# Patient Record
Sex: FEMALE | Race: WHITE | NOT HISPANIC OR LATINO | Employment: OTHER | URBAN - METROPOLITAN AREA
[De-identification: names, ages, dates, MRNs, and addresses within clinical notes are randomized per-mention and may not be internally consistent; named-entity substitution may affect disease eponyms.]

---

## 2017-03-03 ENCOUNTER — TRANSCRIBE ORDERS (OUTPATIENT)
Dept: LAB | Facility: CLINIC | Age: 72
End: 2017-03-03

## 2017-03-03 ENCOUNTER — APPOINTMENT (OUTPATIENT)
Dept: LAB | Facility: CLINIC | Age: 72
End: 2017-03-03
Payer: MEDICARE

## 2017-03-03 DIAGNOSIS — B19.20 UNSPECIFIED VIRAL HEPATITIS C WITHOUT HEPATIC COMA: Primary | ICD-10-CM

## 2017-03-03 DIAGNOSIS — D64.9 ANEMIA, UNSPECIFIED: ICD-10-CM

## 2017-03-03 DIAGNOSIS — I10 ESSENTIAL HYPERTENSION, MALIGNANT: ICD-10-CM

## 2017-03-03 DIAGNOSIS — E55.9 UNSPECIFIED VITAMIN D DEFICIENCY: ICD-10-CM

## 2017-03-03 DIAGNOSIS — E78.5 HYPERLIPIDEMIA, UNSPECIFIED HYPERLIPIDEMIA TYPE: ICD-10-CM

## 2017-03-03 LAB
25(OH)D3 SERPL-MCNC: 12.5 NG/ML (ref 30–100)
ALBUMIN SERPL BCP-MCNC: 3.9 G/DL (ref 3.5–5)
ALP SERPL-CCNC: 76 U/L (ref 46–116)
ALT SERPL W P-5'-P-CCNC: 27 U/L (ref 12–78)
ANION GAP SERPL CALCULATED.3IONS-SCNC: 8 MMOL/L (ref 4–13)
AST SERPL W P-5'-P-CCNC: 20 U/L (ref 5–45)
BILIRUB SERPL-MCNC: 0.54 MG/DL (ref 0.2–1)
BUN SERPL-MCNC: 24 MG/DL (ref 5–25)
CALCIUM SERPL-MCNC: 9.2 MG/DL (ref 8.3–10.1)
CHLORIDE SERPL-SCNC: 106 MMOL/L (ref 100–108)
CHOLEST SERPL-MCNC: 157 MG/DL (ref 50–200)
CO2 SERPL-SCNC: 27 MMOL/L (ref 21–32)
CREAT SERPL-MCNC: 1.32 MG/DL (ref 0.6–1.3)
GFR SERPL CREATININE-BSD FRML MDRD: 39.6 ML/MIN/1.73SQ M
GLUCOSE SERPL-MCNC: 105 MG/DL (ref 65–140)
HCT VFR BLD AUTO: 44.9 % (ref 34.8–46.1)
HDLC SERPL-MCNC: 68 MG/DL (ref 40–60)
HGB BLD-MCNC: 14.8 G/DL (ref 11.5–15.4)
LDLC SERPL CALC-MCNC: 62 MG/DL (ref 0–100)
POTASSIUM SERPL-SCNC: 4.1 MMOL/L (ref 3.5–5.3)
PROT SERPL-MCNC: 7.5 G/DL (ref 6.4–8.2)
SODIUM SERPL-SCNC: 141 MMOL/L (ref 136–145)
TRIGL SERPL-MCNC: 137 MG/DL

## 2017-03-03 PROCEDURE — 82306 VITAMIN D 25 HYDROXY: CPT

## 2017-03-03 PROCEDURE — 85014 HEMATOCRIT: CPT

## 2017-03-03 PROCEDURE — 36415 COLL VENOUS BLD VENIPUNCTURE: CPT

## 2017-03-03 PROCEDURE — 86803 HEPATITIS C AB TEST: CPT

## 2017-03-03 PROCEDURE — 85018 HEMOGLOBIN: CPT

## 2017-03-03 PROCEDURE — 80053 COMPREHEN METABOLIC PANEL: CPT

## 2017-03-03 PROCEDURE — 80061 LIPID PANEL: CPT

## 2017-03-04 LAB — HCV AB SER QL: NORMAL

## 2017-04-11 ENCOUNTER — HOSPITAL ENCOUNTER (OUTPATIENT)
Dept: RADIOLOGY | Facility: HOSPITAL | Age: 72
Discharge: HOME | End: 2017-04-11
Payer: MEDICARE

## 2017-04-11 DIAGNOSIS — C34.91 PRIMARY LUNG CANCER WITH METASTASIS FROM LUNG TO OTHER SITE, RIGHT (HCC): ICD-10-CM

## 2017-04-11 PROCEDURE — 71250 CT THORAX DX C-: CPT

## 2017-04-18 ENCOUNTER — ALLSCRIPTS OFFICE VISIT (OUTPATIENT)
Dept: OTHER | Facility: OTHER | Age: 72
End: 2017-04-18

## 2017-04-18 DIAGNOSIS — C34.91 MALIGNANT NEOPLASM OF UNSPECIFIED PART OF RIGHT BRONCHUS OR LUNG (HCC): ICD-10-CM

## 2017-06-20 ENCOUNTER — TRANSCRIBE ORDERS (OUTPATIENT)
Dept: LAB | Facility: CLINIC | Age: 72
End: 2017-06-20

## 2017-06-20 ENCOUNTER — APPOINTMENT (OUTPATIENT)
Dept: LAB | Facility: CLINIC | Age: 72
End: 2017-06-20
Payer: MEDICARE

## 2017-06-20 DIAGNOSIS — E78.00 PURE HYPERCHOLESTEROLEMIA: ICD-10-CM

## 2017-06-20 DIAGNOSIS — Z79.899 ENCOUNTER FOR LONG-TERM (CURRENT) USE OF OTHER MEDICATIONS: Primary | ICD-10-CM

## 2017-06-20 DIAGNOSIS — D64.9 ANEMIA, UNSPECIFIED: ICD-10-CM

## 2017-06-20 DIAGNOSIS — I10 ESSENTIAL HYPERTENSION, MALIGNANT: ICD-10-CM

## 2017-06-20 LAB
ALBUMIN SERPL BCP-MCNC: 3.8 G/DL (ref 3.5–5)
ALP SERPL-CCNC: 70 U/L (ref 46–116)
ALT SERPL W P-5'-P-CCNC: 29 U/L (ref 12–78)
ANION GAP SERPL CALCULATED.3IONS-SCNC: 7 MMOL/L (ref 4–13)
AST SERPL W P-5'-P-CCNC: 21 U/L (ref 5–45)
BILIRUB SERPL-MCNC: 0.44 MG/DL (ref 0.2–1)
BUN SERPL-MCNC: 24 MG/DL (ref 5–25)
CALCIUM SERPL-MCNC: 9.3 MG/DL (ref 8.3–10.1)
CHLORIDE SERPL-SCNC: 107 MMOL/L (ref 100–108)
CHOLEST SERPL-MCNC: 163 MG/DL (ref 50–200)
CO2 SERPL-SCNC: 26 MMOL/L (ref 21–32)
CREAT SERPL-MCNC: 1.21 MG/DL (ref 0.6–1.3)
GFR SERPL CREATININE-BSD FRML MDRD: 43.7 ML/MIN/1.73SQ M
GLUCOSE P FAST SERPL-MCNC: 106 MG/DL (ref 65–99)
HCT VFR BLD AUTO: 43.6 % (ref 34.8–46.1)
HDLC SERPL-MCNC: 57 MG/DL (ref 40–60)
HGB BLD-MCNC: 14.4 G/DL (ref 11.5–15.4)
LDLC SERPL CALC-MCNC: 79 MG/DL (ref 0–100)
POTASSIUM SERPL-SCNC: 4.2 MMOL/L (ref 3.5–5.3)
PROT SERPL-MCNC: 7.3 G/DL (ref 6.4–8.2)
SODIUM SERPL-SCNC: 140 MMOL/L (ref 136–145)
TRIGL SERPL-MCNC: 137 MG/DL

## 2017-06-20 PROCEDURE — 80053 COMPREHEN METABOLIC PANEL: CPT

## 2017-06-20 PROCEDURE — 80061 LIPID PANEL: CPT

## 2017-06-20 PROCEDURE — 36415 COLL VENOUS BLD VENIPUNCTURE: CPT

## 2017-06-20 PROCEDURE — 85014 HEMATOCRIT: CPT

## 2017-06-20 PROCEDURE — 85018 HEMOGLOBIN: CPT

## 2017-09-28 ENCOUNTER — TRANSCRIBE ORDERS (OUTPATIENT)
Dept: LAB | Facility: CLINIC | Age: 72
End: 2017-09-28

## 2017-09-28 ENCOUNTER — APPOINTMENT (OUTPATIENT)
Dept: LAB | Facility: CLINIC | Age: 72
End: 2017-09-28
Payer: MEDICARE

## 2017-09-28 DIAGNOSIS — E78.00 PURE HYPERCHOLESTEROLEMIA: ICD-10-CM

## 2017-09-28 DIAGNOSIS — I10 ESSENTIAL HYPERTENSION, MALIGNANT: Primary | ICD-10-CM

## 2017-09-28 DIAGNOSIS — N18.2 CHRONIC KIDNEY DISEASE, STAGE II (MILD): ICD-10-CM

## 2017-09-28 LAB
ALBUMIN SERPL BCP-MCNC: 3.8 G/DL (ref 3.5–5)
ALP SERPL-CCNC: 68 U/L (ref 46–116)
ALT SERPL W P-5'-P-CCNC: 29 U/L (ref 12–78)
ANION GAP SERPL CALCULATED.3IONS-SCNC: 9 MMOL/L (ref 4–13)
AST SERPL W P-5'-P-CCNC: 23 U/L (ref 5–45)
BILIRUB SERPL-MCNC: 0.62 MG/DL (ref 0.2–1)
BUN SERPL-MCNC: 24 MG/DL (ref 5–25)
CALCIUM SERPL-MCNC: 9.2 MG/DL (ref 8.3–10.1)
CHLORIDE SERPL-SCNC: 105 MMOL/L (ref 100–108)
CHOLEST SERPL-MCNC: 144 MG/DL (ref 50–200)
CO2 SERPL-SCNC: 25 MMOL/L (ref 21–32)
CREAT SERPL-MCNC: 1.25 MG/DL (ref 0.6–1.3)
GFR SERPL CREATININE-BSD FRML MDRD: 43 ML/MIN/1.73SQ M
GLUCOSE P FAST SERPL-MCNC: 104 MG/DL (ref 65–99)
HCT VFR BLD AUTO: 44.3 % (ref 34.8–46.1)
HDLC SERPL-MCNC: 63 MG/DL (ref 40–60)
HGB BLD-MCNC: 14.9 G/DL (ref 11.5–15.4)
LDLC SERPL CALC-MCNC: 53 MG/DL (ref 0–100)
POTASSIUM SERPL-SCNC: 3.9 MMOL/L (ref 3.5–5.3)
PROT SERPL-MCNC: 7.6 G/DL (ref 6.4–8.2)
SODIUM SERPL-SCNC: 139 MMOL/L (ref 136–145)
TRIGL SERPL-MCNC: 140 MG/DL

## 2017-09-28 PROCEDURE — 80053 COMPREHEN METABOLIC PANEL: CPT

## 2017-09-28 PROCEDURE — 80061 LIPID PANEL: CPT

## 2017-09-28 PROCEDURE — 85018 HEMOGLOBIN: CPT

## 2017-09-28 PROCEDURE — 85014 HEMATOCRIT: CPT

## 2017-09-28 PROCEDURE — 36415 COLL VENOUS BLD VENIPUNCTURE: CPT

## 2017-10-10 ENCOUNTER — HOSPITAL ENCOUNTER (OUTPATIENT)
Dept: RADIOLOGY | Facility: HOSPITAL | Age: 72
Discharge: HOME | End: 2017-10-10
Payer: MEDICARE

## 2017-10-10 DIAGNOSIS — C34.91 MALIGNANT NEOPLASM OF UNSPECIFIED PART OF RIGHT BRONCHUS OR LUNG (HCC): ICD-10-CM

## 2017-10-10 PROCEDURE — 71250 CT THORAX DX C-: CPT

## 2017-10-24 ENCOUNTER — GENERIC CONVERSION - ENCOUNTER (OUTPATIENT)
Dept: OTHER | Facility: OTHER | Age: 72
End: 2017-10-24

## 2017-10-24 DIAGNOSIS — C34.91 MALIGNANT NEOPLASM OF UNSPECIFIED PART OF RIGHT BRONCHUS OR LUNG (HCC): ICD-10-CM

## 2018-01-13 VITALS
OXYGEN SATURATION: 98 % | WEIGHT: 175 LBS | BODY MASS INDEX: 26.52 KG/M2 | HEIGHT: 68 IN | DIASTOLIC BLOOD PRESSURE: 68 MMHG | TEMPERATURE: 97.6 F | HEART RATE: 62 BPM | SYSTOLIC BLOOD PRESSURE: 162 MMHG

## 2018-01-22 VITALS
SYSTOLIC BLOOD PRESSURE: 172 MMHG | HEIGHT: 68 IN | TEMPERATURE: 97.9 F | BODY MASS INDEX: 27.28 KG/M2 | OXYGEN SATURATION: 95 % | DIASTOLIC BLOOD PRESSURE: 74 MMHG | WEIGHT: 180 LBS | HEART RATE: 70 BPM

## 2018-03-01 ENCOUNTER — TRANSCRIBE ORDERS (OUTPATIENT)
Dept: LAB | Facility: CLINIC | Age: 73
End: 2018-03-01

## 2018-03-01 ENCOUNTER — APPOINTMENT (OUTPATIENT)
Dept: LAB | Facility: CLINIC | Age: 73
End: 2018-03-01
Payer: MEDICARE

## 2018-03-01 DIAGNOSIS — T45.1X5A ANEMIA DUE TO CHEMOTHERAPY: ICD-10-CM

## 2018-03-01 DIAGNOSIS — E78.00 PURE HYPERCHOLESTEROLEMIA: ICD-10-CM

## 2018-03-01 DIAGNOSIS — C34.90 ADENOCARCINOMA OF LUNG, UNSPECIFIED LATERALITY (HCC): ICD-10-CM

## 2018-03-01 DIAGNOSIS — I10 BENIGN HYPERTENSION: Primary | ICD-10-CM

## 2018-03-01 DIAGNOSIS — D64.81 ANEMIA DUE TO CHEMOTHERAPY: ICD-10-CM

## 2018-03-01 LAB
ALBUMIN SERPL BCP-MCNC: 3.9 G/DL (ref 3.5–5)
ALP SERPL-CCNC: 74 U/L (ref 46–116)
ALT SERPL W P-5'-P-CCNC: 27 U/L (ref 12–78)
ANION GAP SERPL CALCULATED.3IONS-SCNC: 7 MMOL/L (ref 4–13)
AST SERPL W P-5'-P-CCNC: 20 U/L (ref 5–45)
BILIRUB SERPL-MCNC: 0.55 MG/DL (ref 0.2–1)
BUN SERPL-MCNC: 39 MG/DL (ref 5–25)
CALCIUM SERPL-MCNC: 8.8 MG/DL (ref 8.3–10.1)
CHLORIDE SERPL-SCNC: 105 MMOL/L (ref 100–108)
CHOLEST SERPL-MCNC: 151 MG/DL (ref 50–200)
CO2 SERPL-SCNC: 26 MMOL/L (ref 21–32)
CREAT SERPL-MCNC: 1.34 MG/DL (ref 0.6–1.3)
GFR SERPL CREATININE-BSD FRML MDRD: 39 ML/MIN/1.73SQ M
GLUCOSE P FAST SERPL-MCNC: 105 MG/DL (ref 65–99)
HCT VFR BLD AUTO: 43.4 % (ref 34.8–46.1)
HDLC SERPL-MCNC: 54 MG/DL (ref 40–60)
HGB BLD-MCNC: 14.4 G/DL (ref 11.5–15.4)
LDLC SERPL CALC-MCNC: 70 MG/DL (ref 0–100)
POTASSIUM SERPL-SCNC: 4.1 MMOL/L (ref 3.5–5.3)
PROT SERPL-MCNC: 7.4 G/DL (ref 6.4–8.2)
SODIUM SERPL-SCNC: 138 MMOL/L (ref 136–145)
TRIGL SERPL-MCNC: 134 MG/DL

## 2018-03-01 PROCEDURE — 85018 HEMOGLOBIN: CPT

## 2018-03-01 PROCEDURE — 85014 HEMATOCRIT: CPT

## 2018-03-01 PROCEDURE — 80053 COMPREHEN METABOLIC PANEL: CPT

## 2018-03-01 PROCEDURE — 36415 COLL VENOUS BLD VENIPUNCTURE: CPT

## 2018-03-01 PROCEDURE — 80061 LIPID PANEL: CPT

## 2018-05-02 ENCOUNTER — TRANSCRIBE ORDERS (OUTPATIENT)
Dept: LAB | Facility: CLINIC | Age: 73
End: 2018-05-02

## 2018-05-02 ENCOUNTER — APPOINTMENT (OUTPATIENT)
Dept: LAB | Facility: CLINIC | Age: 73
End: 2018-05-02
Payer: MEDICARE

## 2018-05-02 DIAGNOSIS — N18.30 CHRONIC RENAL DISEASE, STAGE III (HCC): ICD-10-CM

## 2018-05-02 DIAGNOSIS — I10 ESSENTIAL HYPERTENSION, BENIGN: Primary | ICD-10-CM

## 2018-05-02 LAB
ANION GAP SERPL CALCULATED.3IONS-SCNC: 6 MMOL/L (ref 4–13)
BUN SERPL-MCNC: 19 MG/DL (ref 5–25)
CALCIUM SERPL-MCNC: 9 MG/DL (ref 8.3–10.1)
CHLORIDE SERPL-SCNC: 104 MMOL/L (ref 100–108)
CO2 SERPL-SCNC: 29 MMOL/L (ref 21–32)
CREAT SERPL-MCNC: 1.04 MG/DL (ref 0.6–1.3)
GFR SERPL CREATININE-BSD FRML MDRD: 53 ML/MIN/1.73SQ M
GLUCOSE P FAST SERPL-MCNC: 107 MG/DL (ref 65–99)
HCT VFR BLD AUTO: 42.5 % (ref 34.8–46.1)
HGB BLD-MCNC: 13.6 G/DL (ref 11.5–15.4)
POTASSIUM SERPL-SCNC: 4.3 MMOL/L (ref 3.5–5.3)
SODIUM SERPL-SCNC: 139 MMOL/L (ref 136–145)

## 2018-05-02 PROCEDURE — 80048 BASIC METABOLIC PNL TOTAL CA: CPT

## 2018-05-02 PROCEDURE — 85014 HEMATOCRIT: CPT

## 2018-05-02 PROCEDURE — 36415 COLL VENOUS BLD VENIPUNCTURE: CPT

## 2018-05-02 PROCEDURE — 85018 HEMOGLOBIN: CPT

## 2018-08-02 ENCOUNTER — TRANSCRIBE ORDERS (OUTPATIENT)
Dept: LAB | Facility: CLINIC | Age: 73
End: 2018-08-02

## 2018-08-02 ENCOUNTER — APPOINTMENT (OUTPATIENT)
Dept: LAB | Facility: CLINIC | Age: 73
End: 2018-08-02
Payer: MEDICARE

## 2018-08-02 DIAGNOSIS — D63.8 CHRONIC DISEASE ANEMIA: ICD-10-CM

## 2018-08-02 DIAGNOSIS — E78.00 PURE HYPERCHOLESTEROLEMIA: ICD-10-CM

## 2018-08-02 DIAGNOSIS — I10 BENIGN HYPERTENSION: Primary | ICD-10-CM

## 2018-08-02 LAB
ALBUMIN SERPL BCP-MCNC: 3.7 G/DL (ref 3.5–5)
ALP SERPL-CCNC: 73 U/L (ref 46–116)
ALT SERPL W P-5'-P-CCNC: 24 U/L (ref 12–78)
ANION GAP SERPL CALCULATED.3IONS-SCNC: 5 MMOL/L (ref 4–13)
AST SERPL W P-5'-P-CCNC: 18 U/L (ref 5–45)
BILIRUB SERPL-MCNC: 0.44 MG/DL (ref 0.2–1)
BUN SERPL-MCNC: 19 MG/DL (ref 5–25)
CALCIUM SERPL-MCNC: 8.7 MG/DL (ref 8.3–10.1)
CHLORIDE SERPL-SCNC: 104 MMOL/L (ref 100–108)
CHOLEST SERPL-MCNC: 165 MG/DL (ref 50–200)
CO2 SERPL-SCNC: 27 MMOL/L (ref 21–32)
CREAT SERPL-MCNC: 1.19 MG/DL (ref 0.6–1.3)
GFR SERPL CREATININE-BSD FRML MDRD: 45 ML/MIN/1.73SQ M
GLUCOSE P FAST SERPL-MCNC: 96 MG/DL (ref 65–99)
HCT VFR BLD AUTO: 44 % (ref 34.8–46.1)
HDLC SERPL-MCNC: 61 MG/DL (ref 40–60)
HGB BLD-MCNC: 13.5 G/DL (ref 11.5–15.4)
LDLC SERPL CALC-MCNC: 76 MG/DL (ref 0–100)
NONHDLC SERPL-MCNC: 104 MG/DL
POTASSIUM SERPL-SCNC: 4.2 MMOL/L (ref 3.5–5.3)
PROT SERPL-MCNC: 7.3 G/DL (ref 6.4–8.2)
SODIUM SERPL-SCNC: 136 MMOL/L (ref 136–145)
TRIGL SERPL-MCNC: 139 MG/DL

## 2018-08-02 PROCEDURE — 80061 LIPID PANEL: CPT

## 2018-08-02 PROCEDURE — 80053 COMPREHEN METABOLIC PANEL: CPT

## 2018-08-02 PROCEDURE — 85018 HEMOGLOBIN: CPT

## 2018-08-02 PROCEDURE — 85014 HEMATOCRIT: CPT

## 2018-08-02 PROCEDURE — 36415 COLL VENOUS BLD VENIPUNCTURE: CPT

## 2018-08-14 ENCOUNTER — TRANSCRIBE ORDERS (OUTPATIENT)
Dept: ADMINISTRATIVE | Facility: HOSPITAL | Age: 73
End: 2018-08-14

## 2018-08-14 DIAGNOSIS — R20.2 NUMBNESS AND TINGLING OF FOOT: Primary | ICD-10-CM

## 2018-08-14 DIAGNOSIS — R20.0 NUMBNESS AND TINGLING OF FOOT: Primary | ICD-10-CM

## 2018-08-23 ENCOUNTER — HOSPITAL ENCOUNTER (OUTPATIENT)
Dept: RADIOLOGY | Facility: HOSPITAL | Age: 73
Discharge: HOME | End: 2018-08-23
Attending: PSYCHIATRY & NEUROLOGY
Payer: MEDICARE

## 2018-08-23 DIAGNOSIS — R20.0 NUMBNESS AND TINGLING OF FOOT: ICD-10-CM

## 2018-08-23 DIAGNOSIS — R20.2 NUMBNESS AND TINGLING OF FOOT: ICD-10-CM

## 2018-08-23 PROCEDURE — 70551 MRI BRAIN STEM W/O DYE: CPT

## 2018-08-31 ENCOUNTER — APPOINTMENT (OUTPATIENT)
Dept: LAB | Facility: CLINIC | Age: 73
End: 2018-08-31
Payer: MEDICARE

## 2018-08-31 ENCOUNTER — TRANSCRIBE ORDERS (OUTPATIENT)
Dept: LAB | Facility: CLINIC | Age: 73
End: 2018-08-31

## 2018-08-31 DIAGNOSIS — N39.0 URINARY TRACT INFECTION WITHOUT HEMATURIA, SITE UNSPECIFIED: Primary | ICD-10-CM

## 2018-08-31 LAB
BACTERIA UR QL AUTO: ABNORMAL /HPF
BILIRUB UR QL STRIP: NEGATIVE
CLARITY UR: ABNORMAL
COLOR UR: YELLOW
GLUCOSE UR STRIP-MCNC: NEGATIVE MG/DL
HGB UR QL STRIP.AUTO: ABNORMAL
HYALINE CASTS #/AREA URNS LPF: ABNORMAL /LPF
KETONES UR STRIP-MCNC: NEGATIVE MG/DL
LEUKOCYTE ESTERASE UR QL STRIP: ABNORMAL
NITRITE UR QL STRIP: NEGATIVE
NON-SQ EPI CELLS URNS QL MICRO: ABNORMAL /HPF
PH UR STRIP.AUTO: 6 [PH] (ref 4.5–8)
PROT UR STRIP-MCNC: NEGATIVE MG/DL
RBC #/AREA URNS AUTO: ABNORMAL /HPF
SP GR UR STRIP.AUTO: 1.01 (ref 1–1.03)
UROBILINOGEN UR QL STRIP.AUTO: 0.2 E.U./DL
WBC #/AREA URNS AUTO: ABNORMAL /HPF

## 2018-08-31 PROCEDURE — 87086 URINE CULTURE/COLONY COUNT: CPT

## 2018-08-31 PROCEDURE — 81001 URINALYSIS AUTO W/SCOPE: CPT

## 2018-09-01 LAB — BACTERIA UR CULT: NORMAL

## 2018-09-06 ENCOUNTER — OFFICE VISIT (OUTPATIENT)
Dept: CARDIOLOGY CLINIC | Facility: CLINIC | Age: 73
End: 2018-09-06
Payer: MEDICARE

## 2018-09-06 VITALS
HEIGHT: 68 IN | HEART RATE: 74 BPM | WEIGHT: 184 LBS | DIASTOLIC BLOOD PRESSURE: 86 MMHG | OXYGEN SATURATION: 97 % | SYSTOLIC BLOOD PRESSURE: 160 MMHG | BODY MASS INDEX: 27.89 KG/M2

## 2018-09-06 DIAGNOSIS — M79.89 LEFT LEG SWELLING: ICD-10-CM

## 2018-09-06 DIAGNOSIS — E78.5 DYSLIPIDEMIA: ICD-10-CM

## 2018-09-06 DIAGNOSIS — M15.9 PRIMARY OSTEOARTHRITIS INVOLVING MULTIPLE JOINTS: ICD-10-CM

## 2018-09-06 DIAGNOSIS — I10 ESSENTIAL HYPERTENSION: ICD-10-CM

## 2018-09-06 DIAGNOSIS — C34.91 ADENOCARCINOMA OF RIGHT LUNG (HCC): ICD-10-CM

## 2018-09-06 DIAGNOSIS — Z01.818 PREOPERATIVE CLEARANCE: ICD-10-CM

## 2018-09-06 PROBLEM — M15.0 PRIMARY OSTEOARTHRITIS INVOLVING MULTIPLE JOINTS: Status: ACTIVE | Noted: 2018-09-06

## 2018-09-06 PROCEDURE — 99205 OFFICE O/P NEW HI 60 MIN: CPT | Performed by: INTERNAL MEDICINE

## 2018-09-06 PROCEDURE — 93000 ELECTROCARDIOGRAM COMPLETE: CPT | Performed by: INTERNAL MEDICINE

## 2018-09-06 RX ORDER — ATORVASTATIN CALCIUM 10 MG/1
10 TABLET, FILM COATED ORAL
COMMUNITY
End: 2019-10-24 | Stop reason: SDUPTHER

## 2018-09-06 RX ORDER — DOXYCYCLINE HYCLATE 20 MG
20 TABLET ORAL 2 TIMES DAILY
Refills: 3 | COMMUNITY
Start: 2018-09-01

## 2018-09-06 RX ORDER — LOSARTAN POTASSIUM 50 MG/1
50 TABLET ORAL DAILY
COMMUNITY
End: 2019-10-24 | Stop reason: SDUPTHER

## 2018-09-06 RX ORDER — METOPROLOL SUCCINATE 100 MG/1
100 TABLET, EXTENDED RELEASE ORAL DAILY
Qty: 90 TABLET | Refills: 1
Start: 2018-09-06 | End: 2019-10-24 | Stop reason: SDUPTHER

## 2018-09-06 RX ORDER — LOSARTAN POTASSIUM 50 MG/1
50 TABLET ORAL DAILY
Qty: 30 TABLET | Refills: 2
Start: 2018-09-06 | End: 2019-02-11

## 2018-09-06 RX ORDER — TRIAMTERENE AND HYDROCHLOROTHIAZIDE 37.5; 25 MG/1; MG/1
1 CAPSULE ORAL
COMMUNITY
End: 2020-04-17

## 2018-09-06 RX ORDER — CHOLECALCIFEROL (VITAMIN D3) 125 MCG
10 CAPSULE ORAL
COMMUNITY

## 2018-09-06 RX ORDER — LOSARTAN POTASSIUM AND HYDROCHLOROTHIAZIDE 12.5; 5 MG/1; MG/1
1 TABLET ORAL DAILY
COMMUNITY
End: 2018-09-06

## 2018-09-06 RX ORDER — METOPROLOL TARTRATE 50 MG/1
TABLET, FILM COATED ORAL
COMMUNITY
End: 2018-09-06

## 2018-09-06 RX ORDER — HYDROCODONE BITARTRATE AND ACETAMINOPHEN 7.5; 325 MG/1; MG/1
TABLET ORAL
COMMUNITY
End: 2019-08-26

## 2018-09-06 NOTE — PROGRESS NOTES
Consultation - Cardiology  Office  [facility]     Khai Franks 75 y o  female MRN: [id number]  : 1945  Encounter: [id number]    Assessment:  1  Essential hypertension    2  Primary osteoarthritis involving multiple joints    3  Dyslipidemia    4  Left leg swelling    5  Adenocarcinoma of right lung ([address] )    6  Preoperative clearance        Discussion Summary & Plan:   1  Essential hypertension  Patient has elevated blood pressure  Could be anxiety driven however patient was also holding her diuretic  Will restart Dyazide as she is taking before  With increased Toprol XL to total dose of 100 mg  Currently she was taking 75 mg  Continue losartan  She is already scheduled to have blood test   Initial blood pressure was around 176  Repeat blood pressure was around 160/80  2   Preoperative clearance for hip arthritis  Patient has multiple cardiac risk factors including history of previous tobacco abuse, lung cancer, dyslipidemia  It is hard to assess her functional capacity and she had issues related noncompliance  Will check Lexiscan stress test    3  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  Follow up with CT surgery    4  Dyslipidemia  Continue statins    5  History of anxiety    6  Bilateral lower extremity edema continue diuretics  Electrolytes are acceptable  Issues related to regular follow-up and compliance discussed with the patient  She understand it very well  Thank you for the consult  Counseling :  A description of the counseling:   Goals and Barriers:  Patient's ability to self care: Yes  Medication side effect reviewed with patient in detail and all their questions answered to their satisfaction  Physician Requesting Consult:  Maria Teresa Ovalle    Reason for Consult:   Preoperative clearance      HPI:     Khai Franks is a 75y o  year old female  Who was initially seen by me in 2015 has past medical history significant for lung cancer status post partial lobectomy right lower lobe  She had a stage IA cancer and she follows up with [facility] thoracic surgery  She also had past medical history significant for hypertension, previous tobacco abuse now quit smoking, emphysema, dyslipidemia, severe DJD with previous left hip surgery now need surgery on her right hip  Since her last visit in 2015 she has gained some weight  She also noted that she occasionally gets bilateral lower extremity swelling left more than right  She was on Dyazide which was recently had as her BUN creatinine might be high and that led to increase in her blood pressure  She also increased swelling she is back on Dyazide now every other day  She also takes losartan 50 mg, metoprolol XL 75 mg daily and atorvastatin  Her previous cardiac workup was in 2015 April when she had a normal stress test and study was non gated  Echo shows at that time she has a normal LV systolic function EF was 05% with grade 1 diastolic dysfunction  She cannot walk much she walks with the help of cane  No nausea no vomiting no PND no chest pain  She denies any history of stenting in between no history of MI no history of heart attack no heart failure  Review of Systems   Musculoskeletal: Positive for arthralgias and back pain  Psychiatric/Behavioral: The patient is nervous/anxious  All other systems reviewed and are negative        Historical Information   Past Medical History:   Diagnosis Date    Emphysema lung ([address] )     Hypertension      Past Surgical History:   Procedure Laterality Date    CHOLECYSTECTOMY      TOTAL HIP ARTHROPLASTY      TUMOR REMOVAL Right 2015    lower lobe        Social History:  History   Alcohol Use    Yes     Comment: ocassional      History   Drug Use No     History   Smoking Status    Former Smoker   Smokeless Tobacco    Never Used          Family History   Problem Relation Age of Onset    Heart disease Mother        Meds/Allergies     No Known Allergies    Current medications:    Current Outpatient Prescriptions:     atorvastatin (LIPITOR) 10 mg tablet, Take 1 tablet by mouth daily, Disp: , Rfl:     doxycycline (PERIOSTAT) 20 MG tablet, , Disp: , Rfl: 3    HYDROcodone-acetaminophen (NORCO) 7 5-325 mg per tablet, Take by mouth, Disp: , Rfl:     losartan (COZAAR) 50 mg tablet, Take 50 mg by mouth daily , Disp: , Rfl:     Melatonin 5 MG TABS, Take by mouth, Disp: , Rfl:     Polyethylene Glycol 400 (BLINK TEARS) 0 25 % SOLN, Apply 1 drop to eye, Disp: , Rfl:     triamterene-hydrochlorothiazide (DYAZIDE) 37 5-25 mg per capsule, Take by mouth, Disp: , Rfl:     losartan (COZAAR) 50 mg tablet, Take 1 tablet (50 mg total) by mouth daily, Disp: 30 tablet, Rfl: 2    metoprolol succinate (TOPROL-XL) 100 mg 24 hr tablet, Take 1 tablet (100 mg total) by mouth daily, Disp: 90 tablet, Rfl: 1      Objective:     Vitals: Blood pressure 160/86, pulse 74, height 5' 7 5" (1 715 m), weight 83 5 kg (184 lb), SpO2 97 %  Body mass index is 28 39 kg/m²  Vitals:    09/06/18 1439   Weight: 83 5 kg (184 lb)     BP Readings from Last 3 Encounters:   09/06/18 160/86   10/24/17 (!) 172/74   04/18/17 162/68         Physical Exam:   Physical Exam    Neurologic:  Alert & oriented x 3, no new focal deficits, Not in any acute distress,  Constitutional:  Well developed, well nourished, non-toxic appearance   Eyes:  Pupil equal and reacting to light, conjunctiva normal, No JVP, No LNP   HENT:  Atraumatic, oropharynx moist, Neck- normal range of motion, no tenderness,  Neck supple   Respiratory:  Bilateral air entry, mostly clear to auscultation  Cardiovascular: S1-S2 regular with a 2/6 ejection systolic murmur and S4 is present  GI:  Soft, nondistended, normal bowel sounds, nontender, no hepatosplenomegaly appreciated  Musculoskeletal:  No edema, no tenderness, no deformities     Skin:  Well hydrated, no rash   Lymphatic:  No lymphadenopathy noted   Extremities:  No edema and distal pulses are present      Labs:     Lab Results   Component Value Date    WBC 13 05 (H) 09/19/2015    HGB 13 5 08/02/2018    HCT 44 0 08/02/2018    MCV 87 09/19/2015    RDW 14 5 09/19/2015     09/19/2015     BMP:  Lab Results   Component Value Date     08/02/2018    K 4 2 08/02/2018     08/02/2018    CO2 27 08/02/2018    ANIONGAP 6 09/20/2015    BUN 19 08/02/2018    CREATININE 1 19 08/02/2018    GLUCOSE 109 09/20/2015    GLUF 96 08/02/2018    CALCIUM 8 7 08/02/2018    EGFR 45 08/02/2018    MG 2 2 09/20/2015     LFT:  Lab Results   Component Value Date    AST 18 08/02/2018    ALT 24 08/02/2018    ALKPHOS 73 08/02/2018     Lipid Profile:   Lab Results   Component Value Date    HDL 61 (H) 08/02/2018    LDLCALC 76 08/02/2018    TRIG 139 08/02/2018         Imaging & Testing     Cardiac testing:       No recent cardiac testing    Diagnostic Studies Review Cardio:   echo Doppler echo Doppler done in April of 2015 shows EF 70%, hyperdynamic LV, grade 1 diastolic dysfunction, trace MR Trace TR  Nuclear stress test done on 04/03/2015 was nonischemic it was non gated study  EKG:    Twelve lead EKG done in our office on 09/06/2018 shows normal sinus rhythm heart rate 72 beats per minute  No significant ST changes  Dr Franklin Corley MD [facility] [place]      "This note has been constructed using a voice recognition system  Therefore there may be syntax, spelling, and/or grammatical errors  Please call if you have any questions

## 2018-09-06 NOTE — LETTER
September 6, 2018     Michael Lemus  [facility]  [address] [place] [address]    Patient: Danilo Andres   YOB: 1945   Date of Visit: 9/6/2018     Dear Dr Sarah Kong      Thank you for referring Shawn Navarro to me for evaluation  Below are the relevant portions of my assessment and plan of care  If you have questions, please do not hesitate to call me  I look forward to following Saul Aleman along with you  Sincerely,        Georgi Donald MD        CC: No Recipients       Discussion Summary & Plan:   1  Essential hypertension  Patient has elevated blood pressure  Could be anxiety driven however patient was also holding her diuretic  Will restart Dyazide as she is taking before  With increased Toprol XL to total dose of 100 mg  Currently she was taking 75 mg  Continue losartan  She is already scheduled to have blood test   Initial blood pressure was around 176  Repeat blood pressure was around 160/80  2   Preoperative clearance for hip arthritis  Patient has multiple cardiac risk factors including history of previous tobacco abuse, lung cancer, dyslipidemia  It is hard to assess her functional capacity and she had issues related noncompliance  Will check Lexiscan stress test    3  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  Follow up with CT surgery    4  Dyslipidemia  Continue statins    5  History of anxiety    6  Bilateral lower extremity edema continue diuretics  Electrolytes are acceptable  Issues related to regular follow-up and compliance discussed with the patient  She understand it very well  Thank you for the consult  Counseling :  A description of the counseling:   Goals and Barriers:  Patient's ability to self care: Yes  Medication side effect reviewed with patient in detail and all their questions answered to their satisfaction  Physician Requesting Consult:  Michael Lemus    Reason for Consult: Preoperative clearance      HPI:     Danilo Andres is a 75y o  year old female  Who was initially seen by me in 2015 has past medical history significant for lung cancer status post partial lobectomy right lower lobe  She had a stage IA cancer and she follows up with [facility] thoracic surgery  She also had past medical history significant for hypertension, previous tobacco abuse now quit smoking, emphysema, dyslipidemia, severe DJD with previous left hip surgery now need surgery on her right hip  Since her last visit in 2015 she has gained some weight  She also noted that she occasionally gets bilateral lower extremity swelling left more than right  She was on Dyazide which was recently had as her BUN creatinine might be high and that led to increase in her blood pressure  She also increased swelling she is back on Dyazide now every other day  She also takes losartan 50 mg, metoprolol XL 75 mg daily and atorvastatin  Her previous cardiac workup was in 2015 April when she had a normal stress test and study was non gated  Echo shows at that time she has a normal LV systolic function EF was 95% with grade 1 diastolic dysfunction  She cannot walk much she walks with the help of cane  No nausea no vomiting no PND no chest pain  She denies any history of stenting in between no history of MI no history of heart attack no heart failure  Review of Systems   Musculoskeletal: Positive for arthralgias and back pain  Psychiatric/Behavioral: The patient is nervous/anxious  All other systems reviewed and are negative        Historical Information   Past Medical History:   Diagnosis Date    Emphysema lung ([address] )     Hypertension      Past Surgical History:   Procedure Laterality Date    CHOLECYSTECTOMY      TOTAL HIP ARTHROPLASTY      TUMOR REMOVAL Right 2015    lower lobe        Social History:  History   Alcohol Use    Yes     Comment: ocassional      History   Drug Use No     History Smoking Status    Former Smoker   Smokeless Tobacco    Never Used          Family History   Problem Relation Age of Onset    Heart disease Mother        Meds/Allergies     No Known Allergies    Current medications:    Current Outpatient Prescriptions:     atorvastatin (LIPITOR) 10 mg tablet, Take 1 tablet by mouth daily, Disp: , Rfl:     doxycycline (PERIOSTAT) 20 MG tablet, , Disp: , Rfl: 3    HYDROcodone-acetaminophen (NORCO) 7 5-325 mg per tablet, Take by mouth, Disp: , Rfl:     losartan (COZAAR) 50 mg tablet, Take 50 mg by mouth daily , Disp: , Rfl:     Melatonin 5 MG TABS, Take by mouth, Disp: , Rfl:     Polyethylene Glycol 400 (BLINK TEARS) 0 25 % SOLN, Apply 1 drop to eye, Disp: , Rfl:     triamterene-hydrochlorothiazide (DYAZIDE) 37 5-25 mg per capsule, Take by mouth, Disp: , Rfl:     losartan (COZAAR) 50 mg tablet, Take 1 tablet (50 mg total) by mouth daily, Disp: 30 tablet, Rfl: 2    metoprolol succinate (TOPROL-XL) 100 mg 24 hr tablet, Take 1 tablet (100 mg total) by mouth daily, Disp: 90 tablet, Rfl: 1      Objective:     Vitals: Blood pressure 160/86, pulse 74, height 5' 7 5" (1 715 m), weight 83 5 kg (184 lb), SpO2 97 %  Body mass index is 28 39 kg/m²    Vitals:    09/06/18 1439   Weight: 83 5 kg (184 lb)     BP Readings from Last 3 Encounters:   09/06/18 160/86   10/24/17 (!) 172/74   04/18/17 162/68         Physical Exam:   Physical Exam    Neurologic:  Alert & oriented x 3, no new focal deficits, Not in any acute distress,  Constitutional:  Well developed, well nourished, non-toxic appearance   Eyes:  Pupil equal and reacting to light, conjunctiva normal, No JVP, No LNP   HENT:  Atraumatic, oropharynx moist, Neck- normal range of motion, no tenderness,  Neck supple   Respiratory:  Bilateral air entry, mostly clear to auscultation  Cardiovascular: S1-S2 regular with a 2/6 ejection systolic murmur and S4 is present  GI:  Soft, nondistended, normal bowel sounds, nontender, no hepatosplenomegaly appreciated  Musculoskeletal:  No edema, no tenderness, no deformities  Skin:  Well hydrated, no rash   Lymphatic:  No lymphadenopathy noted   Extremities:  No edema and distal pulses are present      Labs:     Lab Results   Component Value Date    WBC 13 05 (H) 09/19/2015    HGB 13 5 08/02/2018    HCT 44 0 08/02/2018    MCV 87 09/19/2015    RDW 14 5 09/19/2015     09/19/2015     BMP:  Lab Results   Component Value Date     08/02/2018    K 4 2 08/02/2018     08/02/2018    CO2 27 08/02/2018    ANIONGAP 6 09/20/2015    BUN 19 08/02/2018    CREATININE 1 19 08/02/2018    GLUCOSE 109 09/20/2015    GLUF 96 08/02/2018    CALCIUM 8 7 08/02/2018    EGFR 45 08/02/2018    MG 2 2 09/20/2015     LFT:  Lab Results   Component Value Date    AST 18 08/02/2018    ALT 24 08/02/2018    ALKPHOS 73 08/02/2018     Lipid Profile:   Lab Results   Component Value Date    HDL 61 (H) 08/02/2018    LDLCALC 76 08/02/2018    TRIG 139 08/02/2018         Imaging & Testing     Cardiac testing:       No recent cardiac testing    Diagnostic Studies Review Cardio:   echo Doppler echo Doppler done in April of 2015 shows EF 70%, hyperdynamic LV, grade 1 diastolic dysfunction, trace MR Trace TR  Nuclear stress test done on 04/03/2015 was nonischemic it was non gated study  EKG:    Twelve lead EKG done in our office on 09/06/2018 shows normal sinus rhythm heart rate 72 beats per minute  No significant ST changes  Dr Torres oFng MD [facility] [place]      "This note has been constructed using a voice recognition system  Therefore there may be syntax, spelling, and/or grammatical errors  Please call if you have any questions       Progress Notes:

## 2018-09-07 ENCOUNTER — HOSPITAL ENCOUNTER (OUTPATIENT)
Dept: RADIOLOGY | Facility: HOSPITAL | Age: 73
Discharge: HOME | End: 2018-09-07
Payer: MEDICARE

## 2018-09-07 ENCOUNTER — APPOINTMENT (OUTPATIENT)
Dept: PREADMISSION TESTING | Facility: HOSPITAL | Age: 73
DRG: 470 | End: 2018-09-07
Payer: MEDICARE

## 2018-09-07 ENCOUNTER — APPOINTMENT (OUTPATIENT)
Dept: LAB | Facility: HOSPITAL | Age: 73
DRG: 470 | End: 2018-09-07
Attending: ORTHOPAEDIC SURGERY
Payer: MEDICARE

## 2018-09-07 ENCOUNTER — TELEPHONE (OUTPATIENT)
Dept: CARDIOLOGY CLINIC | Facility: CLINIC | Age: 73
End: 2018-09-07

## 2018-09-07 ENCOUNTER — TRANSCRIBE ORDERS (OUTPATIENT)
Dept: ADMINISTRATIVE | Facility: HOSPITAL | Age: 73
End: 2018-09-07

## 2018-09-07 ENCOUNTER — HOSPITAL ENCOUNTER (OUTPATIENT)
Dept: NON INVASIVE DIAGNOSTICS | Facility: HOSPITAL | Age: 73
Discharge: HOME | End: 2018-09-07
Payer: MEDICARE

## 2018-09-07 DIAGNOSIS — I10 ESSENTIAL HYPERTENSION: ICD-10-CM

## 2018-09-07 DIAGNOSIS — Z01.818 PREOP TESTING: Primary | ICD-10-CM

## 2018-09-07 DIAGNOSIS — Z01.818 PREOPERATIVE CLEARANCE: ICD-10-CM

## 2018-09-07 DIAGNOSIS — Z01.818 PREOP TESTING: ICD-10-CM

## 2018-09-07 LAB
ABO GROUP BLD: NORMAL
BLD GP AB SCN SERPL QL: NEGATIVE
CHEST PAIN STATEMENT: NORMAL
MAX DIASTOLIC BP: 97 MMHG
MAX HEART RATE: 100 BPM
MAX PREDICTED HEART RATE: 146 BPM
MAX. SYSTOLIC BP: 214 MMHG
PROTOCOL NAME: NORMAL
REASON FOR TERMINATION: NORMAL
RH BLD: POSITIVE
SPECIMEN EXPIRATION DATE: NORMAL
TARGET HR FORMULA: NORMAL
TEST INDICATION: NORMAL
TIME IN EXERCISE PHASE: NORMAL

## 2018-09-07 PROCEDURE — A9502 TC99M TETROFOSMIN: HCPCS

## 2018-09-07 PROCEDURE — 86850 RBC ANTIBODY SCREEN: CPT

## 2018-09-07 PROCEDURE — 78452 HT MUSCLE IMAGE SPECT MULT: CPT | Performed by: INTERNAL MEDICINE

## 2018-09-07 PROCEDURE — 78452 HT MUSCLE IMAGE SPECT MULT: CPT

## 2018-09-07 PROCEDURE — 86900 BLOOD TYPING SEROLOGIC ABO: CPT

## 2018-09-07 PROCEDURE — 93017 CV STRESS TEST TRACING ONLY: CPT

## 2018-09-07 PROCEDURE — 36415 COLL VENOUS BLD VENIPUNCTURE: CPT

## 2018-09-07 PROCEDURE — 87081 CULTURE SCREEN ONLY: CPT

## 2018-09-07 PROCEDURE — 93018 CV STRESS TEST I&R ONLY: CPT | Performed by: INTERNAL MEDICINE

## 2018-09-07 PROCEDURE — 93016 CV STRESS TEST SUPVJ ONLY: CPT | Performed by: INTERNAL MEDICINE

## 2018-09-07 PROCEDURE — 86901 BLOOD TYPING SEROLOGIC RH(D): CPT

## 2018-09-07 RX ADMIN — REGADENOSON 0.4 MG: 0.08 INJECTION, SOLUTION INTRAVENOUS at 10:46

## 2018-09-07 NOTE — TELEPHONE ENCOUNTER
Morena Bloom came in today with questions around her medications  I asked Dr Sandy Pires, and the medications have been clarified  She is a little leary on taking so many medications for blood pressure but I assured her that Dr Sandy Pires knows what he is doing and this is why we have followups  Per Dr Sandy Pires, Metoprolol 100mg qd  Losartan 50mg BID or 100mg QD  And to continue Dyazide as directed  Patient was also instructed to call back with any questions or concerns that she may have

## 2018-09-07 NOTE — PRE-PROCEDURE INSTRUCTIONS
My Surgical Experience    The following information was developed to assist you to prepare for your operation  What do I need to do before coming to the hospital?   Arrange for a responsible person to drive you to and from the hospital    Arrange care for your children at home  Children are not allowed in the recovery areas of the hospital   Plan to wear clothing that is easy to put on and take off  If you are having shoulder surgery, wear a shirt that buttons or zippers in the front  Bathing  o Shower the evening before and the morning of your surgery with an antibacterial soap  Please refer to the Pre Op Showering Instructions for Surgery Patients Sheet   o Remove nail polish and all body piercing jewelry  o Do not shave any body part for at least 24 hours before surgery-this includes face, arms, legs and upper body  Food  o Nothing to eat or drink after midnight the night before your surgery  This includes candy and chewing gum  o Exception: If your surgery is after 12:00pm (noon), you may have clear liquids such as 7-Up®, ginger ale, apple or cranberry juice, Jell-O®, water, or clear broth until 8:00 am  o Do not drink milk or juice with pulp on the morning before surgery  o Do not drink alcohol 24 hours before surgery  Medicine  o Follow instructions you received from your surgeon about which medicines you may take on the day of surgery  o If instructed to take medicine on the morning of surgery, take pills with just a small sip of water  Call your prescribing doctor for specific infroamtion on what to do if you take insulin    What should I bring to the hospital?    Bring:  Anthony Benson or a walker, if you have them, for foot or knee surgery   A list of the daily medicines, vitamins, minerals, herbals and nutritional supplements you take   Include the dosages of medicines and the time you take them each day   Glasses, dentures or hearing aids   Minimal clothing; you will be wearing hospital sleepwear   Photo ID; required to verify your identity   If you have a Living Will or Power of , bring a copy of the documents   If you have an ostomy, bring an extra pouch and any supplies you use    Do not bring   Medicines or inhalers   Money, valuables or jewelry    What other information should I know about the day of surgery?  Notify your surgeons if you develop a cold, sore throat, cough, fever, rash or any other illness   Report to the Ambulatory Surgical/Same Day Surgery Unit   You will be instructed to stop at Registration only if you have not been pre-registered   Inform your  fi they do not stay that they will be asked by the staff to leave a phone number where they can be reached   Be available to be reached before surgery  In the event the operating room schedule changes, you may be asked to come in earlier or later than expected    *It is important to tell your doctor and others involved in your health care if you are taking or have been taking any non-prescription drugs, vitamins, minerals, herbals or other nutritional supplements  Any of these may interact with some food or medicines and cause a reaction      Pre-Surgery Instructions:   Medication Instructions    atorvastatin (LIPITOR) 10 mg tablet Instructed patient per Anesthesia Guidelines   doxycycline (PERIOSTAT) 20 MG tablet Instructed patient per Anesthesia Guidelines   HYDROcodone-acetaminophen (NORCO) 7 5-325 mg per tablet Instructed patient per Anesthesia Guidelines   losartan (COZAAR) 50 mg tablet Instructed patient per Anesthesia Guidelines   Melatonin 5 MG TABS Instructed patient per Anesthesia Guidelines   metoprolol succinate (TOPROL-XL) 100 mg 24 hr tablet Instructed patient per Anesthesia Guidelines   Polyethylene Glycol 400 (BLINK TEARS) 0 25 % SOLN Instructed patient per Anesthesia Guidelines      triamterene-hydrochlorothiazide (DYAZIDE) 37 5-25 mg per capsule Instructed patient per Anesthesia Guidelines  To take losartan and metoprolol a m   Of surgery

## 2018-09-08 LAB — MRSA NOSE QL CULT: NORMAL

## 2018-09-08 NOTE — PROGRESS NOTES
Pt's Patient's stress test is normal    Patient can keep regular appointment  Please call patient with the result  Pt is low risk for surgery   Do clearance note

## 2018-09-10 ENCOUNTER — APPOINTMENT (OUTPATIENT)
Dept: LAB | Facility: CLINIC | Age: 73
End: 2018-09-10
Payer: MEDICARE

## 2018-09-10 ENCOUNTER — TELEPHONE (OUTPATIENT)
Dept: CARDIOLOGY CLINIC | Facility: CLINIC | Age: 73
End: 2018-09-10

## 2018-09-10 ENCOUNTER — TRANSCRIBE ORDERS (OUTPATIENT)
Dept: LAB | Facility: CLINIC | Age: 73
End: 2018-09-10

## 2018-09-10 DIAGNOSIS — I10 ESSENTIAL HYPERTENSION, MALIGNANT: Primary | ICD-10-CM

## 2018-09-10 LAB
ALBUMIN SERPL BCP-MCNC: 3.8 G/DL (ref 3.5–5)
ALP SERPL-CCNC: 83 U/L (ref 46–116)
ALT SERPL W P-5'-P-CCNC: 26 U/L (ref 12–78)
ANION GAP SERPL CALCULATED.3IONS-SCNC: 8 MMOL/L (ref 4–13)
AST SERPL W P-5'-P-CCNC: 23 U/L (ref 5–45)
BILIRUB SERPL-MCNC: 0.39 MG/DL (ref 0.2–1)
BUN SERPL-MCNC: 22 MG/DL (ref 5–25)
CALCIUM SERPL-MCNC: 9.2 MG/DL (ref 8.3–10.1)
CHLORIDE SERPL-SCNC: 106 MMOL/L (ref 100–108)
CO2 SERPL-SCNC: 24 MMOL/L (ref 21–32)
CREAT SERPL-MCNC: 1.15 MG/DL (ref 0.6–1.3)
GFR SERPL CREATININE-BSD FRML MDRD: 47 ML/MIN/1.73SQ M
GLUCOSE SERPL-MCNC: 102 MG/DL (ref 65–140)
POTASSIUM SERPL-SCNC: 4 MMOL/L (ref 3.5–5.3)
PROT SERPL-MCNC: 7.6 G/DL (ref 6.4–8.2)
SODIUM SERPL-SCNC: 138 MMOL/L (ref 136–145)
URATE SERPL-MCNC: 5.4 MG/DL (ref 2–6.8)

## 2018-09-10 PROCEDURE — 84550 ASSAY OF BLOOD/URIC ACID: CPT

## 2018-09-10 PROCEDURE — 36415 COLL VENOUS BLD VENIPUNCTURE: CPT

## 2018-09-10 PROCEDURE — 80053 COMPREHEN METABOLIC PANEL: CPT

## 2018-09-10 NOTE — TELEPHONE ENCOUNTER
Pre  Op  Clearance note- Cardiology    Rossana Feliciano   75 y o   female  1945      Janet CARLOS Pfund :   Patient's chart was reviewed for preop clearance  Patient was seen in our office on 09/06/2018  Patient has past medical history significant for essential hypertension, dyslipidemia, adeno carcinoma of lung status post surgery and severe arthritis  Patient is now scheduled for hip surgery  Patient has no clinical evidence of heart failure or ischemia or active arrhythmia  Patient's last cardiac workup including nuclear stress test reports were reviewed and it shows normal LV systolic function with no ischemia nuclear done in September of 2018  Previously echo in 2015 shows normal LV systolic function and no significant valvular disease  Georgette Ormond She was found to have high blood pressure as it was not well controlled and she was not taking her medications as prescribed  In my opinion patient is in optimum condition for the procedure as planned  Patient is low risk for the surgery as planned  Please advise patient to continue taking blood pressure medication and monitor her blood pressure closely  Continue current cardiac medications  Patient can hold  Aspirin for  5-7 days as required for surgery  Patient can hold Eliquis/Xarelto/Pradaxa for 3 days before the procedure  Please restart after the procedure when okay from surgical point of view and advise patient to contact our office  If you have any question please do not hesitate to call us at our office of [facility]  Phone # [phone]        Lab Results   Component Value Date    WBC 13 05 (H) 09/19/2015    HGB 13 5 08/02/2018    HCT 44 0 08/02/2018    MCV 87 09/19/2015     09/19/2015     Lab Results   Component Value Date    CREATININE 1 19 08/02/2018     Lab Results   Component Value Date    GLUF 96 08/02/2018       DR Torres Fong   9/10/2018  9:43 AM

## 2018-09-10 NOTE — TELEPHONE ENCOUNTER
----- Message from Jona Mosley MD sent at 9/8/2018  6:56 PM EDT -----  Pt's Patient's stress test is normal    Patient can keep regular appointment  Please call patient with the result  Pt is low risk for surgery   Do clearance note

## 2018-09-13 ENCOUNTER — ANESTHESIA EVENT (OUTPATIENT)
Dept: PERIOP | Facility: HOSPITAL | Age: 73
DRG: 470 | End: 2018-09-13
Payer: MEDICARE

## 2018-09-13 NOTE — ANESTHESIA PREPROCEDURE EVALUATION
Review of Systems/Medical History  Patient summary reviewed  Chart reviewed  No history of anesthetic complications     Cardiovascular  Hyperlipidemia, Hypertension ,    Pulmonary  Smoker ex-smoker  ,   Comment: Lung cancer s/p right lower lobe resection 2015     GI/Hepatic    GERD well controlled,             Endo/Other  History of thyroid disease (nodules) ,      GYN       Hematology   Musculoskeletal    Arthritis     Neurology   Psychology           Physical Exam    Airway    Mallampati score: III  TM Distance: >3 FB  Neck ROM: full     Dental       Cardiovascular  Rhythm: regular, Rate: normal,     Pulmonary  Breath sounds clear to auscultation,     Other Findings  Delicate right upper molars      Anesthesia Plan  ASA Score- 2     Anesthesia Type- IV sedation with anesthesia and spinal with ASA Monitors  Additional Monitors:   Airway Plan:         Plan Factors-    Induction- intravenous  Postoperative Plan- Plan for postoperative opioid use  Informed Consent- Anesthetic plan and risks discussed with patient  I personally reviewed this patient with the CRNA  Discussed and agreed on the Anesthesia Plan with the CRNA  Rosella Goldmann

## 2018-09-14 ENCOUNTER — HOSPITAL ENCOUNTER (OUTPATIENT)
Dept: RADIOLOGY | Facility: HOSPITAL | Age: 73
Setting detail: SURGERY ADMIT
Discharge: HOME | DRG: 470 | End: 2018-09-14
Payer: MEDICARE

## 2018-09-14 ENCOUNTER — ANESTHESIA (OUTPATIENT)
Dept: PERIOP | Facility: HOSPITAL | Age: 73
DRG: 470 | End: 2018-09-14
Payer: MEDICARE

## 2018-09-14 ENCOUNTER — HOSPITAL ENCOUNTER (INPATIENT)
Facility: HOSPITAL | Age: 73
LOS: 4 days | Discharge: NURSING, REHAB OR LONG-TERM CARE | DRG: 470 | End: 2018-09-18
Attending: ORTHOPAEDIC SURGERY | Admitting: ORTHOPAEDIC SURGERY
Payer: MEDICARE

## 2018-09-14 DIAGNOSIS — M16.11 OSTEOARTHRITIS OF RIGHT HIP, UNSPECIFIED OSTEOARTHRITIS TYPE: ICD-10-CM

## 2018-09-14 DIAGNOSIS — M15.9 PRIMARY OSTEOARTHRITIS INVOLVING MULTIPLE JOINTS: Primary | ICD-10-CM

## 2018-09-14 DIAGNOSIS — M79.89 LEFT LEG SWELLING: ICD-10-CM

## 2018-09-14 LAB — GLUCOSE SERPL-MCNC: 107 MG/DL (ref 65–140)

## 2018-09-14 PROCEDURE — G8979 MOBILITY GOAL STATUS: HCPCS

## 2018-09-14 PROCEDURE — C1776 JOINT DEVICE (IMPLANTABLE): HCPCS | Performed by: ORTHOPAEDIC SURGERY

## 2018-09-14 PROCEDURE — 73501 X-RAY EXAM HIP UNI 1 VIEW: CPT

## 2018-09-14 PROCEDURE — 0SR90JA REPLACEMENT OF RIGHT HIP JOINT WITH SYNTHETIC SUBSTITUTE, UNCEMENTED, OPEN APPROACH: ICD-10-PCS | Performed by: ORTHOPAEDIC SURGERY

## 2018-09-14 PROCEDURE — G8978 MOBILITY CURRENT STATUS: HCPCS

## 2018-09-14 PROCEDURE — 97167 OT EVAL HIGH COMPLEX 60 MIN: CPT

## 2018-09-14 PROCEDURE — C1713 ANCHOR/SCREW BN/BN,TIS/BN: HCPCS | Performed by: ORTHOPAEDIC SURGERY

## 2018-09-14 PROCEDURE — 97163 PT EVAL HIGH COMPLEX 45 MIN: CPT

## 2018-09-14 PROCEDURE — G8988 SELF CARE GOAL STATUS: HCPCS

## 2018-09-14 PROCEDURE — 82948 REAGENT STRIP/BLOOD GLUCOSE: CPT

## 2018-09-14 PROCEDURE — G8987 SELF CARE CURRENT STATUS: HCPCS

## 2018-09-14 DEVICE — PINNACLE HIP SOLUTIONS ALTRX POLYETHYLENE ACETABULAR LINER NEUTRAL 36MM ID 52MM OD
Type: IMPLANTABLE DEVICE | Site: TROCHANTER | Status: FUNCTIONAL
Brand: PINNACLE ALTRX

## 2018-09-14 DEVICE — PINNACLE POROCOAT ACETABULAR SHELL SECTOR II 52MM OD
Type: IMPLANTABLE DEVICE | Site: TROCHANTER | Status: FUNCTIONAL
Brand: PINNACLE POROCOAT

## 2018-09-14 DEVICE — PINNACLE CANCELLOUS BONE SCREW 6.5MM X 30MM
Type: IMPLANTABLE DEVICE | Site: TROCHANTER | Status: FUNCTIONAL
Brand: PINNACLE

## 2018-09-14 DEVICE — M-SPEC METAL FEMORAL HEAD 12/14 TAPER DIAMETER 36MM +8.5: Type: IMPLANTABLE DEVICE | Site: FEMUR | Status: FUNCTIONAL

## 2018-09-14 DEVICE — CORAIL HIP SYSTEM CEMENTLESS FEMORAL STEM 12/14 AMT 135 DEGREES KA SIZE 13 HA COATED STANDARD COLLAR
Type: IMPLANTABLE DEVICE | Site: FEMUR | Status: FUNCTIONAL
Brand: CORAIL

## 2018-09-14 DEVICE — PINNACLE CANCELLOUS BONE SCREW 6.5MM X 20MM
Type: IMPLANTABLE DEVICE | Site: TROCHANTER | Status: FUNCTIONAL
Brand: PINNACLE

## 2018-09-14 RX ORDER — MEPERIDINE HYDROCHLORIDE 25 MG/ML
12.5 INJECTION INTRAMUSCULAR; INTRAVENOUS; SUBCUTANEOUS
Status: COMPLETED | OUTPATIENT
Start: 2018-09-14 | End: 2018-09-14

## 2018-09-14 RX ORDER — PROPOFOL 10 MG/ML
INJECTION, EMULSION INTRAVENOUS CONTINUOUS PRN
Status: DISCONTINUED | OUTPATIENT
Start: 2018-09-14 | End: 2018-09-14 | Stop reason: SURG

## 2018-09-14 RX ORDER — LIDOCAINE HYDROCHLORIDE 10 MG/ML
INJECTION, SOLUTION INFILTRATION; PERINEURAL AS NEEDED
Status: DISCONTINUED | OUTPATIENT
Start: 2018-09-14 | End: 2018-09-14 | Stop reason: SURG

## 2018-09-14 RX ORDER — METOPROLOL SUCCINATE 100 MG/1
100 TABLET, EXTENDED RELEASE ORAL DAILY
Status: DISCONTINUED | OUTPATIENT
Start: 2018-09-14 | End: 2018-09-18 | Stop reason: HOSPADM

## 2018-09-14 RX ORDER — TRIAMTERENE AND HYDROCHLOROTHIAZIDE 37.5; 25 MG/1; MG/1
1 TABLET ORAL DAILY
Status: DISCONTINUED | OUTPATIENT
Start: 2018-09-14 | End: 2018-09-15

## 2018-09-14 RX ORDER — LOSARTAN POTASSIUM 50 MG/1
50 TABLET ORAL DAILY
Status: DISCONTINUED | OUTPATIENT
Start: 2018-09-14 | End: 2018-09-14

## 2018-09-14 RX ORDER — SODIUM CHLORIDE 9 MG/ML
100 INJECTION, SOLUTION INTRAVENOUS CONTINUOUS
Status: DISCONTINUED | OUTPATIENT
Start: 2018-09-14 | End: 2018-09-15

## 2018-09-14 RX ORDER — SODIUM CHLORIDE, SODIUM LACTATE, POTASSIUM CHLORIDE, CALCIUM CHLORIDE 600; 310; 30; 20 MG/100ML; MG/100ML; MG/100ML; MG/100ML
75 INJECTION, SOLUTION INTRAVENOUS CONTINUOUS
Status: DISCONTINUED | OUTPATIENT
Start: 2018-09-14 | End: 2018-09-14 | Stop reason: SDUPTHER

## 2018-09-14 RX ORDER — PROPOFOL 10 MG/ML
INJECTION, EMULSION INTRAVENOUS AS NEEDED
Status: DISCONTINUED | OUTPATIENT
Start: 2018-09-14 | End: 2018-09-14 | Stop reason: SURG

## 2018-09-14 RX ORDER — MIDAZOLAM HYDROCHLORIDE 1 MG/ML
INJECTION INTRAMUSCULAR; INTRAVENOUS AS NEEDED
Status: DISCONTINUED | OUTPATIENT
Start: 2018-09-14 | End: 2018-09-14

## 2018-09-14 RX ORDER — GLYCOPYRROLATE 0.2 MG/ML
INJECTION INTRAMUSCULAR; INTRAVENOUS AS NEEDED
Status: DISCONTINUED | OUTPATIENT
Start: 2018-09-14 | End: 2018-09-14 | Stop reason: SURG

## 2018-09-14 RX ORDER — BUPIVACAINE HYDROCHLORIDE 7.5 MG/ML
INJECTION, SOLUTION INTRASPINAL AS NEEDED
Status: DISCONTINUED | OUTPATIENT
Start: 2018-09-14 | End: 2018-09-14 | Stop reason: SURG

## 2018-09-14 RX ORDER — HYDROCODONE BITARTRATE AND ACETAMINOPHEN 5; 325 MG/1; MG/1
2 TABLET ORAL EVERY 4 HOURS PRN
Status: DISCONTINUED | OUTPATIENT
Start: 2018-09-14 | End: 2018-09-18 | Stop reason: HOSPADM

## 2018-09-14 RX ORDER — SENNOSIDES 8.6 MG
1 TABLET ORAL
Status: DISCONTINUED | OUTPATIENT
Start: 2018-09-14 | End: 2018-09-18 | Stop reason: HOSPADM

## 2018-09-14 RX ORDER — SODIUM CHLORIDE 9 MG/ML
75 INJECTION, SOLUTION INTRAVENOUS CONTINUOUS
Status: DISCONTINUED | OUTPATIENT
Start: 2018-09-14 | End: 2018-09-14 | Stop reason: SDUPTHER

## 2018-09-14 RX ORDER — ATORVASTATIN CALCIUM 10 MG/1
10 TABLET, FILM COATED ORAL
Status: DISCONTINUED | OUTPATIENT
Start: 2018-09-14 | End: 2018-09-18 | Stop reason: HOSPADM

## 2018-09-14 RX ORDER — LOSARTAN POTASSIUM 50 MG/1
50 TABLET ORAL DAILY
Status: DISCONTINUED | OUTPATIENT
Start: 2018-09-15 | End: 2018-09-15

## 2018-09-14 RX ORDER — MINERAL OIL AND PETROLATUM 150; 830 MG/G; MG/G
OINTMENT OPHTHALMIC
Status: DISCONTINUED | OUTPATIENT
Start: 2018-09-14 | End: 2018-09-14 | Stop reason: SDUPTHER

## 2018-09-14 RX ORDER — BUPIVACAINE HYDROCHLORIDE 5 MG/ML
INJECTION, SOLUTION PERINEURAL AS NEEDED
Status: DISCONTINUED | OUTPATIENT
Start: 2018-09-14 | End: 2018-09-14 | Stop reason: HOSPADM

## 2018-09-14 RX ORDER — FENTANYL CITRATE/PF 50 MCG/ML
25 SYRINGE (ML) INJECTION
Status: DISCONTINUED | OUTPATIENT
Start: 2018-09-14 | End: 2018-09-14 | Stop reason: HOSPADM

## 2018-09-14 RX ORDER — MIDAZOLAM HYDROCHLORIDE 1 MG/ML
INJECTION INTRAMUSCULAR; INTRAVENOUS AS NEEDED
Status: DISCONTINUED | OUTPATIENT
Start: 2018-09-14 | End: 2018-09-14 | Stop reason: SURG

## 2018-09-14 RX ORDER — ASPIRIN 81 MG/1
81 TABLET ORAL 2 TIMES DAILY
Status: DISCONTINUED | OUTPATIENT
Start: 2018-09-14 | End: 2018-09-18 | Stop reason: HOSPADM

## 2018-09-14 RX ORDER — DOCUSATE SODIUM 100 MG/1
100 CAPSULE, LIQUID FILLED ORAL 2 TIMES DAILY
Status: DISCONTINUED | OUTPATIENT
Start: 2018-09-14 | End: 2018-09-18 | Stop reason: HOSPADM

## 2018-09-14 RX ORDER — ONDANSETRON 2 MG/ML
4 INJECTION INTRAMUSCULAR; INTRAVENOUS EVERY 6 HOURS PRN
Status: DISCONTINUED | OUTPATIENT
Start: 2018-09-14 | End: 2018-09-18 | Stop reason: HOSPADM

## 2018-09-14 RX ORDER — HYDROCODONE BITARTRATE AND ACETAMINOPHEN 5; 325 MG/1; MG/1
1 TABLET ORAL
Status: DISCONTINUED | OUTPATIENT
Start: 2018-09-14 | End: 2018-09-18 | Stop reason: HOSPADM

## 2018-09-14 RX ADMIN — LIDOCAINE HYDROCHLORIDE 3 ML: 10 INJECTION, SOLUTION INFILTRATION; PERINEURAL at 12:54

## 2018-09-14 RX ADMIN — GLYCOPYRROLATE 0.2 MG: 0.2 INJECTION, SOLUTION INTRAMUSCULAR; INTRAVENOUS at 13:08

## 2018-09-14 RX ADMIN — HYDROCODONE BITARTRATE AND ACETAMINOPHEN 1 TABLET: 5; 325 TABLET ORAL at 17:27

## 2018-09-14 RX ADMIN — DOCUSATE SODIUM 100 MG: 100 CAPSULE, LIQUID FILLED ORAL at 17:21

## 2018-09-14 RX ADMIN — MEPERIDINE HYDROCHLORIDE 12.5 MG: 25 INJECTION INTRAMUSCULAR; INTRAVENOUS; SUBCUTANEOUS at 15:33

## 2018-09-14 RX ADMIN — CEFAZOLIN SODIUM 1000 MG: 1 SOLUTION INTRAVENOUS at 22:59

## 2018-09-14 RX ADMIN — VANCOMYCIN HYDROCHLORIDE 1500 MG: 10 INJECTION, POWDER, LYOPHILIZED, FOR SOLUTION INTRAVENOUS at 11:30

## 2018-09-14 RX ADMIN — BUPIVACAINE HYDROCHLORIDE IN DEXTROSE 1.8 ML: 7.5 INJECTION, SOLUTION SUBARACHNOID at 12:52

## 2018-09-14 RX ADMIN — MEPERIDINE HYDROCHLORIDE 12.5 MG: 25 INJECTION INTRAMUSCULAR; INTRAVENOUS; SUBCUTANEOUS at 15:38

## 2018-09-14 RX ADMIN — ATORVASTATIN CALCIUM 10 MG: 10 TABLET, FILM COATED ORAL at 23:00

## 2018-09-14 RX ADMIN — MIDAZOLAM HYDROCHLORIDE 2 MG: 1 INJECTION, SOLUTION INTRAMUSCULAR; INTRAVENOUS at 12:44

## 2018-09-14 RX ADMIN — PROPOFOL 150 MCG/KG/MIN: 10 INJECTION, EMULSION INTRAVENOUS at 12:54

## 2018-09-14 RX ADMIN — SODIUM CHLORIDE 100 ML/HR: 0.9 INJECTION, SOLUTION INTRAVENOUS at 16:10

## 2018-09-14 RX ADMIN — PROPOFOL 100 MG: 10 INJECTION, EMULSION INTRAVENOUS at 12:54

## 2018-09-14 RX ADMIN — SODIUM CHLORIDE: 0.9 INJECTION, SOLUTION INTRAVENOUS at 12:22

## 2018-09-14 RX ADMIN — HYDROCODONE BITARTRATE AND ACETAMINOPHEN 2 TABLET: 5; 325 TABLET ORAL at 23:07

## 2018-09-14 RX ADMIN — TRANEXAMIC ACID 1000 MG: 1 INJECTION, SOLUTION INTRAVENOUS at 12:59

## 2018-09-14 RX ADMIN — ASPIRIN 81 MG: 81 TABLET, COATED ORAL at 17:22

## 2018-09-14 RX ADMIN — CEFAZOLIN SODIUM 2000 MG: 2 SOLUTION INTRAVENOUS at 12:45

## 2018-09-14 NOTE — PHYSICAL THERAPY NOTE
PT EVALUATION       09/14/18 7292   Note Type   Note type Eval only   Pain Assessment   Pain Assessment 0-10   Pain Score 8   Pain Location Back   Pain Orientation Right   Home Living   Type of Home House   Home Layout Two level  (3 KIM)   Bathroom Shower/Tub Tub/shower unit   Bathroom Equipment Shower chair   Home Equipment Walker;Cane   Prior Function   Level of [place] Independent with ADLs and functional mobility  (amb with cane PTA)   Lives With Daughter   Receives Help From Family   ADL Assistance Independent   Comments pt reports daughter is not reliable for help   Restrictions/Precautions   RLE Weight Bearing Per Order WBAT   Other Precautions Fall Risk;Pain   General   Additional Pertinent History Pt is post op day zero for R anterior CHENCHO  Pt had spinal anesthesia so LEs are numb/weak  Cognition   Overall Cognitive Status WFL   RUE Assessment   RUE Assessment WFL   LUE Assessment   LUE Assessment WFL   RLE Assessment   RLE Assessment (ROM WFL, MMT 2/5)   LLE Assessment   LLE Assessment (ROM WFL, MMT 2/5)   Bed Mobility   Additional Comments NA, LEs numb/weak from spinal anesthesia   Assessment   Prognosis Good   Problem List Decreased strength; Impaired balance;Decreased mobility;Pain;Orthopedic restrictions   Assessment Patient seen for Physical Therapy evaluation  Patient admitted with s/p R CHENCHO  Comorbidities affecting patient's physical performance include: htn, lung ca, OA  Personal factors affecting patient at time of initial evaluation include: lives in 2 story house, ambulating with assistive device and stairs to enter home  Prior to admission, patient was independent with functional mobility with cane and independent with ADLS    Please find objective findings from Physical Therapy assessment regarding body systems outlined above with impairments and limitations including weakness, pain, decreased activity tolerance, decreased functional mobility tolerance, fall risk and orthopedic restrictions  The Barthel Index was used as a functional outcome tool presenting with a score of 30 today indicating marked limitations of functional mobility and ADLS  Patient's clinical presentation is currently unstable/unpredictable as seen in patient's presentation of changing level of pain, increased fall risk, new onset of impairment of functional mobility and new onset of weakness  Pt would benefit from continued Physical Therapy treatment to address deficits as defined above and maximize level of functional mobility  As demonstrated by objective findings, the assigned level of complexity for this evaluation is high  Goals   Patient Goals go home, be able to climb the steps   STG Expiration Date 09/21/18   Short Term Goal #1 independent bed mobility, independent transfers, independent ambulation with walker 50 feet, supervision up and down 10 steps so pt can get to her bedroom at home   LTG Expiration Date 09/28/18   Long Term Goal #1 improve LE strength to at least 4/5, independent ambulation with cane or walker 200 feet outdoor surfaces so pt can get out of her house to her doctor/PT appointments   Treatment Day 0   Plan   Treatment/Interventions ADL retraining;Functional transfer training;LE strengthening/ROM; Elevations; Therapeutic exercise;Patient/family training;Equipment eval/education; Bed mobility;Gait training   PT Frequency Twice a day   Recommendation   Recommendation (str vs home PT pending progress)   Equipment Recommended (pt has a cane and walker)   Barthel Index   Feeding 10   Bathing 0   Grooming Score 0   Dressing Score 0   Bladder Score 10   Bowels Score 10   Toilet Use Score 0   Transfers (Bed/Chair) Score 0   Mobility (Level Surface) Score 0   Stairs Score 0   Barthel Index Score 27   Licensure   [place] License Number  Dorian CHRISTIANSEN [id number]

## 2018-09-14 NOTE — ANESTHESIA PROCEDURE NOTES
Spinal Block    Patient location during procedure: OR  Start time: 9/14/2018 12:46 PM  End time: 9/14/2018 12:52 PM  Reason for block: at surgeon's request and primary anesthetic  Staffing  Anesthesiologist: Regan Ennis  Resident/CRNA: ANDRÉS Hudson  Preanesthetic Checklist  Completed: patient identified, site marked, surgical consent, pre-op evaluation, timeout performed, IV checked, risks and benefits discussed and monitors and equipment checked  Spinal Block  Patient position: sitting  Prep: ChloraPrep  Patient monitoring: heart rate, cardiac monitor, continuous pulse ox and frequent blood pressure checks  Approach: midline  Location: L2-3  Injection technique: single-shot  Needle  Needle type: pencil-tip   Needle gauge: 25 G  Needle length: 5 cm  Assessment  Sensory level: T4  Injection Assessment:  negative aspiration for heme and positive aspiration for clear CSF    Post-procedure:  site cleaned

## 2018-09-14 NOTE — OCCUPATIONAL THERAPY NOTE
OT EVALUATION     09/14/18 5971   Note Type   Note type Eval only   Restrictions/Precautions   RLE Weight Bearing Per Order WBAT   Other Precautions Chair Alarm; Bed Alarm; Fall Risk   Pain Assessment   Pain Assessment 0-10   Pain Score 8   Pain Type Chronic pain  (pt reports numbness in legs and hip )   Pain Location Back   Home Living   Type of Home House   Home Layout Two level  (3 KIM)   Bathroom Shower/Tub Tub/shower unit   Bathroom Equipment Shower chair   Home Equipment Walker;Cane  (was using a cane at home and holding onto walls )   Additional Comments pt still groggy from surgery, S/P spinal and pt numb BLE   Prior Function   Level of [place] Independent with ADLs and functional mobility   Lives With Daughter   Receives Help From Family   ADL Assistance Independent   IADLs Independent   Comments pt reports daughter isnt reliable   ADL   Eating Assistance 5  Supervision/Setup   Grooming Assistance 4  Minimal Assistance   UB Bathing Assistance 2  Maximal Assistance   LB Bathing Assistance 2  Maximal Assistance   UB Dressing Assistance 2  Maximal Assistance   LB Dressing Assistance 2  Maximal [address]  2  Maximal Assistance   Bed Mobility   Additional Comments unable to assess as pt had a spinal and is numb from the waist down  Activity Tolerance   Activity Tolerance Treatment limited secondary to medical complications (Comment)  (spinal)   Nurse Made Aware yes   RUE Assessment   RUE Assessment WFL  (4/5)   LUE Assessment   LUE Assessment WFL  (4/5)   Cognition   Overall Cognitive Status WFL   Arousal/Participation Cooperative   Attention Within functional limits   Orientation Level Oriented X4   Following Commands Follows all commands and directions without difficulty   Assessment   Limitation Decreased ADL status; Decreased Safe judgement during ADL;Decreased endurance;Decreased self-care trans;Decreased high-level ADLs  (decreased balance and mobility )   Prognosis Good Assessment Patient evaluated by Occupational Therapy  Patient admitted with s/p right anterior hip replacement  Assessment limited due to spinal anesthesia and pt still numb from the waist down,  The patients occupational profile, medical and therapy history includes a extensive additional review of physical, cognitive, or psychosocial history related to current functional performance  Comorbidities affecting functional mobility and ADLS include: arthritis, hypertension and cancer  Prior to admission, patient was independent with functional mobility with cane, lives with daughter, independent with ADLS and independent with IADLS  The evaluation identifies the following performance deficits: weakness, decreased ROM, impaired balance, decreased endurance, increased fall risk, new onset of impairment of functional mobility, decreased ADLS, decreased IADLS, pain, decreased activity tolerance, decreased safety awareness, impaired judgement, ortheopedic restrictions, decreased cognition and decreased strength, that result in activity limitations and/or participation restrictions  This evaluation requires clinical decision making of high complexity, because the patient presents with comorbidites that affect occupational performance and required significant modification of tasks or assistance with consideration of multiple treatment options  The Barthel Index was used as a functional outcome tool presenting with a score of 30, indicating marked limitations of functional mobility and ADLS  Patient will benefit from skilled Occupational Therapy services to address above deficits and facilitate a safe return to prior level of function     Goals   Patient Goals go home, be able to do the steps   STG Time Frame (1-7 days)   Short Term Goal  Goals established to promote patient goal of go home, be able to do the steps:  Patient will increase standing tolerance to 4 minutes during ADL task to decrease assistance level and decrease fall risk; Patient will increase bed mobility to min assist in preparation for ADLS and transfers; Patient will increase functional mobility to and from bathroom with rolling walker with min assist to increase performance with ADLS and to use a toilet;  Patient will improve functional activity tolerance to 10 minutes of sustained functional tasks to increase participation in basic self-care and decrease assistance level;  Patient will increase dynamic sitting balance to fair+ to improve the ability to sit at edge of bed or on a chair for ADLS;  Patient will increase dynamic standing balance to fair to improve postural stability and decrease fall risk during standing ADLS and transfers  LTG Time Frame (8-14 days)   Long Term Goal Goals established to promote patient goal of go home, be able to do the steps:  Patient will increase standing tolerance to 8 minutes during ADL task to decrease assistance level and decrease fall risk; Patient will increase bed mobility to independent assist in preparation for ADLS and transfers; Patient will increase functional mobility to and from bathroom with rolling walker with independent to increase performance with ADLS and to use a toilet;  Patient will improve functional activity tolerance to 20 minutes of sustained functional tasks to increase participation in basic self-care and decrease assistance level;  Patient will increase dynamic sitting balance to good to improve the ability to sit at edge of bed or on a chair for ADLS;  Patient will increase dynamic standing balance to fair+ to improve postural stability and decrease fall risk during standing ADLS and transfers       Functional Transfer Goals   Pt Will Perform All Functional Transfers (STG min assist LTG independent )   ADL Goals   Pt Will Perform Eating (STG independent )   Pt Will Perform Grooming (STG independent )   Pt Will Perform Bathing (STG mod assist LTG supervision )   Pt Will Perform UE Dressing (STG supervision LTG independent )   Pt Will Perform LE Dressing (STG min assist LTG independent )   Pt Will Perform Toileting (STG min assist LTG independent )   Plan   Treatment Interventions ADL retraining;Functional transfer training;Patient/family training;Equipment evaluation/education; Endurance training; Activityengagement; Compensatory technique education   Goal Expiration Date 09/28/18   Treatment Day 0   OT Frequency 3-5x/wk   Recommendation   OT Discharge Recommendation (pending functional assessment STR vs home with services)   Equipment Recommended (will continue to assess ? LE dressing equip/SS)   Barthel Index   Feeding 10   Bathing 0   Grooming Score 0   Dressing Score 0   Bladder Score 10   Bowels Score 10   Toilet Use Score 0   Transfers (Bed/Chair) Score 0   Mobility (Level Surface) Score 0   Stairs Score 0   Barthel Index Score 30   Licensure   [place] License Number  Angelia Miss Toribio Fabien 87 [id number]/L [id number]

## 2018-09-14 NOTE — OP NOTE
OPERATIVE REPORT  PATIENT NAME: Carmela Garcia    :  1945  MRN: [id number]  Pt Location: [facility] OR ROOM 03    SURGERY DATE: 2018    Surgeon(s) and Role:     * Josh Back MD - Primary     * Cara Heredia PA-C - Assisting     * Sadie Negrete PA-C - Assisting    Preop Diagnosis:  Primary osteoarthritis of right hip [M16 11]    Post-Op Diagnosis Codes:     * Primary osteoarthritis of right hip [M16 11]    Procedure(s) (LRB):  ANTERIOR TOTAL HIP ARTHROPLASTY (Right)    Specimen(s):  * No specimens in log *    Estimated Blood Loss:   350    Drains: none       Anesthesia Type:   Choice    Operative Indications:  Primary osteoarthritis of right hip [M16 11]  Right hip pain     Operative Findings:  End stage djd of the right hip    Complications:   None    Procedure and Technique:    Patient was identified in the holding area and the operative leg was signed  Patient was then brought back into the operative room and spinal anesthesia was induced  She was then placed on the [place] table and prepped and draped in the usual sterile fashion  A formal time-out was performed identifying the patient as  Markus Kaitlin and the operation as a right total hip  An anterolateral incision was made over the tensor fascia srinivasa muscle  The fascia was then incised in line with the skin incision  The medial leaf was grabbed with a Allis  The muscle was then swept laterally  A Cobra retractor was placed over the femoral neck  The bleeders were exposed and cauterized  A Cobra was placed inferior to the neck  An arthrotomy was performed Bovie cautery  The lateral edges were then tagged with 2  Ethibond  We identified the level of the osteotomy under fluoroscopic guidance  We then externally rotated 50° pulled traction 2 clicks and cut the femoral neck with a reciprocating saw  We then exposed the acetabulum  We then reamed up to a size 51 in touch with a 52  A 52 pinnacle 3 hole cup was then impacted into place   The orientation was checked on fluoroscopy to be 23° of anteversion and 40° of abduction  Two screws were placed 30 mm and 20 mm  Irrigation was performed and a liner was inserted  The leg was then externally rotated it adequate exposure retractors were placed leg was dropped down to the floor  The left leg was then adducted across the midline  The lateral femoral neck was removed with a rongeur  A box osteotome was used to remove a little more now neck  A canal finer was used  A chili pepper broach was then used to get lateral and find the canal  We then broached up to a size 13  We put on the true neck and trialed it  Reducing it by bring it up been pulling traction internally rotating  Fluoroscopy verified that this leg was significantly shorter than the other 1  We then retrialed again with a +8 head  This had a little increased offset the length was perfect  Copious irrigation performed Marcaine was infiltrated the area and that broach was removed  The size 13 standard offset Corail was then impacted into place  The length was then checked again on fluoroscopy with the +8 trial head  The length was equal the offset was a little increased  Plus eight head was then impacted into place  Stability was checked for drug in the leg to the floor and documented anteriorly  Copious irrigation was again performed  The capsule was closed by tying the sutures  1   Vicryl was used to run the fascia over the tensor fascia srinivasa muscle  Two 0 Monocryl was used deep dermal   And staple used in the skin  A dry sterile dressing was applied           I was present for the entire procedure and A qualified resident physician was not available    Patient Disposition:  PACU     SIGNATURE: Giovani Lau MD  DATE: September 14, 2018  TIME: 2:34 PM

## 2018-09-15 LAB
ANION GAP SERPL CALCULATED.3IONS-SCNC: 8 MMOL/L (ref 4–13)
BUN SERPL-MCNC: 17 MG/DL (ref 5–25)
CALCIUM SERPL-MCNC: 7.9 MG/DL (ref 8.3–10.1)
CHLORIDE SERPL-SCNC: 106 MMOL/L (ref 100–108)
CO2 SERPL-SCNC: 24 MMOL/L (ref 21–32)
CREAT SERPL-MCNC: 1.06 MG/DL (ref 0.6–1.3)
ERYTHROCYTE [DISTWIDTH] IN BLOOD BY AUTOMATED COUNT: 13.9 % (ref 11.6–15.1)
GFR SERPL CREATININE-BSD FRML MDRD: 52 ML/MIN/1.73SQ M
GLUCOSE SERPL-MCNC: 114 MG/DL (ref 65–140)
HCT VFR BLD AUTO: 36.8 % (ref 34.8–46.1)
HGB BLD-MCNC: 11.5 G/DL (ref 11.5–15.4)
MCH RBC QN AUTO: 29 PG (ref 26.8–34.3)
MCHC RBC AUTO-ENTMCNC: 31.3 G/DL (ref 31.4–37.4)
MCV RBC AUTO: 93 FL (ref 82–98)
PLATELET # BLD AUTO: 231 THOUSANDS/UL (ref 149–390)
PMV BLD AUTO: 9.4 FL (ref 8.9–12.7)
POTASSIUM SERPL-SCNC: 3.8 MMOL/L (ref 3.5–5.3)
RBC # BLD AUTO: 3.96 MILLION/UL (ref 3.81–5.12)
SODIUM SERPL-SCNC: 138 MMOL/L (ref 136–145)
WBC # BLD AUTO: 8.5 THOUSAND/UL (ref 4.31–10.16)

## 2018-09-15 PROCEDURE — 85027 COMPLETE CBC AUTOMATED: CPT | Performed by: PHYSICIAN ASSISTANT

## 2018-09-15 PROCEDURE — 99222 1ST HOSP IP/OBS MODERATE 55: CPT | Performed by: INTERNAL MEDICINE

## 2018-09-15 PROCEDURE — 80048 BASIC METABOLIC PNL TOTAL CA: CPT | Performed by: PHYSICIAN ASSISTANT

## 2018-09-15 PROCEDURE — 97110 THERAPEUTIC EXERCISES: CPT

## 2018-09-15 RX ORDER — ACETAMINOPHEN 325 MG/1
650 TABLET ORAL ONCE
Status: COMPLETED | OUTPATIENT
Start: 2018-09-15 | End: 2018-09-15

## 2018-09-15 RX ORDER — LOSARTAN POTASSIUM 50 MG/1
100 TABLET ORAL DAILY
Status: DISCONTINUED | OUTPATIENT
Start: 2018-09-16 | End: 2018-09-18 | Stop reason: HOSPADM

## 2018-09-15 RX ADMIN — ASPIRIN 81 MG: 81 TABLET, COATED ORAL at 09:37

## 2018-09-15 RX ADMIN — ATORVASTATIN CALCIUM 10 MG: 10 TABLET, FILM COATED ORAL at 21:37

## 2018-09-15 RX ADMIN — ASPIRIN 81 MG: 81 TABLET, COATED ORAL at 17:53

## 2018-09-15 RX ADMIN — HYDROCODONE BITARTRATE AND ACETAMINOPHEN 2 TABLET: 5; 325 TABLET ORAL at 09:35

## 2018-09-15 RX ADMIN — DOCUSATE SODIUM 100 MG: 100 CAPSULE, LIQUID FILLED ORAL at 09:39

## 2018-09-15 RX ADMIN — LOSARTAN POTASSIUM 50 MG: 50 TABLET, FILM COATED ORAL at 09:39

## 2018-09-15 RX ADMIN — DOCUSATE SODIUM 100 MG: 100 CAPSULE, LIQUID FILLED ORAL at 17:53

## 2018-09-15 RX ADMIN — ACETAMINOPHEN 650 MG: 325 TABLET, FILM COATED ORAL at 19:26

## 2018-09-15 RX ADMIN — METOPROLOL SUCCINATE 100 MG: 100 TABLET, FILM COATED, EXTENDED RELEASE ORAL at 09:37

## 2018-09-15 RX ADMIN — CEFAZOLIN SODIUM 1000 MG: 1 SOLUTION INTRAVENOUS at 05:45

## 2018-09-15 NOTE — PROGRESS NOTES
Progress Note - Orthopedics   Fede Love 75 y o  female MRN: [id number]  Unit/Bed#: [address] [place] [address] Encounter: [id number]    Assessment:  Postop day 1 right anterior total hip replacement    Plan:  Continue with physical therapy, out of bed, weight-bearing as tolerated  At this point the patient has not ambulated much and most likely will need at least 1 more day with inpatient therapy  Weight bearing:  As tolerated    VTE Pharmacologic Prophylaxis:  Aspirin  VTE Mechanical Prophylaxis: sequential compression device    Subjective:  Patient is postop day 1 right anterior total hip replacement  She was seen by the attending surgeon and examined today as well  She complains of some numbness around her anterior thigh with some mild weakness in the right lower extremity  Vitals: Blood pressure 120/68, pulse 66, temperature 99 3 °F (37 4 °C), temperature source Oral, resp  rate 18, height 5' 7" (1 702 m), weight 83 5 kg (184 lb), SpO2 98 %  ,Body mass index is 28 82 kg/m²  Intake/Output Summary (Last 24 hours) at 09/15/18 0917  Last data filed at 09/15/18 0755   Gross per 24 hour   Intake              500 ml   Output              850 ml   Net             -350 ml       Invasive Devices     Peripheral Intravenous Line            Peripheral IV 09/14/18 Left Wrist less than 1 day                Physical Exam:  On clinical exam her dressing is clean and dry and intact  There is no calf pain  Reef neurovascular exam revealed some mild anterior thigh numbness    Ortho Exam:  See above exam    Lab, Imaging and other studies:   CBC:   Lab Results   Component Value Date    WBC 8 50 09/15/2018    HGB 11 5 09/15/2018    HCT 36 8 09/15/2018    MCV 93 09/15/2018     09/15/2018    MCH 29 0 09/15/2018    MCHC 31 3 (L) 09/15/2018    RDW 13 9 09/15/2018    MPV 9 4 09/15/2018     CMP:   Lab Results   Component Value Date     09/15/2018     09/15/2018    CO2 24 09/15/2018    BUN 17 09/15/2018    CREATININE 1 06 09/15/2018    CALCIUM 7 9 (L) 09/15/2018    EGFR 52 09/15/2018

## 2018-09-15 NOTE — PLAN OF CARE
Problem: DISCHARGE PLANNING - CARE MANAGEMENT  Goal: Discharge to post-acute care or home with appropriate resources  INTERVENTIONS:  - Conduct assessment to determine patient/family and health care team treatment goals, and need for post-acute services based on payer coverage, community resources, and patient preferences, and barriers to discharge  - Address psychosocial, clinical, and financial barriers to discharge as identified in assessment in conjunction with the patient/family and health care team  - Arrange appropriate level of post-acute services according to patient's   needs and preference and payer coverage in collaboration with the physician and health care team  - Communicate with and update the patient/family, physician, and health care team regarding progress on the discharge plan  - Arrange appropriate transportation to post-acute venues  Outcome: Adequate for Discharge  Plan: Discharge to home with [facility] vs short term rehab

## 2018-09-15 NOTE — CONSULTS
Inpatient Medical Consultation - [facility] Internal Medicine    Patient Information: Atif Christianson 75 y o  female MRN: [id number]  Unit/Bed#: [address] [place] [address] Encounter: [id number]    PCP: Osbaldo Ramírez  Date of Admission:  9/14/2018  Date of Consultation: 09/15/18  Requesting Physician: Dahlia Hunter MD    Reason For Consultation:     Medical management    History of Present Illness:    Atif Christianson is a 75 y o  female who is originally admitted to the orthopedic service on 9/14/2018 postoperatively after elective right anterior total hip arthroplasty for advanced degenerative joint disease  Patient tolerated procedure well, surgery was done under choice anesthesia with EBL of 350 cc  We are consulted for medical management  Patient has past medical history of hypertension, dyslipidemia, osteoarthritis, history of lung cancer treated with surgical resection, ex-smoker  Patient denies any prior perioperative complication, bleeding or thromboembolism  Patient reports postoperative right leg and knee pain  She denies any chest pain, shortness of breath, dizziness, palpitation  Patient is tolerating diet and reports good urine output  Review of Systems:    Review of Systems   Constitutional: Negative for appetite change, chills, diaphoresis, fatigue, fever and unexpected weight change  HENT: Negative for congestion, postnasal drip, rhinorrhea, sinus pressure, sneezing, sore throat, tinnitus, trouble swallowing and voice change  Eyes: Negative for visual disturbance  Respiratory: Negative for cough, chest tightness, shortness of breath, wheezing and stridor  Cardiovascular: Positive for leg swelling  Negative for chest pain and palpitations  Gastrointestinal: Negative for abdominal distention, abdominal pain, constipation, diarrhea, nausea and vomiting  Genitourinary: Negative for difficulty urinating, dysuria and flank pain  Musculoskeletal: Positive for arthralgias and gait problem  Negative for back pain and neck stiffness  Skin: Negative for pallor and rash  Neurological: Negative for dizziness, seizures, facial asymmetry, speech difficulty, light-headedness, numbness and headaches  Hematological: Negative for adenopathy  Psychiatric/Behavioral: Negative for agitation and confusion  Past Medical and Surgical History:     Past Medical History:   Diagnosis Date    Arthritis     [facility]     [facility]     Hyperlipidemia     Hypertension     Lung cancer ([facility] ) 2015    right lung has a resection( no otherRX needed)       Past Surgical History:   Procedure Laterality Date    CATARACT EXTRACTION      CHOLECYSTECTOMY      COLONOSCOPY      JOINT REPLACEMENT      TOTAL HIP ARTHROPLASTY      TUMOR REMOVAL Right 2015    lower lobe        Meds/Allergies:    PTA meds:   Prior to Admission Medications   Prescriptions Last Dose Informant Patient Reported? Taking?    HYDROcodone-acetaminophen (NORCO) 7 5-325 mg per tablet 2018 at 0900 Self Yes Yes   Sig: Take by mouth   Melatonin 5 MG TABS Past Week at Unknown time Self Yes Yes   Sig: Take 20 mg by mouth daily at bedtime     Polyethylene Glycol 400 (BLINK TEARS) 0 25 % SOLN Past Week at Unknown time Self Yes Yes   Sig: Apply 1 drop to eye 2 (two) times a day     atorvastatin (LIPITOR) 10 mg tablet Past Week at Unknown time Self Yes Yes   Sig: Take 10 mg by mouth daily at bedtime     doxycycline (PERIOSTAT) 20 MG tablet 2018 at 2000 Self Yes Yes   Si mg 2 (two) times a day     losartan (COZAAR) 50 mg tablet  Self Yes No   Sig: Take 50 mg by mouth daily    losartan (COZAAR) 50 mg tablet 2018 at 0730  No Yes   Sig: Take 1 tablet (50 mg total) by mouth daily   Patient taking differently: Take 100 mg by mouth daily     metoprolol succinate (TOPROL-XL) 100 mg 24 hr tablet 2018 at 0730  No Yes   Sig: Take 1 tablet (100 mg total) by mouth daily   triamterene-hydrochlorothiazide (DYAZIDE) 37 5-25 mg per capsule 9/12/2018 at 0900 Self Yes Yes   Sig: Take by mouth Takes mon- wed - fri       Facility-Administered Medications: None       Allergies: No Known Allergies  History:     Marital Status:     Substance Use History:   History   Alcohol Use    Yes     Comment: ocassional      History   Smoking Status    Former Smoker    Packs/day: 1 00    Years: 50 00    Types: Cigarettes    Quit date: 9/7/2015   Smokeless Tobacco    Never Used     History   Drug Use No       Family History:    Family History   Problem Relation Age of Onset    Heart disease Mother        Physical Exam:     Vitals:   Blood Pressure: 120/68 (09/15/18 0330)  Pulse: 66 (09/15/18 0330)  Temperature: 99 3 °F (37 4 °C) (09/15/18 0330)  Temp Source: Oral (09/15/18 0330)  Respirations: 18 (09/15/18 0330)  Height: 5' 7" (170 2 cm) (09/14/18 1555)  Weight - Scale: 83 5 kg (184 lb) (09/14/18 1555)  SpO2: 98 % (09/15/18 0330)    Physical Exam   Constitutional: She appears well-developed  No distress  HENT:   Head: Normocephalic and atraumatic  Nose: Nose normal    Eyes: Conjunctivae and EOM are normal  Pupils are equal, round, and reactive to light  Neck: Normal range of motion  Neck supple  No JVD present  Cardiovascular: Normal rate, regular rhythm and normal heart sounds  Exam reveals no gallop and no friction rub  No murmur heard  Pulmonary/Chest: Effort normal  No respiratory distress  She has decreased breath sounds  She has no wheezes  She has no rhonchi  She has no rales  She exhibits no tenderness  Abdominal: Soft  Bowel sounds are normal  She exhibits no distension  There is no tenderness  There is no rebound and no guarding  Musculoskeletal: She exhibits no edema  Right hip dressing in place C/D/I  Distal neurovascular intact  Mild expected right leg swelling  Calf supple   Neurological: She is alert  No cranial nerve deficit  Skin: Skin is warm and dry  No rash noted     Psychiatric: She has a normal mood and affect  Lab Results: I Have Reviewed All Lab Data Below:      Results from last 7 days  Lab Units 09/15/18  0712   WBC Thousand/uL 8 50   HEMOGLOBIN g/dL 11 5   HEMATOCRIT % 36 8   PLATELETS Thousands/uL 231       Results from last 7 days  Lab Units 09/15/18  0712 09/10/18  1305   SODIUM mmol/L 138 138   POTASSIUM mmol/L 3 8 4 0   CHLORIDE mmol/L 106 106   CO2 mmol/L 24 24   BUN mg/dL 17 22   CREATININE mg/dL 1 06 1 15   CALCIUM mg/dL 7 9* 9 2   ALK PHOS U/L  --  83   ALT U/L  --  26   AST U/L  --  23           * Additional Pertinent Lab Tests Reviewed: Ventural[address] [facility] Admission Reviewed    Imaging: I have personally reviewed pertinent reports  Xr Hip/pelv 1 Vw Right If Performed    Result Date: 9/14/2018  Narrative: C-ARM - right hip INDICATION: ant rt hip  Procedure guidance  COMPARISON:  None TECHNIQUE: FLUOROSCOPY TIME:   59 SEC 3 FLUOROSCOPIC IMAGES FINDINGS: Fluoroscopic guidance provided for surgical procedure  Images provided demonstrate placement of a right total hip replacement  Osseous and soft tissue detail limited by technique  Impression: Fluoroscopic guidance provided for surgical procedure  Please refer to the separate procedure notes for additional details  Workstation performed: [id number]     Mri Brain Wo Contrast    Result Date: 8/24/2018  Narrative: MRI BRAIN WITHOUT CONTRAST INDICATION: R20 0: Anesthesia of skin R20 2: Paresthesia of skin  Involuntary movement of bilateral toes COMPARISON:   None  TECHNIQUE:  Sagittal T1, axial T2, axial FLAIR, axial T1, axial [place] and axial diffusion imaging  IMAGE QUALITY:  Diagnostic  FINDINGS: BRAIN PARENCHYMA:  No acute disease  There is no acute ischemia, intracranial mass, mass effect or edema  No pathologic hemosiderin deposition  Diffuse generalized volume loss consistent with age, minor degree of what most probably represents chronic small vessel disease    VENTRICLES:  The ventricles are normal in size and contour  SELLA AND PITUITARY GLAND:  Partially empty sella ORBITS:  Normal  PARANASAL SINUSES:  Normal  VASCULATURE:  Evaluation of the major intracranial vasculature demonstrates appropriate flow voids  CALVARIUM AND SKULL BASE:  Normal  EXTRACRANIAL SOFT TISSUES:  Normal      Impression: No acute disease  Volume loss consistent with age, minor degree of most probably represents chronic small vessel disease  Workstation performed: Antonio Veras 4 Problem List:     Principal Problem:    Primary osteoarthritis involving multiple joints  Active Problems:    Essential hypertension    Dyslipidemia      Assessment/Plan    Dyslipidemia   Assessment & Plan    Continue home        Essential hypertension   Assessment & Plan    Continue metoprolol and losartan with holding parameter  Discontinue Maxzide as patient has not been using it routinely  Monitor blood pressure and BMP          * Primary osteoarthritis involving multiple joints   Assessment & Plan    Status post right anterior total hip arthroplasty, 9/14  Continue postoperative care as per primary team  Incentive spirometry, PT/OT  Pain control and DVT prophylaxis, would suggest to use Lovenox if acceptable by Orthopedic team              VTE Prophylaxis:  As per primary team / sequential compression device     Addendum 7:00 p m  I was informed by patient's primary RN that patient had temperature of 102 1 degree F  Patient reported no focal symptoms of infection  Likely postoperative fever reactive  Will observe clinically  Incentive spirometry  Discussed with patient's RN  Counseling / Coordination of Care Time: 45 minutes  Greater than 50% of total time spent on patient counseling and coordination of care  Collaboration of Care:  Were Recommendations Directly Discussed with Primary Treatment Team? - No     ** Please Note:  Dictation speech to text software may have been used in the creation of this document **

## 2018-09-15 NOTE — PHYSICAL THERAPY NOTE
PT TREATMENT     09/15/18 0910   Pain Assessment   Pain Assessment 0-10   Pain Score 8  (R hip/thigh and groin areas "burning")   Restrictions/Precautions   Other Precautions Chair Alarm; Bed Alarm; Fall Risk;Pain  (R anterior CHENCHO)   General   Chart Reviewed Yes   Family/Caregiver Present No   Cognition   Arousal/Participation Cooperative   Subjective   Subjective patient with reports of "burning and numbness" throughout R hip/groin/thigh areas   Bed Mobility   Supine to Sit 3  Moderate assistance   Additional items Assist x 1   Transfers   Sit to Stand 3  Moderate assistance   Additional items Assist x 1   Stand to Sit 4  Minimal assistance   Additional items Assist x 1;Verbal cues  (cuing for proper hand placement)   Ambulation/Elevation   Gait Assistance 3  Moderate assist   Additional items Assist x 1;Verbal cues; Tactile cues   Assistive Device Rolling walker   Distance 3- 5 steps with cuing for quad setting on RLE and mod assist of 1 for assist to maintain R knee extension for WB to prevent knee buckling   Exercises   Heelslides Supine;10 reps;AAROM; Right   Glute Sets Supine;10 reps   Hip Flexion Sitting;10 reps;AAROM;PROM; Right   Knee AROM Short Arc Quad Supine;15 reps; Left  (unable to complete on RLE at this time)   Knee AROM Long Arc Quad Sitting;15 reps; Left  (RLE unable to complete at this time, continued quad weakness)   Ankle Pumps Supine;20 reps;Bilateral   Balance training  standing weight shifting activity completed for improved quad control on R LE   Assessment   Assessment Patient anxious and nervous but cooperative and pleasant with continued R quad weakness following surgery for R anterior CHENCHO  Patient stood at bedside x 3 with min to mod assist of 1 and improved with each completion  Patient also sat for urination and demonstrated ability to complete toileting hygiene with min assist of 1 for standing balance   patient will benefit from continued PT with progression as tolerated, BID   Plan Treatment/Interventions ADL retraining;Functional transfer training;LE strengthening/ROM; Elevations; Therapeutic exercise; Endurance training;Patient/family training;Equipment eval/education; Bed mobility;Gait training; Compensatory technique education   PT Frequency Twice a day   Recommendation   Recommendation (home with PT/family vs STR if function does not improve)   Licensure   [place] License Number  [employer] PT [id number]

## 2018-09-16 PROBLEM — R50.9 FEVER: Status: ACTIVE | Noted: 2018-09-16

## 2018-09-16 LAB
ANION GAP SERPL CALCULATED.3IONS-SCNC: 8 MMOL/L (ref 4–13)
BUN SERPL-MCNC: 12 MG/DL (ref 5–25)
CALCIUM SERPL-MCNC: 8.2 MG/DL (ref 8.3–10.1)
CHLORIDE SERPL-SCNC: 109 MMOL/L (ref 100–108)
CO2 SERPL-SCNC: 23 MMOL/L (ref 21–32)
CREAT SERPL-MCNC: 0.91 MG/DL (ref 0.6–1.3)
ERYTHROCYTE [DISTWIDTH] IN BLOOD BY AUTOMATED COUNT: 14.1 % (ref 11.6–15.1)
GFR SERPL CREATININE-BSD FRML MDRD: 62 ML/MIN/1.73SQ M
GLUCOSE SERPL-MCNC: 118 MG/DL (ref 65–140)
HCT VFR BLD AUTO: 34.7 % (ref 34.8–46.1)
HGB BLD-MCNC: 11 G/DL (ref 11.5–15.4)
MCH RBC QN AUTO: 29.3 PG (ref 26.8–34.3)
MCHC RBC AUTO-ENTMCNC: 31.7 G/DL (ref 31.4–37.4)
MCV RBC AUTO: 93 FL (ref 82–98)
PLATELET # BLD AUTO: 203 THOUSANDS/UL (ref 149–390)
PMV BLD AUTO: 9.1 FL (ref 8.9–12.7)
POTASSIUM SERPL-SCNC: 3.6 MMOL/L (ref 3.5–5.3)
PROCALCITONIN SERPL-MCNC: 0.07 NG/ML
RBC # BLD AUTO: 3.75 MILLION/UL (ref 3.81–5.12)
SODIUM SERPL-SCNC: 140 MMOL/L (ref 136–145)
WBC # BLD AUTO: 11.75 THOUSAND/UL (ref 4.31–10.16)

## 2018-09-16 PROCEDURE — 97116 GAIT TRAINING THERAPY: CPT

## 2018-09-16 PROCEDURE — 99232 SBSQ HOSP IP/OBS MODERATE 35: CPT | Performed by: INTERNAL MEDICINE

## 2018-09-16 PROCEDURE — 84145 PROCALCITONIN (PCT): CPT | Performed by: INTERNAL MEDICINE

## 2018-09-16 PROCEDURE — 97535 SELF CARE MNGMENT TRAINING: CPT

## 2018-09-16 PROCEDURE — 80048 BASIC METABOLIC PNL TOTAL CA: CPT | Performed by: PHYSICIAN ASSISTANT

## 2018-09-16 PROCEDURE — 85027 COMPLETE CBC AUTOMATED: CPT | Performed by: PHYSICIAN ASSISTANT

## 2018-09-16 PROCEDURE — 97110 THERAPEUTIC EXERCISES: CPT

## 2018-09-16 RX ORDER — ACETAMINOPHEN 325 MG/1
650 TABLET ORAL EVERY 8 HOURS PRN
Status: DISCONTINUED | OUTPATIENT
Start: 2018-09-16 | End: 2018-09-18 | Stop reason: HOSPADM

## 2018-09-16 RX ADMIN — ASPIRIN 81 MG: 81 TABLET, COATED ORAL at 08:47

## 2018-09-16 RX ADMIN — HYDROCODONE BITARTRATE AND ACETAMINOPHEN 2 TABLET: 5; 325 TABLET ORAL at 14:15

## 2018-09-16 RX ADMIN — ATORVASTATIN CALCIUM 10 MG: 10 TABLET, FILM COATED ORAL at 21:24

## 2018-09-16 RX ADMIN — METOPROLOL SUCCINATE 100 MG: 100 TABLET, FILM COATED, EXTENDED RELEASE ORAL at 08:46

## 2018-09-16 RX ADMIN — LOSARTAN POTASSIUM 100 MG: 50 TABLET, FILM COATED ORAL at 08:47

## 2018-09-16 RX ADMIN — DOCUSATE SODIUM 100 MG: 100 CAPSULE, LIQUID FILLED ORAL at 17:08

## 2018-09-16 RX ADMIN — ACETAMINOPHEN 650 MG: 325 TABLET, FILM COATED ORAL at 08:46

## 2018-09-16 RX ADMIN — DOCUSATE SODIUM 100 MG: 100 CAPSULE, LIQUID FILLED ORAL at 08:46

## 2018-09-16 RX ADMIN — ASPIRIN 81 MG: 81 TABLET, COATED ORAL at 17:08

## 2018-09-16 RX ADMIN — HYDROCODONE BITARTRATE AND ACETAMINOPHEN 1 TABLET: 5; 325 TABLET ORAL at 21:24

## 2018-09-16 NOTE — ASSESSMENT & PLAN NOTE
Continue metoprolol and losartan with holding parameter  Resume Maxzide  Monitor blood pressure and BMP

## 2018-09-16 NOTE — PHYSICAL THERAPY NOTE
PT TREATMENT     09/16/18 0918   Pain Assessment   Pain Assessment 0-10   Pain Score 6   Pain Type Acute pain;Surgical pain   Pain Location Hip; Buttocks  ("burning in low back : right side)   Pain Orientation Right   Restrictions/Precautions   RLE Weight Bearing Per Order WBAT   Other Precautions Chair Alarm; Bed Alarm; Fall Risk;Pain  (Anterior hip )   General   Chart Reviewed Yes   Family/Caregiver Present No   Cognition   Overall Cognitive Status WFL   Arousal/Participation Alert; Cooperative   Attention Attends with cues to redirect   Following Commands Follows multistep commands with increased time or repetition   Comments many questions about surgery and what she is feeling post op: instructed to ask MD her questions  Subjective   Subjective "The other hip seemed easier post op"   Transfers   Sit to Stand 4  Minimal assistance   Additional items Verbal cues;Armrests   Stand to Sit 4  Minimal assistance   Additional items Verbal cues   Ambulation/Elevation   Gait pattern (does not lift R foot off floor; wiggles her toes to advance)   Gait Assistance 4  Minimal assist   Additional items Verbal cues  (frequent verbal cues; tactle assist to assist with advancing)   Assistive Device Rolling walker   Distance 5 feet x 2 with 1/2 turns to get from commode to chair and bed to commode   Activity Tolerance   Activity Tolerance Patient limited by fatigue;Patient limited by pain  (limited by quad /hip weakness)   Exercises   Quad Sets 10 reps; Sitting;Right   Hip Flexion Sitting;10 reps;AAROM; Right   Hip Abduction Sitting;10 reps;AAROM; Right   Knee AROM Long Arc Quad Sitting;Right  (PROm with attempts to improve quad control/hold/AROM)   Ankle Pumps 20 reps; Sitting;Bilateral   Neuro re-ed worked on engaging right quads    Balance training  with RW: pre-gait ; attempts to advance RLE   Assessment   Prognosis Good   Problem List Decreased strength;Decreased range of motion;Decreased endurance; Impaired balance;Decreased mobility;Pain   Assessment Pt is focused on her disability rather than working on her abilities  Talking about her pain in back, noises she heard in her knee/hip yesterday and burning in her back  RN made aware and instructed pt to bring any concerns to the MD   PT tried to get pt to focus on task at hand to achieve funcitonal mobility rather than surgical discomforts  Working on quad control/initiation 2/2 weak quads  will  benefit from continued PT  Goals   Patient Goals To walk and move my leg better  Treatment Day 1   Plan   Treatment/Interventions Functional transfer training;LE strengthening/ROM; Therapeutic exercise; Endurance training;Patient/family training;Equipment eval/education; Bed mobility;Gait training;Spoke to nursing;OT   Progress Progressing toward goals   Recommendation   Recommendation (STR)   Licensure   [place] License Number  [facility] PT  [id number]   In chair with all needs in reach

## 2018-09-16 NOTE — PROGRESS NOTES
[address] Internal Medicine Progress Note  Patient: Soham Stovall 75 y o  female   MRN: [id number]  PCP: Gerri Prater  Unit/Bed#: [address] [place] [address] Encounter: [id number]  Date Of Visit: 09/16/18    Primary Service: Janak Noyola MD  Reason for Consultation:  Medical management    Problem List:    Principal Problem:    Primary osteoarthritis involving multiple joints  Active Problems:    Fever    Essential hypertension    Dyslipidemia      Assessment & Plan:    * Primary osteoarthritis involving multiple joints   Assessment & Plan    Status post right anterior total hip arthroplasty, 9/14  Continue postoperative care as per primary team  Incentive spirometry, PT/OT  Pain control and DVT prophylaxis, would suggest to use Lovenox if acceptable by Orthopedic team          Fever   Assessment & Plan    Likely reactive, postoperative  No focal signs and symptoms of infection  Follow-up CBC  Blood culture if persistent  Monitor clinically  DVT prophylaxis        Dyslipidemia   Assessment & Plan    Continue home        Essential hypertension   Assessment & Plan    Continue metoprolol and losartan with holding parameter  Discontinue Maxzide as patient has not been using it routinely  Monitor blood pressure and BMP                VTE Pharmacologic Prophylaxis:   Pharmacologic: As per primary team  Mechanical:  Yes    Was care plan discussed with the Primary service today?: No    Education and Discussions with Family / Patient: Yes    Time Spent for Care: 45 minutes  More than 50% of total time spent on counseling and coordination of care as described above      Is patient acceptable for discharge from medicine standpoint?: No  Discharge Recommendations:  Pain control and monitor for fever    Subjective:   Fever of 102 1 degree F  Denies any symptoms except right leg and knee pain with associated swelling and difficulty in ambulation  Denies chest pain shortness of breath or palpitation  Denies cough or dysuria    Objective:   Comfortably sitting in chair    Negative for Chest Pain, Palpitations, Shortness of Breath, Abdominal Pain, Nausea, Vomiting, Dizziness  All other 10 review of systems negative and without drastic interval changes from yesterday  Vitals:   Temp (24hrs), Av 1 °F (37 8 °C), Min:98 6 °F (37 °C), Max:102 1 °F (38 9 °C)    HR:  [67-83] 80  Resp:  [18-19] 18  BP: (136-144)/(63-65) 138/63  SpO2:  [95 %-98 %] 96 %  Body mass index is 28 82 kg/m²  Input and Output Summary (last 24 hours): Intake/Output Summary (Last 24 hours) at 18 1030  Last data filed at 18 0301   Gross per 24 hour   Intake                0 ml   Output             1700 ml   Net            -1700 ml       Physical Exam:     Physical Exam   Constitutional: She appears well-developed  No distress  HENT:   Head: Normocephalic and atraumatic  Nose: Nose normal    Eyes: Conjunctivae and EOM are normal  Pupils are equal, round, and reactive to light  Neck: Normal range of motion  Neck supple  No JVD present  Cardiovascular: Normal rate, regular rhythm and normal heart sounds  Exam reveals no gallop and no friction rub  No murmur heard  Pulmonary/Chest: Effort normal and breath sounds normal  No respiratory distress  She has no wheezes  She has no rales  She exhibits no tenderness  Abdominal: Soft  Bowel sounds are normal  She exhibits no distension  There is no tenderness  There is no rebound and no guarding  Musculoskeletal: She exhibits no edema  Right leg dressing C/D/I  With mild expected swelling and tenderness  Area appears cold due to ice pack   Neurological: She is alert  No cranial nerve deficit  Skin: Skin is warm and dry  No rash noted  Psychiatric: She has a normal mood and affect         Additional Data:     Labs:      Results from last 7 days  Lab Units 18  0648   WBC Thousand/uL 11 75*   HEMOGLOBIN g/dL 11 0*   HEMATOCRIT % 34 7*   PLATELETS Thousands/uL 203 Results from last 7 days  Lab Units 09/16/18  0648  09/10/18  1305   SODIUM mmol/L 140  < > 138   POTASSIUM mmol/L 3 6  < > 4 0   CHLORIDE mmol/L 109*  < > 106   CO2 mmol/L 23  < > 24   BUN mg/dL 12  < > 22   CREATININE mg/dL 0 91  < > 1 15   CALCIUM mg/dL 8 2*  < > 9 2   ALK PHOS U/L  --   --  83   ALT U/L  --   --  26   AST U/L  --   --  23   < > = values in this interval not displayed  * I Have Reviewed All Lab Data Listed Above  * Additional Pertinent Lab Tests Reviewed: Rere 66 Admission Reviewed    Imaging:    Xr Hip/pelv 1 Vw Right If Performed    Result Date: 9/14/2018  Narrative: C-ARM - right hip INDICATION: ant rt hip  Procedure guidance  COMPARISON:  None TECHNIQUE: FLUOROSCOPY TIME:   59 SEC 3 FLUOROSCOPIC IMAGES FINDINGS: Fluoroscopic guidance provided for surgical procedure  Images provided demonstrate placement of a right total hip replacement  Osseous and soft tissue detail limited by technique  Impression: Fluoroscopic guidance provided for surgical procedure  Please refer to the separate procedure notes for additional details  Workstation performed: [id number]     Mri Brain Wo Contrast    Result Date: 8/24/2018  Narrative: MRI BRAIN WITHOUT CONTRAST INDICATION: R20 0: Anesthesia of skin R20 2: Paresthesia of skin  Involuntary movement of bilateral toes COMPARISON:   None  TECHNIQUE:  Sagittal T1, axial T2, axial FLAIR, axial T1, axial [place] and axial diffusion imaging  IMAGE QUALITY:  Diagnostic  FINDINGS: BRAIN PARENCHYMA:  No acute disease  There is no acute ischemia, intracranial mass, mass effect or edema  No pathologic hemosiderin deposition  Diffuse generalized volume loss consistent with age, minor degree of what most probably represents chronic small vessel disease  VENTRICLES:  The ventricles are normal in size and contour   SELLA AND PITUITARY GLAND:  Partially empty sella ORBITS:  Normal  PARANASAL SINUSES:  Normal  VASCULATURE: Evaluation of the major intracranial vasculature demonstrates appropriate flow voids  CALVARIUM AND SKULL BASE:  Normal  EXTRACRANIAL SOFT TISSUES:  Normal      Impression: No acute disease  Volume loss consistent with age, minor degree of most probably represents chronic small vessel disease  Workstation performed: [id number]     Imaging Reports Reviewed by myself    Cultures:   Blood Culture: No results found for: BLOODCX  Urine Culture:   Lab Results   Component Value Date    URINECX 30,000-39,000 cfu/ml  08/31/2018     Sputum Culture: No components found for: SPUTUMCX  Wound Culture: No results found for: WOUNDCULT    Last 24 Hours Medication List:     Current Facility-Administered Medications:  acetaminophen 650 mg Oral Q8H PRN Gustabo Murillo MD   aspirin 81 mg Oral BID Sadie Negrete PA-C   atorvastatin 10 mg Oral HS Sadie Negrete PA-C   dextran 70-hypromellose 1 drop Both Eyes HS PRN Sadie Negrete PA-C   docusate sodium 100 mg Oral BID Sadie Negrete PA-C   HYDROcodone-acetaminophen 1 tablet Oral Q3H PRN Sadie Negrete PA-C   HYDROcodone-acetaminophen 2 tablet Oral Q4H PRN Sadie Negrete PA-C   HYDROmorphone 1 mg Intravenous Q2H PRN Sadie Negrete PA-C   losartan 100 mg Oral Daily Gustabo Murillo MD   metoprolol succinate 100 mg Oral Daily Sadie Negrete PA-C   ondansetron 4 mg Intravenous Q6H PRN Sadie Negrete PA-C   pneumococcal 13-valent conjugate vaccine 0 5 mL Intramuscular Prior to discharge Aramis Houston MD   senna 1 tablet Oral HS PRN Tish Osuna PA-C        Today, Patient Was Seen By: Gustabo Murillo MD    ** Please Note:  Dictation voice to text software may have been used in the creation of this document   **

## 2018-09-16 NOTE — ASSESSMENT & PLAN NOTE
Status post right anterior total hip arthroplasty, 9/14  Continue postoperative care as per primary team  Incentive spirometry, PT/OT  Pain control and DVT prophylaxis, would suggest to use Lovenox if acceptable by Orthopedic team  Agree with DVT study  Monitor

## 2018-09-16 NOTE — ASSESSMENT & PLAN NOTE
Likely reactive, postoperative  No focal signs and symptoms of infection  Mild leukocytosis  No further fever  Monitor clinically  Follow-up DVT study  DVT prophylaxis

## 2018-09-16 NOTE — OCCUPATIONAL THERAPY NOTE
OT TREATMENT       09/16/18 0900   Restrictions/Precautions   Weight Bearing Precautions Per Order Yes   RLE Weight Bearing Per Order WBAT   Other Precautions Pain; Fall Risk;THR;Chair Alarm; Bed Alarm  (Anterior approach THR)   Pain Assessment   Pain Assessment 0-10   Pain Score 6   Pain Type Acute pain;Surgical pain   Pain Location Hip; Buttocks   Pain Orientation Right   Diversional Activities Television   ADL   LB Dressing Assistance 2  Maximal Assistance   Bed Mobility   Rolling L 3  Moderate assistance   Supine to Sit 3  Moderate assistance   Additional items LE management;Verbal cues; Increased time required   Transfers   Sit to Stand 4  Minimal assistance   Additional items Assist x 1; Increased time required;Verbal cues   Stand to Sit 4  Minimal assistance   Additional items Assist x 1;Verbal cues; Increased time required   Toilet transfer 4  Minimal assistance   Additional items Increased time required;Commode;Assist x 1; Armrests   Additional Comments pt with difficulty with R knee extension   Toilet Transfers   Toilet Transfer From [place] Type To   Toilet Transfer to Standard bedside commode   Toilet Transfer Technique Stand pivot   Toilet Transfers Minimal assistance   Toilet Transfers Comments increased time, cues to advance R LE   Cognition   Overall Cognitive Status [facility]   Arousal/Participation Alert; Cooperative   Attention Within functional limits   Orientation Level Oriented X4   Following Commands Follows all commands and directions without difficulty   Comments pt anxious about healing and pain she is experiencing  support offered throughout   Activity Tolerance   Activity Tolerance Patient limited by fatigue;Patient limited by pain   Assessment   Assessment Pt seen at bedside for skilled tx  pt with improved performance this date despite fearfulness throughout  Support offered  pt requires assist with bed mobility, transfers, toileting, dressing, and functional mobility   pt will continue to benefit from skilled tx, will follow, recommend D/C to STR  Thank you for this referral    Plan   Treatment Interventions ADL retraining;Functional transfer training;UE strengthening/ROM; Endurance training;Patient/family training;Equipment evaluation/education; Activityengagement   Goal Expiration Date 09/21/18   Treatment Day 1   OT Frequency 3-5x/wk   Recommendation   OT Discharge Recommendation Short Term Rehab   Equipment Recommended Bedside commode;Raised toilet seat;Tub seat with back  (LE AE)   Licensure   [place] License Number  INJUNE OTR/L [address] [facility] PIG#[id number]

## 2018-09-16 NOTE — PROGRESS NOTES
Patient is seen today postop day 2 right anterior hip replacement  She is still complaining of considerable pain and decreased mobility  She does not think she is safe to go home today  On clinical exam her dressing is clean and dry and intact  Brief neurovascular exam was intact  No calf pain  Labs:  Pending    Postop day 2 right anterior total hip replacement    Continue physical therapy weight-bearing as tolerated  Total hip precautions   for DC planning, may need short-term rehab

## 2018-09-16 NOTE — PHYSICAL THERAPY NOTE
PT TREATMENT     09/16/18 1425   Pain Assessment   Pain Assessment 0-10   Pain Score 6   Pain Location Hip   Pain Orientation Right   Restrictions/Precautions   Weight Bearing Precautions Per Order Yes   RLE Weight Bearing Per Order WBAT   Other Precautions Chair Alarm; Bed Alarm; Fall Risk;Pain   General   Chart Reviewed Yes   Family/Caregiver Present No   Cognition   Overall Cognitive Status WFL   Arousal/Participation Cooperative   Attention Attends with cues to redirect   Following Commands Follows multistep commands with increased time or repetition   Subjective   Subjective "My knee is still swelling"   Bed Mobility   Sit to Supine 3  Moderate assistance   Additional items Assist x 1;Verbal cues;LE management   Transfers   Sit to Stand 4  Minimal assistance   Additional items Assist x 1;Verbal cues;Armrests   Stand to Sit 4  Minimal assistance   Additional items Verbal cues   Toilet transfer 4  Minimal assistance   Additional items Commode;Verbal cues;Armrests   Ambulation/Elevation   Gait pattern (decreased knee/hip flexion=wiggle toes to move foot fwd)   Gait Assistance 4  Minimal assist   Additional items Assist x 1;Verbal cues   Assistive Device Rolling walker   Distance 5 feet x 2   Balance   Ambulatory Poor   Activity Tolerance   Activity Tolerance Patient limited by pain   Nurse Made Aware yes: Camille   Exercises   Quad Sets Supine;10 reps;Right   Heelslides Supine;10 reps;AAROM; Right   Hip Abduction Supine;10 reps;AAROM; Right   Ankle Pumps Supine;20 reps;Bilateral   Balance training  standing at [address] [place] [address] working on hip/knee flexion   Assessment   Prognosis Good   Problem List Decreased strength;Decreased range of motion;Decreased endurance; Impaired balance;Decreased mobility; Decreased coordination;Pain   Assessment Pt presents in chair, walked to commode then walked to bed but pt continues to not have control of RLE via lifting hip and flexing right knee    Still no visible quad contraction in supine quad sets   Can push leg into bed but no visible quad contraction  Pt 's right knee does not buckle, uses UE to assist with LLE advancement  Cont  PT BID   Goals   Patient Goals to be able to move her leg   Treatment Day 2   Plan   Treatment/Interventions Functional transfer training;LE strengthening/ROM; Therapeutic exercise; Endurance training;Patient/family training;Equipment eval/education; Bed mobility;Gait training;Spoke to nursing;OT   Progress Progressing toward goals   Recommendation   Recommendation (STR)   Licensure   [place] License Number  Opal Benavidez PT  [id number]

## 2018-09-17 ENCOUNTER — APPOINTMENT (INPATIENT)
Dept: RADIOLOGY | Facility: HOSPITAL | Age: 73
DRG: 470 | End: 2018-09-17
Payer: MEDICARE

## 2018-09-17 LAB
ALBUMIN SERPL BCP-MCNC: 2.4 G/DL (ref 3.5–5)
ALP SERPL-CCNC: 42 U/L (ref 46–116)
ALT SERPL W P-5'-P-CCNC: 15 U/L (ref 12–78)
ANION GAP SERPL CALCULATED.3IONS-SCNC: 6 MMOL/L (ref 4–13)
AST SERPL W P-5'-P-CCNC: 16 U/L (ref 5–45)
BASOPHILS # BLD AUTO: 0.04 THOUSANDS/ΜL (ref 0–0.1)
BASOPHILS NFR BLD AUTO: 0 % (ref 0–1)
BILIRUB DIRECT SERPL-MCNC: 0.1 MG/DL (ref 0–0.2)
BILIRUB SERPL-MCNC: 0.3 MG/DL (ref 0.2–1)
BUN SERPL-MCNC: 12 MG/DL (ref 5–25)
CALCIUM SERPL-MCNC: 8.3 MG/DL (ref 8.3–10.1)
CHLORIDE SERPL-SCNC: 108 MMOL/L (ref 100–108)
CO2 SERPL-SCNC: 25 MMOL/L (ref 21–32)
CREAT SERPL-MCNC: 0.95 MG/DL (ref 0.6–1.3)
EOSINOPHIL # BLD AUTO: 0.36 THOUSAND/ΜL (ref 0–0.61)
EOSINOPHIL NFR BLD AUTO: 3 % (ref 0–6)
ERYTHROCYTE [DISTWIDTH] IN BLOOD BY AUTOMATED COUNT: 14.1 % (ref 11.6–15.1)
GFR SERPL CREATININE-BSD FRML MDRD: 59 ML/MIN/1.73SQ M
GLUCOSE SERPL-MCNC: 121 MG/DL (ref 65–140)
HCT VFR BLD AUTO: 32 % (ref 34.8–46.1)
HGB BLD-MCNC: 10.3 G/DL (ref 11.5–15.4)
IMM GRANULOCYTES # BLD AUTO: 0.07 THOUSAND/UL (ref 0–0.2)
IMM GRANULOCYTES NFR BLD AUTO: 1 % (ref 0–2)
LYMPHOCYTES # BLD AUTO: 2.04 THOUSANDS/ΜL (ref 0.6–4.47)
LYMPHOCYTES NFR BLD AUTO: 18 % (ref 14–44)
MCH RBC QN AUTO: 29.9 PG (ref 26.8–34.3)
MCHC RBC AUTO-ENTMCNC: 32.2 G/DL (ref 31.4–37.4)
MCV RBC AUTO: 93 FL (ref 82–98)
MONOCYTES # BLD AUTO: 0.88 THOUSAND/ΜL (ref 0.17–1.22)
MONOCYTES NFR BLD AUTO: 8 % (ref 4–12)
NEUTROPHILS # BLD AUTO: 8.19 THOUSANDS/ΜL (ref 1.85–7.62)
NEUTS SEG NFR BLD AUTO: 70 % (ref 43–75)
NRBC BLD AUTO-RTO: 0 /100 WBCS
PLATELET # BLD AUTO: 218 THOUSANDS/UL (ref 149–390)
PMV BLD AUTO: 9.1 FL (ref 8.9–12.7)
POTASSIUM SERPL-SCNC: 3.7 MMOL/L (ref 3.5–5.3)
PROCALCITONIN SERPL-MCNC: 0.05 NG/ML
PROT SERPL-MCNC: 5.7 G/DL (ref 6.4–8.2)
RBC # BLD AUTO: 3.45 MILLION/UL (ref 3.81–5.12)
SODIUM SERPL-SCNC: 139 MMOL/L (ref 136–145)
WBC # BLD AUTO: 11.58 THOUSAND/UL (ref 4.31–10.16)

## 2018-09-17 PROCEDURE — 93971 EXTREMITY STUDY: CPT

## 2018-09-17 PROCEDURE — 80048 BASIC METABOLIC PNL TOTAL CA: CPT | Performed by: INTERNAL MEDICINE

## 2018-09-17 PROCEDURE — 84145 PROCALCITONIN (PCT): CPT | Performed by: INTERNAL MEDICINE

## 2018-09-17 PROCEDURE — 97535 SELF CARE MNGMENT TRAINING: CPT

## 2018-09-17 PROCEDURE — 80076 HEPATIC FUNCTION PANEL: CPT | Performed by: INTERNAL MEDICINE

## 2018-09-17 PROCEDURE — 93971 EXTREMITY STUDY: CPT | Performed by: SURGERY

## 2018-09-17 PROCEDURE — 99232 SBSQ HOSP IP/OBS MODERATE 35: CPT | Performed by: INTERNAL MEDICINE

## 2018-09-17 PROCEDURE — 97110 THERAPEUTIC EXERCISES: CPT

## 2018-09-17 PROCEDURE — 85025 COMPLETE CBC W/AUTO DIFF WBC: CPT | Performed by: INTERNAL MEDICINE

## 2018-09-17 RX ORDER — TRIAMTERENE AND HYDROCHLOROTHIAZIDE 37.5; 25 MG/1; MG/1
1 TABLET ORAL
Status: DISCONTINUED | OUTPATIENT
Start: 2018-09-17 | End: 2018-09-18 | Stop reason: HOSPADM

## 2018-09-17 RX ADMIN — TRIAMTERENE AND HYDROCHLOROTHIAZIDE 1 TABLET: 37.5; 25 TABLET ORAL at 17:26

## 2018-09-17 RX ADMIN — ATORVASTATIN CALCIUM 10 MG: 10 TABLET, FILM COATED ORAL at 21:55

## 2018-09-17 RX ADMIN — LOSARTAN POTASSIUM 100 MG: 50 TABLET, FILM COATED ORAL at 10:17

## 2018-09-17 RX ADMIN — ASPIRIN 81 MG: 81 TABLET, COATED ORAL at 10:17

## 2018-09-17 RX ADMIN — DOCUSATE SODIUM 100 MG: 100 CAPSULE, LIQUID FILLED ORAL at 17:26

## 2018-09-17 RX ADMIN — ASPIRIN 81 MG: 81 TABLET, COATED ORAL at 17:26

## 2018-09-17 RX ADMIN — METOPROLOL SUCCINATE 100 MG: 100 TABLET, FILM COATED, EXTENDED RELEASE ORAL at 10:17

## 2018-09-17 RX ADMIN — DOCUSATE SODIUM 100 MG: 100 CAPSULE, LIQUID FILLED ORAL at 10:17

## 2018-09-17 RX ADMIN — HYDROCODONE BITARTRATE AND ACETAMINOPHEN 1 TABLET: 5; 325 TABLET ORAL at 16:37

## 2018-09-17 NOTE — PLAN OF CARE
Problem: DISCHARGE PLANNING - CARE MANAGEMENT  Goal: Discharge to post-acute care or home with appropriate resources  INTERVENTIONS:  - Conduct assessment to determine patient/family and health care team treatment goals, and need for post-acute services based on payer coverage, community resources, and patient preferences, and barriers to discharge  - Address psychosocial, clinical, and financial barriers to discharge as identified in assessment in conjunction with the patient/family and health care team  - Arrange appropriate level of post-acute services according to patient's   needs and preference and payer coverage in collaboration with the physician and health care team  - Communicate with and update the patient/family, physician, and health care team regarding progress on the discharge plan  - Arrange appropriate transportation to post-acute venues     TRANSFER TO SHORT TERM REHAB WHEN STABLE  Outcome: Progressing  STR referral made to [facility]

## 2018-09-17 NOTE — SOCIAL WORK
SW following to assist with DCP  STR placement is being recommended  SW met with pt to discuss plans and facility options  Offered list of facilities for pt to consider  Pt's facility preference is [facility]  Pt was in [facility] following her previous orthopedic surgery  Discussed need for two additional choices  Pt will consider list and let SW know tomorrow  Referral has been made to [facility]  SW will continue to follow to assist with planning as needed

## 2018-09-17 NOTE — CASE MANAGEMENT
Initial Clinical Review    Age/Sex: 75 y o  female    Surgery Date: 9/14/18    Procedure: Procedure(s) (LRB):  ANTERIOR TOTAL HIP ARTHROPLASTY (Right)  Operative Findings:  End stage djd of the right hip  Anesthesia: choice spinal block    Admission Orders: Date/Time/Statement: 9/14/18 @ 1548     Orders Placed This Encounter   Procedures    Inpatient Admission     Standing Status:   Standing     Number of Occurrences:   1     Order Specific Question:   Admitting Physician     Answer:   Delta Shah     Order Specific Question:   Level of Care     Answer:   Med Surg [16]     Order Specific Question:   Estimated length of stay     Answer:   More than 2 Midnights     Order Specific Question:   Certification     Answer:   I certify that inpatient services are medically necessary for this patient for a duration of greater than two midnights  See H&P and MD Progress Notes for additional information about the patient's course of treatment  Vital Signs: /66 (BP Location: Left arm)   Pulse 81   Temp 98 5 °F (36 9 °C) (Oral)   Resp 18   Ht 5' 7" (1 702 m)   Wt 83 5 kg (184 lb)   SpO2 98%   BMI 28 82 kg/m²     Diet:        Diet Orders            Start     Ordered    09/14/18 1559  Room Service  Once     Question:  Type of Service  Answer:  Room Service-Appropriate    09/14/18 1558    09/14/18 1549  Diet Regular; Regular House  Diet effective now     Question Answer Comment   Diet Type Regular    Regular Regular House    RD to adjust diet per protocol?  Yes        09/14/18 1548          Mobility: beginning pod 1 wbat     DVT Prophylaxis: see meds    Pain Control:   Scheduled Meds:  Current Facility-Administered Medications:  acetaminophen 650 mg Oral Q8H PRN Belle Maher MD   aspirin 81 mg Oral BID Sadie Negrete PA-C   atorvastatin 10 mg Oral HS Sadie Negrete PA-C   dextran 70-hypromellose 1 drop Both Eyes HS PRN aSdie Negrete PA-C   docusate sodium 100 mg Oral BID Dee Mesa PA-C HYDROcodone-acetaminophen 1 tablet Oral Q3H PRN Zachariah Becerril PA-C   HYDROcodone-acetaminophen 2 tablet Oral Q4H PRN Sadie Negrete PA-C   HYDROmorphone 1 mg Intravenous Q2H PRN Sadie Negrete PA-C   losartan 100 mg Oral Daily Julio Holt MD   metoprolol succinate 100 mg Oral Daily Sadie Negrete PA-C   ondansetron 4 mg Intravenous Q6H PRN Sadie Negrete PA-C   pneumococcal 13-valent conjugate vaccine 0 5 mL Intramuscular Prior to discharge Doug Queen MD   senna 1 tablet Oral HS PRN Sadie Negrete PA-C     Continuous Infusions:   PRN Meds:   acetaminophen    dextran 70-hypromellose    HYDROcodone-acetaminophen    HYDROcodone-acetaminophen    HYDROmorphone    ondansetron    pneumococcal 13-valent conjugate vaccine    senna

## 2018-09-17 NOTE — OCCUPATIONAL THERAPY NOTE
OT TREATMENT       09/17/18 1228   Restrictions/Precautions   RLE Weight Bearing Per Order WBAT   Other Precautions Chair Alarm; Bed Alarm; Fall Risk;Pain   Pain Assessment   Pain Assessment 0-10   Pain Score 5   Pain Type Acute pain   Pain Location Hip;Knee   Pain Orientation Right   Functional Standing Tolerance   Time (2 minutes)   Activity (prep for transfer)   Comments Pt reprots some light headedness and discomfort in knee (cracking) stood prior to transfer to minimize risk of LOB   Bed Mobility   Sit to Supine 3  Moderate assistance   Additional items Assist x 1;Verbal cues;LE management   Transfers   Sit to Stand 4  Minimal assistance   Additional items Assist x 1   Stand to Sit 4  Minimal assistance   Additional items Assist x 1   Stand pivot 4  Minimal assistance   Additional items Assist x 1   Functional Mobility   Functional Mobility (Pt walks 5 ft in room at RW level, limited due to pain)   Cognition   Overall Cognitive Status [facility]   Arousal/Participation Alert; Responsive; Cooperative   Attention Within functional limits   Orientation Level Oriented X4   Memory Within functional limits   Following Commands Follows all commands and directions without difficulty   Activity Tolerance   Activity Tolerance Patient limited by pain   Assessment   Assessment Pt received in bedside chair  Very focused on cracking in knee, pain and swelling  Pt with decreased hip flexion and knee extension, pt unable to swing leg through during transfer and short distance amb, wiggles toes to move right left forward  Educated on moving leg as toelrated to minimize swelling and discomfort in leg  Discussed use of leg  to decrease need of assistance during bed moblity but pt declined  Plan   Treatment Interventions ADL retraining;Functional transfer training; Endurance training;Patient/family training;Equipment evaluation/education; Neuromuscular reeducation; Compensatory technique education   Goal Expiration Date 09/28/18 Treatment Day (3)   OT Frequency 3-5x/wk   Recommendation   OT Discharge Recommendation Short Term Rehab  (due to pt anxiety and limits in personal care and mobilty)   Barthel Index   Feeding 10   Bathing 0   Grooming Score 5   Dressing Score 5   Bladder Score 10   Bowels Score 10   Toilet Use Score 5   Transfers (Bed/Chair) Score 10   Mobility (Level Surface) Score 0   Stairs Score 0   Barthel Index Score 54   Licensure   [place] Number  Javon Morrell OTR/L [id number]

## 2018-09-17 NOTE — PROGRESS NOTES
Progress Note - Orthopedics   Curly Stafford 75 y o  female MRN: [id number]  Unit/Bed#: [address] [place] [address] Encounter: [id number]    Assessment:  Postop day 3  Status post right anterior total hip arthroplasty  Right lower extremity swelling  Anxiety  DVT prophylaxis with aspirin 81 mg b i d  Plan:  Patient reports that her pain and mobility is not where she would like it to be  She still is not able to independently ambulate to the bathroom or even dress herself  She is requesting placement at a rehab facility  Tiara Wen also expresses significant anxiety regarding the swelling in her right leg  Other this could represent normal perioperative dependent swelling we will get an ultrasound of the right lower extremity to rule out a DVT  I encouraged the patient to attempt to become more mobile and independent with the therapist   She seems to be fixated anxious regarding her inability to anything  I did discuss with the patient that she should be ready to go to rehab tomorrow and that a prolonged hospitalization is likely not indicated  Will make referrals to rehab facility  Possible discharge tomorrow if bed available  Weight bearing:  Weight bear as tolerated right lower extremity    VTE Pharmacologic Prophylaxis: ASA 81 mg BID  VTE Mechanical Prophylaxis: sequential compression device    Subjective:  Patient is postop day 3  Status post anterior total hip arthroplasty on the right  The patient is not very ambulatory and continues to complain of significant pain, swelling, and inability to perform ADLs  She reports that she has not independently gone to the bathroom and she is walking minimally  She does have concerns that she will need short-term rehab  She also verbalizes that she would like to stay in the hospital for another 4-5 days  She also complains of significant swelling in the right lower extremity    Denies calf pain  Vitals: Blood pressure 146/65, pulse 77, temperature 97 8 °F (36 6 °C), temperature source Oral, resp  rate 18, height 5' 7" (1 702 m), weight 83 5 kg (184 lb), SpO2 99 %  ,Body mass index is 28 82 kg/m²  Intake/Output Summary (Last 24 hours) at 09/17/18 0823  Last data filed at 09/17/18 0443   Gross per 24 hour   Intake                0 ml   Output             1550 ml   Net            -1550 ml       Invasive Devices     Peripheral Intravenous Line            Peripheral IV 09/14/18 Left Wrist 2 days                Ortho Exam:  Patient's right hip as addressing anteriorly  Moderate thigh swelling and right knee swelling  Tender to palpation  Patient has a negative Homans sign at the right calf  Peripheral pulses are intact      Lab, Imaging and other studies:   CBC:   Lab Results   Component Value Date    WBC 11 58 (H) 09/17/2018    HGB 10 3 (L) 09/17/2018    HCT 32 0 (L) 09/17/2018    MCV 93 09/17/2018     09/17/2018    MCH 29 9 09/17/2018    MCHC 32 2 09/17/2018    RDW 14 1 09/17/2018    MPV 9 1 09/17/2018    NRBC 0 09/17/2018

## 2018-09-17 NOTE — PHYSICAL THERAPY NOTE
PT TREATMENT     09/17/18 1605   Pain Assessment   Pain Assessment 0-10   Pain Score 5  (R hip and thigh areas)   Restrictions/Precautions   Other Precautions Chair Alarm; Bed Alarm; Fall Risk;Pain   General   Chart Reviewed Yes   Family/Caregiver Present No   Cognition   Arousal/Participation Cooperative   Transfers   Sit to Stand Unable to assess  (patient "just back to bed")   Ambulation/Elevation   Gait Assistance (deferred by patient "just back to bed")   Exercises   Quad Sets Supine;20 reps;Right   Heelslides Supine;10 reps;Right   Glute Sets Supine;10 reps   Hip Abduction Supine;10 reps;Bilateral   Knee AROM Short Arc Quad (unable to complete although pt demonstrated strong quad set)   Ankle Pumps Supine;20 reps;Bilateral   Assessment   Assessment patient cooperative but remains anxious about pain and falling  Patient demonstrated strong quad set this session but unable to actively [address] or SAQ  Patient will benefit from continued PT with progression as toleraed  Plan   Treatment/Interventions ADL retraining;Functional transfer training;LE strengthening/ROM; Therapeutic exercise; Endurance training;Patient/family training;Equipment eval/education; Bed mobility;Gait training   PT Frequency Twice a day   Recommendation   Recommendation (str)   Licensure   [place] Number  Galo Haq PT [id number]

## 2018-09-17 NOTE — PHYSICAL THERAPY NOTE
PT TREATMENT     09/17/18 1115   Pain Assessment   Pain Assessment 0-10   Pain Score 5  (R hip and thigh areas)   Restrictions/Precautions   RLE Weight Bearing Per Order WBAT   Other Precautions Chair Alarm; Bed Alarm; Fall Risk;Pain  (r quad weakness/fall risk! ! )   General   Chart Reviewed Yes   Family/Caregiver Present No   Cognition   Arousal/Participation Cooperative   Subjective   Subjective patient cooperative but remains anxious about all activity   Transfers   Sit to Stand 4  Minimal assistance   Additional items Assist x 1;Verbal cues   Stand to Sit 4  Minimal assistance   Additional items Assist x 1;Verbal cues; Impulsive  (verbal cuing to maintain R knee extension due to quad weakne)   Ambulation/Elevation   Gait Assistance (min to mod assist )   Additional items Assist x 1;Verbal cues; Tactile cues   Assistive Device Rolling walker   Distance 5 feet x1, 15 feet x 1 with change in direction and verbal cuing for improved gait patterning and maintain R knee extension to prevent knee buckling due to quad weakness   Exercises   Quad Sets Sitting;10 reps;Right;AAROM   Heelslides Sitting;Right;5 reps   Hip Flexion Sitting;10 reps;AAROM; Right   Knee AROM Short Arc Quad (unable to complete on RLE due to quad weakness)   Knee AROM Long Arc Quad (unable to complete on RLE due to quad weakness)   Ankle Pumps Sitting;20 reps;Bilateral   Balance training  sit to and from stand with min assist of 1 and verbal cuing for R quad weakness and safety awareness  Patient completed for strengthening and balance training  Assessment   Assessment Patient remains at higher risk to fall due to R quad and hip flexor weakness and patient states that doctor is aware and discussion had this morning with ortho  Patient will benefit from continued PT with progression as tolerated/BID  Recommend STR   Goals   Treatment Day 3   Plan   Treatment/Interventions ADL retraining;Functional transfer training;LE strengthening/ROM; Therapeutic exercise; Endurance training;Patient/family training;Equipment eval/education; Bed mobility;Gait training; Compensatory technique education   PT Frequency Twice a day   Recommendation   Recommendation (str)   Licensure   [place] License Number  Yuliana Chance PT [id number]

## 2018-09-18 VITALS
TEMPERATURE: 98.2 F | HEART RATE: 79 BPM | HEIGHT: 67 IN | WEIGHT: 184 LBS | SYSTOLIC BLOOD PRESSURE: 138 MMHG | RESPIRATION RATE: 20 BRPM | DIASTOLIC BLOOD PRESSURE: 63 MMHG | BODY MASS INDEX: 28.88 KG/M2 | OXYGEN SATURATION: 99 %

## 2018-09-18 LAB
ANION GAP SERPL CALCULATED.3IONS-SCNC: 10 MMOL/L (ref 4–13)
BASOPHILS # BLD AUTO: 0.06 THOUSANDS/ΜL (ref 0–0.1)
BASOPHILS NFR BLD AUTO: 1 % (ref 0–1)
BUN SERPL-MCNC: 12 MG/DL (ref 5–25)
CALCIUM SERPL-MCNC: 8.4 MG/DL (ref 8.3–10.1)
CHLORIDE SERPL-SCNC: 106 MMOL/L (ref 100–108)
CO2 SERPL-SCNC: 23 MMOL/L (ref 21–32)
CREAT SERPL-MCNC: 0.92 MG/DL (ref 0.6–1.3)
EOSINOPHIL # BLD AUTO: 0.48 THOUSAND/ΜL (ref 0–0.61)
EOSINOPHIL NFR BLD AUTO: 5 % (ref 0–6)
ERYTHROCYTE [DISTWIDTH] IN BLOOD BY AUTOMATED COUNT: 14 % (ref 11.6–15.1)
GFR SERPL CREATININE-BSD FRML MDRD: 62 ML/MIN/1.73SQ M
GLUCOSE SERPL-MCNC: 109 MG/DL (ref 65–140)
HCT VFR BLD AUTO: 31 % (ref 34.8–46.1)
HGB BLD-MCNC: 9.8 G/DL (ref 11.5–15.4)
IMM GRANULOCYTES # BLD AUTO: 0.09 THOUSAND/UL (ref 0–0.2)
IMM GRANULOCYTES NFR BLD AUTO: 1 % (ref 0–2)
LYMPHOCYTES # BLD AUTO: 2.39 THOUSANDS/ΜL (ref 0.6–4.47)
LYMPHOCYTES NFR BLD AUTO: 23 % (ref 14–44)
MCH RBC QN AUTO: 29.1 PG (ref 26.8–34.3)
MCHC RBC AUTO-ENTMCNC: 31.6 G/DL (ref 31.4–37.4)
MCV RBC AUTO: 92 FL (ref 82–98)
MONOCYTES # BLD AUTO: 0.83 THOUSAND/ΜL (ref 0.17–1.22)
MONOCYTES NFR BLD AUTO: 8 % (ref 4–12)
NEUTROPHILS # BLD AUTO: 6.55 THOUSANDS/ΜL (ref 1.85–7.62)
NEUTS SEG NFR BLD AUTO: 62 % (ref 43–75)
NRBC BLD AUTO-RTO: 0 /100 WBCS
PLATELET # BLD AUTO: 264 THOUSANDS/UL (ref 149–390)
PMV BLD AUTO: 9.4 FL (ref 8.9–12.7)
POTASSIUM SERPL-SCNC: 3.6 MMOL/L (ref 3.5–5.3)
RBC # BLD AUTO: 3.37 MILLION/UL (ref 3.81–5.12)
SODIUM SERPL-SCNC: 139 MMOL/L (ref 136–145)
WBC # BLD AUTO: 10.4 THOUSAND/UL (ref 4.31–10.16)

## 2018-09-18 PROCEDURE — 85025 COMPLETE CBC W/AUTO DIFF WBC: CPT | Performed by: INTERNAL MEDICINE

## 2018-09-18 PROCEDURE — 97116 GAIT TRAINING THERAPY: CPT

## 2018-09-18 PROCEDURE — 80048 BASIC METABOLIC PNL TOTAL CA: CPT | Performed by: INTERNAL MEDICINE

## 2018-09-18 PROCEDURE — 97535 SELF CARE MNGMENT TRAINING: CPT

## 2018-09-18 PROCEDURE — 97110 THERAPEUTIC EXERCISES: CPT

## 2018-09-18 RX ORDER — DOCUSATE SODIUM 100 MG/1
100 CAPSULE, LIQUID FILLED ORAL 2 TIMES DAILY
Qty: 10 CAPSULE | Refills: 0 | Status: SHIPPED | OUTPATIENT
Start: 2018-09-18 | End: 2019-10-24

## 2018-09-18 RX ORDER — HYDROCODONE BITARTRATE AND ACETAMINOPHEN 5; 325 MG/1; MG/1
1 TABLET ORAL EVERY 4 HOURS PRN
Qty: 60 TABLET | Refills: 0 | Status: SHIPPED | OUTPATIENT
Start: 2018-09-18 | End: 2018-09-28

## 2018-09-18 RX ORDER — ASPIRIN 81 MG/1
81 TABLET ORAL 2 TIMES DAILY
Qty: 60 TABLET | Refills: 0 | Status: SHIPPED | OUTPATIENT
Start: 2018-09-18

## 2018-09-18 RX ADMIN — DOCUSATE SODIUM 100 MG: 100 CAPSULE, LIQUID FILLED ORAL at 08:18

## 2018-09-18 RX ADMIN — METOPROLOL SUCCINATE 100 MG: 100 TABLET, FILM COATED, EXTENDED RELEASE ORAL at 08:18

## 2018-09-18 RX ADMIN — HYDROCODONE BITARTRATE AND ACETAMINOPHEN 2 TABLET: 5; 325 TABLET ORAL at 13:57

## 2018-09-18 RX ADMIN — LOSARTAN POTASSIUM 100 MG: 50 TABLET, FILM COATED ORAL at 08:17

## 2018-09-18 RX ADMIN — ASPIRIN 81 MG: 81 TABLET, COATED ORAL at 08:18

## 2018-09-18 NOTE — DISCHARGE SUMMARY
Discharge Summary - Dex Shine 75 y o  female MRN: [id number]    Unit/Bed#: [address] [place] [address] Encounter: [id number]    Admission Date:   Admission Orders     Ordered        09/14/18 1548  Inpatient Admission  Once               Admitting Diagnosis: Primary osteoarthritis of right hip [M16 11]    HPI: Admitted for elective right THR on 9/14/18     Procedures Performed: Right anterior total hip    Summary of Hospital Course: Patient admitted for elective surgery  She underwent procedure without incident  Progressed very slow with significant anxiety and poor motivation  Patient was ready for DC orthopedically to a SNF on POD#4  She did have a doppler on 9/17/18 for right lower extremity swelling  This study was WNL  Significant Findings, Care, Treatment and Services Provided: Right THR    Complications: none     Discharge Diagnosis: s/p anterior right THR 9/14/18    Resolved Problems  Date Reviewed: 9/6/2018    None          Condition at Discharge: fair         Discharge instructions/Information to patient and family:   See after visit summary for information provided to patient and family  Wound instructions: may remove the dressing on 9/19/18  Shower ok, no bath or submersion of the incision under water  Ok to leave incision to air with Steri-strips  Provisions for Follow-Up Care:  See after visit summary for information related to follow-up care and any pertinent home health orders  PCP: Osiris Pacheco    Disposition: Skilled nursing facility at [facility]    Planned Readmission: No    Discharge Statement   I spent 55 minutes discharging the patient  This time was spent on the day of discharge  I had direct contact with the patient on the day of discharge  Additional documentation is required if more than 30 minutes were spent on discharge  Discharge Medications:  See after visit summary for reconciled discharge medications provided to patient and family

## 2018-09-18 NOTE — PROGRESS NOTES
[address] Internal Medicine Progress Note  Patient: Dafne Perry 75 y o  female   MRN: [id number]  PCP: Lucien Wen  Unit/Bed#: [address] [place] [address] Encounter: [id number]  Date Of Visit: 09/17/18    Primary Service: Brooklyn Maurice MD  Reason for Consultation:  Medical management    Problem List:    Principal Problem:    Primary osteoarthritis involving multiple joints  Active Problems:    Fever    Essential hypertension    Dyslipidemia      Assessment & Plan:    * Primary osteoarthritis involving multiple joints   Assessment & Plan    Status post right anterior total hip arthroplasty, 9/14  Continue postoperative care as per primary team  Incentive spirometry, PT/OT  Pain control and DVT prophylaxis, would suggest to use Lovenox if acceptable by Orthopedic team  Agree with DVT study  Monitor          Fever   Assessment & Plan    Likely reactive, postoperative  No focal signs and symptoms of infection  Mild leukocytosis  No further fever  Monitor clinically  Follow-up DVT study  DVT prophylaxis        Dyslipidemia   Assessment & Plan    Continue home        Essential hypertension   Assessment & Plan    Continue metoprolol and losartan with holding parameter  Resume Maxzide  Monitor blood pressure and BMP                VTE Pharmacologic Prophylaxis:   Pharmacologic: As per primary team  Mechanical:  Yes    Was care plan discussed with the Primary service today?: No    Education and Discussions with Family / Patient: Yes    Time Spent for Care: 45 minutes  More than 50% of total time spent on counseling and coordination of care as described above      Is patient acceptable for discharge from medicine standpoint?: No  Discharge Recommendations:  Pain control and monitor for fever    Subjective:   Remains afebrile  Denies chest pain or shortness of breath  Reports right leg swelling and discomfort    Objective:   Comfortably sitting in chair  Constipation    Negative for Chest Pain, Palpitations, Shortness of Breath, Abdominal Pain, Nausea, Vomiting, Dizziness  All other 10 review of systems negative and without drastic interval changes from yesterday  Vitals:   Temp (24hrs), Av °F (37 2 °C), Min:98 5 °F (36 9 °C), Max:99 8 °F (37 7 °C)    HR:  [77-81] 77  Resp:  [18] 18  BP: (126-144)/(58-66) 126/58  SpO2:  [98 %-99 %] 99 %  Body mass index is 28 82 kg/m²  Input and Output Summary (last 24 hours): Intake/Output Summary (Last 24 hours) at 18 2316  Last data filed at 18 2224   Gross per 24 hour   Intake                0 ml   Output             1550 ml   Net            -1550 ml       Physical Exam:     Physical Exam   Constitutional: No distress  HENT:   Head: Normocephalic and atraumatic  Nose: Nose normal    Eyes: Conjunctivae and EOM are normal  Pupils are equal, round, and reactive to light  Neck: Normal range of motion  Neck supple  No JVD present  Cardiovascular: Normal rate, regular rhythm and normal heart sounds  Exam reveals no gallop and no friction rub  No murmur heard  Pulmonary/Chest: Effort normal and breath sounds normal  No respiratory distress  She has no wheezes  She has no rales  She exhibits no tenderness  Abdominal: Soft  Bowel sounds are normal  She exhibits no distension  There is no tenderness  There is no rebound and no guarding  Musculoskeletal: She exhibits edema (Right leg)  Neurological: She is alert  No cranial nerve deficit  Skin: Skin is warm and dry  No rash noted  Right leg dressing C/D/I  With mild expected swelling and tenderness   Psychiatric: She has a normal mood and affect         Additional Data:     Labs:      Results from last 7 days  Lab Units 18  0613   WBC Thousand/uL 11 58*   HEMOGLOBIN g/dL 10 3*   HEMATOCRIT % 32 0*   PLATELETS Thousands/uL 218   NEUTROS PCT % 70   LYMPHS PCT % 18   MONOS PCT % 8   EOS PCT % 3       Results from last 7 days  Lab Units 18  0613   SODIUM mmol/L 139   POTASSIUM mmol/L 3 7   CHLORIDE mmol/L 108   CO2 mmol/L 25   BUN mg/dL 12   CREATININE mg/dL 0 95   CALCIUM mg/dL 8 3   ALK PHOS U/L 42*   ALT U/L 15   AST U/L 16           * I Have Reviewed All Lab Data Listed Above  * Additional Pertinent Lab Tests Reviewed: Rere 66 Admission Reviewed    Imaging:    Xr Hip/pelv 1 Vw Right If Performed    Result Date: 9/14/2018  Narrative: C-ARM - right hip INDICATION: ant rt hip  Procedure guidance  COMPARISON:  None TECHNIQUE: FLUOROSCOPY TIME:   59 SEC 3 FLUOROSCOPIC IMAGES FINDINGS: Fluoroscopic guidance provided for surgical procedure  Images provided demonstrate placement of a right total hip replacement  Osseous and soft tissue detail limited by technique  Impression: Fluoroscopic guidance provided for surgical procedure  Please refer to the separate procedure notes for additional details  Workstation performed: [id number]     Mri Brain Wo Contrast    Result Date: 8/24/2018  Narrative: MRI BRAIN WITHOUT CONTRAST INDICATION: R20 0: Anesthesia of skin R20 2: Paresthesia of skin  Involuntary movement of bilateral toes COMPARISON:   None  TECHNIQUE:  Sagittal T1, axial T2, axial FLAIR, axial T1, axial Nadeau and axial diffusion imaging  IMAGE QUALITY:  Diagnostic  FINDINGS: BRAIN PARENCHYMA:  No acute disease  There is no acute ischemia, intracranial mass, mass effect or edema  No pathologic hemosiderin deposition  Diffuse generalized volume loss consistent with age, minor degree of what most probably represents chronic small vessel disease  VENTRICLES:  The ventricles are normal in size and contour  SELLA AND PITUITARY GLAND:  Partially empty sella ORBITS:  Normal  PARANASAL SINUSES:  Normal  VASCULATURE:  Evaluation of the major intracranial vasculature demonstrates appropriate flow voids  CALVARIUM AND SKULL BASE:  Normal  EXTRACRANIAL SOFT TISSUES:  Normal      Impression: No acute disease    Volume loss consistent with age, minor degree of most probably represents chronic small vessel disease  Workstation performed: [id number]     Imaging Reports Reviewed by myself    Cultures:   Blood Culture: No results found for: BLOODCX  Urine Culture:   Lab Results   Component Value Date    URINECX 30,000-39,000 cfu/ml  08/31/2018     Sputum Culture: No components found for: SPUTUMCX  Wound Culture: No results found for: WOUNDCULT    Last 24 Hours Medication List:     Current Facility-Administered Medications:  acetaminophen 650 mg Oral Q8H PRN Elie Tan MD   aspirin 81 mg Oral BID Sadie Negrete PA-C   atorvastatin 10 mg Oral HS Sadie Negrete PA-C   dextran 70-hypromellose 1 drop Both Eyes HS PRN Sadie Negrete PA-C   docusate sodium 100 mg Oral BID Sadie Negrete PA-C   HYDROcodone-acetaminophen 1 tablet Oral Q3H PRN Sadie Negrete PA-C   HYDROcodone-acetaminophen 2 tablet Oral Q4H PRN Sadie Negrete PA-C   HYDROmorphone 1 mg Intravenous Q2H PRN Sadie Negrete PA-C   losartan 100 mg Oral Daily Elie Tan MD   metoprolol succinate 100 mg Oral Daily Sadie Negrete PA-C   ondansetron 4 mg Intravenous Q6H PRN Sadie Negrete PA-C   pneumococcal 13-valent conjugate vaccine 0 5 mL Intramuscular Prior to discharge Maida Gutierrez MD   senna 1 tablet Oral HS PRN Sadie Negrete PA-C   triamterene-hydrochlorothiazide 1 tablet Oral Once per day on Mon Wed Fri Elie Tan MD        Today, Patient Was Seen By: Elie Tan MD    ** Please Note:  Dictation voice to text software may have been used in the creation of this document   **

## 2018-09-18 NOTE — SOCIAL WORK
Discharge ordered  Pt will be transferred to [facility] for skilled rehab  Reviewed transportation options with pt  Pt aware of charge for wheelchair Krystyna Mohamud transport and expressed concerns  Concerns discussed with Care  and trip will be covered  Bitely scheduled to transport as wheelchair Krystyna Pillai), [place] and pt aware of plan

## 2018-09-18 NOTE — PHYSICAL THERAPY NOTE
PT TREATMENT     09/18/18 1031   Pain Assessment   Pain Assessment 0-10   Pain Score 6   Pain Type Acute pain;Surgical pain   Pain Location Hip;Knee   Pain Orientation Right   Restrictions/Precautions   RLE Weight Bearing Per Order WBAT   Other Precautions Pain; Fall Risk;Bed Alarm; Chair Alarm   General   Chart Reviewed Yes   Family/Caregiver Present No   Subjective   Subjective "I wish I could stay here one more day"   Bed Mobility   Sit to Supine 4  Minimal assistance   Additional items LE management;Assist x 1;Verbal cues   Transfers   Sit to Stand 4  Minimal assistance   Additional items Assist x 1;Verbal cues   Stand to Sit 4  Minimal assistance   Additional items Assist x 1;Verbal cues   Ambulation/Elevation   Gait pattern R Foot drag   Gait Assistance 4  Minimal assist   Additional items Assist x 1;Verbal cues; Tactile cues   Assistive Device Rolling walker   Distance 20 feet with change in direction  Balance   Static Sitting Fair   Static Standing Fair   Dynamic Standing Fair -   Ambulatory Fair -   Activity Tolerance   Activity Tolerance Patient limited by fatigue;Patient limited by pain   Exercises   Quad Sets 20 reps;Right;Supine  (quad is weak;pt compensates with hip extensors)   Heelslides AAROM;20 reps;Right;Supine   Hip Abduction AAROM;20 reps;Right;Supine   Knee AROM Long Arc Quad PROM;10 reps; Sitting;Right   Ankle Pumps 20 reps; Supine;Right   Assessment   Assessment Pt is making progress with bed mob, trans, amb and mobility with the RW  Amb endurance improving as well  Pt with good tolerance to RLE ex  Right quad remains weak  Pt will cont to benefit from skilled PT services  Goals   Treatment Day 4   Plan   Treatment/Interventions ADL retraining;Functional transfer training;LE strengthening/ROM; Elevations; Therapeutic exercise; Endurance training;Patient/family training;Bed mobility;Gait training   PT Frequency Twice a day   Recommendation   Recommendation (STR)   Equipment Recommended (Pt has a cane and walker)   Licensure   [place] License Number  [id number] [place] [address] [id number]

## 2018-09-18 NOTE — [place] UNIVERSAL TRANSFER FORM
[place] UNIV[place] TRANSFER FOR[place]  (ALL ITEMS MUST BE COMPLETED)    1  TRANSFER FROM: [address] [facility]    2  DATE OF TRANSFER: 9/18/2018                        TIME OF TRANSFER: 2 pm    3  PATIENT NAME: BREANNA Roman      YOB: 1945                             GENDER: female    4  LANGUAGE:   English    5  PHYSICIAN NAME:  Francesco Norris MD                   PHONE: [phone]    6  CODE STATUS: No Order        Out of Hospital DNR Attached: No    7  :                                      :  Extended Emergency Contact Information  Primary Emergency Contact: Georgie Smith   [place]  Mobile Phone: [phone]  Relation: Daughter           Health Care Representative/Proxy:  No           Legal Guardian:  No             NAME OF:           HEALTH CARE REPRESENTATIVE/PROXY:                                         OR           LEGAL GUARDIAN, IF NOT :                                               PHONE:  (Day)           (Night)                        (Cell)    8  REASON FOR TRANSFER: (Must include brief medical history and recent changes in physical function or cognition ) Short term rehab            V/S: /54 (BP Location: Left arm)   Pulse 79   Temp 98 1 °F (36 7 °C) (Oral)   Resp 18   Ht 5' 7" (1 702 m)   Wt 83 5 kg (184 lb)   SpO2 96%   BMI 28 82 kg/m²           PAIN: Yes, Site R hip    9  PRIMARY DIAGNOSIS: Primary osteoarthritis involving multiple joints      Secondary Diagnosis:         Pacemaker: No      Internal Defib: No          Mental Health Diagnosis (if Applicable):    10  RESTRAINTS: No     11  RESPIRATORY NEEDS: None    12  ISOLATION/PRECAUTION: None    13  ALLERGY: Patient has no known allergies  14  SENSORY:       Vision Glasses    15  SKIN CONDITION: Yes:  Surgical    16  DIET: Regular    17  IV ACCESS: None    18   PERSONAL ITEMS SENT WITH PATIENT: Glasses    19  ATTACHED DOCUMENTS: MUST ATTACH CURRENT MEDICATION INFORMATION Face Sheet, MAR and Medication Reconciliation    20  AT RISK ALERTS:Falls        HARM TO: N/A    21  WEIGHT BEARING STATUS:         Left Leg: Limited        Right Leg: Limited    22  MENTAL STATUS:Alert    23  FUNCTION:        Walk: With Help        Transfer: With Help        Toilet: With Help        Feed: Self    24  IMMUNIZATIONS/SCREENING:   There is no immunization history for the selected administration types on file for this patient  25  BOWEL: Continent and Date Last BM 9/18/2018    26  BLADDER: Continent    27   SENDING FACILITY CONTACT: Dinesh Keen                  Title: RN        Unit: [facility]        Phone: [phone] [address] (if known):        Title:        Unit:         Phone:         FORM PREFILLED BY (if applicable)       Title:       Unit:        Phone:         FORM COMPLETED BY Marlene Cabrera RN      Title: RN      Phone: [phone]

## 2018-09-18 NOTE — PROGRESS NOTES
Progress Note - Orthopedics   Chris Birmingham 75 y o  female MRN: [id number]  Unit/Bed#: [address] [place] [address] Encounter: [id number]    Assessment:  POD 4 s/p anterior right THR  DVT prophylaxis with ASA 81mg BID  Anxiety   Poor motivation    Plan:  Patient has been very slow to progress  She is apprehensive to do much independently and has been consistently encouraged to attempt to perform ADLs independently  Referrals were made by Case Management for STR  She is ready for DC to facility today if bed available  I encouraged the patient to mobilize and do her own self care  With regards to the doppler of the RLE, the study was WNL  Discussed the results with the patient  Weight bearing: WBAT on RLE    VTE Pharmacologic Prophylaxis: ASA 81mg BID  VTE Mechanical Prophylaxis: sequential compression device    Subjective: POD 4 s/p anterior right THR  The patient has been very slow to progress with poor motivation  She notes that she would like to stay inpatient for another 4-5 days  Denies CP, SOB or calf pain  Reports that she is going to rehab and discussed with the  yesterday the facility she is interested in  Patient did have venous study yesterday to evaluate the RLE swelling  Vitals: Blood pressure 117/54, pulse 79, temperature 98 1 °F (36 7 °C), temperature source Oral, resp  rate 18, height 5' 7" (1 702 m), weight 83 5 kg (184 lb), SpO2 96 %  ,Body mass index is 28 82 kg/m²  Intake/Output Summary (Last 24 hours) at 09/18/18 0750  Last data filed at 09/18/18 0525   Gross per 24 hour   Intake                0 ml   Output             1450 ml   Net            -1450 ml       Invasive Devices     Peripheral Intravenous Line            Peripheral IV 09/14/18 Left Wrist 3 days                Physical Exam: very anxious  Ortho Exam: right hip dressing is clean/dry and intact  There is no drainage  No calf pain  Swelling of the right thigh and knee  Sensory exam intact       Lab, Imaging and other studies: CBC:   Lab Results   Component Value Date    WBC 10 40 (H) 09/18/2018    HGB 9 8 (L) 09/18/2018    HCT 31 0 (L) 09/18/2018    MCV 92 09/18/2018     09/18/2018    MCH 29 1 09/18/2018    MCHC 31 6 09/18/2018    RDW 14 0 09/18/2018    MPV 9 4 09/18/2018    NRBC 0 09/18/2018

## 2018-09-18 NOTE — OCCUPATIONAL THERAPY NOTE
OT TREATMENT       09/18/18 0910   Restrictions/Precautions   RLE Weight Bearing Per Order WBAT   Other Precautions Chair Alarm; Bed Alarm; Fall Risk;Pain   Pain Assessment   Pain Assessment 0-10   Pain Score 6   Pain Type Acute pain;Surgical pain   Pain Location Hip;Knee   Pain Orientation Right   Pain Descriptors ('feels like my bones are cracking")   Hospital Pain Intervention(s) Medication (See MAR); Cold applied;Repositioned; Ambulation/increased activity; Distraction; Emotional support   Response to Interventions some relief   ADL   Where Assessed Standing at sink  (and sitting at sink)   Grooming Assistance 5  Supervision/Setup   UB Bathing Assistance 4  Minimal Assistance   LB Bathing Assistance 3  Moderate Assistance   LB Bathing Deficit Right lower leg including foot; Left lower leg including foot   UB Dressing Assistance 5  Supervision/Setup   LB Dressing Assistance 3  Moderate Assistance   LB Dressing Deficit Use of adaptive equipment;Verbal cueing; Increased time to complete   Functional Standing Tolerance   Time 3 minutes   Activity lower body washing   Transfers   Sit to Stand 4  Minimal assistance   Additional items Assist x 1   Stand to Sit 4  Minimal assistance   Additional items Assist x 1   Stand pivot 4  Minimal assistance   Additional items Assist x 1   Cognition   Overall Cognitive Status WFL   Comments anxious   Activity Tolerance   Activity Tolerance Patient limited by fatigue;Patient limited by pain   Medical Staff Made Aware Janee Muñoz, SAMMI   Assessment   Assessment Pt demonstrating improved strength, balance and functional activity tolerance allowing for increased active participation with ADL and mobility tasks  However, pt anxious and frequently complaining about intermittent pain in right knee through hip that feels like "bones cracking"  Good progress towards goals and should progress well in STR  Pt left sitting in chair with all needs within reach and tab alarm in place  Cont OT per POC     Plan Treatment Interventions ADL retraining;Functional transfer training; Endurance training;Patient/family training;Equipment evaluation/education; Neuromuscular reeducation; Compensatory technique education   Treatment Day 1   OT Frequency 3-5x/wk   Recommendation   OT Discharge Recommendation Short Term Rehab   Equipment Recommended Bedside commode   Licensure   [place] License Number  Estella Elizondo Morgan [address], OTR/L, [place] Lic# [id number]

## 2018-09-18 NOTE — PLAN OF CARE
Problem: PHYSICAL THERAPY ADULT  Goal: Performs mobility at highest level of function for planned discharge setting  See evaluation for individualized goals  Treatment/Interventions: ADL retraining, Functional transfer training, LE strengthening/ROM, Elevations, Therapeutic exercise, Patient/family training, Equipment eval/education, Bed mobility, Gait training  Equipment Recommended:  (pt has a cane and walker)       See flowsheet documentation for full assessment, interventions and recommendations  Outcome: Progressing  Prognosis: Good  Problem List: Decreased strength, Decreased range of motion, Decreased endurance, Impaired balance, Decreased mobility, Decreased coordination, Pain  Assessment: Pt is making progress with bed mob, trans, amb and mobility with the RW  Amb endurance improving as well  Pt with good tolerance to RLE ex  Right quad remains weak  Pt will cont to benefit from skilled PT services  Recommendation:  (STR)          See flowsheet documentation for full assessment

## 2018-10-15 ENCOUNTER — TELEPHONE (OUTPATIENT)
Dept: CARDIAC SURGERY | Facility: CLINIC | Age: 73
End: 2018-10-15

## 2018-10-15 NOTE — TELEPHONE ENCOUNTER
Pt's daughter called to cancel pts appt with Dr Сергей Cooper on 10/23/2018 for a f/u  She also cx'd her CT scheduled for 10/15/2018  She will c/b to reschedule both appts once pt is out of rehab and home

## 2018-11-21 ENCOUNTER — TRANSCRIBE ORDERS (OUTPATIENT)
Dept: LAB | Facility: CLINIC | Age: 73
End: 2018-11-21

## 2018-11-21 ENCOUNTER — APPOINTMENT (OUTPATIENT)
Dept: LAB | Facility: CLINIC | Age: 73
End: 2018-11-21
Payer: MEDICARE

## 2018-11-21 DIAGNOSIS — D63.8 CHRONIC DISEASE ANEMIA: ICD-10-CM

## 2018-11-21 DIAGNOSIS — E78.00 PURE HYPERCHOLESTEROLEMIA: ICD-10-CM

## 2018-11-21 DIAGNOSIS — N18.30 CHRONIC KIDNEY DISEASE, STAGE III (MODERATE) (HCC): ICD-10-CM

## 2018-11-21 DIAGNOSIS — I10 ESSENTIAL HYPERTENSION, MALIGNANT: Primary | ICD-10-CM

## 2018-11-21 DIAGNOSIS — D50.0 IRON DEFICIENCY ANEMIA SECONDARY TO BLOOD LOSS (CHRONIC): ICD-10-CM

## 2018-11-21 LAB
ALBUMIN SERPL BCP-MCNC: 4 G/DL (ref 3.5–5)
ALP SERPL-CCNC: 86 U/L (ref 46–116)
ALT SERPL W P-5'-P-CCNC: 25 U/L (ref 12–78)
ANION GAP SERPL CALCULATED.3IONS-SCNC: 6 MMOL/L (ref 4–13)
AST SERPL W P-5'-P-CCNC: 18 U/L (ref 5–45)
BILIRUB SERPL-MCNC: 0.29 MG/DL (ref 0.2–1)
BUN SERPL-MCNC: 21 MG/DL (ref 5–25)
CALCIUM SERPL-MCNC: 9.1 MG/DL (ref 8.3–10.1)
CHLORIDE SERPL-SCNC: 107 MMOL/L (ref 100–108)
CO2 SERPL-SCNC: 25 MMOL/L (ref 21–32)
CREAT SERPL-MCNC: 1.06 MG/DL (ref 0.6–1.3)
ERYTHROCYTE [DISTWIDTH] IN BLOOD BY AUTOMATED COUNT: 13.6 % (ref 11.6–15.1)
ERYTHROCYTE [SEDIMENTATION RATE] IN BLOOD: 19 MM/HOUR (ref 0–20)
GFR SERPL CREATININE-BSD FRML MDRD: 52 ML/MIN/1.73SQ M
GLUCOSE SERPL-MCNC: 87 MG/DL (ref 65–140)
HCT VFR BLD AUTO: 41 % (ref 34.8–46.1)
HGB BLD-MCNC: 12.6 G/DL (ref 11.5–15.4)
MCH RBC QN AUTO: 29 PG (ref 26.8–34.3)
MCHC RBC AUTO-ENTMCNC: 30.7 G/DL (ref 31.4–37.4)
MCV RBC AUTO: 94 FL (ref 82–98)
PLATELET # BLD AUTO: 291 THOUSANDS/UL (ref 149–390)
PMV BLD AUTO: 9.5 FL (ref 8.9–12.7)
POTASSIUM SERPL-SCNC: 4.3 MMOL/L (ref 3.5–5.3)
PROT SERPL-MCNC: 7.7 G/DL (ref 6.4–8.2)
RBC # BLD AUTO: 4.35 MILLION/UL (ref 3.81–5.12)
SODIUM SERPL-SCNC: 138 MMOL/L (ref 136–145)
WBC # BLD AUTO: 10.2 THOUSAND/UL (ref 4.31–10.16)

## 2018-11-21 PROCEDURE — 36415 COLL VENOUS BLD VENIPUNCTURE: CPT

## 2018-11-21 PROCEDURE — 80053 COMPREHEN METABOLIC PANEL: CPT

## 2018-11-21 PROCEDURE — 85652 RBC SED RATE AUTOMATED: CPT

## 2018-11-21 PROCEDURE — 85027 COMPLETE CBC AUTOMATED: CPT

## 2018-12-04 ENCOUNTER — PATIENT OUTREACH (OUTPATIENT)
Dept: OTHER | Facility: HOSPITAL | Age: 73
End: 2018-12-04

## 2018-12-06 ENCOUNTER — TELEPHONE (OUTPATIENT)
Dept: CARDIOLOGY CLINIC | Facility: CLINIC | Age: 73
End: 2018-12-06

## 2018-12-10 ENCOUNTER — HOSPITAL ENCOUNTER (OUTPATIENT)
Dept: RADIOLOGY | Facility: HOSPITAL | Age: 73
Discharge: HOME | End: 2018-12-10
Payer: MEDICARE

## 2018-12-10 ENCOUNTER — PATIENT OUTREACH (OUTPATIENT)
Dept: OTHER | Facility: HOSPITAL | Age: 73
End: 2018-12-10

## 2018-12-10 DIAGNOSIS — C34.91 MALIGNANT NEOPLASM OF UNSPECIFIED PART OF RIGHT BRONCHUS OR LUNG (HCC): ICD-10-CM

## 2018-12-10 PROCEDURE — 71250 CT THORAX DX C-: CPT

## 2018-12-10 NOTE — PROGRESS NOTES
Patient discharged from services with [facility] on 11/23/18  Patient states she is doing "ok" s/p right THR, inpatient rehab, & home health services  Her right leg edema persists, nontender, skin color normal to ethnicity  She has limited ROM of her right foot & c/o numbness & achiness from her thigh to her foot for which she takes tylenol w/result  Ambulation is with a rolling walker  She states Dr Yessenia Andino prescribed her Gabapentin PRN for restless legs which she takes at night & it also helps her sleep better  Her daughter assists her with errands & provides transportation (patient states Dr Yessenia Andino may clear her to drive in 3 weeks), but she has been sick with kidney stones & their 2 cars need work done  A friend will drive her to her upcoming appointment with the cardiothoracic surgeon on 12/18/18  Referred patient to the [facility] senior service division  She is agreeable to outreach  Will mail [address] letter

## 2018-12-17 ENCOUNTER — PATIENT OUTREACH (OUTPATIENT)
Dept: CASE MANAGEMENT | Facility: OTHER | Age: 73
End: 2018-12-17

## 2018-12-18 ENCOUNTER — OFFICE VISIT (OUTPATIENT)
Dept: CARDIAC SURGERY | Facility: CLINIC | Age: 73
End: 2018-12-18
Payer: MEDICARE

## 2018-12-18 VITALS
TEMPERATURE: 97.6 F | HEART RATE: 74 BPM | DIASTOLIC BLOOD PRESSURE: 69 MMHG | HEIGHT: 68 IN | SYSTOLIC BLOOD PRESSURE: 153 MMHG | BODY MASS INDEX: 27.71 KG/M2 | WEIGHT: 182.8 LBS | OXYGEN SATURATION: 98 %

## 2018-12-18 DIAGNOSIS — C34.91 ADENOCARCINOMA OF RIGHT LUNG (HCC): Primary | ICD-10-CM

## 2018-12-18 PROCEDURE — 99213 OFFICE O/P EST LOW 20 MIN: CPT | Performed by: THORACIC SURGERY (CARDIOTHORACIC VASCULAR SURGERY)

## 2018-12-18 RX ORDER — SENNA PLUS 8.6 MG/1
1 TABLET ORAL DAILY
COMMUNITY
End: 2020-07-30

## 2018-12-18 NOTE — PROGRESS NOTES
Thoracic Follow-Up  Assessment/Plan:    Adenocarcinoma of right lung [facility])  Ms Jessee Fu is currently 3 yrs out from resection and there is no evidence of recurrent or metastatic disease  We will continue yearly surveillance and have her return in September/October 2019  She is in agreement with the plan  Diagnoses and all orders for this visit:    Adenocarcinoma of right lung ([address] )  -     CT chest wo contrast; Future    Other orders  -     senna (SENOKOT) 8 6 MG tablet; Take 1 tablet by mouth daily          Thoracic History       Diagnosis: 1  Stage IA right lower lobe adenocarcinoma  Procedures: 1  fiberoptic bronchoscopy, right thoracoscopic superior segmentectomy and mediastinal lymph node dissection performed on September 17, 2015  Pathology: 1  Right lower lobe suprerior segment--moderately differentiated acinar predominant adenocarcinoma with mucinous features  Tumor size 2 1 x 1 6 x 1 5 cm  Bronchial, vascular and parenchymal margins are negative  Lymphatic channel invasion is identified  All lymph node levels sampled including right paratracheal, level 7, 8, 9 and 11 are negative for malignancy  Seventh edition AJCC tumor stage IA (pT1a, N0, M0)  Patient ID: Lakshmi Burnette is a 75 y o  female  HPI     Jv Page is a 70-year-old female with a history of stage IA adenocarcinoma of the right lower lobe status post right thoracoscopic superior segmentectomy on September 17, 2015  She continues to follow in our office for routine cancer surveillance  She was last seen in October 2017, at which time her CT revealed no abnormalities  She returns today, 3 yrs out from surgery, with a CT scan from 12/10/18  This illustrated some stable post operative scarring in the right lower lobe, without any new pulmonary nodules or lymphadenopathy  On discussion, she fell down the steps after a CHENCHO while at rehab and is not having difficulty ambulating   She denies fever, chills, cough, shortness of breath, or weight loss  Review of Systems   Constitutional: Negative for chills, fatigue and fever  HENT: Negative for trouble swallowing and voice change  Respiratory: Negative for cough  Cardiovascular: Negative for chest pain  Musculoskeletal: Positive for gait problem  Psychiatric/Behavioral: Negative for agitation and behavioral problems  Objective:   Physical Exam   Constitutional: She appears well-nourished  HENT:   Head: Normocephalic and atraumatic  Eyes: Pupils are equal, round, and reactive to light  EOM are normal  No scleral icterus  Cardiovascular: Normal rate and regular rhythm  Pulmonary/Chest: Effort normal and breath sounds normal  No respiratory distress  She has no wheezes  Musculoskeletal:   Using a cane  + right knee brace   Skin: Skin is warm and dry  She is not diaphoretic  Psychiatric: She has a normal mood and affect  Her behavior is normal    Vitals reviewed  /69 (BP Location: Right arm, Patient Position: Sitting, Cuff Size: Adult)   Pulse 74   Temp 97 6 °F (36 4 °C)   Ht 5' 7 5" (1 715 m)   Wt 82 9 kg (182 lb 12 8 oz)   SpO2 98%   BMI 28 21 kg/m²       Ct Chest Wo Contrast    Result Date: 12/12/2018  Narrative CT CHEST WITHOUT IV CONTRAST INDICATION:   C34 91: Malignant neoplasm of unspecified part of right bronchus or lung  History of right lower lobe bronchogenic carcinoma treated with surgical resection  Follow-up  COMPARISON:  CT chest from October 10, 2017  TECHNIQUE: CT examination of the chest was performed without intravenous contrast   Axial, sagittal, and coronal 2D reformatted images were created from the source data and submitted for interpretation  Radiation dose length product (DLP) for this visit:  229 69 mGy-cm     This examination, like all CT scans performed in the [facility], was performed utilizing techniques to minimize radiation dose exposure, including the use of iterative  reconstruction and automated exposure control  FINDINGS: LUNGS:  Emphysema  Stable postoperative scarring in the superior segment of the right lower lobe  No evidence of local recurrence of cancer  No new pulmonary nodule, mass or consolidation  PLEURA:  Unremarkable  HEART/GREAT VESSELS:  Heart top normal in size  Coronary artery calcifications  Aorta and central pulmonary arteries normal in caliber  MEDIASTINUM AND KARIE: No lymphadenopathy or mass  Esophagus unremarkable  Trachea and main stem bronchi normal  CHEST WALL AND LOWER NECK:   Unremarkable  VISUALIZED STRUCTURES IN THE UPPER ABDOMEN:  Unremarkable  OSSEOUS STRUCTURES:  No acute fracture or destructive osseous lesion  Impression 1  Emphysema  2   Stable postoperative scarring in the right lower lobe  3   No evidence of recurrent bronchogenic carcinoma   Workstation performed: [id number]

## 2018-12-18 NOTE — ASSESSMENT & PLAN NOTE
Ms Marcela Meza is currently 3 yrs out from resection and there is no evidence of recurrent or metastatic disease  We will continue yearly surveillance and have her return in September/October 2019  She is in agreement with the plan

## 2019-02-10 NOTE — PROGRESS NOTES
Progress Note- Cardiology  Office  [facility]     Prem Scales 75 y o  female MRN: [id number]  : 1945  Encounter: [id number]    Assessment:  1  Essential hypertension    2  Dyslipidemia    3  Left leg swelling    4  Primary osteoarthritis involving multiple joints    5  Femoral neuropathy of right lower extremity    6  Adenocarcinoma of right lung [facility])        Discussion Summary & Plan:   1  Essential hypertension  Patient blood pressure has much improved  She is currently taking losartan 50 mg along with metoprolol XL and Dyazide 1 capsule 4 times a week     Labs reviewed with the patient    2  Dyslipidemia  Patient is tolerating statins  No issues appetite is good  Cholesterol is acceptable    3  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  Follow up with CT surgery    4  Left leg swelling  Much improved  With diuretics    5  History of anxiety    6  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  Patient has lot of back pain issues and MRI shows narrowing of 4 minutes  Management as per orthopedics  Advised patient to be compliant with blood pressure medications  Labs reviewed with her  Currently stable cardiovascular status  Results of stress test discussed with her  Counseling :  A description of the counseling:   Goals and Barriers:  Patient's ability to self care: Yes  Medication side effect reviewed with patient in detail and all their questions answered to their satisfaction  Physician Requesting Consult: Franchesca Macedo        HPI:     Prem Sacles is a 75y o  year old female  Who was initially seen by me in 2015 has past medical history significant for lung cancer status post partial lobectomy right lower lobe  She had a stage IA cancer and she follows up with [facility] thoracic surgery    She also had past medical history significant for hypertension, previous tobacco abuse now quit smoking, emphysema, dyslipidemia, severe DJD with previous left hip surgery now need surgery on her right hip  Since her last visit in 2015 she has gained some weight  She also noted that she occasionally gets bilateral lower extremity swelling left more than right  She was on Dyazide which was recently had as her BUN creatinine might be high and that led to increase in her blood pressure  She also increased swelling she is back on Dyazide now every other day  She also takes losartan 50 mg, metoprolol XL 75 mg daily and atorvastatin  Her previous cardiac workup was in 2015 April when she had a normal stress test and study was non gated  Echo shows at that time she has a normal LV systolic function EF was 75% with grade 1 diastolic dysfunction  She cannot walk much she walks with the help of cane  No nausea no vomiting no PND no chest pain  She denies any history of stenting in between no history of MI no history of heart attack no heart failure  02/11/2019  Above reviewed  Patient came for follow-up  She did well with her hip surgery from cardiac point of view but she still having some issue with right femoral her nerve neuropathy  She feel numbness in thigh area  Slowly the movement of her hip is coming back  She denies any chest pain any shortness of breath  Blood pressure is pretty well controlled  She still taking losartan 100 mg daily  No nausea no vomiting no fever no chills no PND no orthopnea  She is taking her cholesterol and other medications  So currently she is taking losartan 50 mg and metoprolol  mg and along with Dyazide  She taking Dyazide 4 times a week  No other significant issues at this time  She has history of right lung cancer status post surgery  She has quit smoking at that time  Review of Systems   Constitutional: Negative for activity change, chills, diaphoresis, fever and unexpected weight change  HENT: Negative for congestion      Eyes: Negative for discharge and redness  Respiratory: Negative for cough, chest tightness, shortness of breath and wheezing  Cardiovascular: Negative  Negative for chest pain, palpitations and leg swelling  Gastrointestinal: Negative for abdominal pain, diarrhea and nausea  Endocrine: Negative  Genitourinary: Negative for decreased urine volume and urgency  Musculoskeletal: Positive for back pain and gait problem  Negative for arthralgias  Skin: Negative for rash and wound  Allergic/Immunologic: Negative  Neurological: Negative for dizziness, seizures, syncope, weakness, light-headedness and headaches  Tingling sensation in right thigh area   Hematological: Negative  Psychiatric/Behavioral: Negative for agitation and confusion  The patient is nervous/anxious          Historical Information   Past Medical History:   Diagnosis Date    Arthritis     [facility] )     Emphysema lung ([facility] )     Hyperlipidemia     Hypertension     Lung cancer ([facility] ) 2015    right lung has a resection( no otherRX needed)    Numbness and tingling of foot     Osteoarthritis of right hip      Past Surgical History:   Procedure Laterality Date    CATARACT EXTRACTION      CHOLECYSTECTOMY      COLONOSCOPY      JOINT REPLACEMENT      MT TOTAL HIP ARTHROPLASTY Right 9/14/2018    Procedure: ANTERIOR TOTAL HIP ARTHROPLASTY;  Surgeon: Raciel Zamorano MD;  [facility];  Service: Orthopedics    TOTAL HIP ARTHROPLASTY      TUMOR REMOVAL Right 2015    lower lobe        Social History:  Social History     Substance and Sexual Activity   Alcohol Use Yes    Comment: ocassional      Social History     Substance and Sexual Activity   Drug Use No     Social History     Tobacco Use   Smoking Status Former Smoker    Packs/day: 1 00    Years: 50 00    Pack years: 50 00    Types: Cigarettes    Last attempt to quit: 9/7/2015    Years since quitting: 3 4   Smokeless Tobacco Never Used          Family History Problem Relation Age of Onset    Heart disease Mother     No Known Problems Father        Meds/Allergies     No Known Allergies    Current medications:    Current Outpatient Medications:     aspirin (ECOTRIN LOW STRENGTH) 81 mg EC tablet, Take 1 tablet (81 mg total) by mouth 2 (two) times a day, Disp: 60 tablet, Rfl: 0    atorvastatin (LIPITOR) 10 mg tablet, Take 10 mg by mouth daily at bedtime  , Disp: , Rfl:     doxycycline (PERIOSTAT) 20 MG tablet, 20 mg 2 (two) times a day  , Disp: , Rfl: 3    HYDROcodone-acetaminophen (NORCO) 7 5-325 mg per tablet, Take by mouth, Disp: , Rfl:     losartan (COZAAR) 50 mg tablet, Take 50 mg by mouth daily , Disp: , Rfl:     Melatonin 5 MG TABS, Take 20 mg by mouth daily at bedtime  , Disp: , Rfl:     metoprolol succinate (TOPROL-XL) 100 mg 24 hr tablet, Take 1 tablet (100 mg total) by mouth daily, Disp: 90 tablet, Rfl: 1    Polyethylene Glycol 400 (BLINK TEARS) 0 25 % SOLN, Apply 1 drop to eye 2 (two) times a day  , Disp: , Rfl:     senna (SENOKOT) 8 6 MG tablet, Take 1 tablet by mouth daily , Disp: , Rfl:     triamterene-hydrochlorothiazide (DYAZIDE) 37 5-25 mg per capsule, Take 1 capsule by mouth Takes mon- wed - fri, Disp: , Rfl:     docusate sodium (COLACE) 100 mg capsule, Take 1 capsule (100 mg total) by mouth 2 (two) times a day (Patient not taking: Reported on 12/18/2018 ), Disp: 10 capsule, Rfl: 0      Objective:     Vitals: Blood pressure 120/70, pulse 74, height 5' 7 5" (1 715 m), weight 82 1 kg (181 lb), SpO2 98 %  Body mass index is 27 93 kg/m²  Vitals:    02/11/19 1305   Weight: 82 1 kg (181 lb)     BP Readings from Last 3 Encounters:   02/11/19 120/70   12/18/18 153/69   09/18/18 138/63         Physical Exam:   Physical Exam   Constitutional: She is oriented to person, place, and time  She appears well-developed and well-nourished  No distress  HENT:   Head: Normocephalic and atraumatic  Eyes: Pupils are equal, round, and reactive to light  Neck: Neck supple  No JVD present  No tracheal deviation present  No thyromegaly present  Cardiovascular: Normal rate, regular rhythm, S1 normal, S2 normal and intact distal pulses  Exam reveals no gallop, no S3, no S4, no distant heart sounds and no friction rub  Murmur heard  Systolic (ejection) murmur is present with a grade of 2/6  Pulmonary/Chest: Effort normal  No respiratory distress  She has no wheezes  She has no rales  She exhibits no tenderness  With few rare rhonchi   Abdominal: Soft  Bowel sounds are normal  She exhibits no distension  There is no tenderness  Musculoskeletal: She exhibits no edema or deformity  Neurological: She is alert and oriented to person, place, and time  Skin: Skin is warm and dry  No rash noted  She is not diaphoretic  No pallor  Psychiatric: She has a normal mood and affect  Her behavior is normal  Judgment normal            Labs:     Lab Results   Component Value Date    WBC 10 20 (H) 11/21/2018    HGB 12 6 11/21/2018    HCT 41 0 11/21/2018    MCV 94 11/21/2018    RDW 13 6 11/21/2018     11/21/2018     BMP:  Lab Results   Component Value Date     09/20/2015    K 4 3 11/21/2018     11/21/2018    CO2 25 11/21/2018    ANIONGAP 6 09/20/2015    BUN 21 11/21/2018    CREATININE 1 06 11/21/2018    GLUCOSE 109 09/20/2015    GLUF 96 08/02/2018    CALCIUM 9 1 11/21/2018    EGFR 52 11/21/2018    MG 2 2 09/20/2015     LFT:  Lab Results   Component Value Date    AST 18 11/21/2018    ALT 25 11/21/2018    ALKPHOS 86 11/21/2018     Lipid Profile:   Lab Results   Component Value Date    HDL 61 (H) 08/02/2018    LDLCALC 76 08/02/2018    TRIG 139 08/02/2018         Imaging & Testing     Cardiac testing:       Nuclear stress test 09/07/2018 was normal with EF around 74%    It was Lexiscan stress test     Diagnostic Studies Review Cardio:   Echo Doppler echo Doppler done in April of 2015 shows EF 70%, hyperdynamic LV, grade 1 diastolic dysfunction, trace MR Trace TR  Nuclear stress test done on 04/03/2015 was nonischemic it was non gated study  EKG:    Twelve lead EKG done in our office on 09/06/2018 shows normal sinus rhythm heart rate 72 beats per minute  No significant ST changes  Dr Naga Ramos MD [facility] [place]      "This note has been constructed using a voice recognition system  Therefore there may be syntax, spelling, and/or grammatical errors  Please call if you have any questions

## 2019-02-11 ENCOUNTER — OFFICE VISIT (OUTPATIENT)
Dept: CARDIOLOGY CLINIC | Facility: CLINIC | Age: 74
End: 2019-02-11
Payer: MEDICARE

## 2019-02-11 VITALS
HEART RATE: 74 BPM | HEIGHT: 68 IN | BODY MASS INDEX: 27.43 KG/M2 | SYSTOLIC BLOOD PRESSURE: 120 MMHG | OXYGEN SATURATION: 98 % | DIASTOLIC BLOOD PRESSURE: 70 MMHG | WEIGHT: 181 LBS

## 2019-02-11 DIAGNOSIS — E78.5 DYSLIPIDEMIA: ICD-10-CM

## 2019-02-11 DIAGNOSIS — G57.21 FEMORAL NEUROPATHY OF RIGHT LOWER EXTREMITY: ICD-10-CM

## 2019-02-11 DIAGNOSIS — C34.91 ADENOCARCINOMA OF RIGHT LUNG (HCC): ICD-10-CM

## 2019-02-11 DIAGNOSIS — I10 ESSENTIAL HYPERTENSION: ICD-10-CM

## 2019-02-11 DIAGNOSIS — M15.9 PRIMARY OSTEOARTHRITIS INVOLVING MULTIPLE JOINTS: ICD-10-CM

## 2019-02-11 DIAGNOSIS — M79.89 LEFT LEG SWELLING: ICD-10-CM

## 2019-02-11 PROCEDURE — 99214 OFFICE O/P EST MOD 30 MIN: CPT | Performed by: INTERNAL MEDICINE

## 2019-02-28 ENCOUNTER — TRANSCRIBE ORDERS (OUTPATIENT)
Dept: LAB | Facility: CLINIC | Age: 74
End: 2019-02-28

## 2019-02-28 ENCOUNTER — APPOINTMENT (OUTPATIENT)
Dept: LAB | Facility: CLINIC | Age: 74
End: 2019-02-28
Payer: MEDICARE

## 2019-02-28 DIAGNOSIS — E78.00 PURE HYPERCHOLESTEROLEMIA: ICD-10-CM

## 2019-02-28 DIAGNOSIS — E03.9 MYXEDEMA HEART DISEASE: ICD-10-CM

## 2019-02-28 DIAGNOSIS — D63.8 CHRONIC DISEASE ANEMIA: ICD-10-CM

## 2019-02-28 DIAGNOSIS — I51.9 MYXEDEMA HEART DISEASE: ICD-10-CM

## 2019-02-28 DIAGNOSIS — D51.9 ANEMIA DUE TO VITAMIN B12 DEFICIENCY, UNSPECIFIED B12 DEFICIENCY TYPE: ICD-10-CM

## 2019-02-28 DIAGNOSIS — I10 ESSENTIAL HYPERTENSION, MALIGNANT: ICD-10-CM

## 2019-02-28 DIAGNOSIS — I10 ESSENTIAL HYPERTENSION, MALIGNANT: Primary | ICD-10-CM

## 2019-02-28 LAB
ALBUMIN SERPL BCP-MCNC: 3.7 G/DL (ref 3.5–5)
ALP SERPL-CCNC: 80 U/L (ref 46–116)
ALT SERPL W P-5'-P-CCNC: 24 U/L (ref 12–78)
ANION GAP SERPL CALCULATED.3IONS-SCNC: 6 MMOL/L (ref 4–13)
AST SERPL W P-5'-P-CCNC: 22 U/L (ref 5–45)
BILIRUB SERPL-MCNC: 0.56 MG/DL (ref 0.2–1)
BUN SERPL-MCNC: 24 MG/DL (ref 5–25)
CALCIUM SERPL-MCNC: 9 MG/DL (ref 8.3–10.1)
CHLORIDE SERPL-SCNC: 109 MMOL/L (ref 100–108)
CHOLEST SERPL-MCNC: 147 MG/DL (ref 50–200)
CO2 SERPL-SCNC: 25 MMOL/L (ref 21–32)
CREAT SERPL-MCNC: 1.1 MG/DL (ref 0.6–1.3)
GFR SERPL CREATININE-BSD FRML MDRD: 50 ML/MIN/1.73SQ M
GLUCOSE P FAST SERPL-MCNC: 104 MG/DL (ref 65–99)
HCT VFR BLD AUTO: 43.5 % (ref 34.8–46.1)
HDLC SERPL-MCNC: 62 MG/DL (ref 40–60)
HGB BLD-MCNC: 13.6 G/DL (ref 11.5–15.4)
LDLC SERPL CALC-MCNC: 61 MG/DL (ref 0–100)
NONHDLC SERPL-MCNC: 85 MG/DL
POTASSIUM SERPL-SCNC: 4.2 MMOL/L (ref 3.5–5.3)
PROT SERPL-MCNC: 6.9 G/DL (ref 6.4–8.2)
SODIUM SERPL-SCNC: 140 MMOL/L (ref 136–145)
TRIGL SERPL-MCNC: 121 MG/DL
TSH SERPL DL<=0.05 MIU/L-ACNC: 1.45 UIU/ML (ref 0.36–3.74)
VIT B12 SERPL-MCNC: 414 PG/ML (ref 100–900)

## 2019-02-28 PROCEDURE — 80061 LIPID PANEL: CPT

## 2019-02-28 PROCEDURE — 80053 COMPREHEN METABOLIC PANEL: CPT

## 2019-02-28 PROCEDURE — 82607 VITAMIN B-12: CPT

## 2019-02-28 PROCEDURE — 84443 ASSAY THYROID STIM HORMONE: CPT

## 2019-02-28 PROCEDURE — 85014 HEMATOCRIT: CPT

## 2019-02-28 PROCEDURE — 85018 HEMOGLOBIN: CPT

## 2019-02-28 PROCEDURE — 36415 COLL VENOUS BLD VENIPUNCTURE: CPT

## 2019-08-25 PROBLEM — K57.90 DIVERTICULOSIS: Status: ACTIVE | Noted: 2019-08-25

## 2019-08-25 PROBLEM — R20.2 NUMBNESS AND TINGLING OF FOOT: Status: ACTIVE | Noted: 2019-08-25

## 2019-08-25 PROBLEM — E53.8 VITAMIN B12 DEFICIENCY: Status: ACTIVE | Noted: 2019-08-25

## 2019-08-25 PROBLEM — R20.0 NUMBNESS AND TINGLING OF FOOT: Status: ACTIVE | Noted: 2019-08-25

## 2019-08-25 PROBLEM — F41.9 ANXIETY: Status: ACTIVE | Noted: 2019-08-25

## 2019-08-25 PROBLEM — G25.5 CHOREA: Status: ACTIVE | Noted: 2019-08-25

## 2019-08-25 PROBLEM — E55.9 VITAMIN D DEFICIENCY: Status: ACTIVE | Noted: 2019-08-25

## 2019-08-25 PROBLEM — F17.200 TOBACCO USE DISORDER: Status: ACTIVE | Noted: 2019-08-25

## 2019-08-25 PROBLEM — J43.9 EMPHYSEMA LUNG (HCC): Status: ACTIVE | Noted: 2019-08-25

## 2019-08-25 PROBLEM — E04.1 THYROID NODULE: Status: ACTIVE | Noted: 2019-08-25

## 2019-08-25 PROBLEM — D22.9 SKIN MOLE: Status: ACTIVE | Noted: 2019-08-25

## 2019-08-25 PROBLEM — F41.8 MIXED ANXIETY AND DEPRESSIVE DISORDER: Status: ACTIVE | Noted: 2019-08-25

## 2019-08-25 PROBLEM — K63.5 COLON POLYP: Status: ACTIVE | Noted: 2019-08-25

## 2019-08-25 PROBLEM — E78.5 HYPERLIPIDEMIA: Status: ACTIVE | Noted: 2019-08-25

## 2019-08-25 PROBLEM — H61.90 LESION OF EXTERNAL EAR: Status: ACTIVE | Noted: 2019-08-25

## 2019-08-25 PROBLEM — R60.0 EDEMA OF BOTH LEGS: Status: ACTIVE | Noted: 2019-08-25

## 2019-08-25 PROBLEM — H81.10 BPPV (BENIGN PAROXYSMAL POSITIONAL VERTIGO): Status: ACTIVE | Noted: 2019-08-25

## 2019-08-25 PROBLEM — I83.93 VARICOSE VEINS OF LEGS: Status: ACTIVE | Noted: 2019-08-25

## 2019-08-25 PROBLEM — G25.81 RESTLESS LEG SYNDROME: Status: ACTIVE | Noted: 2019-08-25

## 2019-08-25 PROBLEM — N18.30 CHRONIC RENAL DISEASE, STAGE III (HCC): Status: ACTIVE | Noted: 2019-08-25

## 2019-08-25 PROBLEM — E66.9 OBESITY, UNSPECIFIED: Status: ACTIVE | Noted: 2019-08-25

## 2019-08-25 PROBLEM — M16.11 OSTEOARTHRITIS OF RIGHT HIP: Status: ACTIVE | Noted: 2019-08-25

## 2019-08-25 PROBLEM — G62.9 NEUROPATHY: Status: ACTIVE | Noted: 2019-08-25

## 2019-08-26 PROBLEM — N28.1 KIDNEY CYST, ACQUIRED: Status: ACTIVE | Noted: 2019-08-26

## 2019-08-26 PROBLEM — M70.22 OLECRANON BURSITIS, LEFT ELBOW: Status: ACTIVE | Noted: 2019-08-26

## 2019-09-15 NOTE — PROGRESS NOTES
Progress Note- Cardiology  Office  [facility]     Danilo Andres 75 y o  female MRN: [id number]  : 1945  Encounter: [id number]    Assessment:  1  Essential hypertension    2  Chronic renal disease, stage III ([facility] [address] )    3  Mixed hyperlipidemia    4  Malignant neoplasm of lower lobe of right lung ([facility] [address] )    5  Pulmonary emphysema, unspecified emphysema type ([facility] [address] )    6  Varicose veins of both lower extremities, unspecified whether complicated        Discussion Summary & Plan:   1  Essential hypertension  Much better controlled now  Currently she is taking losartan, Dyazide and metoprolol XL heart rate and blood pressure is acceptable  Labs from 2019 reviewed were acceptable  2   Dyslipidemia  Continue statin  She is on atorvastatin 10 mg daily  3   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  Follow up with CT surgery no reoccurrence so far  4   Left leg swelling  Much improved  With diuretics and blood pressure is acceptable  She is scheduled to have repeat lab with his primary care doctor 0 earlier next month  5   History of anxiety  Currently she is doing well  6   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  Much improved she is scheduled to have nerve conduction study and follows up with Neurology  This happened after surgery       Advised patient to be compliant with blood pressure medications  Labs reviewed with her  Currently stable cardiovascular status  Results of stress test discussed with her  Counseling :  A description of the counseling:   Goals and Barriers:  Patient's ability to self care: Yes  Medication side effect reviewed with patient in detail and all their questions answered to their satisfaction  Physician Requesting Consult:  Michael Lemus MD    HPI:     Danilo Andres is a 75y o  year old female  Who was initially seen by me in 2015 has past medical history significant for lung cancer status post partial lobectomy right lower lobe  She had a stage IA cancer and she follows up with [address] [place] [address] thoracic surgery  She also had past medical history significant for hypertension, previous tobacco abuse now quit smoking, emphysema, dyslipidemia, severe DJD with previous left hip surgery now need surgery on her right hip  Since her last visit in 2015 she has gained some weight  She also noted that she occasionally gets bilateral lower extremity swelling left more than right  She was on Dyazide which was recently had as her BUN creatinine might be high and that led to increase in her blood pressure  She also increased swelling she is back on Dyazide now every other day  She also takes losartan 50 mg, metoprolol XL 75 mg daily and atorvastatin  Her previous cardiac workup was in 2015 April when she had a normal stress test and study was non gated  Echo shows at that time she has a normal LV systolic function EF was 56% with grade 1 diastolic dysfunction  She cannot walk much she walks with the help of cane  No nausea no vomiting no PND no chest pain  She denies any history of stenting in between no history of MI no history of heart attack no heart failure  09/19/2019  Above reviewed  Patient came for follow-up  She did well for her hip surgery from cardiac point of view but still having some issues with right femoral nerve problem  She feel numbness in thigh area but it has improved  She is scheduled to have nerve conduction study  Blood pressure is acceptable  She has no fever no chills no nausea no vomiting  Currently she is taking losartan 50 metoprolol  along with Dyazide  She takes Dyazide 4 times a week  Electrolytes in February was acceptable she is scheduled to have repeat blood test   She has history of lung cancer status post surgery she quit smoking at that time  No fever no chills no nausea no PND no orthopnea no other issues  She is back to driving she is able to move the leg better than before  Patient does have some cough in the morning but it improves  Review of Systems   Constitutional: Negative for activity change, chills, diaphoresis, fever and unexpected weight change  HENT: Negative for congestion  Eyes: Negative for discharge and redness  Respiratory: Negative for cough, chest tightness, shortness of breath and wheezing  Cardiovascular: Negative  Negative for chest pain, palpitations and leg swelling  Gastrointestinal: Negative for abdominal pain, diarrhea and nausea  Endocrine: Negative  Genitourinary: Negative for decreased urine volume and urgency  Musculoskeletal: Positive for back pain and gait problem  Negative for arthralgias  Skin: Negative for rash and wound  Allergic/Immunologic: Negative  Neurological: Negative for dizziness, seizures, syncope, weakness, light-headedness and headaches  Hematological: Negative  Psychiatric/Behavioral: Negative for agitation and confusion  The patient is nervous/anxious          Historical Information   Past Medical History:   Diagnosis Date    Emphysema lung ([address] )     Hyperlipidemia     Hypertension     Numbness and tingling of foot     Osteoarthritis of right hip      Past Surgical History:   Procedure Laterality Date    CATARACT EXTRACTION      CHOLECYSTECTOMY      COLONOSCOPY      JOINT REPLACEMENT      CT TOTAL HIP ARTHROPLASTY Right 9/14/2018    Procedure: ANTERIOR TOTAL HIP ARTHROPLASTY;  Surgeon: Ronald Arriola MD;  Location: [facility];  Service: Orthopedics    TOTAL HIP ARTHROPLASTY      TUMOR REMOVAL Right 2015    lower lobe        Social History:  Social History     Substance and Sexual Activity   Alcohol Use Yes    Comment: ocassional      Social History     Substance and Sexual Activity   Drug Use No     Social History     Tobacco Use   Smoking Status Former Smoker    Packs/day: 1 00    Years: 50 00    Pack years: 50 00    Types: Cigarettes    Last attempt to quit: 2015    Years since quittin 0   Smokeless Tobacco Never Used          Family History   Problem Relation Age of Onset    Heart disease Mother     No Known Problems Father        Meds/Allergies     No Known Allergies    Current medications:    Current Outpatient Medications:     aspirin (ECOTRIN LOW STRENGTH) 81 mg EC tablet, Take 1 tablet (81 mg total) by mouth 2 (two) times a day (Patient taking differently: Take 81 mg by mouth daily ), Disp: 60 tablet, Rfl: 0    atorvastatin (LIPITOR) 10 mg tablet, Take 10 mg by mouth daily at bedtime  , Disp: , Rfl:     doxycycline (PERIOSTAT) 20 MG tablet, 20 mg 2 (two) times a day  , Disp: , Rfl: 3    gabapentin (NEURONTIN) 300 mg capsule, Take 300 mg by mouth daily , Disp: , Rfl:     losartan (COZAAR) 50 mg tablet, Take 50 mg by mouth daily , Disp: , Rfl:     Melatonin 5 MG TABS, Take 10 mg by mouth daily at bedtime , Disp: , Rfl:     metoprolol succinate (TOPROL-XL) 100 mg 24 hr tablet, Take 1 tablet (100 mg total) by mouth daily, Disp: 90 tablet, Rfl: 1    Polyethylene Glycol 400 (BLINK TEARS) 0 25 % SOLN, Apply 1 drop to eye 2 (two) times a day  , Disp: , Rfl:     triamterene-hydrochlorothiazide (DYAZIDE) 37 5-25 mg per capsule, Take 1 capsule by mouth Takes mon- wed - fri, Disp: , Rfl:     docusate sodium (COLACE) 100 mg capsule, Take 1 capsule (100 mg total) by mouth 2 (two) times a day (Patient not taking: Reported on 2019), Disp: 10 capsule, Rfl: 0    senna (SENOKOT) 8 6 MG tablet, Take 1 tablet by mouth daily , Disp: , Rfl:       Objective:     Vitals: Blood pressure 138/80, pulse 62, height 5' 7 5" (1 715 m), weight 87 5 kg (193 lb), SpO2 96 %  Body mass index is 29 78 kg/m²    Vitals:    19 1411   Weight: 87 5 kg (193 lb)     BP Readings from Last 3 Encounters:   19 138/80   19 120/70   18 153/69         Physical Exam:   Physical Exam   Constitutional: She is oriented to person, place, and time  She appears well-developed and well-nourished  No distress  HENT:   Head: Normocephalic and atraumatic  Eyes: Pupils are equal, round, and reactive to light  Neck: Neck supple  No JVD present  No tracheal deviation present  No thyromegaly present  Cardiovascular: Normal rate, regular rhythm, S1 normal, S2 normal and intact distal pulses  Exam reveals no gallop, no S3, no S4, no distant heart sounds and no friction rub  Murmur heard  Systolic (ejection) murmur is present with a grade of 2/6  Pulmonary/Chest: No respiratory distress  She has no wheezes  She has no rales  She exhibits no tenderness  Bilateral air entry mostly clear to auscultation with coarse breath sound no rhonchi no wheezing this time  Abdominal: Soft  Bowel sounds are normal  She exhibits no distension  There is no tenderness  Musculoskeletal: She exhibits no edema or deformity  Neurological: She is alert and oriented to person, place, and time  Skin: Skin is warm and dry  No rash noted  She is not diaphoretic  No pallor  Psychiatric: She has a normal mood and affect   Her behavior is normal  Judgment normal            Labs:     Lab Results   Component Value Date    WBC 10 20 (H) 11/21/2018    HGB 13 6 02/28/2019    HCT 43 5 02/28/2019    MCV 94 11/21/2018    RDW 13 6 11/21/2018     11/21/2018     BMP:  Lab Results   Component Value Date     09/20/2015    K 4 2 02/28/2019     (H) 02/28/2019    CO2 25 02/28/2019    ANIONGAP 6 09/20/2015    BUN 24 02/28/2019    CREATININE 1 10 02/28/2019    GLUCOSE 109 09/20/2015    GLUF 104 (H) 02/28/2019    CALCIUM 9 0 02/28/2019    EGFR 50 02/28/2019    MG 2 2 09/20/2015     LFT:  Lab Results   Component Value Date    AST 22 02/28/2019    ALT 24 02/28/2019    ALKPHOS 80 02/28/2019     Lipid Profile:   Lab Results   Component Value Date    HDL 62 (H) 02/28/2019    LDLCALC 61 02/28/2019    TRIG 121 02/28/2019         Imaging & Testing Cardiac testing:       Nuclear stress test 09/07/2018 was normal with EF around 74%  It was Lexiscan stress test     Diagnostic Studies Review Cardio:   Echo Doppler echo Doppler done in April of 2015 shows EF 70%, hyperdynamic LV, grade 1 diastolic dysfunction, trace MR Trace TR  Nuclear stress test done on 04/03/2015 was nonischemic it was non gated study  EKG:    Twelve lead EKG done in our office on 09/06/2018 shows normal sinus rhythm heart rate 72 beats per minute  No significant ST changes  Twelve lead EKG 09/19/2019 shows normal sinus rhythm heart rate 62 beats per minute no significant ST changes  No change from old EKG  Dr Jayesh Zaldivar MD [facility]      "This note has been constructed using a voice recognition system  Therefore there may be syntax, spelling, and/or grammatical errors  Please call if you have any questions

## 2019-09-19 ENCOUNTER — OFFICE VISIT (OUTPATIENT)
Dept: CARDIOLOGY CLINIC | Facility: CLINIC | Age: 74
End: 2019-09-19
Payer: MEDICARE

## 2019-09-19 VITALS
OXYGEN SATURATION: 96 % | BODY MASS INDEX: 29.25 KG/M2 | WEIGHT: 193 LBS | DIASTOLIC BLOOD PRESSURE: 80 MMHG | HEIGHT: 68 IN | HEART RATE: 62 BPM | SYSTOLIC BLOOD PRESSURE: 138 MMHG

## 2019-09-19 DIAGNOSIS — J43.9 PULMONARY EMPHYSEMA, UNSPECIFIED EMPHYSEMA TYPE (HCC): ICD-10-CM

## 2019-09-19 DIAGNOSIS — E78.2 MIXED HYPERLIPIDEMIA: ICD-10-CM

## 2019-09-19 DIAGNOSIS — I83.93 VARICOSE VEINS OF BOTH LOWER EXTREMITIES, UNSPECIFIED WHETHER COMPLICATED: ICD-10-CM

## 2019-09-19 DIAGNOSIS — I10 ESSENTIAL HYPERTENSION: ICD-10-CM

## 2019-09-19 DIAGNOSIS — C34.31 MALIGNANT NEOPLASM OF LOWER LOBE OF RIGHT LUNG (HCC): ICD-10-CM

## 2019-09-19 DIAGNOSIS — N18.30 CHRONIC RENAL DISEASE, STAGE III (HCC): ICD-10-CM

## 2019-09-19 PROCEDURE — 93000 ELECTROCARDIOGRAM COMPLETE: CPT | Performed by: INTERNAL MEDICINE

## 2019-09-19 PROCEDURE — 99214 OFFICE O/P EST MOD 30 MIN: CPT | Performed by: INTERNAL MEDICINE

## 2019-10-08 ENCOUNTER — TRANSCRIBE ORDERS (OUTPATIENT)
Dept: ADMINISTRATIVE | Facility: HOSPITAL | Age: 74
End: 2019-10-08

## 2019-10-08 ENCOUNTER — HOSPITAL ENCOUNTER (OUTPATIENT)
Dept: RADIOLOGY | Facility: HOSPITAL | Age: 74
Discharge: HOME | End: 2019-10-08
Payer: MEDICARE

## 2019-10-08 DIAGNOSIS — C34.91 ADENOCARCINOMA OF RIGHT LUNG (HCC): ICD-10-CM

## 2019-10-08 PROCEDURE — 71250 CT THORAX DX C-: CPT

## 2019-10-15 ENCOUNTER — OFFICE VISIT (OUTPATIENT)
Dept: CARDIAC SURGERY | Facility: CLINIC | Age: 74
End: 2019-10-15
Payer: MEDICARE

## 2019-10-15 VITALS
BODY MASS INDEX: 29.55 KG/M2 | DIASTOLIC BLOOD PRESSURE: 70 MMHG | SYSTOLIC BLOOD PRESSURE: 170 MMHG | TEMPERATURE: 98.6 F | OXYGEN SATURATION: 98 % | HEIGHT: 68 IN | HEART RATE: 73 BPM | WEIGHT: 195 LBS

## 2019-10-15 DIAGNOSIS — C34.31 MALIGNANT NEOPLASM OF LOWER LOBE OF RIGHT LUNG (HCC): ICD-10-CM

## 2019-10-15 DIAGNOSIS — C34.91 ADENOCARCINOMA OF RIGHT LUNG (HCC): Primary | ICD-10-CM

## 2019-10-15 PROCEDURE — 99213 OFFICE O/P EST LOW 20 MIN: CPT | Performed by: THORACIC SURGERY (CARDIOTHORACIC VASCULAR SURGERY)

## 2019-10-15 NOTE — PROGRESS NOTES
Thoracic Follow-Up  Assessment/Plan:    Adenocarcinoma of right lung [facility])  Ms Rosaria Severe is 4 years out from resection and is stable from a thoracic surgery and lung cancer standpoint  Her most recent scan continues to reveal a stable 4 mm left lower lobe nodule without evidence of recurrent or metastatic disease  We will continue yearly follow up and have her return in one year with a new scan  Diagnoses and all orders for this visit:    Adenocarcinoma of right lung ([address] )  -     CT chest wo contrast; Future    Malignant neoplasm of lower lobe of right lung [facility])          Thoracic History       Diagnosis: 1  Stage IA right lower lobe adenocarcinoma  Procedures: 1  fiberoptic bronchoscopy, right thoracoscopic superior segmentectomy and mediastinal lymph node dissection performed on September 17, 2015  Pathology: 1  Right lower lobe suprerior segment--moderately differentiated acinar predominant adenocarcinoma with mucinous features  Tumor size 2 1 x 1 6 x 1 5 cm  Bronchial, vascular and parenchymal margins are negative  Lymphatic channel invasion is identified  All lymph node levels sampled including right paratracheal, level 7, 8, 9 and 11 are negative for malignancy  Seventh edition AJCC tumor stage IA (pT1a, N0, M0)        Patient ID: Tess Persaud is a 75 y o  female  HPI    Ms Rosaria Severe is a 74 yo female who underwent a right thoracoscopic superior segmentectomy on 9/17/15 for stage IA adenocarcinoma  She was last seen in December 2018, without any concerning findings on her CT scan  She returns today, with a scan from 10/8/19  This demonstrated stable right lower lobe scarring and 4 mm left lower lobe nodule  This has been stable since 4/2016 deeming it benign  On discussion, she is doing pretty well  She still gets an occasional twinge in her right side once in a while that dissipates spontaneously   She has occasional PND and cough, but denies fever, chills, hemoptysis, chest pain, or shortness of breath  Review of Systems      Objective:   Physical Exam   Constitutional: She is oriented to person, place, and time  She appears well-developed and well-nourished  HENT:   Head: Normocephalic and atraumatic  Eyes: Pupils are equal, round, and reactive to light  EOM are normal    Neck: Normal range of motion  Neck supple  Cardiovascular: Normal rate and regular rhythm  Pulmonary/Chest: Effort normal and breath sounds normal  No respiratory distress  Lymphadenopathy:     She has no cervical adenopathy  Neurological: She is alert and oriented to person, place, and time  Skin: Skin is warm and dry  She is not diaphoretic  Psychiatric: She has a normal mood and affect  Her behavior is normal    Vitals reviewed  /70 (BP Location: Left arm, Patient Position: Sitting, Cuff Size: Adult)   Pulse 73   Temp 98 6 °F (37 °C)   Ht 5' 7 5" (1 715 m)   Wt 88 5 kg (195 lb)   SpO2 98%   BMI 30 09 kg/m²       Ct Chest Wo Contrast    Result Date: 10/11/2019  Narrative CT CHEST WITHOUT IV CONTRAST INDICATION:   C34 91: Malignant neoplasm of unspecified part of right bronchus or lung  COMPARISON:  Multiple prior studies, most recent dated 12/10/2018  TECHNIQUE: CT examination of the chest was performed without intravenous contrast   Axial, sagittal, and coronal 2D reformatted images were created from the source data and submitted for interpretation  Radiation dose length product (DLP) for this visit:  402 5 mGy-cm   This examination, like all CT scans performed in the [facility], was performed utilizing techniques to minimize radiation dose exposure, including the use of iterative reconstruction and automated exposure control  FINDINGS: LUNGS:  Stable right lower lobe scarring  No evidence of recurrent mass  4 mm subpleural nodule in the lateral left lower lobe on image 3/90 remain stable since 4/12/2016, therefore benign  No new nodules or consolidation    Mild biapical scarring again noted  There is no tracheal or endobronchial lesion  PLEURA:  Unremarkable  HEART/GREAT VESSELS:  Normal heart size  Coronary artery calcifications noted  Normal caliber thoracic aorta with mild atherosclerotic calcifications  MEDIASTINUM AND KARIE:  Unremarkable  CHEST WALL AND LOWER NECK:   Unremarkable  VISUALIZED STRUCTURES IN THE UPPER ABDOMEN:  Status post cholecystectomy  OSSEOUS STRUCTURES:  No acute fracture or destructive osseous lesion       Impression Stable exam   No evidence of recurrent or metastatic disease in the chest  Workstation performed: [id number]

## 2019-10-15 NOTE — ASSESSMENT & PLAN NOTE
Ms Cyndy Hernandez is 4 years out from resection and is stable from a thoracic surgery and lung cancer standpoint  Her most recent scan continues to reveal a stable 4 mm left lower lobe nodule without evidence of recurrent or metastatic disease  We will continue yearly follow up and have her return in one year with a new scan

## 2019-10-18 ENCOUNTER — TRANSCRIBE ORDERS (OUTPATIENT)
Dept: LAB | Facility: CLINIC | Age: 74
End: 2019-10-18

## 2019-10-18 ENCOUNTER — APPOINTMENT (OUTPATIENT)
Dept: LAB | Facility: CLINIC | Age: 74
End: 2019-10-18
Payer: MEDICARE

## 2019-10-18 DIAGNOSIS — D50.0 IRON DEFICIENCY ANEMIA SECONDARY TO BLOOD LOSS (CHRONIC): ICD-10-CM

## 2019-10-18 DIAGNOSIS — E78.00 PURE HYPERCHOLESTEROLEMIA: ICD-10-CM

## 2019-10-18 DIAGNOSIS — E04.8 MEDIASTINAL GOITER: ICD-10-CM

## 2019-10-18 DIAGNOSIS — M05.9 SEROPOSITIVE RHEUMATOID ARTHRITIS (HCC): ICD-10-CM

## 2019-10-18 DIAGNOSIS — I10 ESSENTIAL HYPERTENSION, MALIGNANT: Primary | ICD-10-CM

## 2019-10-18 DIAGNOSIS — L93.0 LUPUS ERYTHEMATOSUS, UNSPECIFIED FORM: ICD-10-CM

## 2019-10-18 LAB
ALBUMIN SERPL BCP-MCNC: 4.3 G/DL (ref 3.5–5)
ALP SERPL-CCNC: 76 U/L (ref 46–116)
ALT SERPL W P-5'-P-CCNC: 25 U/L (ref 12–78)
ANION GAP SERPL CALCULATED.3IONS-SCNC: 7 MMOL/L (ref 4–13)
AST SERPL W P-5'-P-CCNC: 20 U/L (ref 5–45)
BILIRUB SERPL-MCNC: 0.49 MG/DL (ref 0.2–1)
BUN SERPL-MCNC: 20 MG/DL (ref 5–25)
CALCIUM SERPL-MCNC: 9.4 MG/DL (ref 8.3–10.1)
CHLORIDE SERPL-SCNC: 107 MMOL/L (ref 100–108)
CHOLEST SERPL-MCNC: 172 MG/DL (ref 50–200)
CO2 SERPL-SCNC: 27 MMOL/L (ref 21–32)
CREAT SERPL-MCNC: 1.29 MG/DL (ref 0.6–1.3)
GFR SERPL CREATININE-BSD FRML MDRD: 41 ML/MIN/1.73SQ M
GLUCOSE P FAST SERPL-MCNC: 107 MG/DL (ref 65–99)
HDLC SERPL-MCNC: 55 MG/DL (ref 40–60)
HGB BLD-MCNC: 13.9 G/DL (ref 11.5–15.4)
LDLC SERPL CALC-MCNC: 87 MG/DL (ref 0–100)
NONHDLC SERPL-MCNC: 117 MG/DL
POTASSIUM SERPL-SCNC: 4.2 MMOL/L (ref 3.5–5.3)
PROT SERPL-MCNC: 7.5 G/DL (ref 6.4–8.2)
SODIUM SERPL-SCNC: 141 MMOL/L (ref 136–145)
T4 FREE SERPL-MCNC: 1.02 NG/DL (ref 0.76–1.46)
TRIGL SERPL-MCNC: 149 MG/DL
TSH SERPL DL<=0.05 MIU/L-ACNC: 1.99 UIU/ML (ref 0.36–3.74)

## 2019-10-18 PROCEDURE — 84443 ASSAY THYROID STIM HORMONE: CPT

## 2019-10-18 PROCEDURE — 80053 COMPREHEN METABOLIC PANEL: CPT

## 2019-10-18 PROCEDURE — 86430 RHEUMATOID FACTOR TEST QUAL: CPT

## 2019-10-18 PROCEDURE — 80061 LIPID PANEL: CPT

## 2019-10-18 PROCEDURE — 85018 HEMOGLOBIN: CPT

## 2019-10-18 PROCEDURE — 86038 ANTINUCLEAR ANTIBODIES: CPT

## 2019-10-18 PROCEDURE — 36415 COLL VENOUS BLD VENIPUNCTURE: CPT

## 2019-10-18 PROCEDURE — 84439 ASSAY OF FREE THYROXINE: CPT

## 2019-10-21 LAB
RHEUMATOID FACT SER QL LA: NEGATIVE
RYE IGE QN: NEGATIVE

## 2019-10-24 PROBLEM — R50.9 FEVER: Status: RESOLVED | Noted: 2018-09-16 | Resolved: 2019-10-24

## 2019-12-26 PROBLEM — G56.00 CARPAL TUNNEL SYNDROME: Status: ACTIVE | Noted: 2019-12-26

## 2020-01-03 ENCOUNTER — HOSPITAL ENCOUNTER (OUTPATIENT)
Dept: RADIOLOGY | Facility: HOSPITAL | Age: 75
Discharge: HOME | End: 2020-01-03
Attending: INTERNAL MEDICINE
Payer: MEDICARE

## 2020-01-03 DIAGNOSIS — E04.1 THYROID NODULE: ICD-10-CM

## 2020-01-03 PROCEDURE — 76536 US EXAM OF HEAD AND NECK: CPT

## 2020-02-04 ENCOUNTER — HOSPITAL ENCOUNTER (OUTPATIENT)
Dept: RADIOLOGY | Facility: HOSPITAL | Age: 75
Discharge: HOME | End: 2020-02-04
Attending: INTERNAL MEDICINE

## 2020-02-26 ENCOUNTER — HOSPITAL ENCOUNTER (OUTPATIENT)
Dept: RADIOLOGY | Facility: HOSPITAL | Age: 75
Discharge: HOME | End: 2020-02-26
Attending: INTERNAL MEDICINE
Payer: MEDICARE

## 2020-02-26 VITALS — HEIGHT: 68 IN | BODY MASS INDEX: 29.94 KG/M2

## 2020-02-26 DIAGNOSIS — Z12.31 BREAST CANCER SCREENING BY MAMMOGRAM: ICD-10-CM

## 2020-02-26 PROCEDURE — 77067 SCR MAMMO BI INCL CAD: CPT

## 2020-02-26 PROCEDURE — 77063 BREAST TOMOSYNTHESIS BI: CPT

## 2020-03-17 ENCOUNTER — OFFICE VISIT (OUTPATIENT)
Dept: CARDIOLOGY CLINIC | Facility: CLINIC | Age: 75
End: 2020-03-17
Payer: MEDICARE

## 2020-03-17 VITALS
HEART RATE: 74 BPM | OXYGEN SATURATION: 98 % | BODY MASS INDEX: 29.86 KG/M2 | WEIGHT: 197 LBS | SYSTOLIC BLOOD PRESSURE: 148 MMHG | HEIGHT: 68 IN | DIASTOLIC BLOOD PRESSURE: 78 MMHG

## 2020-03-17 DIAGNOSIS — C34.31 MALIGNANT NEOPLASM OF LOWER LOBE OF RIGHT LUNG (HCC): ICD-10-CM

## 2020-03-17 DIAGNOSIS — N18.30 CHRONIC RENAL DISEASE, STAGE III (HCC): ICD-10-CM

## 2020-03-17 DIAGNOSIS — F41.9 ANXIETY: ICD-10-CM

## 2020-03-17 DIAGNOSIS — E78.2 MIXED HYPERLIPIDEMIA: ICD-10-CM

## 2020-03-17 DIAGNOSIS — I10 ESSENTIAL HYPERTENSION: ICD-10-CM

## 2020-03-17 DIAGNOSIS — J43.9 PULMONARY EMPHYSEMA, UNSPECIFIED EMPHYSEMA TYPE (HCC): ICD-10-CM

## 2020-03-17 PROCEDURE — 3008F BODY MASS INDEX DOCD: CPT | Performed by: INTERNAL MEDICINE

## 2020-03-17 PROCEDURE — 1036F TOBACCO NON-USER: CPT | Performed by: INTERNAL MEDICINE

## 2020-03-17 PROCEDURE — 93000 ELECTROCARDIOGRAM COMPLETE: CPT | Performed by: INTERNAL MEDICINE

## 2020-03-17 PROCEDURE — 3078F DIAST BP <80 MM HG: CPT | Performed by: INTERNAL MEDICINE

## 2020-03-17 PROCEDURE — 3077F SYST BP >= 140 MM HG: CPT | Performed by: INTERNAL MEDICINE

## 2020-03-17 PROCEDURE — 1160F RVW MEDS BY RX/DR IN RCRD: CPT | Performed by: INTERNAL MEDICINE

## 2020-03-17 PROCEDURE — 99214 OFFICE O/P EST MOD 30 MIN: CPT | Performed by: INTERNAL MEDICINE

## 2020-03-17 RX ORDER — AMLODIPINE BESYLATE 2.5 MG/1
2.5 TABLET ORAL DAILY
Qty: 90 TABLET | Refills: 1 | Status: SHIPPED | OUTPATIENT
Start: 2020-03-17 | End: 2020-06-10

## 2020-03-17 NOTE — PROGRESS NOTES
Progress Note- Cardiology  Office  [facility]     Patricia Lynn 75 y o  female MRN: [id number]  : 1945  Encounter: [id number]    Assessment:  1  Essential hypertension    2  Chronic renal disease, stage III ([facility] [address] )    3  Mixed hyperlipidemia    4  Malignant neoplasm of lower lobe of right lung ([facility] [address] )    5  Pulmonary emphysema, unspecified emphysema type ([facility] )    6  Anxiety        Discussion Summary & Plan:   1  Essential hypertension  Blood pressure need better controlled  She is taking losartan, Dyazide metoprolol XL  Heart rate is acceptable  She is worried about increasing her losartan and Dyazide and its affect on the kidney  Will start amlodipine 2 5 mg daily  Hopefully this will help control blood pressure if not it will be increased to 5 mg daily  2   Dyslipidemia  Continue statin she is on atorvastatin 10 mg daily       3   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  Follow up with CT surgery no reoccurrence so far  Clinically she is doing well she has some bilateral rhonchi she had history of emphysema she is aware of it now  4   Left leg swelling  Much improved  With diuretics and blood pressure is acceptable  Labs from 2019 reviewed there were acceptable she will follow-up with her primary care doctor regarding blood test    5  History of anxiety  Currently doing very well  6   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  Much improved she is scheduled to have nerve conduction study and follows up with Neurology  She is stable now    Advised patient to be compliant with blood pressure medications  Labs reviewed with her  Currently stable cardiovascular status  Results of stress test discussed with her    Counseling :  A description of the counseling:   Goals and Barriers:  Patient's ability to self care: Yes  Medication side effect reviewed with patient in detail and all their questions answered to their satisfaction  Physician Requesting Consult: Julián Saunders MD    HPI:     Camden Bautista is a 75y o  year old female  Who was initially seen by me in 2015 has past medical history significant for lung cancer status post partial lobectomy right lower lobe  She had a stage IA cancer and she follows up with [facility]  She also had past medical history significant for hypertension, previous tobacco abuse now quit smoking, emphysema, dyslipidemia, severe DJD with previous left hip surgery now need surgery on her right hip  Since her last visit in 2015 she has gained some weight  She also noted that she occasionally gets bilateral lower extremity swelling left more than right  She was on Dyazide which was recently had as her BUN creatinine might be high and that led to increase in her blood pressure  She also increased swelling she is back on Dyazide now every other day  She also takes losartan 50 mg, metoprolol XL 75 mg daily and atorvastatin  Her previous cardiac workup was in 2015 April when she had a normal stress test and study was non gated  Echo shows at that time she has a normal LV systolic function EF was 98% with grade 1 diastolic dysfunction  She cannot walk much she walks with the help of cane  No nausea no vomiting no PND no chest pain  She denies any history of stenting in between no history of MI no history of heart attack no heart failure  03/17/2020  Above reviewed  Patient came for follow-up  She is doing well from cardiac point of view  She has gained some weight blood pressure is slightly high  Her numbness not thigh is improving  Currently she is taking losartan 50, metoprolol  along with Dyazide  She take Dyazide 4 times a week  Electrolyte last time were acceptable  She had history of lung cancer status post surgery  She quit smoking at that time  No fever no chills no PND no orthopnea    She is back to driving and she is able to move her leg better  She does have some cough in the morning but later on she improves  Today her heart rate is 72 beats per minute  No significant ST changes  She has some phlegm  Occasionally she noted her wheezing  Review of Systems   Constitutional: Negative for activity change, chills, diaphoresis, fever and unexpected weight change  HENT: Negative for congestion  Eyes: Negative for discharge and redness  Respiratory: Positive for cough and wheezing  Negative for chest tightness and shortness of breath  Occasional cough and wheezing   Cardiovascular: Negative  Negative for chest pain, palpitations and leg swelling  Gastrointestinal: Negative for abdominal pain, diarrhea and nausea  Endocrine: Negative  Genitourinary: Negative for decreased urine volume and urgency  Musculoskeletal: Positive for back pain and gait problem  Negative for arthralgias  Skin: Negative for rash and wound  Allergic/Immunologic: Negative  Neurological: Negative for dizziness, seizures, syncope, weakness, light-headedness and headaches  Hematological: Negative  Psychiatric/Behavioral: Negative for agitation and confusion  The patient is nervous/anxious          Historical Information   Past Medical History:   Diagnosis Date    Emphysema lung ([address] )     Hyperlipidemia     Hypertension     Numbness and tingling of foot     Osteoarthritis of right hip      Past Surgical History:   Procedure Laterality Date    CATARACT EXTRACTION      CHOLECYSTECTOMY      COLONOSCOPY      JOINT REPLACEMENT      [place] TOTAL HIP ARTHROPLASTY Right 9/14/2018    Procedure: ANTERIOR TOTAL HIP ARTHROPLASTY;  Surgeon: Osmin Holguin MD;  Location: [facility];  Service: Orthopedics    TOTAL HIP ARTHROPLASTY      TUMOR REMOVAL Right 2015    lower lobe        Social History:  Social History     Substance and Sexual Activity   Alcohol Use Yes    Frequency: Monthly or less    Drinks per session: 1 or 2    Binge frequency: Never    Comment: ocassional      Social History     Substance and Sexual Activity   Drug Use No     Social History     Tobacco Use   Smoking Status Former Smoker    Packs/day: 1 00    Years: 50 00    Pack years: 50 00    Types: Cigarettes    Last attempt to quit: 2015    Years since quittin 5   Smokeless Tobacco Never Used          Family History   Problem Relation Age of Onset    Heart disease Mother     No Known Problems Father        Meds/Allergies     No Known Allergies    Current medications:    Current Outpatient Medications:     aspirin (ECOTRIN LOW STRENGTH) 81 mg EC tablet, Take 1 tablet (81 mg total) by mouth 2 (two) times a day (Patient taking differently: Take 81 mg by mouth daily ), Disp: 60 tablet, Rfl: 0    atorvastatin (LIPITOR) 10 mg tablet, Take 1 tablet (10 mg total) by mouth daily at bedtime, Disp: 90 tablet, Rfl: 1    doxycycline (PERIOSTAT) 20 MG tablet, 20 mg 2 (two) times a day  , Disp: , Rfl: 3    gabapentin (NEURONTIN) 300 mg capsule, Take 300 mg by mouth daily , Disp: , Rfl:     losartan (COZAAR) 50 mg tablet, Take 1 tablet (50 mg total) by mouth daily, Disp: 90 tablet, Rfl: 1    Melatonin 5 MG TABS, Take 10 mg by mouth daily at bedtime , Disp: , Rfl:     metoprolol succinate (TOPROL-XL) 100 mg 24 hr tablet, TAKE 1 TABLET DAILY, Disp: 90 tablet, Rfl: 1    Polyethylene Glycol 400 (BLINK TEARS) 0 25 % SOLN, Apply 1 drop to eye 2 (two) times a day  , Disp: , Rfl:     triamterene-hydrochlorothiazide (DYAZIDE) 37 5-25 mg per capsule, Take 1 capsule by mouth Takes mon- wed - fri, Disp: , Rfl:     amLODIPine (NORVASC) 2 5 mg tablet, Take 1 tablet (2 5 mg total) by mouth daily, Disp: 90 tablet, Rfl: 1    senna (SENOKOT) 8 6 MG tablet, Take 1 tablet by mouth daily , Disp: , Rfl:       Objective:     Vitals: Blood pressure 148/78, pulse 74, height 5' 7 5" (1 715 m), weight 89 4 kg (197 lb), SpO2 98 %      Body mass index is 30 4 kg/m²  Vitals:    03/17/20 1356   Weight: 89 4 kg (197 lb)     BP Readings from Last 3 Encounters:   03/17/20 148/78   12/26/19 126/76   10/24/19 146/74         Physical Exam:   Physical Exam   Constitutional: She is oriented to person, place, and time  She appears well-developed and well-nourished  No distress  HENT:   Head: Normocephalic and atraumatic  Eyes: Pupils are equal, round, and reactive to light  Neck: Neck supple  No JVD present  No tracheal deviation present  No thyromegaly present  Cardiovascular: Normal rate, regular rhythm, S1 normal and S2 normal  Exam reveals no gallop, no S3, no S4, no distant heart sounds and no friction rub  Murmur heard  Systolic (ejection) murmur is present with a grade of 2/6  Pulmonary/Chest: Effort normal  No respiratory distress  She has no wheezes  She has no rales  She exhibits no tenderness  Bilateral few rhonchi and wheezing   Abdominal: Soft  Bowel sounds are normal  She exhibits no distension  There is no tenderness  Musculoskeletal: She exhibits no edema or deformity  Neurological: She is alert and oriented to person, place, and time  Skin: Skin is warm and dry  No rash noted  She is not diaphoretic  No pallor  Psychiatric: She has a normal mood and affect   Her behavior is normal  Judgment normal            Labs:     Lab Results   Component Value Date    WBC 10 20 (H) 11/21/2018    HGB 13 9 10/18/2019    HCT 43 5 02/28/2019    MCV 94 11/21/2018    RDW 13 6 11/21/2018     11/21/2018     BMP:  Lab Results   Component Value Date     09/20/2015    K 4 2 10/18/2019     10/18/2019    CO2 27 10/18/2019    ANIONGAP 6 09/20/2015    BUN 20 10/18/2019    CREATININE 1 29 10/18/2019    GLUCOSE 109 09/20/2015    GLUF 107 (H) 10/18/2019    CALCIUM 9 4 10/18/2019    EGFR 41 10/18/2019    MG 2 2 09/20/2015     LFT:  Lab Results   Component Value Date    AST 20 10/18/2019    ALT 25 10/18/2019    ALKPHOS 76 10/18/2019     Lipid Profile: Lab Results   Component Value Date    HDL 55 10/18/2019    LDLCALC 87 10/18/2019    TRIG 149 10/18/2019         Imaging & Testing     Cardiac testing:       Nuclear stress test 09/07/2018 was normal with EF around 74%  It was Lexiscan stress test     Diagnostic Studies Review Cardio:   Echo Doppler echo Doppler done in April of 2015 shows EF 70%, hyperdynamic LV, grade 1 diastolic dysfunction, trace MR Trace TR  Nuclear stress test done on 04/03/2015 was nonischemic it was non gated study  EKG:    Twelve lead EKG done in our office on 09/06/2018 shows normal sinus rhythm heart rate 72 beats per minute  No significant ST changes  Twelve lead EKG 09/19/2019 shows normal sinus rhythm heart rate 62 beats per minute no significant ST changes  No change from old EKG  Twelve lead EKG 03/07/2020 shows normal sinus rhythm heart rate 72 beats per minute  QS in V2 V3 most likely due to lead location no change from old EKG      Dr Joanna Calvin MD [facility]      "This note has been constructed using a voice recognition system  Therefore there may be syntax, spelling, and/or grammatical errors  Please call if you have any questions

## 2020-06-10 DIAGNOSIS — I10 ESSENTIAL HYPERTENSION: ICD-10-CM

## 2020-06-10 RX ORDER — AMLODIPINE BESYLATE 2.5 MG/1
2.5 TABLET ORAL DAILY
Qty: 90 TABLET | Refills: 1 | Status: SHIPPED | OUTPATIENT
Start: 2020-06-10 | End: 2020-11-20

## 2020-07-22 ENCOUNTER — APPOINTMENT (OUTPATIENT)
Dept: LAB | Facility: CLINIC | Age: 75
End: 2020-07-22
Payer: MEDICARE

## 2020-07-22 DIAGNOSIS — I10 ESSENTIAL HYPERTENSION: ICD-10-CM

## 2020-07-22 DIAGNOSIS — C34.91 ADENOCARCINOMA OF RIGHT LUNG (HCC): ICD-10-CM

## 2020-07-22 DIAGNOSIS — E55.9 VITAMIN D DEFICIENCY: ICD-10-CM

## 2020-07-22 DIAGNOSIS — E78.5 DYSLIPIDEMIA: ICD-10-CM

## 2020-07-22 LAB
25(OH)D3 SERPL-MCNC: 10.6 NG/ML (ref 30–100)
ALBUMIN SERPL BCP-MCNC: 4 G/DL (ref 3.5–5)
ALP SERPL-CCNC: 74 U/L (ref 46–116)
ALT SERPL W P-5'-P-CCNC: 25 U/L (ref 12–78)
ANION GAP SERPL CALCULATED.3IONS-SCNC: 9 MMOL/L (ref 4–13)
AST SERPL W P-5'-P-CCNC: 19 U/L (ref 5–45)
BASOPHILS # BLD AUTO: 0.09 THOUSANDS/ΜL (ref 0–0.1)
BASOPHILS NFR BLD AUTO: 1 % (ref 0–1)
BILIRUB SERPL-MCNC: 0.44 MG/DL (ref 0.2–1)
BUN SERPL-MCNC: 27 MG/DL (ref 5–25)
CALCIUM SERPL-MCNC: 9.3 MG/DL (ref 8.3–10.1)
CHLORIDE SERPL-SCNC: 107 MMOL/L (ref 100–108)
CHOLEST SERPL-MCNC: 161 MG/DL (ref 50–200)
CO2 SERPL-SCNC: 23 MMOL/L (ref 21–32)
CREAT SERPL-MCNC: 1.32 MG/DL (ref 0.6–1.3)
EOSINOPHIL # BLD AUTO: 0.43 THOUSAND/ΜL (ref 0–0.61)
EOSINOPHIL NFR BLD AUTO: 4 % (ref 0–6)
ERYTHROCYTE [DISTWIDTH] IN BLOOD BY AUTOMATED COUNT: 14.1 % (ref 11.6–15.1)
GFR SERPL CREATININE-BSD FRML MDRD: 39 ML/MIN/1.73SQ M
GLUCOSE P FAST SERPL-MCNC: 96 MG/DL (ref 65–99)
HCT VFR BLD AUTO: 42.8 % (ref 34.8–46.1)
HDLC SERPL-MCNC: 47 MG/DL
HGB BLD-MCNC: 13.8 G/DL (ref 11.5–15.4)
IMM GRANULOCYTES # BLD AUTO: 0.04 THOUSAND/UL (ref 0–0.2)
IMM GRANULOCYTES NFR BLD AUTO: 0 % (ref 0–2)
LDLC SERPL CALC-MCNC: 80 MG/DL (ref 0–100)
LYMPHOCYTES # BLD AUTO: 3.73 THOUSANDS/ΜL (ref 0.6–4.47)
LYMPHOCYTES NFR BLD AUTO: 39 % (ref 14–44)
MCH RBC QN AUTO: 29.5 PG (ref 26.8–34.3)
MCHC RBC AUTO-ENTMCNC: 32.2 G/DL (ref 31.4–37.4)
MCV RBC AUTO: 92 FL (ref 82–98)
MONOCYTES # BLD AUTO: 0.57 THOUSAND/ΜL (ref 0.17–1.22)
MONOCYTES NFR BLD AUTO: 6 % (ref 4–12)
NEUTROPHILS # BLD AUTO: 4.82 THOUSANDS/ΜL (ref 1.85–7.62)
NEUTS SEG NFR BLD AUTO: 50 % (ref 43–75)
NONHDLC SERPL-MCNC: 114 MG/DL
NRBC BLD AUTO-RTO: 0 /100 WBCS
PLATELET # BLD AUTO: 263 THOUSANDS/UL (ref 149–390)
PMV BLD AUTO: 9.3 FL (ref 8.9–12.7)
POTASSIUM SERPL-SCNC: 4.3 MMOL/L (ref 3.5–5.3)
PROT SERPL-MCNC: 7.7 G/DL (ref 6.4–8.2)
RBC # BLD AUTO: 4.68 MILLION/UL (ref 3.81–5.12)
SODIUM SERPL-SCNC: 139 MMOL/L (ref 136–145)
TRIGL SERPL-MCNC: 171 MG/DL
WBC # BLD AUTO: 9.68 THOUSAND/UL (ref 4.31–10.16)

## 2020-07-22 PROCEDURE — 80061 LIPID PANEL: CPT

## 2020-07-22 PROCEDURE — 36415 COLL VENOUS BLD VENIPUNCTURE: CPT

## 2020-07-22 PROCEDURE — 82306 VITAMIN D 25 HYDROXY: CPT

## 2020-07-22 PROCEDURE — 80053 COMPREHEN METABOLIC PANEL: CPT

## 2020-07-22 PROCEDURE — 85025 COMPLETE CBC W/AUTO DIFF WBC: CPT

## 2020-07-30 PROBLEM — J41.0 SIMPLE CHRONIC BRONCHITIS (HCC): Status: ACTIVE | Noted: 2020-07-30

## 2020-09-20 NOTE — PROGRESS NOTES
Progress Note- Cardiology  Office  [facility]     Austen Rodriguez 67 y o  female MRN: [id number]  : 1945  Encounter: [id number]    Assessment:  1  Essential hypertension    2  Dyslipidemia    3  Adenocarcinoma of right lung ([facility] [address] )    4  Chronic renal disease, stage III ([facility] [address] )    5  Mixed anxiety and depressive disorder    6  Tobacco use disorder        Discussion Summary & Plan:   1  Essential hypertension  Blood pressure need better controlled  She is taking losartan, Dyazide metoprolol XL  Heart rate is acceptable  Blood pressure is much better with amlodipine 2 5 mg daily  Continue same medications electrolyte done 2020 are acceptable    2  Dyslipidemia  Continue statin she is on atorvastatin 10 mg daily     Cholesterol profile 7222 acceptable liver enzymes are acceptable    3  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  Follow up with CT surgery no reoccurrence so far  Clinically she is doing well she has some bilateral rhonchi she had history of emphysema she is aware of it now  No new complaint    4  Left leg swelling  Much improved  With diuretics and blood pressure is acceptable  Labs done from 2020 acceptable  Creatinine stable around 1 3    5  History of anxiety  Currently doing very well  6   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  Much improved she is scheduled to have nerve conduction study and follows up with Neurology  She is stable now    Advised patient to be compliant with blood pressure medications  Labs reviewed with her  Currently stable cardiovascular status  Results of stress test discussed with her  Counseling :  A description of the counseling:   Goals and Barriers:  Patient's ability to self care: Yes  Medication side effect reviewed with patient in detail and all their questions answered to their satisfaction  Physician Requesting Consult:  Ismael Reyes Estela Heath MD    HPI:     Clovis Noe is a 67y o  year old female  Who was initially seen by me in 2015 has past medical history significant for lung cancer status post partial lobectomy right lower lobe  She had a stage IA cancer and she follows up with [facility] thoracic surgery  She also had past medical history significant for hypertension, previous tobacco abuse now quit smoking, emphysema, dyslipidemia, severe DJD with previous left hip surgery now need surgery on her right hip  Since her last visit in 2015 she has gained some weight  She also noted that she occasionally gets bilateral lower extremity swelling left more than right  She was on Dyazide which was recently had as her BUN creatinine might be high and that led to increase in her blood pressure  She also increased swelling she is back on Dyazide now every other day  She also takes losartan 50 mg, metoprolol XL 75 mg daily and atorvastatin  Her previous cardiac workup was in 2015 April when she had a normal stress test and study was non gated  Echo shows at that time she has a normal LV systolic function EF was 25% with grade 1 diastolic dysfunction  She cannot walk much she walks with the help of cane  No nausea no vomiting no PND no chest pain  She denies any history of stenting in between no history of MI no history of heart attack no heart failure  09/24/2020  Above reviewed  Patient came for follow-up  She is doing well from cardiac point of view but she is under stress because her mother is sick and she is 7-year-old  Her blood pressure is acceptable  She is taking losartan 50 mg, metoprolol  mg, she takes Dyazide 4 times a week  Electrolytes were acceptable  Labs done 07/22/2020 acceptable  She had history of lung cancer status post surgery  She has quit smoking since then    Today heart rate 74 beats per minute EKG shows normal sinus rhythm no significant changes no nausea no vomiting no fever no chills no other issues at this time  Otherwise she is doing well      Review of Systems   Constitutional: Negative for activity change, chills, diaphoresis, fever and unexpected weight change  HENT: Negative for congestion  Eyes: Negative for discharge and redness  Respiratory: Negative for chest tightness and shortness of breath  Cardiovascular: Negative  Negative for chest pain, palpitations and leg swelling  Gastrointestinal: Negative for abdominal pain, diarrhea and nausea  Endocrine: Negative  Genitourinary: Negative for decreased urine volume and urgency  Musculoskeletal: Positive for arthralgias and back pain  Negative for gait problem  Skin: Negative for rash and wound  Allergic/Immunologic: Negative  Neurological: Negative for dizziness, seizures, syncope, weakness, light-headedness and headaches  Hematological: Negative  Psychiatric/Behavioral: Negative for agitation and confusion  The patient is nervous/anxious          Historical Information   Past Medical History:   Diagnosis Date    Emphysema lung ([address] )     Hyperlipidemia     Hypertension     Numbness and tingling of foot     Osteoarthritis of right hip      Past Surgical History:   Procedure Laterality Date    CATARACT EXTRACTION      CHOLECYSTECTOMY      COLONOSCOPY      JOINT REPLACEMENT      [place] TOTAL HIP ARTHROPLASTY Right 9/14/2018    Procedure: ANTERIOR TOTAL HIP ARTHROPLASTY;  Surgeon: Tree Huerta MD;  Location: [facility];  Service: Orthopedics    TOTAL HIP ARTHROPLASTY      TUMOR REMOVAL Right 2015    lower lobe        Social History:  Social History     Substance and Sexual Activity   Alcohol Use Yes    Frequency: Monthly or less    Drinks per session: 1 or 2    Binge frequency: Never    Comment: ocassional      Social History     Substance and Sexual Activity   Drug Use No     Social History     Tobacco Use   Smoking Status Former Smoker    Packs/day: 1 00    Years: 50 00    Pack years: 50 00    Types: Cigarettes    Last attempt to quit: 2015    Years since quittin 0   Smokeless Tobacco Never Used          Family History   Problem Relation Age of Onset    Heart disease Mother     No Known Problems Father        Meds/Allergies     No Known Allergies    Current medications:    Current Outpatient Medications:     albuterol (PROVENTIL HFA,VENTOLIN HFA) 90 mcg/act inhaler, Inhale 2 puffs every 6 (six) hours as needed for wheezing or shortness of breath, Disp: 1 Inhaler, Rfl: 5    amLODIPine (NORVASC) 2 5 mg tablet, TAKE 1 TABLET (2 5 MG TOTAL) BY MOUTH DAILY, Disp: 90 tablet, Rfl: 1    aspirin (ECOTRIN LOW STRENGTH) 81 mg EC tablet, Take 1 tablet (81 mg total) by mouth 2 (two) times a day (Patient taking differently: Take 81 mg by mouth daily ), Disp: 60 tablet, Rfl: 0    atorvastatin (LIPITOR) 10 mg tablet, Take 1 tablet (10 mg total) by mouth daily at bedtime, Disp: 90 tablet, Rfl: 1    gabapentin (NEURONTIN) 300 mg capsule, Take 300 mg by mouth daily , Disp: , Rfl:     losartan (COZAAR) 50 mg tablet, Take 1 tablet (50 mg total) by mouth daily, Disp: 90 tablet, Rfl: 1    Melatonin 5 MG TABS, Take 10 mg by mouth daily at bedtime , Disp: , Rfl:     metoprolol succinate (TOPROL-XL) 100 mg 24 hr tablet, Take 1 tablet (100 mg total) by mouth daily, Disp: 90 tablet, Rfl: 1    Polyethylene Glycol 400 (BLINK TEARS) 0 25 % SOLN, Apply 1 drop to eye 2 (two) times a day  , Disp: , Rfl:     triamterene-hydrochlorothiazide (DYAZIDE) 37 5-25 mg per capsule, TAKE 1 CAPSULE EVERY TUESDAY, THURSDAY, SATURDAY AND , Disp: 52 capsule, Rfl: 3    doxycycline (PERIOSTAT) 20 MG tablet, 20 mg 2 (two) times a day  , Disp: , Rfl: 3      Objective:     Vitals: Blood pressure 130/70, pulse 72, temperature 98 9 °F (37 2 °C), temperature source Temporal, height 5' 7" (1 702 m), weight 88 5 kg (195 lb), SpO2 97 %  Body mass index is 30 54 kg/m²    Vitals:    20 1413   Weight: 88 5 kg (195 lb) BP Readings from Last 3 Encounters:   09/24/20 130/70   07/30/20 142/78   03/17/20 148/78         Physical Exam:   Physical Exam   Constitutional: She is oriented to person, place, and time  She appears well-developed and well-nourished  No distress  HENT:   Head: Normocephalic and atraumatic  Eyes: Pupils are equal, round, and reactive to light  Neck: Neck supple  No JVD present  No tracheal deviation present  No thyromegaly present  Cardiovascular: Normal rate, regular rhythm, S1 normal, S2 normal and intact distal pulses  Exam reveals no gallop, no S3, no S4, no distant heart sounds and no friction rub  Murmur heard  Systolic (ejection) murmur is present with a grade of 2/6  Pulmonary/Chest: Effort normal and breath sounds normal  No respiratory distress  She has no wheezes  She has no rales  She exhibits no tenderness  Abdominal: Soft  Bowel sounds are normal  She exhibits no distension  There is no abdominal tenderness  Musculoskeletal:         General: No deformity or edema  Neurological: She is alert and oriented to person, place, and time  Skin: Skin is warm and dry  No rash noted  She is not diaphoretic  No pallor  Psychiatric: She has a normal mood and affect   Her behavior is normal  Judgment normal            Labs:     Lab Results   Component Value Date    WBC 9 68 07/22/2020    HGB 13 8 07/22/2020    HCT 42 8 07/22/2020    MCV 92 07/22/2020    RDW 14 1 07/22/2020     07/22/2020     BMP:  Lab Results   Component Value Date     09/20/2015    K 4 3 07/22/2020     07/22/2020    CO2 23 07/22/2020    ANIONGAP 6 09/20/2015    BUN 27 (H) 07/22/2020    CREATININE 1 32 (H) 07/22/2020    GLUCOSE 109 09/20/2015    GLUF 96 07/22/2020    CALCIUM 9 3 07/22/2020    EGFR 39 07/22/2020    MG 2 2 09/20/2015     LFT:  Lab Results   Component Value Date    AST 19 07/22/2020    ALT 25 07/22/2020    ALKPHOS 74 07/22/2020     Lipid Profile:   Lab Results   Component Value Date HDL 47 07/22/2020    LDLCALC 80 07/22/2020    TRIG 171 (H) 07/22/2020         Imaging & Testing     Cardiac testing:       Nuclear stress test 09/07/2018 was normal with EF around 74%  It was Lexiscan stress test     Diagnostic Studies Review Cardio:   Echo Doppler echo Doppler done in April of 2015 shows EF 70%, hyperdynamic LV, grade 1 diastolic dysfunction, trace MR Trace TR  Nuclear stress test done on 04/03/2015 was nonischemic it was non gated study  EKG:    Twelve lead EKG done in our office on 09/06/2018 shows normal sinus rhythm heart rate 72 beats per minute  No significant ST changes  Twelve lead EKG 09/19/2019 shows normal sinus rhythm heart rate 62 beats per minute no significant ST changes  No change from old EKG  Twelve lead EKG 03/07/2020 shows normal sinus rhythm heart rate 72 beats per minute  QS in V2 V3 most likely due to lead location no change from old EKG    Twelve lead EKG 09/24/2020 shows normal sinus rhythm heart rate 74 beats per minute  QS in V1 to V3 not change from old EKG  Dr Ahsan Brand MD [facility] [employer]      "This note has been constructed using a voice recognition system  Therefore there may be syntax, spelling, and/or grammatical errors  Please call if you have any questions

## 2020-09-24 ENCOUNTER — OFFICE VISIT (OUTPATIENT)
Dept: CARDIOLOGY CLINIC | Facility: CLINIC | Age: 75
End: 2020-09-24
Payer: MEDICARE

## 2020-09-24 VITALS
OXYGEN SATURATION: 97 % | TEMPERATURE: 98.9 F | DIASTOLIC BLOOD PRESSURE: 70 MMHG | HEART RATE: 72 BPM | WEIGHT: 195 LBS | SYSTOLIC BLOOD PRESSURE: 130 MMHG | HEIGHT: 67 IN | BODY MASS INDEX: 30.61 KG/M2

## 2020-09-24 DIAGNOSIS — F17.200 TOBACCO USE DISORDER: ICD-10-CM

## 2020-09-24 DIAGNOSIS — F41.8 MIXED ANXIETY AND DEPRESSIVE DISORDER: ICD-10-CM

## 2020-09-24 DIAGNOSIS — N18.30 CHRONIC RENAL DISEASE, STAGE III (HCC): ICD-10-CM

## 2020-09-24 DIAGNOSIS — E78.5 DYSLIPIDEMIA: ICD-10-CM

## 2020-09-24 DIAGNOSIS — C34.91 ADENOCARCINOMA OF RIGHT LUNG (HCC): ICD-10-CM

## 2020-09-24 DIAGNOSIS — I10 ESSENTIAL HYPERTENSION: ICD-10-CM

## 2020-09-24 PROCEDURE — 99214 OFFICE O/P EST MOD 30 MIN: CPT | Performed by: INTERNAL MEDICINE

## 2020-09-24 PROCEDURE — 93000 ELECTROCARDIOGRAM COMPLETE: CPT | Performed by: INTERNAL MEDICINE

## 2020-10-08 ENCOUNTER — HOSPITAL ENCOUNTER (OUTPATIENT)
Dept: RADIOLOGY | Facility: HOSPITAL | Age: 75
Discharge: HOME | End: 2020-10-08
Payer: MEDICARE

## 2020-10-08 DIAGNOSIS — C34.91 ADENOCARCINOMA OF RIGHT LUNG (HCC): ICD-10-CM

## 2020-10-08 PROCEDURE — 71250 CT THORAX DX C-: CPT

## 2020-10-13 ENCOUNTER — TELEMEDICINE (OUTPATIENT)
Dept: CARDIAC SURGERY | Facility: CLINIC | Age: 75
End: 2020-10-13
Payer: MEDICARE

## 2020-10-13 DIAGNOSIS — C34.91 ADENOCARCINOMA OF RIGHT LUNG (HCC): Primary | ICD-10-CM

## 2020-10-13 PROCEDURE — 99212 OFFICE O/P EST SF 10 MIN: CPT | Performed by: THORACIC SURGERY (CARDIOTHORACIC VASCULAR SURGERY)

## 2020-11-19 ENCOUNTER — LAB (OUTPATIENT)
Dept: LAB | Facility: CLINIC | Age: 75
End: 2020-11-19
Payer: MEDICARE

## 2020-11-19 DIAGNOSIS — E78.5 DYSLIPIDEMIA: ICD-10-CM

## 2020-11-19 DIAGNOSIS — I10 ESSENTIAL HYPERTENSION: ICD-10-CM

## 2020-11-19 DIAGNOSIS — N18.30 CHRONIC RENAL DISEASE, STAGE III (HCC): ICD-10-CM

## 2020-11-19 DIAGNOSIS — E04.1 THYROID NODULE: ICD-10-CM

## 2020-11-19 LAB
ALBUMIN SERPL BCP-MCNC: 3.6 G/DL (ref 3.5–5)
ALP SERPL-CCNC: 69 U/L (ref 46–116)
ALT SERPL W P-5'-P-CCNC: 29 U/L (ref 12–78)
ANION GAP SERPL CALCULATED.3IONS-SCNC: 5 MMOL/L (ref 4–13)
AST SERPL W P-5'-P-CCNC: 22 U/L (ref 5–45)
BILIRUB SERPL-MCNC: 0.38 MG/DL (ref 0.2–1)
BUN SERPL-MCNC: 23 MG/DL (ref 5–25)
CALCIUM SERPL-MCNC: 9.2 MG/DL (ref 8.3–10.1)
CHLORIDE SERPL-SCNC: 106 MMOL/L (ref 100–108)
CHOLEST SERPL-MCNC: 145 MG/DL (ref 50–200)
CO2 SERPL-SCNC: 28 MMOL/L (ref 21–32)
CREAT SERPL-MCNC: 1.26 MG/DL (ref 0.6–1.3)
GFR SERPL CREATININE-BSD FRML MDRD: 41 ML/MIN/1.73SQ M
GLUCOSE P FAST SERPL-MCNC: 104 MG/DL (ref 65–99)
HDLC SERPL-MCNC: 56 MG/DL
LDLC SERPL CALC-MCNC: 64 MG/DL (ref 0–100)
NONHDLC SERPL-MCNC: 89 MG/DL
POTASSIUM SERPL-SCNC: 4.5 MMOL/L (ref 3.5–5.3)
PROT SERPL-MCNC: 7.4 G/DL (ref 6.4–8.2)
SODIUM SERPL-SCNC: 139 MMOL/L (ref 136–145)
TRIGL SERPL-MCNC: 123 MG/DL
TSH SERPL DL<=0.05 MIU/L-ACNC: 1.41 UIU/ML (ref 0.36–3.74)

## 2020-11-19 PROCEDURE — 84443 ASSAY THYROID STIM HORMONE: CPT

## 2020-11-19 PROCEDURE — 80061 LIPID PANEL: CPT

## 2020-11-19 PROCEDURE — 36415 COLL VENOUS BLD VENIPUNCTURE: CPT

## 2020-11-19 PROCEDURE — 80053 COMPREHEN METABOLIC PANEL: CPT

## 2020-11-20 DIAGNOSIS — I10 ESSENTIAL HYPERTENSION: ICD-10-CM

## 2020-11-20 RX ORDER — AMLODIPINE BESYLATE 2.5 MG/1
TABLET ORAL
Qty: 90 TABLET | Refills: 1 | Status: SHIPPED | OUTPATIENT
Start: 2020-11-20 | End: 2021-03-23 | Stop reason: DRUGHIGH

## 2020-11-23 PROBLEM — M70.22 OLECRANON BURSITIS, LEFT ELBOW: Status: RESOLVED | Noted: 2019-08-26 | Resolved: 2020-11-23

## 2021-03-15 ENCOUNTER — APPOINTMENT (OUTPATIENT)
Dept: LAB | Facility: CLINIC | Age: 76
End: 2021-03-15
Payer: MEDICARE

## 2021-03-15 DIAGNOSIS — G62.9 NEUROPATHY: ICD-10-CM

## 2021-03-15 DIAGNOSIS — E55.9 VITAMIN D DEFICIENCY: ICD-10-CM

## 2021-03-15 DIAGNOSIS — I10 ESSENTIAL HYPERTENSION: ICD-10-CM

## 2021-03-15 DIAGNOSIS — G25.81 RESTLESS LEG SYNDROME: ICD-10-CM

## 2021-03-15 DIAGNOSIS — E78.5 DYSLIPIDEMIA: ICD-10-CM

## 2021-03-15 LAB
ALBUMIN SERPL BCP-MCNC: 4 G/DL (ref 3.5–5)
ALP SERPL-CCNC: 76 U/L (ref 46–116)
ALT SERPL W P-5'-P-CCNC: 29 U/L (ref 12–78)
ANION GAP SERPL CALCULATED.3IONS-SCNC: 3 MMOL/L (ref 4–13)
AST SERPL W P-5'-P-CCNC: 22 U/L (ref 5–45)
BASOPHILS # BLD AUTO: 0.08 THOUSANDS/ΜL (ref 0–0.1)
BASOPHILS NFR BLD AUTO: 1 % (ref 0–1)
BILIRUB SERPL-MCNC: 0.52 MG/DL (ref 0.2–1)
BUN SERPL-MCNC: 24 MG/DL (ref 5–25)
CALCIUM SERPL-MCNC: 9.2 MG/DL (ref 8.3–10.1)
CHLORIDE SERPL-SCNC: 106 MMOL/L (ref 100–108)
CHOLEST SERPL-MCNC: 164 MG/DL (ref 50–200)
CO2 SERPL-SCNC: 28 MMOL/L (ref 21–32)
CREAT SERPL-MCNC: 1.35 MG/DL (ref 0.6–1.3)
EOSINOPHIL # BLD AUTO: 0.35 THOUSAND/ΜL (ref 0–0.61)
EOSINOPHIL NFR BLD AUTO: 4 % (ref 0–6)
ERYTHROCYTE [DISTWIDTH] IN BLOOD BY AUTOMATED COUNT: 13.8 % (ref 11.6–15.1)
GFR SERPL CREATININE-BSD FRML MDRD: 38 ML/MIN/1.73SQ M
GLUCOSE P FAST SERPL-MCNC: 104 MG/DL (ref 65–99)
HCT VFR BLD AUTO: 45.3 % (ref 34.8–46.1)
HDLC SERPL-MCNC: 53 MG/DL
HGB BLD-MCNC: 14.1 G/DL (ref 11.5–15.4)
IMM GRANULOCYTES # BLD AUTO: 0.04 THOUSAND/UL (ref 0–0.2)
IMM GRANULOCYTES NFR BLD AUTO: 0 % (ref 0–2)
LDLC SERPL CALC-MCNC: 86 MG/DL (ref 0–100)
LYMPHOCYTES # BLD AUTO: 3.18 THOUSANDS/ΜL (ref 0.6–4.47)
LYMPHOCYTES NFR BLD AUTO: 35 % (ref 14–44)
MCH RBC QN AUTO: 28.5 PG (ref 26.8–34.3)
MCHC RBC AUTO-ENTMCNC: 31.1 G/DL (ref 31.4–37.4)
MCV RBC AUTO: 92 FL (ref 82–98)
MONOCYTES # BLD AUTO: 0.48 THOUSAND/ΜL (ref 0.17–1.22)
MONOCYTES NFR BLD AUTO: 5 % (ref 4–12)
NEUTROPHILS # BLD AUTO: 4.93 THOUSANDS/ΜL (ref 1.85–7.62)
NEUTS SEG NFR BLD AUTO: 55 % (ref 43–75)
NONHDLC SERPL-MCNC: 111 MG/DL
NRBC BLD AUTO-RTO: 0 /100 WBCS
PLATELET # BLD AUTO: 287 THOUSANDS/UL (ref 149–390)
PMV BLD AUTO: 9.5 FL (ref 8.9–12.7)
POTASSIUM SERPL-SCNC: 4.3 MMOL/L (ref 3.5–5.3)
PROT SERPL-MCNC: 7.6 G/DL (ref 6.4–8.2)
RBC # BLD AUTO: 4.94 MILLION/UL (ref 3.81–5.12)
SODIUM SERPL-SCNC: 137 MMOL/L (ref 136–145)
TRIGL SERPL-MCNC: 125 MG/DL
WBC # BLD AUTO: 9.06 THOUSAND/UL (ref 4.31–10.16)

## 2021-03-15 PROCEDURE — 80053 COMPREHEN METABOLIC PANEL: CPT

## 2021-03-15 PROCEDURE — 85025 COMPLETE CBC W/AUTO DIFF WBC: CPT

## 2021-03-15 PROCEDURE — 82306 VITAMIN D 25 HYDROXY: CPT

## 2021-03-15 PROCEDURE — 36415 COLL VENOUS BLD VENIPUNCTURE: CPT

## 2021-03-15 PROCEDURE — 80061 LIPID PANEL: CPT

## 2021-03-16 LAB — 25(OH)D3 SERPL-MCNC: 16.8 NG/ML (ref 30–100)

## 2021-03-23 ENCOUNTER — OFFICE VISIT (OUTPATIENT)
Dept: INTERNAL MEDICINE CLINIC | Facility: CLINIC | Age: 76
End: 2021-03-23
Payer: MEDICARE

## 2021-03-23 VITALS
BODY MASS INDEX: 30.29 KG/M2 | DIASTOLIC BLOOD PRESSURE: 84 MMHG | OXYGEN SATURATION: 98 % | WEIGHT: 193 LBS | SYSTOLIC BLOOD PRESSURE: 156 MMHG | HEART RATE: 68 BPM | TEMPERATURE: 96.8 F | HEIGHT: 67 IN

## 2021-03-23 DIAGNOSIS — E04.1 THYROID NODULE: ICD-10-CM

## 2021-03-23 DIAGNOSIS — G25.81 RESTLESS LEG SYNDROME: ICD-10-CM

## 2021-03-23 DIAGNOSIS — K63.5 POLYP OF COLON, UNSPECIFIED PART OF COLON, UNSPECIFIED TYPE: ICD-10-CM

## 2021-03-23 DIAGNOSIS — E55.9 VITAMIN D DEFICIENCY: ICD-10-CM

## 2021-03-23 DIAGNOSIS — M15.9 PRIMARY OSTEOARTHRITIS INVOLVING MULTIPLE JOINTS: ICD-10-CM

## 2021-03-23 DIAGNOSIS — Z12.31 BREAST CANCER SCREENING BY MAMMOGRAM: ICD-10-CM

## 2021-03-23 DIAGNOSIS — C34.91 ADENOCARCINOMA OF RIGHT LUNG (HCC): ICD-10-CM

## 2021-03-23 DIAGNOSIS — F41.8 MIXED ANXIETY AND DEPRESSIVE DISORDER: ICD-10-CM

## 2021-03-23 DIAGNOSIS — E78.5 DYSLIPIDEMIA: ICD-10-CM

## 2021-03-23 DIAGNOSIS — N18.32 STAGE 3B CHRONIC KIDNEY DISEASE (HCC): ICD-10-CM

## 2021-03-23 DIAGNOSIS — J41.0 SIMPLE CHRONIC BRONCHITIS (HCC): ICD-10-CM

## 2021-03-23 DIAGNOSIS — I10 ESSENTIAL HYPERTENSION: Primary | ICD-10-CM

## 2021-03-23 PROBLEM — E66.09 CLASS 1 OBESITY DUE TO EXCESS CALORIES WITHOUT SERIOUS COMORBIDITY WITH BODY MASS INDEX (BMI) OF 30.0 TO 30.9 IN ADULT: Status: ACTIVE | Noted: 2019-08-25

## 2021-03-23 PROBLEM — E66.811 CLASS 1 OBESITY DUE TO EXCESS CALORIES WITHOUT SERIOUS COMORBIDITY WITH BODY MASS INDEX (BMI) OF 30.0 TO 30.9 IN ADULT: Status: ACTIVE | Noted: 2019-08-25

## 2021-03-23 PROCEDURE — 99214 OFFICE O/P EST MOD 30 MIN: CPT | Performed by: INTERNAL MEDICINE

## 2021-03-23 RX ORDER — AMLODIPINE BESYLATE 5 MG/1
5 TABLET ORAL DAILY
Qty: 90 TABLET | Refills: 1 | Status: SHIPPED | OUTPATIENT
Start: 2021-03-23 | End: 2021-07-16

## 2021-03-23 RX ORDER — METOPROLOL SUCCINATE 100 MG/1
100 TABLET, EXTENDED RELEASE ORAL DAILY
Qty: 90 TABLET | Refills: 1 | Status: SHIPPED | OUTPATIENT
Start: 2021-03-23 | End: 2021-10-26

## 2021-03-23 RX ORDER — LOSARTAN POTASSIUM 50 MG/1
50 TABLET ORAL DAILY
Qty: 90 TABLET | Refills: 1 | Status: SHIPPED | OUTPATIENT
Start: 2021-03-23 | End: 2021-12-06

## 2021-03-23 NOTE — ASSESSMENT & PLAN NOTE
Probably 2014 or 15  Will try to get the copy of the report      Patient is advised to contact gastroenterologist and find out 20 see due for next colonoscopy

## 2021-03-23 NOTE — ASSESSMENT & PLAN NOTE
Lab Results   Component Value Date    EGFR 38 03/15/2021    EGFR 41 11/19/2020    EGFR 39 07/22/2020    CREATININE 1 35 (H) 03/15/2021    CREATININE 1 26 11/19/2020    CREATININE 1 32 (H) 07/22/2020   GFR is baseline  Creat is baseline  Recommend periodic blood test for monitoring of BUN and Creat  Avoid Non Steroidal Antiinflammatory such as ibuprofen, aleve etc   Potassium, HCO3 and calcium are wnl and will require periodic blood test   Bone and Mineral disease monitoring as appropriate    All patient with GFR less than 30( CKD 4 or 5 or 6) advised to follow with nephrologist

## 2021-03-23 NOTE — ASSESSMENT & PLAN NOTE
Cholesterol    Eat low cholesterol diet    Continue taking your cholesterol medicine as advised    Call if any side effects    Lipid Profile and LFT prior to next visit or as advised  ( You should Get periodically to monitor liver side effects)    Know you LDL ( Bad Cholesterol ) , HDL ( Good Cholesterol ) and Triglyceride goal    Continue atorvastatin 10 mg daily

## 2021-03-23 NOTE — ASSESSMENT & PLAN NOTE
Stage I A right lower lobe adenocarcinoma  Patient is due for CT scan of the chest   Patient encouraged    Will continue to follow with cardiothoracic surgeon     10/08/2020 CT scan of the chest was unremarkable

## 2021-03-23 NOTE — ASSESSMENT & PLAN NOTE
Anxiety and depression is fair at this time  She is somewhat overwhelmed with her mother's as set adjustment but she is handling fairly well

## 2021-03-23 NOTE — ASSESSMENT & PLAN NOTE
Patient remains on gabapentin  Patient was seen by neurologist   Mom    They twitch when she sees resting      See does not wake up in middle of the night rest leg leg

## 2021-03-23 NOTE — PATIENT INSTRUCTIONS
1  Call your gastroenterologist and find out when you are due for your colonoscopy  2  You need to go for your ultrasound of the thyroid we gave you order placed pursue  3  You are due for mammogram last 1 was done on February 26, 2020     4  The you should see Dr Ammy Callejas urologist and consider getting ultrasound of the kidney  5  Buy a blood pressure machine at home and check at home  Omron is a good brand    6  Take vitamin D 2000 unit every day    Hypertension( High Blood Pressure ):    Continue Home BP check daily and bring log, if you are not checking consider checking daily    Take your Blood Pressure medicine as advised    Do not take your Blood pressure medicine if systolic Blood Pressure (top number) is less than 100 or heart rate less than 60  Notify you physician  Follow with Consultants as per their and our suggestion    Follow up in 12 week(s) or as needed earlier    Follow all instructions as advised and discussed  Take your medications as prescribed  Call the office immediately if you experience any side effects  Ask questions if you do not understand  Keep your scheduled appointment as advised or come sooner if necessary or in doubt  Best time to call for non-urgent matter or questions on weekdays is between 9am and 12 noon  See physician for any new symptoms or worsening of current symptoms  Urgent or emergent situations call 911 and report to nearest emergency room      I spent  30 -40 minutes taking care of this patient including clinical care, conseling, collaboration, chart, lab and consultaion review as appropriate    Patient is to get labs 1 week(s) prior to next visit if advised

## 2021-03-23 NOTE — ASSESSMENT & PLAN NOTE
01/03/2020:  COMPARISON:  Thyroid ultrasound 1/15/2016 and 2/24/2014   No change in size  Patient was recommended to get a follow-up ultrasound which patient did not go    I again encouraged    No hyper or hypothyroidism symptoms or compressive symptoms at this time

## 2021-03-23 NOTE — PROGRESS NOTES
[facility] Office Visit Note  21     Ranjit Dale 67 y o  female MRN: [id number]  : 1945    Assessment:     No problem-specific Assessment & Plan notes found for this encounter  Diagnoses and all orders for this visit:    Essential hypertension          Discussion Summary and Plan: Today's care plan and medications were reviewed with patient in detail and all their questions answered to their satisfaction  In summary    Hypertension suboptimal control will increase amlodipine 5 mg daily  Patient to follow with cardiologist tomorrow will continue metoprolol succinate 100 mg daily heart rate is reasonable  Patient is on Dyazide 4 times a week  CKD level 3 GFR slowly decreasing down to 38  Electrolytes normal hemoglobin normal of probably will need to come of Dyazide if it is close to 30  Patient will discuss with cardiologist     Colonic polyp history of tubular adenoma overdue for colonoscopy advised  Vitamin-D deficiency patient is not taking vitamin-D recommend 2000 unit daily  CKD level IIIb as discussed above  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 home patient encouraged to take Tylenol only for the pain do not do any weightlifting cleaning snow or backyard  Now COPD fair  The his cancer of the lung reasonable  Thyroid nodule patient did not go for the ultrasound is advised patient encouraged  Patient there there was a question of some abnormality of kidney a we had recommended ultrasound and see urologist patient is not compliant again encouraged for both of them  Mammogram order given  Patient does not want to do breast examination by us or new gynecologist   And she does not want a internal examination  Obesity weight slightly high recommend diet exercise patient advised  Generalized anxiety fair the she is not depressed  Adjusting to the life  Hyperlipidemia well controlled    LFTs normal   Continue atorvastatin 10 mg daily with low-cholesterol diet  Recommend exercise diet diet weight loss  Chief Complaint   Patient presents with    Follow-up     no complaints      Subjective:      Lucy Alford is here for chronic disease management  Elbow Pain remains sx free    Hypertension symptom-free well controlled remains on metoprolol  mg daily, Dyazide 37  every  and Saturday and , losartan 50 mg daily  recently amlodipine 2 5 mg daily     CKD level 3 baseline recent lab data  Lytes normal   GFR 38  We did discuss about a getting of the Dyazide as he is getting closer to 30  Home no    Cancer of lung lung  is stable and seen by cardiothoracic surgeon patient does not have any pulmonary symptoms except occasionally shortness of breath   10-8-2020 CT shows no reoccurence    COPD fair good functional capacity  rec Albuterol HFA prn    Blood sugar fair    Edema minimal     Pain control not a issue  Patient recently was shoveling the snow and did get back pain and had radiculopathy on the right side but pain is better as long as she is sitting  We talked about do not take any nonsteroidal due to her kidney  Recommend to take Tylenol  She is walking without an assistive device  She is not limping  Hyperlipidemia excellent lipid profile normal LFT will continue atorvastatin daily  The femoral neuropathy on the right thigh saw you improving  Generalized anxiety and depression fair    PHQ-9 Follow-up    Little interest or pleasure in doing things: 0 - not at all  Feeling down, depressed, or hopeless: 0 - not at all  Trouble falling or staying asleep, or sleeping too much: 0 - not at all  Feeling tired or having little energy: 0 - not at all  Poor appetite or overeatin - not at all  Feeling bad about yourself - or that you are a failure or have let   yourself or your family down: 0 - not at all  Trouble concentrating on things, such as reading the newspaper or watching   television: 0 - not at all  Moving or speaking so slowly that other people could have noticed  Or the   opposite - being so fidgety or restless that you have been moving around a   lot more than usual: 0 - not at all  Thoughts that you would be better off dead, or of hurting yourself in some   way: 0 - not at all  PHQ-2 Score: 0  PHQ-9 Score: 0       PERRY-7 Flowsheet Screening      Most Recent Value   Over the last two weeks, how often have you been bothered by the following   problems? Feeling nervous, anxious, or on edge  0   Not being able to stop or control worrying  0   Worrying too much about different things  1   Trouble relaxing   0   Being so restless that it's hard to sit still  0   Becoming easily annoyed or irritable   0   Feeling afraid as if something awful might happen  0   How difficult have these problems made it for you to do your work, take   care of things at home, or get along with other people? Not difficult at   all   PERRY Score   1          Thyroid nodule the last study done on 1-3-2020 and previously1/18/2016 stable, willing to go for US in January 2021 and will think about seeing a endocrinologist    Colonic polyp studies done on 09/2009, April 2009, and 10/19/2010  Refuses colonoscopy    Osteoarthritis of the hand reasonable  Leg pain is not a major issue  Benign positional vertigo stable  Pt is refusing colonoscpy  Pt canceled October  Appointment due to her mother's death  History of tubular adenoma advised to get colonoscopy done  Insomnia fair  Lung cancer symptom-free  S/p Rigth THR    Femoral neuropathy fair    Pt does not want to see  Urologist and does not want to go for ultrasound of kidney and bladder  Of previous important study:    MRI:  Cystic or cyst-like lesion along the anterior and mid pole of the left kidney with this region not image on the current study    2  New appearing metabolic blooming artifact extending from the region of the right hip compatible with interval right hip replacement    3  Left T11 superior facet/pedicle marrow edema resolved    4  No significant interval change since the previous MRI of the 08/18/2016 11 the technical differences    5  Multilevel degenerative spondylitic changes of the lumbar spine without central canal stenosis is refer to the full body of report    6  Atherosclerotic changes of the abdominal aorta    7  Transitional anatomy at the lumbosacral study    Subsequently has been under care of also urologist     The also had a EMG and nerve conduction study done  11/30/2018 which revealed right femoral neuropathy, there is evidence of the better peroneal motor nerve neuropathy with axonal feature on which is felt to be primarily due to technical factors including atrophy of the extensor digitorum brevis muscle and hypertrophic skin changes     She is taking gabapentin without side effect  We also talked about cyst on the kidney we never scheduled ultrasound of the kidney  She refuses to go   Encouraged to go for us    Pt is refusing ultrasound of kidney  I again encouraged to consider getting ultrasound of the kidney as well as see Dr Ovi Rodrigues  Patient never went for ultrasound of thyroid we will do new orders hopefully she will go she never went to see endocrinologist hopefully 7 follow-through  Appointment on 03/15/2021  Sodium                    Value: 137(mmol/L)        Dt: 03/15/2021  Potassium                 Value: 4 3(mmol/L)        Dt: 03/15/2021  Chloride                  Value: 106(mmol/L)        Dt: 03/15/2021  CO2                       Value: 28(mmol/L)         Dt: 03/15/2021  ANION GAP                 Value:  3(mmol/L)*         Dt: 03/15/2021  BUN                       Value: 24(mg/dL)          Dt: 03/15/2021  Creatinine                Value: 1 35(mg/dL)*       Dt: 03/15/2021  Glucose, Fasting          Value: 104(mg/dL)*        Dt: 03/15/2021  Calcium                   Value: 9 2(mg/dL)         Dt: 03/15/2021  AST Value: 22(U/L)            Dt: 03/15/2021  ALT                       Value: 29(U/L)            Dt: 03/15/2021  Alkaline Phosphatase      Value: 76(U/L)            Dt: 03/15/2021  Total Protein             Value: 7 6(g/dL)          Dt: 03/15/2021  Albumin                   Value: 4 0(g/dL)          Dt: 03/15/2021  Total Bilirubin           Value: 0 52(mg/dL)        Dt: 03/15/2021  eGFR                      Value: 38(ml/min/1 73sq m) Dt: 03/15/2021  WBC                       Value: 9 06(Thousand/uL)  Dt: 03/15/2021  RBC                       Value: 4 94(Million/uL)   Dt: 03/15/2021  Hemoglobin                Value: 14 1(g/dL)         Dt: 03/15/2021  Hematocrit                Value: 45 3(%)            Dt: 03/15/2021  MCV                       Value: 92(fL)             Dt: 03/15/2021  MCH                       Value: 28 5(pg)           Dt: 03/15/2021  MCHC                      Value: 31 1(g/dL)*        Dt: 03/15/2021  RDW                       Value: 13 8(%)            Dt: 03/15/2021  MPV                       Value: 9 5(fL)            Dt: 03/15/2021  Platelets                 Value: 287(Thousands/uL)  Dt: 03/15/2021  nRBC                      Value: 0(/100 WBCs)       Dt: 03/15/2021  Neutrophils Relative      Value: 55(%)              Dt: 03/15/2021  Immat GRANS %             Value: 0(%)               Dt: 03/15/2021  Lymphocytes Relative      Value: 35(%)              Dt: 03/15/2021  Monocytes Relative        Value: 5(%)               Dt: 03/15/2021  Eosinophils Relative      Value: 4(%)               Dt: 03/15/2021  Basophils Relative        Value: 1(%)               Dt: 03/15/2021  Neutrophils Absolute      Value: 4 93(Thousands/µL) Dt: 03/15/2021  Immature Grans Absolute   Value: 0 04(Thousand/uL)  Dt: 03/15/2021  Lymphocytes Absolute      Value: 3 18(Thousands/µL) Dt: 03/15/2021  Monocytes Absolute        Value: 0 48(Thousand/µL)  Dt: 03/15/2021  Eosinophils Absolute      Value: 0 35(Thousand/µL)  Dt: 03/15/2021  Basophils Absolute        Value: 0 08(Thousands/µL) Dt: 03/15/2021  Cholesterol               Value: 164(mg/dL)         Dt: 03/15/2021  Triglycerides             Value: 125(mg/dL)         Dt: 03/15/2021  HDL, Direct               Value: 53(mg/dL)          Dt: 03/15/2021  LDL Calculated            Value: 86(mg/dL)          Dt: 03/15/2021  Non-HDL-Chol (CHOL-HDL)   Value: 111(mg/dl)         Dt: 03/15/2021  Vit D, 25-Hydroxy         Value: 16 8(ng/mL)*       Dt: 03/15/2021  ------------ - 2 weeks        TSH 3RD GENERATON         Value: 1 410(uIU/mL)      Dt: 11/19/2020          Vit D, 25-Hydroxy         Value: 10  6(ng/mL)*       Dt: 07/22/2020    Rheumatoid Factor         Value: Negative           Dt: 10/18/2019    Mammogram:  02/26/2020 negative    CT scan of the chest on 10/12/2020 no reoccurrence  Ultrasound of thyroid January 2020 1 1 x 0 5 x 1 cm nodule on the right side and 1 x 0 5 x 0 7 cm nodule on the left  1-2020: There are additional nodules of lesser size and/or TI-RADS score  These do not necessitate additional evaluation based on ACR criteria        The following portions of the patient's history were reviewed and updated as appropriate: allergies, current medications, past family history, past medical history, past social history, past surgical history and problem list     Review of Systems   All other systems reviewed and are negative          Historical Information   Patient Active Problem List   Diagnosis    Adenocarcinoma of right lung ([address] )    Hypertension    Dyslipidemia    Primary osteoarthritis involving multiple joints    Left leg swelling    Preoperative clearance    Femoral neuropathy, right    Emphysema lung (HCC)    Osteoarthritis of right hip    Hyperlipidemia    Numbness and tingling of foot    Anxiety    Colon polyp    Thyroid nodule    Mixed anxiety and depressive disorder    Chronic renal disease, stage III    Diverticulosis    Skin mole    Restless leg syndrome    BPPV (benign paroxysmal positional vertigo)    Obesity, unspecified    Lesion of external ear    Tobacco use disorder    Neuropathy    Chorea    Varicose veins of legs    Vitamin B12 deficiency    Edema of both legs    Vitamin D deficiency    Kidney cyst, acquired    Carpal tunnel syndrome    Simple chronic bronchitis (HCC)     Past Medical History:   Diagnosis Date    Emphysema lung (HCC)     Hyperlipidemia     Hypertension     Numbness and tingling of foot     Osteoarthritis of right hip     Primary adenocarcinoma of lower lobe of right lung (HCC)      Past Surgical History:   Procedure Laterality Date    CATARACT EXTRACTION      CHOLECYSTECTOMY      COLONOSCOPY      JOINT REPLACEMENT      RI TOTAL HIP ARTHROPLASTY Right 2018    Procedure: ANTERIOR TOTAL HIP ARTHROPLASTY;  Surgeon: Mario Navarro MD;  Location: [facility];  Service: Orthopedics    TOTAL HIP ARTHROPLASTY      TUMOR REMOVAL Right 2015    lower lobe      Social History     Substance and Sexual Activity   Alcohol Use Yes    Frequency: Monthly or less    Drinks per session: 1 or 2    Binge frequency: Never    Comment: ocassional      Social History     Substance and Sexual Activity   Drug Use No     Social History     Tobacco Use   Smoking Status Former Smoker    Packs/day: 1 00    Years: 50 00    Pack years: 50 00    Types: Cigarettes    Quit date: 2015    Years since quittin 5   Smokeless Tobacco Never Used     Family History   Problem Relation Age of Onset    Heart disease Mother     No Known Problems Father      Health Maintenance Due   Topic    COVID-19 Vaccine (1)    DTaP,Tdap,and Td Vaccines (1 - Tdap)    Pneumococcal Vaccine: 65+ Years (1 of 1 - PPSV23)    Medicare Annual Wellness Visit (AWV)     BMI: Followup Plan       Meds/Allergies       Current Outpatient Medications:     albuterol (PROVENTIL HFA,VENTOLIN HFA) 90 mcg/act inhaler, Inhale 2 puffs every 6 (six) hours as needed for wheezing or shortness of breath, Disp: 1 Inhaler, Rfl: 5    amLODIPine (NORVASC) 2 5 mg tablet, TAKE 1 TABLET DAILY, Disp: 90 tablet, Rfl: 1    aspirin (ECOTRIN LOW STRENGTH) 81 mg EC tablet, Take 1 tablet (81 mg total) by mouth 2 (two) times a day (Patient taking differently: Take 81 mg by mouth daily ), Disp: 60 tablet, Rfl: 0    atorvastatin (LIPITOR) 10 mg tablet, Take 1 tablet (10 mg total) by mouth daily at bedtime, Disp: 90 tablet, Rfl: 1    doxycycline (PERIOSTAT) 20 MG tablet, 20 mg 2 (two) times a day  , Disp: , Rfl: 3    gabapentin (NEURONTIN) 300 mg capsule, Take 300 mg by mouth daily , Disp: , Rfl:     losartan (COZAAR) 50 mg tablet, Take 1 tablet (50 mg total) by mouth daily, Disp: 90 tablet, Rfl: 1    Melatonin 5 MG TABS, Take 10 mg by mouth daily at bedtime , Disp: , Rfl:     metoprolol succinate (TOPROL-XL) 100 mg 24 hr tablet, Take 1 tablet (100 mg total) by mouth daily, Disp: 90 tablet, Rfl: 1    Polyethylene Glycol 400 (BLINK TEARS) 0 25 % SOLN, Apply 1 drop to eye 2 (two) times a day  , Disp: , Rfl:     triamterene-hydrochlorothiazide (DYAZIDE) 37 5-25 mg per capsule, TAKE 1 CAPSULE EVERY TUESDAY, THURSDAY, SATURDAY AND SUNDAY, Disp: 52 capsule, Rfl: 3      Objective:    Vitals:   Pulse 68   Temp (!) 96 8 °F (36 °C)   Ht 5' 7" (1 702 m)   Wt 87 5 kg (193 lb)   SpO2 98%   BMI 30 23 kg/m²   Body mass index is 30 23 kg/m²  Vitals:    03/23/21 1405   Weight: 87 5 kg (193 lb)       Physical Exam  Constitutional:       General: She is not in acute distress  Appearance: She is well-developed  She is not ill-appearing  HENT:      Head: Normocephalic and atraumatic  Eyes:      Conjunctiva/sclera: Conjunctivae normal    Neck:      Thyroid: No thyromegaly  Vascular: No JVD  Cardiovascular:      Rate and Rhythm: Regular rhythm  Heart sounds: Normal heart sounds  No murmur  No friction rub  No gallop      Pulmonary:      Effort: No respiratory distress  Breath sounds: Normal breath sounds  No wheezing or rales  Musculoskeletal:      Lumbar back: She exhibits decreased range of motion  She exhibits no tenderness, no swelling, no edema, no deformity, no pain and no spasm  Right lower leg: No edema  Left lower leg: No edema  Lymphadenopathy:      Cervical: No cervical adenopathy  Skin:     General: Skin is warm  Neurological:      General: No focal deficit present  Mental Status: Mental status is at baseline  Cranial Nerves: No cranial nerve deficit  Sensory: No sensory deficit  Motor: No weakness  Coordination: Coordination normal       Gait: Gait normal    Psychiatric:         Mood and Affect: Mood normal  Mood is not anxious  Speech: Speech normal          Behavior: Behavior normal  Behavior is not agitated, aggressive, withdrawn, hyperactive or combative  Thought Content: Thought content normal  Thought content is not paranoid or delusional  Thought content does not include homicidal or suicidal ideation  Thought content does not include suicidal plan  Judgment: Judgment normal        BMI Counseling: Body mass index is 30 23 kg/m²  The BMI is above normal  Nutrition recommendations include decreasing portion sizes, encouraging healthy choices of fruits and vegetables, decreasing fast food intake, moderation in carbohydrate intake and increasing intake of lean protein          Lab Review   Appointment on 03/15/2021   Component Date Value Ref Range Status    Sodium 03/15/2021 137  136 - 145 mmol/L Final    Potassium 03/15/2021 4 3  3 5 - 5 3 mmol/L Final    Chloride 03/15/2021 106  100 - 108 mmol/L Final    CO2 03/15/2021 28  21 - 32 mmol/L Final    ANION GAP 03/15/2021 3* 4 - 13 mmol/L Final    BUN 03/15/2021 24  5 - 25 mg/dL Final    Creatinine 03/15/2021 1 35* 0 60 - 1 30 mg/dL Final    Standardized to IDMS reference method    Glucose, Fasting 03/15/2021 104* 65 - 99 mg/dL Final    Specimen collection should occur prior to Sulfasalazine administration due to the potential for falsely depressed results  Specimen collection should occur prior to Sulfapyridine administration due to the potential for falsely elevated results   Calcium 03/15/2021 9 2  8 3 - 10 1 mg/dL Final    AST 03/15/2021 22  5 - 45 U/L Final    Specimen collection should occur prior to Sulfasalazine administration due to the potential for falsely depressed results   ALT 03/15/2021 29  12 - 78 U/L Final    Specimen collection should occur prior to Sulfasalazine and/or Sulfapyridine administration due to the potential for falsely depressed results   Alkaline Phosphatase 03/15/2021 76  46 - 116 U/L Final    Total Protein 03/15/2021 7 6  6 4 - 8 2 g/dL Final    Albumin 03/15/2021 4 0  3 5 - 5 0 g/dL Final    Total Bilirubin 03/15/2021 0 52  0 20 - 1 00 mg/dL Final    Use of this assay is not recommended for patients undergoing treatment with eltrombopag due to the potential for falsely elevated results      eGFR 03/15/2021 38  ml/min/1 73sq m Final    WBC 03/15/2021 9 06  4 31 - 10 16 Thousand/uL Final    RBC 03/15/2021 4 94  3 81 - 5 12 Million/uL Final    Hemoglobin 03/15/2021 14 1  11 5 - 15 4 g/dL Final    Hematocrit 03/15/2021 45 3  34 8 - 46 1 % Final    MCV 03/15/2021 92  82 - 98 fL Final    MCH 03/15/2021 28 5  26 8 - 34 3 pg Final    MCHC 03/15/2021 31 1* 31 4 - 37 4 g/dL Final    RDW 03/15/2021 13 8  11 6 - 15 1 % Final    MPV 03/15/2021 9 5  8 9 - 12 7 fL Final    Platelets 90/19/1948 287  149 - 390 Thousands/uL Final    nRBC 03/15/2021 0  /100 WBCs Final    Neutrophils Relative 03/15/2021 55  43 - 75 % Final    Immat GRANS % 03/15/2021 0  0 - 2 % Final    Lymphocytes Relative 03/15/2021 35  14 - 44 % Final    Monocytes Relative 03/15/2021 5  4 - 12 % Final    Eosinophils Relative 03/15/2021 4  0 - 6 % Final    Basophils Relative 03/15/2021 1  0 - 1 % Final    Neutrophils Absolute 03/15/2021 4 93  1 85 - 7 62 Thousands/µL Final    Immature Grans Absolute 03/15/2021 0 04  0 00 - 0 20 Thousand/uL Final    Lymphocytes Absolute 03/15/2021 3 18  0 60 - 4 47 Thousands/µL Final    Monocytes Absolute 03/15/2021 0 48  0 17 - 1 22 Thousand/µL Final    Eosinophils Absolute 03/15/2021 0 35  0 00 - 0 61 Thousand/µL Final    Basophils Absolute 03/15/2021 0 08  0 00 - 0 10 Thousands/µL Final    Cholesterol 03/15/2021 164  50 - 200 mg/dL Final    Cholesterol:       Desirable         <200 mg/dl       Borderline         200-239 mg/dl       High              >239           Triglycerides 03/15/2021 125  <=150 mg/dL Final    Triglyceride:     Normal          <150 mg/dl     Borderline High 150-199 mg/dl     High            200-499 mg/dl        Very High       >499 mg/dl    Specimen collection should occur prior to N-Acetylcysteine or Metamizole administration due to the potential for falsely depressed results   HDL, Direct 03/15/2021 53  >=40 mg/dL Final    HDL Cholesterol:       Low     <41 mg/dL  Specimen collection should occur prior to Metamizole administration due to the potential for falsley depressed results   LDL Calculated 03/15/2021 86  0 - 100 mg/dL Final    LDL Cholesterol:     Optimal           <100 mg/dl     Near Optimal      100-129 mg/dl     Above Optimal       Borderline High 130-159 mg/dl       High            160-189 mg/dl       Very High       >189 mg/dl         This screening LDL is a calculated result  It does not have the accuracy of the Direct Measured LDL in the monitoring of patients with hyperlipidemia and/or statin therapy  Direct Measure LDL (EZZ484) must be ordered separately in these patients   Non-HDL-Chol (CHOL-HDL) 03/15/2021 111  mg/dl Final    Vit D, 25-Hydroxy 03/15/2021 16 8* 30 0 - 100 0 ng/mL Final         There are no Patient Instructions on file for this visit       Dr Mayito Cates MD  [facility]       "This note has been constructed using a voice recognition system  Therefore there may be syntax, spelling, and/or grammatical errors   Please call if you have any questions  "

## 2021-03-24 ENCOUNTER — OFFICE VISIT (OUTPATIENT)
Dept: CARDIOLOGY CLINIC | Facility: CLINIC | Age: 76
End: 2021-03-24
Payer: MEDICARE

## 2021-03-24 VITALS
DIASTOLIC BLOOD PRESSURE: 70 MMHG | HEART RATE: 56 BPM | TEMPERATURE: 98 F | WEIGHT: 195 LBS | SYSTOLIC BLOOD PRESSURE: 140 MMHG | BODY MASS INDEX: 30.61 KG/M2 | OXYGEN SATURATION: 98 % | HEIGHT: 67 IN

## 2021-03-24 DIAGNOSIS — F41.9 ANXIETY: ICD-10-CM

## 2021-03-24 DIAGNOSIS — E66.09 CLASS 1 OBESITY DUE TO EXCESS CALORIES WITHOUT SERIOUS COMORBIDITY WITH BODY MASS INDEX (BMI) OF 31.0 TO 31.9 IN ADULT: ICD-10-CM

## 2021-03-24 DIAGNOSIS — R60.0 EDEMA OF BOTH LEGS: ICD-10-CM

## 2021-03-24 DIAGNOSIS — I10 ESSENTIAL HYPERTENSION: ICD-10-CM

## 2021-03-24 DIAGNOSIS — E78.5 DYSLIPIDEMIA: ICD-10-CM

## 2021-03-24 DIAGNOSIS — C34.91 ADENOCARCINOMA OF RIGHT LUNG (HCC): ICD-10-CM

## 2021-03-24 DIAGNOSIS — F17.200 TOBACCO USE DISORDER: ICD-10-CM

## 2021-03-24 PROCEDURE — 93000 ELECTROCARDIOGRAM COMPLETE: CPT | Performed by: INTERNAL MEDICINE

## 2021-03-24 PROCEDURE — 99214 OFFICE O/P EST MOD 30 MIN: CPT | Performed by: INTERNAL MEDICINE

## 2021-03-24 RX ORDER — HYDROCHLOROTHIAZIDE 12.5 MG/1
12.5 TABLET ORAL DAILY
Qty: 30 TABLET | Refills: 4 | Status: SHIPPED | OUTPATIENT
Start: 2021-03-24 | End: 2021-07-16

## 2021-03-24 RX ORDER — HYDROCHLOROTHIAZIDE 12.5 MG/1
12.5 TABLET ORAL DAILY
Qty: 30 TABLET | Refills: 4 | Status: SHIPPED | OUTPATIENT
Start: 2021-03-24 | End: 2021-03-24 | Stop reason: SDUPTHER

## 2021-03-24 NOTE — PATIENT INSTRUCTIONS
Discontinue Dyazide which you take 4 times a week     amlodipine 5 mg daily     losartan 50 mg     metoprolol  mg daily    We are starting year hydrochlorothiazide 12 5 mg daily you take every day in the morning     repeat blood test in 2 weeks

## 2021-03-24 NOTE — PROGRESS NOTES
Progress Note- Cardiology  Office  [facility]     Addison Madison 67 y o  female MRN: [id number]  : 1945  Encounter: [id number]    Assessment:  1  Essential hypertension    2  Class 1 obesity due to excess calories without serious comorbidity with body mass index (BMI) of 31 0 to 31 9 in adult    3  Dyslipidemia    4  Edema of both legs    5  Adenocarcinoma of right lung ([address] )    6  Anxiety    7  Tobacco use disorder        Discussion Summary & Plan:   1  Essential hypertension  Patient blood pressure was elevated  Needed better control  She was started on increased dose of amlodipine  Currently she is also on losartan 50 mg daily, metoprolol  mg daily and she was on Dyazide however her creatinine went up  Will discontinue Dyazide and started on hydrochlorothiazide 12 5 mg daily and repeat BMP in 2 weeks  Labs from 2021 reviewed      2  Dyslipidemia  Continue statins  She is on Lipitor 10 mg cholesterol profile acceptable    3  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  Follow up with CT surgery no reoccurrence so far  Clinically she is doing well she has some bilateral rhonchi she had history of emphysema she is aware of it now  No new complaint    4  Left leg swelling  She had history of lower extremity swelling  Much better now  Will need diuretics  Okay to accept creatinine around 1 2-1 3 as long as it is stable  5   History of anxiety  Currently doing very well  6   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  Much improved she is scheduled to have nerve conduction study and follows up with Neurology  She is stable now    Advised patient to be compliant with blood pressure medications  Labs reviewed with her  Currently stable cardiovascular status  Results of stress test discussed with her  Continue other Rx as before    Counseling :  A description of the counseling: Goals and Barriers:  Patient's ability to self care: Yes  Medication side effect reviewed with patient in detail and all their questions answered to their satisfaction  Physician Requesting Consult: Deedee Torres MD    HPI:     Booker Blackwell is a 67y o  year old female  Who was initially seen by me in 2015 has past medical history significant for lung cancer status post partial lobectomy right lower lobe  She had a stage IA cancer and she follows up with [facility]  She also had past medical history significant for hypertension, previous tobacco abuse now quit smoking, emphysema, dyslipidemia, severe DJD with previous left hip surgery now need surgery on her right hip  Since her last visit in 2015 she has gained some weight  She also noted that she occasionally gets bilateral lower extremity swelling left more than right  She was on Dyazide which was recently had as her BUN creatinine might be high and that led to increase in her blood pressure  She also increased swelling she is back on Dyazide now every other day  She also takes losartan 50 mg, metoprolol XL 75 mg daily and atorvastatin  Her previous cardiac workup was in 2015 April when she had a normal stress test and study was non gated  Echo shows at that time she has a normal LV systolic function EF was 87% with grade 1 diastolic dysfunction  She cannot walk much she walks with the help of cane  No nausea no vomiting no PND no chest pain  She denies any history of stenting in between no history of MI no history of heart attack no heart failure  03/24/2021  Above reviewed  Patient came for follow-up  She is under stress because her mother has passed away and she has to take care of her stuff  She is doing well from cardiac point of view  However her blood pressure was found high and primary care doctor increase her amlodipine to 5 mg daily    She also was taking his losartan 50 mg daily, metoprolol XL 100 mg daily and Dyazide 4 times a week  Her creatinine has increased to 1 35  She had history of lung cancer status post surgery  She has quit smoking today heart rate 56 beats per minute and EKG shows no other significant abnormality  Her labs from March 15, 2021 reviewed cholesterol profile other labs were acceptable  Last few months she was struggling with her hip pain as well as back pain and she was taking multiple Aleve      Review of Systems   Constitutional: Negative for activity change, chills, diaphoresis, fever and unexpected weight change  HENT: Negative for congestion  Eyes: Negative for discharge and redness  Respiratory: Negative for cough, chest tightness, shortness of breath and wheezing  Cardiovascular: Negative  Negative for chest pain, palpitations and leg swelling  Gastrointestinal: Negative for abdominal pain, diarrhea and nausea  Endocrine: Negative  Genitourinary: Negative for decreased urine volume and urgency  Musculoskeletal: Positive for arthralgias and back pain  Negative for gait problem  Skin: Negative for rash and wound  Allergic/Immunologic: Negative  Neurological: Negative for dizziness, seizures, syncope, weakness, light-headedness and headaches  Hematological: Negative  Psychiatric/Behavioral: Negative for agitation and confusion  The patient is nervous/anxious          Historical Information   Past Medical History:   Diagnosis Date    Emphysema lung ([address] )     Hyperlipidemia     Hypertension     Numbness and tingling of foot     Osteoarthritis of right hip     Primary adenocarcinoma of lower lobe of right lung [facility])      Past Surgical History:   Procedure Laterality Date    CATARACT EXTRACTION      CHOLECYSTECTOMY      COLONOSCOPY      JOINT REPLACEMENT      RI TOTAL HIP ARTHROPLASTY Right 9/14/2018    Procedure: ANTERIOR TOTAL HIP ARTHROPLASTY;  Surgeon: Tamra Lisa MD;  Location: [address] [place] [address];  Service: Orthopedics    TOTAL HIP ARTHROPLASTY      TUMOR REMOVAL Right 2015    lower lobe        Social History:  Social History     Substance and Sexual Activity   Alcohol Use Yes    Frequency: Monthly or less    Drinks per session: 1 or 2    Binge frequency: Never    Comment: ocassional      Social History     Substance and Sexual Activity   Drug Use No     Social History     Tobacco Use   Smoking Status Former Smoker    Packs/day: 1 00    Years: 50 00    Pack years: 50 00    Types: Cigarettes    Quit date: 2015    Years since quittin 5   Smokeless Tobacco Never Used          Family History   Problem Relation Age of Onset    Heart disease Mother     No Known Problems Father        Meds/Allergies     No Known Allergies    Current medications:    Current Outpatient Medications:     albuterol (PROVENTIL HFA,VENTOLIN HFA) 90 mcg/act inhaler, Inhale 2 puffs every 6 (six) hours as needed for wheezing or shortness of breath, Disp: 1 Inhaler, Rfl: 5    amLODIPine (NORVASC) 5 mg tablet, Take 1 tablet (5 mg total) by mouth daily, Disp: 90 tablet, Rfl: 1    aspirin (ECOTRIN LOW STRENGTH) 81 mg EC tablet, Take 1 tablet (81 mg total) by mouth 2 (two) times a day (Patient taking differently: Take 81 mg by mouth daily ), Disp: 60 tablet, Rfl: 0    atorvastatin (LIPITOR) 10 mg tablet, Take 1 tablet (10 mg total) by mouth daily at bedtime, Disp: 90 tablet, Rfl: 1    doxycycline (PERIOSTAT) 20 MG tablet, 20 mg 2 (two) times a day  , Disp: , Rfl: 3    gabapentin (NEURONTIN) 300 mg capsule, Take 300 mg by mouth daily , Disp: , Rfl:     losartan (COZAAR) 50 mg tablet, Take 1 tablet (50 mg total) by mouth daily, Disp: 90 tablet, Rfl: 1    Melatonin 5 MG TABS, Take 10 mg by mouth daily at bedtime , Disp: , Rfl:     metoprolol succinate (TOPROL-XL) 100 mg 24 hr tablet, Take 1 tablet (100 mg total) by mouth daily, Disp: 90 tablet, Rfl: 1    Polyethylene Glycol 400 (BLINK TEARS) 0 25 % SOLN, Apply 1 drop to eye 2 (two) times a day , Disp: , Rfl:     triamterene-hydrochlorothiazide (DYAZIDE) 37 5-25 mg per capsule, TAKE 1 CAPSULE EVERY TUESDAY, THURSDAY, SATURDAY AND SUNDAY, Disp: 52 capsule, Rfl: 3      Objective:     Vitals: Blood pressure 140/70, pulse 56, temperature 98 °F (36 7 °C), temperature source Temporal, height 5' 7" (1 702 m), weight 88 5 kg (195 lb), SpO2 98 %  Body mass index is 30 54 kg/m²  Vitals:    03/24/21 1404   Weight: 88 5 kg (195 lb)     BP Readings from Last 3 Encounters:   03/24/21 140/70   03/23/21 156/84   11/23/20 142/78         Physical Exam:   Physical Exam   Constitutional: She is oriented to person, place, and time  She appears well-developed and well-nourished  No distress  HENT:   Head: Normocephalic and atraumatic  Eyes: Pupils are equal, round, and reactive to light  Neck: Neck supple  No JVD present  No tracheal deviation present  No thyromegaly present  Cardiovascular: Normal rate, regular rhythm, S1 normal, S2 normal and intact distal pulses  Exam reveals no gallop, no S3, no S4, no distant heart sounds and no friction rub  Murmur heard  Systolic (ejection) murmur is present with a grade of 2/6  Pulmonary/Chest: Effort normal and breath sounds normal  No respiratory distress  She has no wheezes  She has no rales  She exhibits no tenderness  Abdominal: Soft  Bowel sounds are normal  She exhibits no distension  There is no abdominal tenderness  Musculoskeletal:         General: No deformity or edema  Neurological: She is alert and oriented to person, place, and time  Skin: Skin is warm and dry  No rash noted  She is not diaphoretic  No pallor  Psychiatric: She has a normal mood and affect   Her behavior is normal  Judgment normal            Labs:     Lab Results   Component Value Date    WBC 9 06 03/15/2021    HGB 14 1 03/15/2021    HCT 45 3 03/15/2021    MCV 92 03/15/2021    RDW 13 8 03/15/2021     03/15/2021     BMP:  Lab Results   Component Value Date     09/20/2015    K 4 3 03/15/2021     03/15/2021    CO2 28 03/15/2021    ANIONGAP 6 09/20/2015    BUN 24 03/15/2021    CREATININE 1 35 (H) 03/15/2021    GLUCOSE 109 09/20/2015    GLUF 104 (H) 03/15/2021    CALCIUM 9 2 03/15/2021    EGFR 38 03/15/2021    MG 2 2 09/20/2015     LFT:  Lab Results   Component Value Date    AST 22 03/15/2021    ALT 29 03/15/2021    ALKPHOS 76 03/15/2021     Lipid Profile:   Lab Results   Component Value Date    HDL 53 03/15/2021    LDLCALC 86 03/15/2021    TRIG 125 03/15/2021         Imaging & Testing     Cardiac testing:       Nuclear stress test 09/07/2018 was normal with EF around 74%  It was Lexiscan stress test     Diagnostic Studies Review Cardio:   Echo Doppler echo Doppler done in April of 2015 shows EF 70%, hyperdynamic LV, grade 1 diastolic dysfunction, trace MR Trace TR  Nuclear stress test done on 04/03/2015 was nonischemic it was non gated study  EKG:    Twelve lead EKG done in our office on 09/06/2018 shows normal sinus rhythm heart rate 72 beats per minute  No significant ST changes  Twelve lead EKG 09/19/2019 shows normal sinus rhythm heart rate 62 beats per minute no significant ST changes  No change from old EKG  Twelve lead EKG 03/07/2020 shows normal sinus rhythm heart rate 72 beats per minute  QS in V2 V3 most likely due to lead location no change from old EKG    Twelve lead EKG 09/24/2020 shows normal sinus rhythm heart rate 74 beats per minute  QS in V1 to V3 not change from old EKG  Dr Beronica Bardales MD [facility] [place]      "This note has been constructed using a voice recognition system  Therefore there may be syntax, spelling, and/or grammatical errors  Please call if you have any questions

## 2021-03-25 ENCOUNTER — TELEPHONE (OUTPATIENT)
Dept: ADMINISTRATIVE | Facility: OTHER | Age: 76
End: 2021-03-25

## 2021-03-25 NOTE — TELEPHONE ENCOUNTER
----- Message from Lisa Sharma MD sent at 3/23/2021  2:28 PM EDT -----  Regarding: colonoscopy  03/23/21 2:28 PM    Hello, our patient Em Brown has had CRC: Colonoscopy completed/performed  Please assist in updating the patient chart by making an External outreach to [facility] located in [facility]  The date of service is 2014 or 2015      Thank you,  Lisa Sharma MD  [facility] INTERNAL [facility]

## 2021-03-25 NOTE — LETTER
Procedure Request Form: Colonoscopy      Date Requested: 21  Patient: Isaac Hickey  Patient : 1945   Referring Provider: Garret Torres, MD        Date of Procedure ______________________________       The above patient has informed us that they have completed their   most recent Colonoscopy at your facility  Please complete   this form and attach all corresponding procedure reports/results  Comments __________________________________________________________  ____________________________________________________________________  ____________________________________________________________________  ____________________________________________________________________    Facility Completing Procedure _________________________________________    Form Completed By (print name) _______________________________________      Signature __________________________________________________________      These reports are needed for  compliance    Please fax this completed form and a copy of the procedure report to our office located at [place] [address] as soon as possible to [phone] eder Moore: Phone [phone]    We thank you for your assistance in treating our mutual patient

## 2021-03-25 NOTE — TELEPHONE ENCOUNTER
Upon review of the In Basket request and the patient's chart, initial outreach has been made via fax, please see Contacts section for details        [phone]    Thank you  Merline Santos MA

## 2021-04-02 NOTE — TELEPHONE ENCOUNTER
Upon review of the In Basket request we [facility] sent the last one that patient had at their facility, which is 2010    Any additional questions or concerns should be emailed to the [employer] [facility] via [email or web address] email, please do not reply via In Basket      Thank you  Boy Marcano MA

## 2021-04-14 ENCOUNTER — APPOINTMENT (OUTPATIENT)
Dept: LAB | Facility: CLINIC | Age: 76
End: 2021-04-14
Payer: MEDICARE

## 2021-04-14 DIAGNOSIS — R60.0 EDEMA OF BOTH LEGS: ICD-10-CM

## 2021-04-14 DIAGNOSIS — E78.5 DYSLIPIDEMIA: ICD-10-CM

## 2021-04-14 DIAGNOSIS — I10 ESSENTIAL HYPERTENSION: ICD-10-CM

## 2021-04-14 DIAGNOSIS — F41.9 ANXIETY: ICD-10-CM

## 2021-04-14 DIAGNOSIS — E66.09 CLASS 1 OBESITY DUE TO EXCESS CALORIES WITHOUT SERIOUS COMORBIDITY WITH BODY MASS INDEX (BMI) OF 31.0 TO 31.9 IN ADULT: ICD-10-CM

## 2021-04-14 DIAGNOSIS — C34.91 ADENOCARCINOMA OF RIGHT LUNG (HCC): ICD-10-CM

## 2021-04-14 LAB
ANION GAP SERPL CALCULATED.3IONS-SCNC: 5 MMOL/L (ref 4–13)
BUN SERPL-MCNC: 26 MG/DL (ref 5–25)
CALCIUM SERPL-MCNC: 9.2 MG/DL (ref 8.3–10.1)
CHLORIDE SERPL-SCNC: 107 MMOL/L (ref 100–108)
CO2 SERPL-SCNC: 27 MMOL/L (ref 21–32)
CREAT SERPL-MCNC: 1.4 MG/DL (ref 0.6–1.3)
GFR SERPL CREATININE-BSD FRML MDRD: 37 ML/MIN/1.73SQ M
GLUCOSE P FAST SERPL-MCNC: 104 MG/DL (ref 65–99)
POTASSIUM SERPL-SCNC: 4.1 MMOL/L (ref 3.5–5.3)
SODIUM SERPL-SCNC: 139 MMOL/L (ref 136–145)

## 2021-04-14 PROCEDURE — 36415 COLL VENOUS BLD VENIPUNCTURE: CPT

## 2021-04-14 PROCEDURE — 80048 BASIC METABOLIC PNL TOTAL CA: CPT

## 2021-04-15 ENCOUNTER — TELEPHONE (OUTPATIENT)
Dept: CARDIOLOGY CLINIC | Facility: CLINIC | Age: 76
End: 2021-04-15

## 2021-04-15 DIAGNOSIS — I10 ESSENTIAL HYPERTENSION: Primary | ICD-10-CM

## 2021-04-15 DIAGNOSIS — E78.5 DYSLIPIDEMIA: ICD-10-CM

## 2021-04-15 NOTE — TELEPHONE ENCOUNTER
----- Message from Reinier Davis MD sent at 4/15/2021  8:27 AM EDT -----  Patient's creatinine remains around 1 4  She is encouraged to drink more water  Even changing medication did not improved creatinine much  She may need to follow up with Nephrology  Encouraged her to drink more water    Maybe we will repeat BMP in 1 month

## 2021-05-18 ENCOUNTER — APPOINTMENT (OUTPATIENT)
Dept: LAB | Facility: CLINIC | Age: 76
End: 2021-05-18
Payer: MEDICARE

## 2021-05-18 LAB
ANION GAP SERPL CALCULATED.3IONS-SCNC: 5 MMOL/L (ref 4–13)
BUN SERPL-MCNC: 27 MG/DL (ref 5–25)
CALCIUM SERPL-MCNC: 9.4 MG/DL (ref 8.3–10.1)
CHLORIDE SERPL-SCNC: 104 MMOL/L (ref 100–108)
CO2 SERPL-SCNC: 28 MMOL/L (ref 21–32)
CREAT SERPL-MCNC: 1.24 MG/DL (ref 0.6–1.3)
GFR SERPL CREATININE-BSD FRML MDRD: 42 ML/MIN/1.73SQ M
GLUCOSE P FAST SERPL-MCNC: 108 MG/DL (ref 65–99)
POTASSIUM SERPL-SCNC: 4.2 MMOL/L (ref 3.5–5.3)
SODIUM SERPL-SCNC: 137 MMOL/L (ref 136–145)

## 2021-05-18 PROCEDURE — 80048 BASIC METABOLIC PNL TOTAL CA: CPT

## 2021-05-18 PROCEDURE — 36415 COLL VENOUS BLD VENIPUNCTURE: CPT

## 2021-05-19 ENCOUNTER — TELEPHONE (OUTPATIENT)
Dept: CARDIOLOGY CLINIC | Facility: CLINIC | Age: 76
End: 2021-05-19

## 2021-05-19 NOTE — TELEPHONE ENCOUNTER
She should keep her legs elevated  Most people have some indenting in the evening   If she is doing fine we can just continue to monitor

## 2021-05-19 NOTE — TELEPHONE ENCOUNTER
----- Message from Mine Damon MD sent at 5/18/2021  4:58 PM EDT -----  Patient blood test reviewed  They are acceptable  Will continue same medication  Patient should keep his appointment

## 2021-05-19 NOTE — TELEPHONE ENCOUNTER
Spoke with patient, message given per Dr Valentina Holbrook  Patient verbalized understanding  Patient reports when taking off her socks she had swelling in bilateral legs  Patient denies SOB, chest pain, weight gain or pain  Patient is elevating legs throughout the day and is drinking fluids       Please advise

## 2021-06-16 ENCOUNTER — APPOINTMENT (OUTPATIENT)
Dept: LAB | Facility: CLINIC | Age: 76
End: 2021-06-16
Payer: MEDICARE

## 2021-06-16 DIAGNOSIS — I10 ESSENTIAL HYPERTENSION: ICD-10-CM

## 2021-06-16 DIAGNOSIS — E78.5 DYSLIPIDEMIA: ICD-10-CM

## 2021-06-16 LAB
ALBUMIN SERPL BCP-MCNC: 3.8 G/DL (ref 3.5–5)
ALP SERPL-CCNC: 72 U/L (ref 46–116)
ALT SERPL W P-5'-P-CCNC: 30 U/L (ref 12–78)
ANION GAP SERPL CALCULATED.3IONS-SCNC: 6 MMOL/L (ref 4–13)
AST SERPL W P-5'-P-CCNC: 22 U/L (ref 5–45)
BILIRUB SERPL-MCNC: 0.6 MG/DL (ref 0.2–1)
BUN SERPL-MCNC: 23 MG/DL (ref 5–25)
CALCIUM SERPL-MCNC: 9.2 MG/DL (ref 8.3–10.1)
CHLORIDE SERPL-SCNC: 107 MMOL/L (ref 100–108)
CHOLEST SERPL-MCNC: 147 MG/DL (ref 50–200)
CO2 SERPL-SCNC: 27 MMOL/L (ref 21–32)
CREAT SERPL-MCNC: 1.07 MG/DL (ref 0.6–1.3)
GFR SERPL CREATININE-BSD FRML MDRD: 51 ML/MIN/1.73SQ M
GLUCOSE P FAST SERPL-MCNC: 99 MG/DL (ref 65–99)
HDLC SERPL-MCNC: 60 MG/DL
LDLC SERPL CALC-MCNC: 63 MG/DL (ref 0–100)
NONHDLC SERPL-MCNC: 87 MG/DL
POTASSIUM SERPL-SCNC: 4.2 MMOL/L (ref 3.5–5.3)
PROT SERPL-MCNC: 7.8 G/DL (ref 6.4–8.2)
SODIUM SERPL-SCNC: 140 MMOL/L (ref 136–145)
TRIGL SERPL-MCNC: 119 MG/DL

## 2021-06-16 PROCEDURE — 80053 COMPREHEN METABOLIC PANEL: CPT

## 2021-06-16 PROCEDURE — 36415 COLL VENOUS BLD VENIPUNCTURE: CPT

## 2021-06-16 PROCEDURE — 80061 LIPID PANEL: CPT

## 2021-06-22 ENCOUNTER — OFFICE VISIT (OUTPATIENT)
Dept: INTERNAL MEDICINE CLINIC | Facility: CLINIC | Age: 76
End: 2021-06-22
Payer: MEDICARE

## 2021-06-22 VITALS
OXYGEN SATURATION: 97 % | BODY MASS INDEX: 30.61 KG/M2 | HEIGHT: 67 IN | WEIGHT: 195 LBS | HEART RATE: 68 BPM | SYSTOLIC BLOOD PRESSURE: 132 MMHG | DIASTOLIC BLOOD PRESSURE: 79 MMHG

## 2021-06-22 DIAGNOSIS — I83.93 VARICOSE VEINS OF BOTH LOWER EXTREMITIES, UNSPECIFIED WHETHER COMPLICATED: ICD-10-CM

## 2021-06-22 DIAGNOSIS — E78.2 MIXED HYPERLIPIDEMIA: ICD-10-CM

## 2021-06-22 DIAGNOSIS — J43.9 PULMONARY EMPHYSEMA, UNSPECIFIED EMPHYSEMA TYPE (HCC): ICD-10-CM

## 2021-06-22 DIAGNOSIS — I82.4Z2 ACUTE DEEP VEIN THROMBOSIS (DVT) OF DISTAL VEIN OF LEFT LOWER EXTREMITY (HCC): ICD-10-CM

## 2021-06-22 DIAGNOSIS — C34.91 ADENOCARCINOMA OF RIGHT LUNG (HCC): ICD-10-CM

## 2021-06-22 DIAGNOSIS — E04.1 THYROID NODULE: ICD-10-CM

## 2021-06-22 DIAGNOSIS — Z00.00 MEDICARE ANNUAL WELLNESS VISIT, SUBSEQUENT: ICD-10-CM

## 2021-06-22 DIAGNOSIS — M79.89 LEFT LEG SWELLING: Primary | ICD-10-CM

## 2021-06-22 DIAGNOSIS — I10 ESSENTIAL HYPERTENSION: ICD-10-CM

## 2021-06-22 DIAGNOSIS — E78.5 DYSLIPIDEMIA: ICD-10-CM

## 2021-06-22 DIAGNOSIS — G62.9 NEUROPATHY: ICD-10-CM

## 2021-06-22 DIAGNOSIS — N18.32 STAGE 3B CHRONIC KIDNEY DISEASE (HCC): ICD-10-CM

## 2021-06-22 DIAGNOSIS — B35.1 ONYCHOMYCOSIS: ICD-10-CM

## 2021-06-22 PROBLEM — R60.1 GENERALIZED EDEMA: Status: ACTIVE | Noted: 2021-06-22

## 2021-06-22 PROCEDURE — 99214 OFFICE O/P EST MOD 30 MIN: CPT | Performed by: INTERNAL MEDICINE

## 2021-06-22 PROCEDURE — 1123F ACP DISCUSS/DSCN MKR DOCD: CPT | Performed by: INTERNAL MEDICINE

## 2021-06-22 PROCEDURE — G0439 PPPS, SUBSEQ VISIT: HCPCS | Performed by: INTERNAL MEDICINE

## 2021-06-22 NOTE — ASSESSMENT & PLAN NOTE
1/2 to 2 months swelling of the left lower extremity mostly distal worse in the afternoon better in the morning  Patient does have a history of cancer of the lung  Also she does have a mild varicosity  No previous history of deep vein thrombosis    Will do venous Doppler of both lower extremity

## 2021-06-22 NOTE — ASSESSMENT & PLAN NOTE
01/03/2020:  COMPARISON:  Thyroid ultrasound 1/15/2016 and 2/24/2014       No nodule meets current ACR criteria for requiring biopsy and since there has been 5 years of stability, no more followup is indicated

## 2021-06-22 NOTE — ASSESSMENT & PLAN NOTE
Unilateral swelling of the left lower extremity  History of cancer of the lung    Will do venous Doppler of lower extremity

## 2021-06-22 NOTE — PROGRESS NOTES
[facility]ah[facility] Tan Office Visit Note  21     Anya Almeida 67 y o  female MRN: [id number]  : 1945    Assessment:     Thyroid nodule  2020:  COMPARISON:  Thyroid ultrasound 1/15/2016 and 2014       No nodule meets current ACR criteria for requiring biopsy and since there has been 5 years of stability, no more followup is indicated    Generalized edema  1/2 to 2 months swelling of the left lower extremity mostly distal worse in the afternoon better in the morning  Patient does have a history of cancer of the lung  Also she does have a mild varicosity  No previous history of deep vein thrombosis  Will do venous Doppler of both lower extremity    Onychomycosis  Will refer to the podiatrist    Acute deep vein thrombosis (DVT) of distal vein of left lower extremity (HCC)  Unilateral swelling of the left lower extremity  History of cancer of the lung  Will do venous Doppler of lower extremity       Diagnoses and all orders for this visit:    Left leg swelling  -     VAS lower limb venous duplex study, complete bilateral; Future  -     Ambulatory referral to Podiatry; Future    Medicare annual wellness visit, subsequent    Thyroid nodule    Onychomycosis  -     Cancel: Ambulatory referral to Podiatry; Future  -     Ambulatory referral to Podiatry; Future    Essential hypertension    Varicose veins of both lower extremities, unspecified whether complicated    Stage 3b chronic kidney disease ([facility] [address] )    Dyslipidemia    Mixed hyperlipidemia    Adenocarcinoma of right lung (HCC)    Pulmonary emphysema, unspecified emphysema type ([address] )    Neuropathy  -     Ambulatory referral to Neurology; Future  -     Ambulatory referral to Podiatry; Future    Acute deep vein thrombosis (DVT) of distal vein of left lower extremity ([facility] [address] )          Discussion Summary and Plan: Today's care plan and medications were reviewed with patient in detail and all their questions answered to their satisfaction    New onset edema of the left lower extremity for last 4-6 weeks the unclear history somewhat  Mom also started same time she had a injury to the left foot though there is not obvious evidence of any injury or tenderness at this point  Will do venous Doppler particularly given history of COPD on varicose veins  Generalized anxiety fair  Hypertension well controlled  Renal functions improved  COPD fair  Paragraphs CA of the lung stable  Home a CKD level 3 GFR around 50 better from 30/5  Not on Dyazide 3 times a week but on her only hydrochlorothiazide along with other antihypertensive daily  Hyperlipidemia for diet  Hypertension fair control  Also patient is on statin for hyper lipidemia  Patient is a week healing of the toes work complain high did see a small toes wiggling  Patient was seen by neurologist who was managing he did not have any specific answer he refer to other neurologist which patient never went  Home    She also has a neuropathy  Also right femoral neuropathy in the thigh is better  Thyroid nodule needed ultrasound patient did not go again encouraged  Onychomycosis is present on the toenail  Medicare wellness done  Paragraphs a though no clinical acute DVT but cannot rule out  Also rec encouraged for the mammogram     Follow back in 2 weeks  All question answered multiple joint DJD fair at this time low back pain reasonable  CA of the lung supposed to be getting CT scan of the chest for surveillance in the near future  Chief Complaint   Patient presents with    Medicare Wellness Visit    Hypertension    Hyperlipidemia    CKD III    COPD    Cancer    Follow-up      Subjective:      Birdena Epley is here for chronic disease management also patient has a new complain  Complaint of the swelling of the left lower extremity for 6 weeks  Patient was seen by cardiologist done Dyazide was discontinued switch over to hydrochlorothiazide that was in March    Patient also does have a chronic varicose vein of both lower extremity  Patient swelling is worse in the afternoon gets better in the morning  Patient has not been using stockings  No previous history of deep vein thrombosis the patient does have history of cancer of the lung  Patient also gets unrelated dependent erythema of the distal feet  Patient dropped a can on her left foot 6 weeks ago but does not have any  open area ecchymosis redness cellulitis or pain in the left foot  She does have a flat feet bilateral   She does have onychomycosis of the both feet  Hypertension:  Symptom-free much, much better control, remains on metoprolol  mg daily, hydrochlorothiazide 12 5 mg daily, losartan 50 mg daily and amlodipine daily    Elbow Pain remains sx free    CKD level 3:  GFR was down to 38 now with discontinuation of Dyazide home and back only on hydrochlorothiazide GFR improved 51  Electrolytes normal   He did renal functions noted  We also talked about using Lasix however likely it may make renal function worse and may not help unilateral swelling  The cardiologist had recommended elevation and stockings patient has not want stockings again encouraged  CA of the lung:  No cough chest congestion  Being followed by cardiothoracic surgeon  See seek scan of the chest is also order  Report awaited  The patient has not gone  COPD fair good functional capacity  rec Albuterol HFA prn    Blood sugar fair    No significant low back pain or radicular radiculopathy at this time  Hyperlipidemia excellent lipid profile normal LFT will continue atorvastatin daily  The femoral neuropathy on the right thigh saw you improving  Patient has involuntary weekly feeling of the toes of the both feet usually 2nd 3rd 4th and 5th the  Not provoked by anything at rest a do daytime and nighttime    Patient claims that the her previous neurologist did not have any good explanation the he wanted her to go to different neurologist which patient never went  It is not interfering with her life lifestyle  We also talked about getting opinion with the new neurologist as previous neurologist left this area we give the name hopefully she will comply  Generalized anxiety and depression fair  PHQ-9 Follow-up    Little interest or pleasure in doing things: 0 - not at all  Feeling down, depressed, or hopeless: 0 - not at all  Trouble falling or staying asleep, or sleeping too much: 0 - not at all  Feeling tired or having little energy: 0 - not at all  Poor appetite or overeatin - not at all  Feeling bad about yourself - or that you are a failure or have let   yourself or your family down: 0 - not at all  Trouble concentrating on things, such as reading the newspaper or watching   television: 0 - not at all  Moving or speaking so slowly that other people could have noticed  Or the   opposite - being so fidgety or restless that you have been moving around a   lot more than usual: 0 - not at all  Thoughts that you would be better off dead, or of hurting yourself in some   way: 0 - not at all  PHQ-2 Score: 0  PHQ-9 Score: 0       PERRY-7 Flowsheet Screening      Most Recent Value   Over the last two weeks, how often have you been bothered by the following   problems? Feeling nervous, anxious, or on edge  0   Not being able to stop or control worrying  0   Worrying too much about different things  1   Trouble relaxing   0   Being so restless that it's hard to sit still  0   Becoming easily annoyed or irritable   0   Feeling afraid as if something awful might happen  0   How difficult have these problems made it for you to do your work, take   care of things at home, or get along with other people?    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 as well as her 's paperwork  History of tubular adenoma advised to get colonoscopy done  Insomnia fair  S/p Rigth THR    Femoral neuropathy fair    Pt does not want to see  Urologist and does not want to go for ultrasound of kidney and bladder  Previous important study  MRI:  Cystic or cyst-like lesion along the anterior and mid pole of the left kidney with this region not image on the current study  2  New appearing metabolic blooming artifact extending from the region of the right hip compatible with interval right hip replacement    3  Left T11 superior facet/pedicle marrow edema resolved    4  No significant interval change since the previous MRI of the 08/18/2016 11 the technical differences    5  Multilevel degenerative spondylitic changes of the lumbar spine without central canal stenosis is refer to the full body of report    6  Atherosclerotic changes of the abdominal aorta    7  Transitional anatomy at the lumbosacral study    Subsequently has been under care of also urologist     The also had a EMG and nerve conduction study done  11/30/2018 which revealed right femoral neuropathy, there is evidence of the better peroneal motor nerve neuropathy with axonal feature on which is felt to be primarily due to technical factors including atrophy of the extensor digitorum brevis muscle and hypertrophic skin changes     She is taking gabapentin without side effect  We also talked about cyst on the kidney we never scheduled ultrasound of the kidney  She refuses to go    Encouraged to go for us      06/16/2021,:  Cholesterol 147 LDL 63 HDL 60 potassium 4 2 creatinine 1 07 ALT 30 GFR 51 blood sugar 99  03/15/2021:  Creatinine 1 35, blood sugar 104, rest  CMP normal, GFR 38, CBC normal, HDL 53, LDL 86, vitamin-D 16 8  TSH 3RD GENERATON         Value: 1 410(uIU/mL)      Dt: 11/19/2020  Vit D, 25-Hydroxy         Value: 10  6(ng/mL)*       Dt: 07/22/2020  Rheumatoid Factor         Value: Negative           Dt: 10/18/2019    Mammogram:  02/26/2020 negative    CT scan of the chest on 10/12/2020 no reoccurrence  The following portions of the patient's history were reviewed and updated as appropriate: allergies, current medications, past family history, past medical history, past social history, past surgical history and problem list     Review of Systems   Psychiatric/Behavioral: Negative for agitation, behavioral problems, confusion, decreased concentration, dysphoric mood, hallucinations, self-injury and sleep disturbance  The patient is nervous/anxious  The patient is not hyperactive  All other systems reviewed and are negative          Historical Information   Patient Active Problem List   Diagnosis    Adenocarcinoma of right lung ([address] )    Essential hypertension    Dyslipidemia    Primary osteoarthritis involving multiple joints    Left leg swelling    Femoral neuropathy, right    Emphysema lung (HCC)    Osteoarthritis of right hip    Hyperlipidemia    Numbness and tingling of foot    Anxiety    Colon polyp    Thyroid nodule    Mixed anxiety and depressive disorder    Stage 3b chronic kidney disease ([address] )    Diverticulosis    Skin mole    Restless leg syndrome    BPPV (benign paroxysmal positional vertigo)    Class 1 obesity due to excess calories without serious comorbidity with body mass index (BMI) of 30 0 to 30 9 in adult    Lesion of external ear    Tobacco use disorder    Neuropathy    Chorea    Varicose veins of legs    Vitamin B12 deficiency    Edema of both legs    Vitamin D deficiency    Kidney cyst, acquired    Carpal tunnel syndrome    Simple chronic bronchitis (HCC)    Breast cancer screening by mammogram    Generalized edema    Onychomycosis    Acute deep vein thrombosis (DVT) of distal vein of left lower extremity (HCC)     Past Medical History:   Diagnosis Date    Emphysema lung (HCC)     Hyperlipidemia     Hypertension     Numbness and tingling of foot     Osteoarthritis of right hip     Primary adenocarcinoma of lower lobe of right lung (HCC)      Past Surgical History:   Procedure Laterality Date    CATARACT EXTRACTION      CHOLECYSTECTOMY      COLONOSCOPY      JOINT REPLACEMENT      ME TOTAL HIP ARTHROPLASTY Right 2018    Procedure: ANTERIOR TOTAL HIP ARTHROPLASTY;  Surgeon: Alcides Thomason MD;  Location: [facility];  Service: Orthopedics    TOTAL HIP ARTHROPLASTY      TUMOR REMOVAL Right 2015    lower lobe      Social History     Substance and Sexual Activity   Alcohol Use Yes    Comment: ocassional      Social History     Substance and Sexual Activity   Drug Use No     Social History     Tobacco Use   Smoking Status Former Smoker    Packs/day: 1 00    Years: 50 00    Pack years: 50 00    Types: Cigarettes    Quit date: 2015    Years since quittin 7   Smokeless Tobacco Never Used     Family History   Problem Relation Age of Onset    Heart disease Mother     No Known Problems Father      Health Maintenance Due   Topic    Pneumococcal Vaccine: 65+ Years (1 of 2 - PPSV23)    COVID-19 Vaccine (1)    DTaP,Tdap,and Td Vaccines (1 - Tdap)    Medicare Annual Wellness Visit (AWV)       Meds/Allergies       Current Outpatient Medications:     albuterol (PROVENTIL HFA,VENTOLIN HFA) 90 mcg/act inhaler, Inhale 2 puffs every 6 (six) hours as needed for wheezing or shortness of breath, Disp: 1 Inhaler, Rfl: 5    amLODIPine (NORVASC) 5 mg tablet, Take 1 tablet (5 mg total) by mouth daily, Disp: 90 tablet, Rfl: 1    aspirin (ECOTRIN LOW STRENGTH) 81 mg EC tablet, Take 1 tablet (81 mg total) by mouth 2 (two) times a day (Patient taking differently: Take 81 mg by mouth daily ), Disp: 60 tablet, Rfl: 0    atorvastatin (LIPITOR) 10 mg tablet, TAKE 1 TABLET DAILY AT BEDTIME, Disp: 90 tablet, Rfl: 3    doxycycline (PERIOSTAT) 20 MG tablet, 20 mg 2 (two) times a day  , Disp: , Rfl: 3    gabapentin (NEURONTIN) 300 mg capsule, Take 300 mg by mouth daily , Disp: , Rfl:     hydrochlorothiazide (HYDRODIURIL) 12 5 mg tablet, Take 1 tablet (12 5 mg total) by mouth daily, Disp: 30 tablet, Rfl: 4    losartan (COZAAR) 50 mg tablet, Take 1 tablet (50 mg total) by mouth daily, Disp: 90 tablet, Rfl: 1    Melatonin 5 MG TABS, Take 10 mg by mouth daily at bedtime , Disp: , Rfl:     metoprolol succinate (TOPROL-XL) 100 mg 24 hr tablet, Take 1 tablet (100 mg total) by mouth daily, Disp: 90 tablet, Rfl: 1    Polyethylene Glycol 400 (BLINK TEARS) 0 25 % SOLN, Apply 1 drop to eye 2 (two) times a day  , Disp: , Rfl:       Objective:    Vitals:   /79   Pulse 68   Ht 5' 7" (1 702 m)   Wt 88 5 kg (195 lb)   SpO2 97%   BMI 30 54 kg/m²   Body mass index is 30 54 kg/m²  Vitals:    06/22/21 1405   Weight: 88 5 kg (195 lb)       Physical Exam  Constitutional:       General: She is not in acute distress  Appearance: She is well-developed  She is obese  She is not ill-appearing or diaphoretic  HENT:      Head: Normocephalic and atraumatic  Right Ear: External ear normal       Left Ear: External ear normal    Eyes:      General:         Right eye: No discharge  Left eye: No discharge  Conjunctiva/sclera: Conjunctivae normal    Neck:      Thyroid: No thyromegaly  Vascular: No carotid bruit or JVD  Cardiovascular:      Rate and Rhythm: Normal rate and regular rhythm  Heart sounds: Normal heart sounds  No murmur heard  No friction rub  No gallop  Pulmonary:      Effort: No respiratory distress  Breath sounds: Normal breath sounds   No wheezing, rhonchi or rales  Abdominal:      General: Bowel sounds are normal  There is no distension  Palpations: Abdomen is soft  There is no mass  Tenderness: There is no abdominal tenderness  Musculoskeletal:      Cervical back: Neck supple  No rigidity or tenderness  Lumbar back: No swelling, edema, deformity, spasms or tenderness  Decreased range of motion  Right lower leg: No edema  Left lower le+ Edema present  Feet:      Right foot:      Skin integrity: No ulcer, blister, skin breakdown, erythema or warmth  Left foot:      Skin integrity: No ulcer, blister, skin breakdown, erythema or warmth  Comments: Lajean Cassette Moderate edema is present on the distal left leg  The the left foot is nontender  There is no obvious evidence of any injury a point tenderness or instability  The right lower extremity trace or minimal edema  More moderate varicosities present in both lower extremity  No clinical DVT present  On either side the  Lymphadenopathy:      Cervical: No cervical adenopathy  Skin:     General: Skin is warm  Neurological:      General: No focal deficit present  Mental Status: She is alert  Mental status is at baseline  Cranial Nerves: No cranial nerve deficit  Sensory: No sensory deficit  Motor: No weakness  Coordination: Coordination normal       Gait: Gait normal    Psychiatric:         Mood and Affect: Mood normal  Mood is not anxious  Speech: Speech normal          Behavior: Behavior normal  Behavior is not agitated, aggressive, withdrawn, hyperactive or combative  Thought Content: Thought content normal  Thought content is not paranoid or delusional  Thought content does not include homicidal or suicidal ideation  Thought content does not include suicidal plan           Judgment: Judgment normal          Lab Review   Appointment on 2021   Component Date Value Ref Range Status    Cholesterol 2021 147  50 - 200 mg/dL Final    Cholesterol:       Desirable         <200 mg/dl       Borderline         200-239 mg/dl       High              >239           Triglycerides 06/16/2021 119  <=150 mg/dL Final    Triglyceride:     Normal          <150 mg/dl     Borderline High 150-199 mg/dl     High            200-499 mg/dl        Very High       >499 mg/dl    Specimen collection should occur prior to N-Acetylcysteine or Metamizole administration due to the potential for falsely depressed results   HDL, Direct 06/16/2021 60  >=40 mg/dL Final    HDL Cholesterol:       Low     <41 mg/dL  Specimen collection should occur prior to Metamizole administration due to the potential for falsley depressed results   LDL Calculated 06/16/2021 63  0 - 100 mg/dL Final    LDL Cholesterol:     Optimal           <100 mg/dl     Near Optimal      100-129 mg/dl     Above Optimal       Borderline High 130-159 mg/dl       High            160-189 mg/dl       Very High       >189 mg/dl         This screening LDL is a calculated result  It does not have the accuracy of the Direct Measured LDL in the monitoring of patients with hyperlipidemia and/or statin therapy  Direct Measure LDL (MFT358) must be ordered separately in these patients   Non-HDL-Chol (CHOL-HDL) 06/16/2021 87  mg/dl Final    Sodium 06/16/2021 140  136 - 145 mmol/L Final    Potassium 06/16/2021 4 2  3 5 - 5 3 mmol/L Final    Chloride 06/16/2021 107  100 - 108 mmol/L Final    CO2 06/16/2021 27  21 - 32 mmol/L Final    ANION GAP 06/16/2021 6  4 - 13 mmol/L Final    BUN 06/16/2021 23  5 - 25 mg/dL Final    Creatinine 06/16/2021 1 07  0 60 - 1 30 mg/dL Final    Standardized to IDMS reference method    Glucose, Fasting 06/16/2021 99  65 - 99 mg/dL Final    Specimen collection should occur prior to Sulfasalazine administration due to the potential for falsely depressed results   Specimen collection should occur prior to Sulfapyridine administration due to the potential for falsely elevated results   Calcium 06/16/2021 9 2  8 3 - 10 1 mg/dL Final    AST 06/16/2021 22  5 - 45 U/L Final    Specimen collection should occur prior to Sulfasalazine administration due to the potential for falsely depressed results   ALT 06/16/2021 30  12 - 78 U/L Final    Specimen collection should occur prior to Sulfasalazine and/or Sulfapyridine administration due to the potential for falsely depressed results   Alkaline Phosphatase 06/16/2021 72  46 - 116 U/L Final    Total Protein 06/16/2021 7 8  6 4 - 8 2 g/dL Final    Albumin 06/16/2021 3 8  3 5 - 5 0 g/dL Final    Total Bilirubin 06/16/2021 0 60  0 20 - 1 00 mg/dL Final    Use of this assay is not recommended for patients undergoing treatment with eltrombopag due to the potential for falsely elevated results   eGFR 06/16/2021 51  ml/min/1 73sq m Final         Patient Instructions       Medicare Preventive Visit Patient Instructions  Thank you for completing your Welcome to Medicare Visit or Medicare Annual Wellness Visit today  Your next wellness visit will be due in one year (6/23/2022)  The screening/preventive services that you may require over the next 5-10 years are detailed below  Some tests may not apply to you based off risk factors and/or age  Screening tests ordered at today's visit but not completed yet may show as past due  Also, please note that scanned in results may not display below  Preventive Screenings:  Service Recommendations Previous Testing/Comments   Colorectal Cancer Screening  * Colonoscopy    * Fecal Occult Blood Test (FOBT)/Fecal Immunochemical Test (FIT)  * Fecal DNA/Cologuard Test  * Flexible Sigmoidoscopy Age: 54-65 years old   Colonoscopy: every 10 years (may be performed more frequently if at higher risk)  OR  FOBT/FIT: every 1 year  OR  Cologuard: every 3 years  OR  Sigmoidoscopy: every 5 years  Screening may be recommended earlier than age 48 if at higher risk for colorectal cancer   Also, an individualized decision between you and your healthcare provider will decide whether screening between the ages of 74-80 would be appropriate  Colonoscopy: 10/19/2010  FOBT/FIT: Not on file  Cologuard: Not on file  Sigmoidoscopy: Not on file          Breast Cancer Screening Age: 36 years old  Frequency: every 1-2 years  Not required if history of left and right mastectomy Mammogram: 02/26/2020        Cervical Cancer Screening Between the ages of 21-29, pap smear recommended once every 3 years  Between the ages of 33-67, can perform pap smear with HPV co-testing every 5 years  Recommendations may differ for women with a history of total hysterectomy, cervical cancer, or abnormal pap smears in past  Pap Smear: Not on file        Hepatitis C Screening Once for adults born between 1945 and 1965  More frequently in patients at high risk for Hepatitis C Hep C Antibody: 03/03/2017        Diabetes Screening 1-2 times per year if you're at risk for diabetes or have pre-diabetes Fasting glucose: 99 mg/dL   A1C: No results in last 5 years        Cholesterol Screening Once every 5 years if you don't have a lipid disorder  May order more often based on risk factors  Lipid panel: 06/16/2021          Other Preventive Screenings Covered by Medicare:  1  Abdominal Aortic Aneurysm (AAA) Screening: covered once if your at risk  You're considered to be at risk if you have a family history of AAA  2  Lung Cancer Screening: covers low dose CT scan once per year if you meet all of the following conditions: (1) Age 50-69; (2) No signs or symptoms of lung cancer; (3) Current smoker or have quit smoking within the last 15 years; (4) You have a tobacco smoking history of at least 30 pack years (packs per day multiplied by number of years you smoked); (5) You get a written order from a healthcare provider    3  Glaucoma Screening: covered annually if you're considered high risk: (1) You have diabetes OR (2) Family history of glaucoma OR (3)  aged 48 and older OR (3)  American aged 72 and older  3  Osteoporosis Screening: covered every 2 years if you meet one of the following conditions: (1) You're estrogen deficient and at risk for osteoporosis based off medical history and other findings; (2) Have a vertebral abnormality; (3) On glucocorticoid therapy for more than 3 months; (4) Have primary hyperparathyroidism; (5) On osteoporosis medications and need to assess response to drug therapy  · Last bone density test (DXA Scan): Not on file  5  HIV Screening: covered annually if you're between the age of 12-76  Also covered annually if you are younger than 13 and older than 72 with risk factors for HIV infection  For pregnant patients, it is covered up to 3 times per pregnancy  Immunizations:  Immunization Recommendations   Influenza Vaccine Annual influenza vaccination during flu season is recommended for all persons aged >= 6 months who do not have contraindications   Pneumococcal Vaccine (Prevnar and Pneumovax)  * Prevnar = PCV13  * Pneumovax = PPSV23   Adults 25-60 years old: 1-3 doses may be recommended based on certain risk factors  Adults 72 years old: Prevnar (PCV13) vaccine recommended followed by Pneumovax (PPSV23) vaccine  If already received PPSV23 since turning 65, then PCV13 recommended at least one year after PPSV23 dose  Hepatitis B Vaccine 3 dose series if at intermediate or high risk (ex: diabetes, end stage renal disease, liver disease)   Tetanus (Td) Vaccine - COST NOT COVERED BY MEDICARE PART B Following completion of primary series, a booster dose should be given every 10 years to maintain immunity against tetanus  Td may also be given as tetanus wound prophylaxis  Tdap Vaccine - COST NOT COVERED BY MEDICARE PART B Recommended at least once for all adults  For pregnant patients, recommended with each pregnancy     Shingles Vaccine (Shingrix) - COST NOT COVERED BY MEDICARE PART B  2 shot series recommended in those aged 48 and above     Health Maintenance Due:      Topic Date Due    Hepatitis C Screening  Completed     Immunizations Due:      Topic Date Due    Pneumococcal Vaccine: 65+ Years (1 of 2 - PPSV23) Never done    COVID-19 Vaccine (1) Never done    DTaP,Tdap,and Td Vaccines (1 - Tdap) Never done     Advance Directives   What are advance directives? Advance directives are legal documents that state your wishes and plans for medical care  These plans are made ahead of time in case you lose your ability to make decisions for yourself  Advance directives can apply to any medical decision, such as the treatments you want, and if you want to donate organs  What are the types of advance directives? There are many types of advance directives, and each state has rules about how to use them  You may choose a combination of any of the following:  · Living will: This is a written record of the treatment you want  You can also choose which treatments you do not want, which to limit, and which to stop at a certain time  This includes surgery, medicine, IV fluid, and tube feedings  · Durable power of  for healthcare [facility]): This is a written record that states who you want to make healthcare choices for you when you are unable to make them for yourself  This person, called a proxy, is usually a family member or a friend  You may choose more than 1 proxy  · Do not resuscitate (DNR) order:  A DNR order is used in case your heart stops beating or you stop breathing  It is a request not to have certain forms of treatment, such as CPR  A DNR order may be included in other types of advance directives  · Medical directive: This covers the care that you want if you are in a coma, near death, or unable to make decisions for yourself  You can list the treatments you want for each condition   Treatment may include pain medicine, surgery, blood transfusions, dialysis, IV or tube feedings, and a ventilator (breathing machine)  · Values history: This document has questions about your views, beliefs, and how you feel and think about life  This information can help others choose the care that you would choose  Why are advance directives important? An advance directive helps you control your care  Although spoken wishes may be used, it is better to have your wishes written down  Spoken wishes can be misunderstood, or not followed  Treatments may be given even if you do not want them  An advance directive may make it easier for your family to make difficult choices about your care  Weight Management   Why it is important to manage your weight:  Being overweight increases your risk of health conditions such as heart disease, high blood pressure, type 2 diabetes, and certain types of cancer  It can also increase your risk for osteoarthritis, sleep apnea, and other respiratory problems  Aim for a slow, steady weight loss  Even a small amount of weight loss can lower your risk of health problems  How to lose weight safely:  A safe and healthy way to lose weight is to eat fewer calories and get regular exercise  You can lose up about 1 pound a week by decreasing the number of calories you eat by 500 calories each day  Healthy meal plan for weight management:  A healthy meal plan includes a variety of foods, contains fewer calories, and helps you stay healthy  A healthy meal plan includes the following:  · Eat whole-grain foods more often  A healthy meal plan should contain fiber  Fiber is the part of grains, fruits, and vegetables that is not broken down by your body  Whole-grain foods are healthy and provide extra fiber in your diet  Some examples of whole-grain foods are whole-wheat breads and pastas, oatmeal, brown rice, and bulgur  · Eat a variety of vegetables every day  Include dark, leafy greens such as spinach, kale, tati greens, and mustard greens   Eat yellow and orange vegetables such as carrots, sweet potatoes, and winter squash  · Eat a variety of fruits every day  Choose fresh or canned fruit (canned in its own juice or light syrup) instead of juice  Fruit juice has very little or no fiber  · Eat low-fat dairy foods  Drink fat-free (skim) milk or 1% milk  Eat fat-free yogurt and low-fat cottage cheese  Try low-fat cheeses such as mozzarella and other reduced-fat cheeses  · Choose meat and other protein foods that are low in fat  Choose beans or other legumes such as split peas or lentils  Choose fish, skinless poultry (chicken or turkey), or lean cuts of red meat (beef or pork)  Before you cook meat or poultry, cut off any visible fat  · Use less fat and oil  Try baking foods instead of frying them  Add less fat, such as margarine, sour cream, regular salad dressing and mayonnaise to foods  Eat fewer high-fat foods  Some examples of high-fat foods include french fries, doughnuts, ice cream, and cakes  · Eat fewer sweets  Limit foods and drinks that are high in sugar  This includes candy, cookies, regular soda, and sweetened drinks  Exercise:  Exercise at least 30 minutes per day on most days of the week  Some examples of exercise include walking, biking, dancing, and swimming  You can also fit in more physical activity by taking the stairs instead of the elevator or parking farther away from stores  Ask your healthcare provider about the best exercise plan for you  © Copyright [employer] 2018 Information is for End User's use only and may not be sold, redistributed or otherwise used for commercial purposes  All illustrations and images included in CareNotes® are the copyrighted property of [employer]  or [facility] Preventive Visit Patient Instructions  Thank you for completing your Welcome to Medicare Visit or Medicare Annual Wellness Visit today  Your next wellness visit will be due in one year (6/23/2022)    The screening/preventive services that you may require over the next 5-10 years are detailed below  Some tests may not apply to you based off risk factors and/or age  Screening tests ordered at today's visit but not completed yet may show as past due  Also, please note that scanned in results may not display below  Preventive Screenings:  Service Recommendations Previous Testing/Comments   Colorectal Cancer Screening  * Colonoscopy    * Fecal Occult Blood Test (FOBT)/Fecal Immunochemical Test (FIT)  * Fecal DNA/Cologuard Test  * Flexible Sigmoidoscopy Age: 54-65 years old   Colonoscopy: every 10 years (may be performed more frequently if at higher risk)  OR  FOBT/FIT: every 1 year  OR  Cologuard: every 3 years  OR  Sigmoidoscopy: every 5 years  Screening may be recommended earlier than age 48 if at higher risk for colorectal cancer  Also, an individualized decision between you and your healthcare provider will decide whether screening between the ages of 74-80 would be appropriate  Colonoscopy: 10/19/2010  FOBT/FIT: Not on file  Cologuard: Not on file  Sigmoidoscopy: Not on file          Breast Cancer Screening Age: 36 years old  Frequency: every 1-2 years  Not required if history of left and right mastectomy Mammogram: 02/26/2020        Cervical Cancer Screening Between the ages of 21-29, pap smear recommended once every 3 years  Between the ages of 33-67, can perform pap smear with HPV co-testing every 5 years  Recommendations may differ for women with a history of total hysterectomy, cervical cancer, or abnormal pap smears in past  Pap Smear: Not on file        Hepatitis C Screening Once for adults born between 1945 and 1965  More frequently in patients at high risk for Hepatitis C Hep C Antibody: 03/03/2017        Diabetes Screening 1-2 times per year if you're at risk for diabetes or have pre-diabetes Fasting glucose: 99 mg/dL   A1C: No results in last 5 years        Cholesterol Screening Once every 5 years if you don't have a lipid disorder   May order more often based on risk factors  Lipid panel: 06/16/2021          Other Preventive Screenings Covered by Medicare:  6  Abdominal Aortic Aneurysm (AAA) Screening: covered once if your at risk  You're considered to be at risk if you have a family history of AAA  7  Lung Cancer Screening: covers low dose CT scan once per year if you meet all of the following conditions: (1) Age 50-69; (2) No signs or symptoms of lung cancer; (3) Current smoker or have quit smoking within the last 15 years; (4) You have a tobacco smoking history of at least 30 pack years (packs per day multiplied by number of years you smoked); (5) You get a written order from a healthcare provider  8  Glaucoma Screening: covered annually if you're considered high risk: (1) You have diabetes OR (2) Family history of glaucoma OR (3)  aged 48 and older OR (3)  American aged 72 and older  5  Osteoporosis Screening: covered every 2 years if you meet one of the following conditions: (1) You're estrogen deficient and at risk for osteoporosis based off medical history and other findings; (2) Have a vertebral abnormality; (3) On glucocorticoid therapy for more than 3 months; (4) Have primary hyperparathyroidism; (5) On osteoporosis medications and need to assess response to drug therapy  · Last bone density test (DXA Scan): Not on file  10  HIV Screening: covered annually if you're between the age of 12-76  Also covered annually if you are younger than 13 and older than 72 with risk factors for HIV infection  For pregnant patients, it is covered up to 3 times per pregnancy      Immunizations:  Immunization Recommendations   Influenza Vaccine Annual influenza vaccination during flu season is recommended for all persons aged >= 6 months who do not have contraindications   Pneumococcal Vaccine (Prevnar and Pneumovax)  * Prevnar = PCV13  * Pneumovax = PPSV23   Adults 25-60 years old: 1-3 doses may be recommended based on certain risk factors  Adults 72 years old: Prevnar (PCV13) vaccine recommended followed by Pneumovax (PPSV23) vaccine  If already received PPSV23 since turning 65, then PCV13 recommended at least one year after PPSV23 dose  Hepatitis B Vaccine 3 dose series if at intermediate or high risk (ex: diabetes, end stage renal disease, liver disease)   Tetanus (Td) Vaccine - COST NOT COVERED BY MEDICARE PART B Following completion of primary series, a booster dose should be given every 10 years to maintain immunity against tetanus  Td may also be given as tetanus wound prophylaxis  Tdap Vaccine - COST NOT COVERED BY MEDICARE PART B Recommended at least once for all adults  For pregnant patients, recommended with each pregnancy  Shingles Vaccine (Shingrix) - COST NOT COVERED BY MEDICARE PART B  2 shot series recommended in those aged 48 and above     Health Maintenance Due:      Topic Date Due    Hepatitis C Screening  Completed     Immunizations Due:      Topic Date Due    Pneumococcal Vaccine: 65+ Years (1 of 2 - PPSV23) Never done    COVID-19 Vaccine (1) Never done    DTaP,Tdap,and Td Vaccines (1 - Tdap) Never done     Advance Directives   What are advance directives? Advance directives are legal documents that state your wishes and plans for medical care  These plans are made ahead of time in case you lose your ability to make decisions for yourself  Advance directives can apply to any medical decision, such as the treatments you want, and if you want to donate organs  What are the types of advance directives? There are many types of advance directives, and each state has rules about how to use them  You may choose a combination of any of the following:  · Living will: This is a written record of the treatment you want  You can also choose which treatments you do not want, which to limit, and which to stop at a certain time  This includes surgery, medicine, IV fluid, and tube feedings     · Durable power of  for [facility]): This is a written record that states who you want to make healthcare choices for you when you are unable to make them for yourself  This person, called a proxy, is usually a family member or a friend  You may choose more than 1 proxy  · Do not resuscitate (DNR) order:  A DNR order is used in case your heart stops beating or you stop breathing  It is a request not to have certain forms of treatment, such as CPR  A DNR order may be included in other types of advance directives  · Medical directive: This covers the care that you want if you are in a coma, near death, or unable to make decisions for yourself  You can list the treatments you want for each condition  Treatment may include pain medicine, surgery, blood transfusions, dialysis, IV or tube feedings, and a ventilator (breathing machine)  · Values history: This document has questions about your views, beliefs, and how you feel and think about life  This information can help others choose the care that you would choose  Why are advance directives important? An advance directive helps you control your care  Although spoken wishes may be used, it is better to have your wishes written down  Spoken wishes can be misunderstood, or not followed  Treatments may be given even if you do not want them  An advance directive may make it easier for your family to make difficult choices about your care  Weight Management   Why it is important to manage your weight:  Being overweight increases your risk of health conditions such as heart disease, high blood pressure, type 2 diabetes, and certain types of cancer  It can also increase your risk for osteoarthritis, sleep apnea, and other respiratory problems  Aim for a slow, steady weight loss  Even a small amount of weight loss can lower your risk of health problems  How to lose weight safely:  A safe and healthy way to lose weight is to eat fewer calories and get regular exercise   You can lose up about 1 pound a week by decreasing the number of calories you eat by 500 calories each day  Healthy meal plan for weight management:  A healthy meal plan includes a variety of foods, contains fewer calories, and helps you stay healthy  A healthy meal plan includes the following:  · Eat whole-grain foods more often  A healthy meal plan should contain fiber  Fiber is the part of grains, fruits, and vegetables that is not broken down by your body  Whole-grain foods are healthy and provide extra fiber in your diet  Some examples of whole-grain foods are whole-wheat breads and pastas, oatmeal, brown rice, and bulgur  · Eat a variety of vegetables every day  Include dark, leafy greens such as spinach, kale, tati greens, and mustard greens  Eat yellow and orange vegetables such as carrots, sweet potatoes, and winter squash  · Eat a variety of fruits every day  Choose fresh or canned fruit (canned in its own juice or light syrup) instead of juice  Fruit juice has very little or no fiber  · Eat low-fat dairy foods  Drink fat-free (skim) milk or 1% milk  Eat fat-free yogurt and low-fat cottage cheese  Try low-fat cheeses such as mozzarella and other reduced-fat cheeses  · Choose meat and other protein foods that are low in fat  Choose beans or other legumes such as split peas or lentils  Choose fish, skinless poultry (chicken or turkey), or lean cuts of red meat (beef or pork)  Before you cook meat or poultry, cut off any visible fat  · Use less fat and oil  Try baking foods instead of frying them  Add less fat, such as margarine, sour cream, regular salad dressing and mayonnaise to foods  Eat fewer high-fat foods  Some examples of high-fat foods include french fries, doughnuts, ice cream, and cakes  · Eat fewer sweets  Limit foods and drinks that are high in sugar  This includes candy, cookies, regular soda, and sweetened drinks    Exercise:  Exercise at least 30 minutes per day on most days of the week  Some examples of exercise include walking, biking, dancing, and swimming  You can also fit in more physical activity by taking the stairs instead of the elevator or parking farther away from stores  Ask your healthcare provider about the best exercise plan for you  © Copyright [employer] 2018 Information is for End User's use only and may not be sold, redistributed or otherwise used for commercial purposes  All illustrations and images included in CareNotes® are the copyrighted property of [employer]  or [address] [place] [address]    Follow-up with the neurologist of your choice regarding weekly feeling on the toes  Go for the venous Doppler of both lower extremity regarding your swelling of the left lower extremity  See podiatrist for doctor regarding your toenail care and onychomycosis  Wear stockings particularly on the left lower extremity for the compression purpose  You are due for mammogram go ahead and set up mammogram as we recommended last time  Follow with Consultants as per their and our suggestion    Follow up in 3  week(s) or as needed earlier    Follow all instructions as advised and discussed  Take your medications as prescribed  Call the office immediately if you experience any side effects  Ask questions if you do not understand  Keep your scheduled appointment as advised or come sooner if necessary or in doubt  Best time to call for non-urgent matter or questions on weekdays is between 9am and 12 noon  See physician for any new symptoms or worsening of current symptoms  Urgent or emergent situations call 911 and report to nearest emergency room  I spent  60 minutes taking care of this patient including clinical care, conseling, collaboration, chart, lab and consultaion review  Dr Luiza Montgomery MD  [facility]       "This note has been constructed using a voice recognition system  Therefore there may be syntax, spelling, and/or grammatical errors   Please call if you have any questions  "

## 2021-06-22 NOTE — PROGRESS NOTES
Assessment and Plan:     Problem List Items Addressed This Visit     None      Visit Diagnoses     Medicare annual wellness visit, subsequent    -  Primary           Preventive health issues were discussed with patient, and age appropriate screening tests were ordered as noted in patient's After Visit Summary  Personalized health advice and appropriate referrals for health education or preventive services given if needed, as noted in patient's After Visit Summary       History of Present Illness:     Patient presents for Medicare Annual Wellness visit    Patient Care Team:  Vanita Rainey MD as PCP - General  MD Asia Jain MD     Problem List:     Patient Active Problem List   Diagnosis    Adenocarcinoma of right lung [facility])    Essential hypertension    Dyslipidemia    Primary osteoarthritis involving multiple joints    Left leg swelling    Preoperative clearance    Femoral neuropathy, right    [facility]    Osteoarthritis of right hip    Hyperlipidemia    Numbness and tingling of foot    Anxiety    Colon polyp    Thyroid nodule    Mixed anxiety and depressive disorder    Stage 3b chronic kidney disease ([facility]    Diverticulosis    Skin mole    Restless leg syndrome    BPPV (benign paroxysmal positional vertigo)    Class 1 obesity due to excess calories without serious comorbidity with body mass index (BMI) of 30 0 to 30 9 in adult    Lesion of external ear    Tobacco use disorder    Neuropathy    Chorea    Varicose veins of legs    Vitamin B12 deficiency    Edema of both legs    Vitamin D deficiency    Kidney cyst, acquired    Carpal tunnel syndrome    [facility] chronic [facility]itis [facility]    Breast cancer screening by mammogram      Past Medical and Surgical History:     Past Medical History:   Diagnosis Date    [facility]     Hyperlipidemia     Hypertension     Numbness and tingling of foot     Osteoarthritis of right hip     Primary adenocarcinoma of lower lobe of right lung [facility])      Past Surgical History:   Procedure Laterality Date    CATARACT EXTRACTION      CHOLECYSTECTOMY      COLONOSCOPY      JOINT REPLACEMENT      DE TOTAL HIP ARTHROPLASTY Right 2018    Procedure: ANTERIOR TOTAL HIP ARTHROPLASTY;  Surgeon: Ovidio Boland MD;  Location: [facility];  Service: Orthopedics    TOTAL HIP ARTHROPLASTY      TUMOR REMOVAL Right 2015    lower lobe       Family History:     Family History   Problem Relation Age of Onset    Heart disease Mother     No Known Problems Father       Social History:     Social History     Socioeconomic History    Marital status:      Spouse name: None    Number of children: None    Years of education: None    Highest education level: None   Occupational History    None   Tobacco Use    Smoking status: Former Smoker     Packs/day: 1 00     Years: 50 00     Pack years: 50 00     Types: Cigarettes     Quit date: 2015     Years since quittin 7    Smokeless tobacco: Never Used   Substance and Sexual Activity    Alcohol use: Yes     Comment: ocassional     Drug use: No    Sexual activity: None   Other Topics Concern    None   Social History Narrative    None     Social Determinants of Health     Financial Resource Strain:     Difficulty of Paying Living Expenses:    Food Insecurity:     Worried About Running Out of Food in the Last Year:     Ran Out of Food in the Last Year:    Transportation Needs:     Lack of Transportation (Medical):      Lack of Transportation (Non-Medical):    Physical Activity:     Days of Exercise per Week:     Minutes of Exercise per Session:    Stress:     Feeling of Stress :    Social Connections:     Frequency of Communication with Friends and Family:     Frequency of Social Gatherings with Friends and Family:     Attends Muslim Services:     Active Member of Clubs or Organizations:     Attends Club or Organization Meetings:    Thaddeus Marital Status:    Intimate Partner Violence:     Fear of Current or Ex-Partner:     Emotionally Abused:     Physically Abused:     Sexually Abused:       Medications and Allergies:     Current Outpatient Medications   Medication Sig Dispense Refill    albuterol (PROVENTIL HFA,VENTOLIN HFA) 90 mcg/act inhaler Inhale 2 puffs every 6 (six) hours as needed for wheezing or shortness of breath 1 Inhaler 5    amLODIPine (NORVASC) 5 mg tablet Take 1 tablet (5 mg total) by mouth daily 90 tablet 1    aspirin (ECOTRIN LOW STRENGTH) 81 mg EC tablet Take 1 tablet (81 mg total) by mouth 2 (two) times a day (Patient taking differently: Take 81 mg by mouth daily ) 60 tablet 0    atorvastatin (LIPITOR) 10 mg tablet TAKE 1 TABLET DAILY AT BEDTIME 90 tablet 3    doxycycline (PERIOSTAT) 20 MG tablet 20 mg 2 (two) times a day    3    gabapentin (NEURONTIN) 300 mg capsule Take 300 mg by mouth daily       hydrochlorothiazide (HYDRODIURIL) 12 5 mg tablet Take 1 tablet (12 5 mg total) by mouth daily 30 tablet 4    losartan (COZAAR) 50 mg tablet Take 1 tablet (50 mg total) by mouth daily 90 tablet 1    Melatonin 5 MG TABS Take 10 mg by mouth daily at bedtime       metoprolol succinate (TOPROL-XL) 100 mg 24 hr tablet Take 1 tablet (100 mg total) by mouth daily 90 tablet 1    Polyethylene Glycol 400 (BLINK TEARS) 0 25 % SOLN Apply 1 drop to eye 2 (two) times a day         No current facility-administered medications for this visit       No Known Allergies   Immunizations:     Immunization History   Administered Date(s) Administered    Influenza, high dose seasonal 0 7 mL 11/23/2020    Influenza, injectable, quadrivalent, preservative free 0 5 mL 10/24/2019      Health Maintenance:         Topic Date Due    Hepatitis C Screening  Completed         Topic Date Due    Pneumococcal Vaccine: 65+ Years (1 of 2 - PPSV23) Never done    COVID-19 Vaccine (1) Never done    DTaP,Tdap,and Td Vaccines (1 - Tdap) Never done      Medicare Health Risk Assessment:     Pulse 68   Ht 5' 7" (1 702 m)   Wt 88 5 kg (195 lb)   SpO2 97%   BMI 30 54 kg/m²      Alea Donaldson is here for her Subsequent Wellness visit  Last Medicare Wellness visit information reviewed, patient interviewed and updates made to the record today  Health Risk Assessment:   Patient rates overall health as good  Patient feels that their physical health rating is same  Patient is satisfied with their life  Eyesight was rated as same  Hearing was rated as same  Patient feels that their emotional and mental health rating is same  Patients states they are never, rarely angry  Patient states they are sometimes unusually tired/fatigued  Pain experienced in the last 7 days has been none  Patient states that she has experienced no weight loss or gain in last 6 months  Weight stays the same  Depression Screening:   PHQ-2 Score: 0  PHQ-9 Score: 2      Fall Risk Screening: In the past year, patient has experienced: no history of falling in past year      Urinary Incontinence Screening:   Patient has not leaked urine accidently in the last six months  Home Safety:  Patient does not have trouble with stairs inside or outside of their home  Patient has working smoke alarms and has working carbon monoxide detector  Home safety hazards include: none  Goes slow on stairs  Uses banisters  No home hazards  Pt feels safe in home  Nutrition:   Current diet is Regular  Follows a balanced diet  Veggies and proteins  Medications:   Patient is currently taking over-the-counter supplements  OTC medications include: see medication list  Patient is able to manage medications  No issues with meds  Activities of Daily Living (ADLs)/Instrumental Activities of Daily Living (IADLs):   Walk and transfer into and out of bed and chair?: Yes  Dress and groom yourself?: Yes    Bathe or shower yourself?: Yes    Feed yourself?  Yes  Do your laundry/housekeeping?: Yes  Manage your money, pay your bills and track your expenses?: Yes  Make your own meals?: Yes    Do your own shopping?: Yes    ADL comments: Pt is independent  Previous Hospitalizations:   Any hospitalizations or ED visits within the last 12 months?: No      Hospitalization Comments: Chronic Conditions have been stable  Advance Care Planning:   Living will: Yes    Durable POA for healthcare: Yes    Advanced directive: Yes    Advanced directive counseling given: Yes    Five wishes given: No    Patient declined ACP directive: No    End of Life Decisions reviewed with patient: Yes    Provider agrees with end of life decisions: Yes      Comments: Encouraged to discuss with family  Pt will bring documets    Cognitive Screening:   Provider or family/friend/caregiver concerned regarding cognition?: No    PREVENTIVE SCREENINGS      Cardiovascular Screening:    General: Screening Not Indicated and History Lipid Disorder      Diabetes Screening:     General: Screening Current      Colorectal Cancer Screening:     General: Risks and Benefits Discussed and Screening Not Indicated      Breast Cancer Screening:     General: Screening Current      Cervical Cancer Screening:    General: Screening Not Indicated      Osteoporosis Screening:    General: Risks and Benefits Discussed and Patient Declines      Abdominal Aortic Aneurysm (AAA) Screening:        General: Risks and Benefits Discussed and Screening Not Indicated      Lung Cancer Screening:     General: Screening Not Indicated and History Lung Cancer      Hepatitis C Screening:    General: Screening Current    Screening, Brief Intervention, and Referral to Treatment (SBIRT)    Screening  Typical number of drinks in a day: 0  Typical number of drinks in a week: 0  Interpretation: Low risk drinking behavior      Single Item Drug Screening:  How often have you used an illegal drug (including marijuana) or a prescription medication for non-medical reasons in the past year? never    Single Item Drug Screen Score: 0  Interpretation: Negative screen for possible drug use disorder    Brief Intervention  Alcohol & drug use screenings were reviewed  No concerns regarding substance use disorder identified         Yahir Tan MD

## 2021-06-22 NOTE — PATIENT INSTRUCTIONS
Medicare Preventive Visit Patient Instructions  Thank you for completing your Welcome to Medicare Visit or Medicare Annual Wellness Visit today  Your next wellness visit will be due in one year (6/23/2022)  The screening/preventive services that you may require over the next 5-10 years are detailed below  Some tests may not apply to you based off risk factors and/or age  Screening tests ordered at today's visit but not completed yet may show as past due  Also, please note that scanned in results may not display below  Preventive Screenings:  Service Recommendations Previous Testing/Comments   Colorectal Cancer Screening  * Colonoscopy    * Fecal Occult Blood Test (FOBT)/Fecal Immunochemical Test (FIT)  * Fecal DNA/Cologuard Test  * Flexible Sigmoidoscopy Age: 54-65 years old   Colonoscopy: every 10 years (may be performed more frequently if at higher risk)  OR  FOBT/FIT: every 1 year  OR  Cologuard: every 3 years  OR  Sigmoidoscopy: every 5 years  Screening may be recommended earlier than age 48 if at higher risk for colorectal cancer  Also, an individualized decision between you and your healthcare provider will decide whether screening between the ages of 74-80 would be appropriate  Colonoscopy: 10/19/2010  FOBT/FIT: Not on file  Cologuard: Not on file  Sigmoidoscopy: Not on file          Breast Cancer Screening Age: 36 years old  Frequency: every 1-2 years  Not required if history of left and right mastectomy Mammogram: 02/26/2020        Cervical Cancer Screening Between the ages of 21-29, pap smear recommended once every 3 years  Between the ages of 33-67, can perform pap smear with HPV co-testing every 5 years     Recommendations may differ for women with a history of total hysterectomy, cervical cancer, or abnormal pap smears in past  Pap Smear: Not on file        Hepatitis C Screening Once for adults born between [facility]  More frequently in patients at high risk for Hepatitis C Hep C Antibody: 03/03/2017        Diabetes Screening 1-2 times per year if you're at risk for diabetes or have pre-diabetes Fasting glucose: 99 mg/dL   A1C: No results in last 5 years        Cholesterol Screening Once every 5 years if you don't have a lipid disorder  May order more often based on risk factors  Lipid panel: 06/16/2021          Other Preventive Screenings Covered by Medicare:  1  Abdominal Aortic Aneurysm (AAA) Screening: covered once if your at risk  You're considered to be at risk if you have a family history of AAA  2  Lung Cancer Screening: covers low dose CT scan once per year if you meet all of the following conditions: (1) Age 50-69; (2) No signs or symptoms of lung cancer; (3) Current smoker or have quit smoking within the last 15 years; (4) You have a tobacco smoking history of at least 30 pack years (packs per day multiplied by number of years you smoked); (5) You get a written order from a healthcare provider  3  Glaucoma Screening: covered annually if you're considered high risk: (1) You have diabetes OR (2) Family history of glaucoma OR (3)  aged 48 and older OR (3)  American aged 72 and older  3  Osteoporosis Screening: covered every 2 years if you meet one of the following conditions: (1) You're estrogen deficient and at risk for osteoporosis based off medical history and other findings; (2) Have a vertebral abnormality; (3) On glucocorticoid therapy for more than 3 months; (4) Have primary hyperparathyroidism; (5) On osteoporosis medications and need to assess response to drug therapy  · Last bone density test (DXA Scan): Not on file  5  HIV Screening: covered annually if you're between the age of 12-76  Also covered annually if you are younger than 13 and older than 72 with risk factors for HIV infection  For pregnant patients, it is covered up to 3 times per pregnancy      Immunizations:  Immunization Recommendations   Influenza Vaccine Annual influenza vaccination during flu season is recommended for all persons aged >= 6 months who do not have contraindications   Pneumococcal Vaccine (Prevnar and Pneumovax)  * Prevnar = PCV13  * Pneumovax = PPSV23   Adults 25-60 years old: 1-3 doses may be recommended based on certain risk factors  Adults 72 years old: Prevnar (PCV13) vaccine recommended followed by Pneumovax (PPSV23) vaccine  If already received PPSV23 since turning 65, then PCV13 recommended at least one year after PPSV23 dose  Hepatitis B Vaccine 3 dose series if at intermediate or high risk (ex: diabetes, end stage renal disease, liver disease)   Tetanus (Td) Vaccine - COST NOT COVERED BY MEDICARE PART B Following completion of primary series, a booster dose should be given every 10 years to maintain immunity against tetanus  Td may also be given as tetanus wound prophylaxis  Tdap Vaccine - COST NOT COVERED BY MEDICARE PART B Recommended at least once for all adults  For pregnant patients, recommended with each pregnancy  Shingles Vaccine (Shingrix) - COST NOT COVERED BY MEDICARE PART B  2 shot series recommended in those aged 48 and above     Health Maintenance Due:      Topic Date Due    Hepatitis C Screening  Completed     Immunizations Due:      Topic Date Due    Pneumococcal Vaccine: 65+ Years (1 of 2 - PPSV23) Never done    COVID-19 Vaccine (1) Never done    DTaP,Tdap,and Td Vaccines (1 - Tdap) Never done     Advance Directives   What are advance directives? Advance directives are legal documents that state your wishes and plans for medical care  These plans are made ahead of time in case you lose your ability to make decisions for yourself  Advance directives can apply to any medical decision, such as the treatments you want, and if you want to donate organs  What are the types of advance directives? There are many types of advance directives, and each state has rules about how to use them   You may choose a combination of any of the following:  · Living will:  This is a written record of the treatment you want  You can also choose which treatments you do not want, which to limit, and which to stop at a certain time  This includes surgery, medicine, IV fluid, and tube feedings  · Durable power of  for healthcare [facility]): This is a written record that states who you want to make healthcare choices for you when you are unable to make them for yourself  This person, called a proxy, is usually a family member or a friend  You may choose more than 1 proxy  · Do not resuscitate (DNR) order:  A DNR order is used in case your heart stops beating or you stop breathing  It is a request not to have certain forms of treatment, such as CPR  A DNR order may be included in other types of advance directives  · Medical directive: This covers the care that you want if you are in a coma, near death, or unable to make decisions for yourself  You can list the treatments you want for each condition  Treatment may include pain medicine, surgery, blood transfusions, dialysis, IV or tube feedings, and a ventilator (breathing machine)  · Values history: This document has questions about your views, beliefs, and how you feel and think about life  This information can help others choose the care that you would choose  Why are advance directives important? An advance directive helps you control your care  Although spoken wishes may be used, it is better to have your wishes written down  Spoken wishes can be misunderstood, or not followed  Treatments may be given even if you do not want them  An advance directive may make it easier for your family to make difficult choices about your care  Weight Management   Why it is important to manage your weight:  Being overweight increases your risk of health conditions such as heart disease, high blood pressure, type 2 diabetes, and certain types of cancer   It can also increase your risk for osteoarthritis, sleep apnea, and other respiratory problems  Aim for a slow, steady weight loss  Even a small amount of weight loss can lower your risk of health problems  How to lose weight safely:  A safe and healthy way to lose weight is to eat fewer calories and get regular exercise  You can lose up about 1 pound a week by decreasing the number of calories you eat by 500 calories each day  Healthy meal plan for weight management:  A healthy meal plan includes a variety of foods, contains fewer calories, and helps you stay healthy  A healthy meal plan includes the following:  · Eat whole-grain foods more often  A healthy meal plan should contain fiber  Fiber is the part of grains, fruits, and vegetables that is not broken down by your body  Whole-grain foods are healthy and provide extra fiber in your diet  Some examples of whole-grain foods are whole-wheat breads and pastas, oatmeal, brown rice, and bulgur  · Eat a variety of vegetables every day  Include dark, leafy greens such as spinach, kale, tati greens, and mustard greens  Eat yellow and orange vegetables such as carrots, sweet potatoes, and winter squash  · Eat a variety of fruits every day  Choose fresh or canned fruit (canned in its own juice or light syrup) instead of juice  Fruit juice has very little or no fiber  · Eat low-fat dairy foods  Drink fat-free (skim) milk or 1% milk  Eat fat-free yogurt and low-fat cottage cheese  Try low-fat cheeses such as mozzarella and other reduced-fat cheeses  · Choose meat and other protein foods that are low in fat  Choose beans or other legumes such as split peas or lentils  Choose fish, skinless poultry (chicken or turkey), or lean cuts of red meat (beef or pork)  Before you cook meat or poultry, cut off any visible fat  · Use less fat and oil  Try baking foods instead of frying them  Add less fat, such as margarine, sour cream, regular salad dressing and mayonnaise to foods  Eat fewer high-fat foods   Some examples of high-fat foods include french fries, doughnuts, ice cream, and cakes  · Eat fewer sweets  Limit foods and drinks that are high in sugar  This includes candy, cookies, regular soda, and sweetened drinks  Exercise:  Exercise at least 30 minutes per day on most days of the week  Some examples of exercise include walking, biking, dancing, and swimming  You can also fit in more physical activity by taking the stairs instead of the elevator or parking farther away from stores  Ask your healthcare provider about the best exercise plan for you  © Copyright [employer] 2018 Information is for End User's use only and may not be sold, redistributed or otherwise used for commercial purposes  All illustrations and images included in CareNotes® are the copyrighted property of [employer]  or [facility] Preventive Visit Patient Instructions  Thank you for completing your Welcome to Medicare Visit or Medicare Annual Wellness Visit today  Your next wellness visit will be due in one year (6/23/2022)  The screening/preventive services that you may require over the next 5-10 years are detailed below  Some tests may not apply to you based off risk factors and/or age  Screening tests ordered at today's visit but not completed yet may show as past due  Also, please note that scanned in results may not display below  Preventive Screenings:  Service Recommendations Previous Testing/Comments   Colorectal Cancer Screening  * Colonoscopy    * Fecal Occult Blood Test (FOBT)/Fecal Immunochemical Test (FIT)  * Fecal DNA/Cologuard Test  * Flexible Sigmoidoscopy Age: 54-65 years old   Colonoscopy: every 10 years (may be performed more frequently if at higher risk)  OR  FOBT/FIT: every 1 year  OR  Cologuard: every 3 years  OR  Sigmoidoscopy: every 5 years  Screening may be recommended earlier than age 48 if at higher risk for colorectal cancer   Also, an individualized decision between you and your healthcare provider will decide whether screening between the ages of 74-80 would be appropriate  Colonoscopy: 10/19/2010  FOBT/FIT: Not on file  Cologuard: Not on file  Sigmoidoscopy: Not on file          Breast Cancer Screening Age: 36 years old  Frequency: every 1-2 years  Not required if history of left and right mastectomy Mammogram: 02/26/2020        Cervical Cancer Screening Between the ages of 21-29, pap smear recommended once every 3 years  Between the ages of 33-67, can perform pap smear with HPV co-testing every 5 years  Recommendations may differ for women with a history of total hysterectomy, cervical cancer, or abnormal pap smears in past  Pap Smear: Not on file        Hepatitis C Screening Once for adults born between 1945 and 1965  More frequently in patients at high risk for Hepatitis C Hep C Antibody: 03/03/2017        Diabetes Screening 1-2 times per year if you're at risk for diabetes or have pre-diabetes Fasting glucose: 99 mg/dL   A1C: No results in last 5 years        Cholesterol Screening Once every 5 years if you don't have a lipid disorder  May order more often based on risk factors  Lipid panel: 06/16/2021          Other Preventive Screenings Covered by Medicare:  6  Abdominal Aortic Aneurysm (AAA) Screening: covered once if your at risk  You're considered to be at risk if you have a family history of AAA  7  Lung Cancer Screening: covers low dose CT scan once per year if you meet all of the following conditions: (1) Age 50-69; (2) No signs or symptoms of lung cancer; (3) Current smoker or have quit smoking within the last 15 years; (4) You have a tobacco smoking history of at least 30 pack years (packs per day multiplied by number of years you smoked); (5) You get a written order from a healthcare provider    8  Glaucoma Screening: covered annually if you're considered high risk: (1) You have diabetes OR (2) Family history of glaucoma OR (3)  aged 48 and older OR (3)  American aged 72 and older  9  Osteoporosis Screening: covered every 2 years if you meet one of the following conditions: (1) You're estrogen deficient and at risk for osteoporosis based off medical history and other findings; (2) Have a vertebral abnormality; (3) On glucocorticoid therapy for more than 3 months; (4) Have primary hyperparathyroidism; (5) On osteoporosis medications and need to assess response to drug therapy  · Last bone density test (DXA Scan): Not on file  10  HIV Screening: covered annually if you're between the age of 12-76  Also covered annually if you are younger than 13 and older than 72 with risk factors for HIV infection  For pregnant patients, it is covered up to 3 times per pregnancy  Immunizations:  Immunization Recommendations   Influenza Vaccine Annual influenza vaccination during flu season is recommended for all persons aged >= 6 months who do not have contraindications   Pneumococcal Vaccine (Prevnar and Pneumovax)  * Prevnar = PCV13  * Pneumovax = PPSV23   Adults 25-60 years old: 1-3 doses may be recommended based on certain risk factors  Adults 72 years old: Prevnar (PCV13) vaccine recommended followed by Pneumovax (PPSV23) vaccine  If already received PPSV23 since turning 65, then PCV13 recommended at least one year after PPSV23 dose  Hepatitis B Vaccine 3 dose series if at intermediate or high risk (ex: diabetes, end stage renal disease, liver disease)   Tetanus (Td) Vaccine - COST NOT COVERED BY MEDICARE PART B Following completion of primary series, a booster dose should be given every 10 years to maintain immunity against tetanus  Td may also be given as tetanus wound prophylaxis  Tdap Vaccine - COST NOT COVERED BY MEDICARE PART B Recommended at least once for all adults  For pregnant patients, recommended with each pregnancy     Shingles Vaccine (Shingrix) - COST NOT COVERED BY MEDICARE PART B  2 shot series recommended in those aged 48 and above     Health Maintenance Due:      Topic Date Due    Hepatitis C Screening  Completed     Immunizations Due:      Topic Date Due    Pneumococcal Vaccine: 65+ Years (1 of 2 - PPSV23) Never done    COVID-19 Vaccine (1) Never done    DTaP,Tdap,and Td Vaccines (1 - Tdap) Never done     Advance Directives   What are advance directives? Advance directives are legal documents that state your wishes and plans for medical care  These plans are made ahead of time in case you lose your ability to make decisions for yourself  Advance directives can apply to any medical decision, such as the treatments you want, and if you want to donate organs  What are the types of advance directives? There are many types of advance directives, and each state has rules about how to use them  You may choose a combination of any of the following:  · Living will: This is a written record of the treatment you want  You can also choose which treatments you do not want, which to limit, and which to stop at a certain time  This includes surgery, medicine, IV fluid, and tube feedings  · Durable power of  for healthcare [facility]): This is a written record that states who you want to make healthcare choices for you when you are unable to make them for yourself  This person, called a proxy, is usually a family member or a friend  You may choose more than 1 proxy  · Do not resuscitate (DNR) order:  A DNR order is used in case your heart stops beating or you stop breathing  It is a request not to have certain forms of treatment, such as CPR  A DNR order may be included in other types of advance directives  · Medical directive: This covers the care that you want if you are in a coma, near death, or unable to make decisions for yourself  You can list the treatments you want for each condition  Treatment may include pain medicine, surgery, blood transfusions, dialysis, IV or tube feedings, and a ventilator (breathing machine)  · Values history:   This document has questions about your views, beliefs, and how you feel and think about life  This information can help others choose the care that you would choose  Why are advance directives important? An advance directive helps you control your care  Although spoken wishes may be used, it is better to have your wishes written down  Spoken wishes can be misunderstood, or not followed  Treatments may be given even if you do not want them  An advance directive may make it easier for your family to make difficult choices about your care  Weight Management   Why it is important to manage your weight:  Being overweight increases your risk of health conditions such as heart disease, high blood pressure, type 2 diabetes, and certain types of cancer  It can also increase your risk for osteoarthritis, sleep apnea, and other respiratory problems  Aim for a slow, steady weight loss  Even a small amount of weight loss can lower your risk of health problems  How to lose weight safely:  A safe and healthy way to lose weight is to eat fewer calories and get regular exercise  You can lose up about 1 pound a week by decreasing the number of calories you eat by 500 calories each day  Healthy meal plan for weight management:  A healthy meal plan includes a variety of foods, contains fewer calories, and helps you stay healthy  A healthy meal plan includes the following:  · Eat whole-grain foods more often  A healthy meal plan should contain fiber  Fiber is the part of grains, fruits, and vegetables that is not broken down by your body  Whole-grain foods are healthy and provide extra fiber in your diet  Some examples of whole-grain foods are whole-wheat breads and pastas, oatmeal, brown rice, and bulgur  · Eat a variety of vegetables every day  Include dark, leafy greens such as spinach, kale, tati greens, and mustard greens  Eat yellow and orange vegetables such as carrots, sweet potatoes, and winter squash  · Eat a variety of fruits every day  Choose fresh or canned fruit (canned in its own juice or light syrup) instead of juice  Fruit juice has very little or no fiber  · Eat low-fat dairy foods  Drink fat-free (skim) milk or 1% milk  Eat fat-free yogurt and low-fat cottage cheese  Try low-fat cheeses such as mozzarella and other reduced-fat cheeses  · Choose meat and other protein foods that are low in fat  Choose beans or other legumes such as split peas or lentils  Choose fish, skinless poultry (chicken or turkey), or lean cuts of red meat (beef or pork)  Before you cook meat or poultry, cut off any visible fat  · Use less fat and oil  Try baking foods instead of frying them  Add less fat, such as margarine, sour cream, regular salad dressing and mayonnaise to foods  Eat fewer high-fat foods  Some examples of high-fat foods include french fries, doughnuts, ice cream, and cakes  · Eat fewer sweets  Limit foods and drinks that are high in sugar  This includes candy, cookies, regular soda, and sweetened drinks  Exercise:  Exercise at least 30 minutes per day on most days of the week  Some examples of exercise include walking, biking, dancing, and swimming  You can also fit in more physical activity by taking the stairs instead of the elevator or parking farther away from stores  Ask your healthcare provider about the best exercise plan for you  © Copyright [employer] 2018 Information is for End User's use only and may not be sold, redistributed or otherwise used for commercial purposes  All illustrations and images included in CareNotes® are the copyrighted property of [employer]  or [facility] Christopher Manjarrez    Follow-up with the neurologist of your choice regarding weekly feeling on the toes  Go for the venous Doppler of both lower extremity regarding your swelling of the left lower extremity  See podiatrist for doctor regarding your toenail care and onychomycosis      Wear stockings particularly on the left lower extremity for the compression purpose  You are due for mammogram go ahead and set up mammogram as we recommended last time  Follow with Consultants as per their and our suggestion    Follow up in 3  week(s) or as needed earlier    Follow all instructions as advised and discussed  Take your medications as prescribed  Call the office immediately if you experience any side effects  Ask questions if you do not understand  Keep your scheduled appointment as advised or come sooner if necessary or in doubt  Best time to call for non-urgent matter or questions on weekdays is between 9am and 12 noon  See physician for any new symptoms or worsening of current symptoms  Urgent or emergent situations call 911 and report to nearest emergency room  I spent  60 minutes taking care of this patient including clinical care, conseling, collaboration, chart, lab and consultaion review

## 2021-07-07 ENCOUNTER — HOSPITAL ENCOUNTER (OUTPATIENT)
Dept: RADIOLOGY | Facility: HOSPITAL | Age: 76
End: 2021-07-07
Attending: INTERNAL MEDICINE
Payer: MEDICARE

## 2021-07-07 ENCOUNTER — HOSPITAL ENCOUNTER (OUTPATIENT)
Dept: RADIOLOGY | Facility: HOSPITAL | Age: 76
Discharge: HOME | End: 2021-07-07
Attending: INTERNAL MEDICINE
Payer: MEDICARE

## 2021-07-07 DIAGNOSIS — M79.89 LEFT LEG SWELLING: ICD-10-CM

## 2021-07-07 PROCEDURE — 93970 EXTREMITY STUDY: CPT

## 2021-07-07 PROCEDURE — 93970 EXTREMITY STUDY: CPT | Performed by: SURGERY

## 2021-07-16 DIAGNOSIS — I10 ESSENTIAL HYPERTENSION: ICD-10-CM

## 2021-07-16 RX ORDER — AMLODIPINE BESYLATE 5 MG/1
TABLET ORAL
Qty: 90 TABLET | Refills: 1 | Status: SHIPPED | OUTPATIENT
Start: 2021-07-16 | End: 2022-01-05

## 2021-07-16 RX ORDER — HYDROCHLOROTHIAZIDE 12.5 MG/1
CAPSULE, GELATIN COATED ORAL
Qty: 30 CAPSULE | Refills: 4 | Status: SHIPPED | OUTPATIENT
Start: 2021-07-16 | End: 2021-08-16 | Stop reason: SDUPTHER

## 2021-07-20 ENCOUNTER — OFFICE VISIT (OUTPATIENT)
Dept: INTERNAL MEDICINE CLINIC | Facility: CLINIC | Age: 76
End: 2021-07-20
Payer: MEDICARE

## 2021-07-20 VITALS
OXYGEN SATURATION: 94 % | SYSTOLIC BLOOD PRESSURE: 132 MMHG | HEART RATE: 68 BPM | BODY MASS INDEX: 30.29 KG/M2 | HEIGHT: 67 IN | WEIGHT: 193 LBS | DIASTOLIC BLOOD PRESSURE: 80 MMHG

## 2021-07-20 DIAGNOSIS — I10 ESSENTIAL HYPERTENSION: ICD-10-CM

## 2021-07-20 DIAGNOSIS — R60.1 GENERALIZED EDEMA: Primary | ICD-10-CM

## 2021-07-20 DIAGNOSIS — G62.9 NEUROPATHY: ICD-10-CM

## 2021-07-20 DIAGNOSIS — M79.672 LEFT FOOT PAIN: ICD-10-CM

## 2021-07-20 DIAGNOSIS — C34.91 ADENOCARCINOMA OF RIGHT LUNG (HCC): ICD-10-CM

## 2021-07-20 DIAGNOSIS — F41.9 ANXIETY: ICD-10-CM

## 2021-07-20 PROCEDURE — 99213 OFFICE O/P EST LOW 20 MIN: CPT | Performed by: INTERNAL MEDICINE

## 2021-07-20 NOTE — PROGRESS NOTES
[facility] Office Visit Note  21     Curly Stafford 67 y o  female MRN: [id number]  : 1945    Assessment:     Left foot pain  Status post can fall 6 weeks ago on the left midfoot  Patient also has a pain in the left foot multiple different places patient has 2 calluses 1 in the central 4 ft undersurface as well as 1 on the lateral patient also has an the 5th toe tiny growth area  Patient has a clinical DJD of both feet  As well as some mild generalized is swelling is present of the left foot  Patient will be seeing podiatrist   Recommend comfortable Marycarmen's recommend insert recommend closely follow with podiatrist on regular basis  Patient does have a also onychomycosis of the toenails    Essential hypertension  Hypertension remains well control on losartan 50 mg daily, Toprol- mg daily and amlodipine 5 mg daily and hydrochlorothiazide 12 5 mg daily    Neuropathy  Neuropathy pain is fair controlled  Patient brought records from Dr Mary perdomo who is no longer in this area  Anxiety  Anxiety is much better  Continue same regimen patient reassured she is actually in good mood today    Generalized edema  Mild edema on the left lower extremity  Minimal edema on the right lower extremity  Recommend decrease salt the do a every day elevation a a  Venous Doppler negative  Recommend to were stockings  Probably multifactorial   Continue diuretics       Diagnoses and all orders for this visit:    Generalized edema    Essential hypertension    Left foot pain  -     XR foot 3+ vw left; Future  -     Ambulatory referral to Podiatry; Future    Anxiety    Adenocarcinoma of right lung [facility])    Neuropathy          Discussion Summary and Plan: Today's care plan and medications were reviewed with patient in detail and all their questions answered to their satisfaction      Chief Complaint   Patient presents with    Leg Swelling    Hypertension    Foot Pain    CKD III Subjective:  Patient is here for follow-up of both lower extremity swelling  We saw her 3 weeks ago  Patient had a venous Doppler which was negative  Patient lost 2 lb weight  Patient remains on hydrochlorothiazide 12 5 mg 1 every day  Of her daughter is encouraging her to cut down salt intake  I would also recommend her to start wearing stockings  Patient is planning to by near future  Recommend to weigh yourself every day or at least 3 times a week    New problem:  Patient has a foot problem a on the left foot  She does have a flat feet  She also has a small growth on the inner side of the 5th toe nail the she is planning to see new podiatrist   She also has a callus is on the lateral side as well as the middle part of the forefoot  There is no obvious infection  We recommend to get feet x-ray done with podiatrist, get answer, gets comfortable [facility] is here for chronic disease management also patient has a new complain  No clinical cellulitis or major ecchymosis or open area  As noted earlier patient dropped a can on her left foot 6 weeks ago    Hypertension:  Symptom-free much, much better control, remains on metoprolol  mg daily, hydrochlorothiazide 12 5 mg daily, losartan 50 mg daily and amlodipine daily    Elbow Pain remains sx free    CKD level 3:  GFR was down to 38 now with discontinuation of Dyazide home and back only on hydrochlorothiazide GFR improved 51  Electrolytes normal   He did renal functions noted  We also talked about using Lasix however likely it may make renal function worse and may not help unilateral swelling  The cardiologist had recommended elevation and stockings patient has not want stockings again encouraged  CA of the lung:  No cough chest congestion  Being followed by cardiothoracic surgeon  See seek scan of the chest is also order  Report awaited  The patient has not gone  COPD fair good functional capacity   rec Albuterol HFA prn    Blood sugar fair    No significant low back pain or radicular radiculopathy at this time  Hyperlipidemia excellent lipid profile normal LFT will continue atorvastatin daily  The femoral neuropathy on the right thigh saw you improving  Patient has involuntary weekly feeling of the toes of the both feet usually 2nd 3rd 4th and 5th the  Not provoked by anything at rest a do daytime and nighttime  Patient claims that the her previous neurologist did not have any good explanation the he wanted her to go to different neurologist which patient never went  It is not interfering with her life lifestyle  We also talked about getting opinion with the new neurologist as previous neurologist left this area we give the name hopefully she will comply  Generalized anxiety and depression fair  PHQ-9 Follow-up    Little interest or pleasure in doing things: 0 - not at all  Feeling down, depressed, or hopeless: 0 - not at all  Trouble falling or staying asleep, or sleeping too much: 0 - not at all  Feeling tired or having little energy: 0 - not at all  Poor appetite or overeatin - not at all  Feeling bad about yourself - or that you are a failure or have let   yourself or your family down: 0 - not at all  Trouble concentrating on things, such as reading the newspaper or watching   television: 0 - not at all  Moving or speaking so slowly that other people could have noticed   Or the   opposite - being so fidgety or restless that you have been moving around a   lot more than usual: 0 - not at all  Thoughts that you would be better off dead, or of hurting yourself in some   way: 0 - not at all  PHQ-2 Score: 0  PHQ-9 Score: 0       Thyroid nodule  :2020:  COMPARISON:  Thyroid ultrasound 1/15/2016 and 2014  No nodule meets current ACR criteria for requiring biopsy and since there has been 5 years of stability, no more followup is indicated    Colonic polyp studies done on 2009, 2009, and 10/19/2010  Refuses colonoscopy    Osteoarthritis of the hand reasonable  Benign positional vertigo stable  Pt is refusing colonoscpy  Pt canceled October  Appointment due to her mother's death  See 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 as well as her 's paperwork  History of tubular adenoma advised to get colonoscopy done  Insomnia fair  S/p Rigth THR    Femoral neuropathy fair    Pt does not want to see  Urologist and does not want to go for ultrasound of kidney and bladder  Previous important study  MRI:  Cystic or cyst-like lesion along the anterior and mid pole of the left kidney with this region not image on the current study  2  New appearing metabolic blooming artifact extending from the region of the right hip compatible with interval right hip replacement    3  Left T11 superior facet/pedicle marrow edema resolved    4  No significant interval change since the previous MRI of the 08/18/2016 11 the technical differences    5  Multilevel degenerative spondylitic changes of the lumbar spine without central canal stenosis is refer to the full body of report    6  Atherosclerotic changes of the abdominal aorta    7  Transitional anatomy at the lumbosacral study    Subsequently has been under care of also urologist     The also had a EMG and nerve conduction study done  11/30/2018 which revealed right femoral neuropathy, there is evidence of the better peroneal motor nerve neuropathy with axonal feature on which is felt to be primarily due to technical factors including atrophy of the extensor digitorum brevis muscle and hypertrophic skin changes     She is taking gabapentin without side effect  We also talked about cyst on the kidney we never scheduled ultrasound of the kidney  She refuses to go    Encouraged to go for us      06/16/2021,:  Cholesterol 147 LDL 63 HDL 60 potassium 4 2 creatinine 1 07 ALT 30 GFR 51 blood sugar 99  03/15/2021:  Creatinine 1 35, blood sugar 104, rest  CMP normal, GFR 38, CBC normal, HDL 53, LDL 86, vitamin-D 16 8  TSH 3RD GENERATON         Value: 1 410(uIU/mL)      Dt: 11/19/2020  Vit D, 25-Hydroxy         Value: 10  6(ng/mL)*       Dt: 07/22/2020  Rheumatoid Factor         Value: Negative           Dt: 10/18/2019    Mammogram:  02/26/2020 negative    CT scan of the chest on 10/12/2020 no reoccurrence  The following portions of the patient's history were reviewed and updated as appropriate: allergies, current medications, past family history, past medical history, past social history, past surgical history and problem list     Review of Systems   All other systems reviewed and are negative          Historical Information   Patient Active Problem List   Diagnosis    Aden[facility] of right lung ([facility] )    Essential hypertension    Dyslipidemia    Primary osteoarthritis involving multiple joints    Left leg swelling    Femoral neuropathy, right    Emphysema lung (HCC)    Osteoarthritis of right hip    Hyperlipidemia    Numbness and tingling of foot    Anxiety    Colon polyp    Thyroid nodule    Mixed anxiety and depressive disorder    Stage 3b chronic kidney disease ([facility] )    Diverticulosis    Skin mole    Restless leg syndrome    BPPV (benign paroxysmal positional vertigo)    Class 1 obesity due to excess calories without serious comorbidity with body mass index (BMI) of 30 0 to 30 9 in adult    Lesion of external ear    Tobacco use disorder    Neuropathy    Chorea    Varicose veins of legs    Vitamin B12 deficiency    Edema of both legs    Vitamin D deficiency    Kidney cyst, acquired    Carpal tunnel syndrome    Simple chronic bronchitis (HCC)    Breast cancer screening by mammogram    Generalized edema    Onychomycosis    Acute deep vein thrombosis (DVT) of distal vein of left lower extremity (HCC)    Left foot pain     Past Medical History:   Diagnosis Date    Emphysema lung ([facility] )     Hyperlipidemia     Hypertension     Numbness and tingling of foot     Osteoarthritis of right hip     Primary adenocarcinoma of lower lobe of right lung [facility])      Past Surgical History:   Procedure Laterality Date    CATARACT EXTRACTION      CHOLECYSTECTOMY      COLONOSCOPY      JOINT REPLACEMENT      TX TOTAL HIP ARTHROPLASTY Right 2018    Procedure: ANTERIOR TOTAL HIP ARTHROPLASTY;  Surgeon: Pepito Murphy MD;  Location: [facility];  Service: Orthopedics    TOTAL HIP ARTHROPLASTY      TUMOR REMOVAL Right 2015    lower lobe      Social History     Substance and Sexual Activity   Alcohol Use Yes    Comment: ocassional      Social History     Substance and Sexual Activity   Drug Use No     Social History     Tobacco Use   Smoking Status Former Smoker    Packs/day: 1 00    Years: 50 00    Pack years: 50 00    Types: Cigarettes    Quit date: 2015    Years since quittin 8   Smokeless Tobacco Never Used     Family History   Problem Relation Age of Onset    Heart disease Mother     No Known Problems Father      Health Maintenance Due   Topic    Pneumococcal Vaccine: 65+ Years (1 of 2 - PPSV23)    COVID-19 Vaccine (1)    DTaP,Tdap,and Td Vaccines (1 - Tdap)    Influenza Vaccine (1)      Meds/Allergies       Current Outpatient Medications:     albuterol (PROVENTIL HFA,VENTOLIN HFA) 90 mcg/act inhaler, Inhale 2 puffs every 6 (six) hours as needed for wheezing or shortness of breath, Disp: 1 Inhaler, Rfl: 5    amLODIPine (NORVASC) 5 mg tablet, ONE TAB DAILY, Disp: 90 tablet, Rfl: 1    aspirin (ECOTRIN LOW STRENGTH) 81 mg EC tablet, Take 1 tablet (81 mg total) by mouth 2 (two) times a day (Patient taking differently: Take 81 mg by mouth daily ), Disp: 60 tablet, Rfl: 0    atorvastatin (LIPITOR) 10 mg tablet, TAKE 1 TABLET DAILY AT BEDTIME, Disp: 90 tablet, Rfl: 3    doxycycline (PERIOSTAT) 20 MG tablet, 20 mg 2 (two) times a day  , Disp: , Rfl: 3    gabapentin (NEURONTIN) 300 mg capsule, Take 300 mg by mouth daily , Disp: , Rfl:     hydrochlorothiazide (MICROZIDE) 12 5 mg capsule, ONE CAPSULE DAILY  , Disp: 30 capsule, Rfl: 4    losartan (COZAAR) 50 mg tablet, Take 1 tablet (50 mg total) by mouth daily, Disp: 90 tablet, Rfl: 1    Melatonin 5 MG TABS, Take 10 mg by mouth daily at bedtime , Disp: , Rfl:     metoprolol succinate (TOPROL-XL) 100 mg 24 hr tablet, Take 1 tablet (100 mg total) by mouth daily, Disp: 90 tablet, Rfl: 1    Polyethylene Glycol 400 (BLINK TEARS) 0 25 % SOLN, Apply 1 drop to eye 2 (two) times a day  , Disp: , Rfl:       Objective:    Vitals:   /80   Pulse 68   Ht 5' 7" (1 702 m)   Wt 87 5 kg (193 lb)   SpO2 94%   BMI 30 23 kg/m²   Body mass index is 30 23 kg/m²  Vitals:    07/20/21 1256   Weight: 87 5 kg (193 lb)       Physical Exam  Constitutional:       General: She is not in acute distress  Appearance: She is well-developed  She is obese  She is not ill-appearing or diaphoretic  HENT:      Head: Normocephalic and atraumatic  Right Ear: External ear normal       Left Ear: External ear normal    Eyes:      General:         Right eye: No discharge  Left eye: No discharge  Conjunctiva/sclera: Conjunctivae normal    Neck:      Thyroid: No thyromegaly  Vascular: No carotid bruit or JVD  Cardiovascular:      Rate and Rhythm: Normal rate and regular rhythm  Heart sounds: Normal heart sounds  No murmur heard  No friction rub  No gallop  Comments: Minimal edema on the right lower extremity  1+ edema on the left lower extremity  Better than last time  Lost 2 lb weight  Pulmonary:      Effort: No respiratory distress  Breath sounds: Normal breath sounds  No wheezing, rhonchi or rales  Abdominal:      General: Bowel sounds are normal  There is no distension  Palpations: Abdomen is soft  There is no mass  Tenderness: There is no abdominal tenderness     Musculoskeletal:      Cervical back: Neck supple  No rigidity or tenderness  Lumbar back: No swelling, edema, deformity, spasms or tenderness  Decreased range of motion  Right lower leg: No edema  Left lower le+ Edema present  Feet:      Right foot:      Skin integrity: No ulcer, blister, skin breakdown, erythema or warmth  Left foot:      Skin integrity: No ulcer, blister, skin breakdown, erythema or warmth  Comments: Henny Lightning Moderate edema is present on the distal left leg  The the left foot is nontender  There is no obvious evidence of any injury a point tenderness or instability  The right lower extremity trace or minimal edema  More moderate varicosities present in both lower extremity  No clinical DVT present  On either side the  Lymphadenopathy:      Cervical: No cervical adenopathy  Skin:     General: Skin is warm  Neurological:      General: No focal deficit present  Mental Status: She is alert  Mental status is at baseline  Cranial Nerves: No cranial nerve deficit  Sensory: No sensory deficit  Motor: No weakness  Coordination: Coordination normal       Gait: Gait normal    Psychiatric:         Mood and Affect: Mood normal  Mood is not anxious  Speech: Speech normal          Behavior: Behavior normal  Behavior is not agitated, aggressive, withdrawn, hyperactive or combative  Thought Content: Thought content normal  Thought content is not paranoid or delusional  Thought content does not include homicidal or suicidal ideation  Thought content does not include suicidal plan           Judgment: Judgment normal          Lab Review   Appointment on 2021   Component Date Value Ref Range Status    Cholesterol 2021 147  50 - 200 mg/dL Final    Cholesterol:       Desirable         <200 mg/dl       Borderline         200-239 mg/dl       High              >239           Triglycerides 2021 119  <=150 mg/dL Final    Triglyceride:     Normal          <150 mg/dl Borderline High 150-199 mg/dl     High            200-499 mg/dl        Very High       >499 mg/dl    Specimen collection should occur prior to N-Acetylcysteine or Metamizole administration due to the potential for falsely depressed results   HDL, Direct 06/16/2021 60  >=40 mg/dL Final    HDL Cholesterol:       Low     <41 mg/dL  Specimen collection should occur prior to Metamizole administration due to the potential for falsley depressed results   LDL Calculated 06/16/2021 63  0 - 100 mg/dL Final    LDL Cholesterol:     Optimal           <100 mg/dl     Near Optimal      100-129 mg/dl     Above Optimal       Borderline High 130-159 mg/dl       High            160-189 mg/dl       Very High       >189 mg/dl         This screening LDL is a calculated result  It does not have the accuracy of the Direct Measured LDL in the monitoring of patients with hyperlipidemia and/or statin therapy  Direct Measure LDL (MRP963) must be ordered separately in these patients   Non-HDL-Chol (CHOL-HDL) 06/16/2021 87  mg/dl Final    Sodium 06/16/2021 140  136 - 145 mmol/L Final    Potassium 06/16/2021 4 2  3 5 - 5 3 mmol/L Final    Chloride 06/16/2021 107  100 - 108 mmol/L Final    CO2 06/16/2021 27  21 - 32 mmol/L Final    ANION GAP 06/16/2021 6  4 - 13 mmol/L Final    BUN 06/16/2021 23  5 - 25 mg/dL Final    Creatinine 06/16/2021 1 07  0 60 - 1 30 mg/dL Final    Standardized to IDMS reference method    Glucose, Fasting 06/16/2021 99  65 - 99 mg/dL Final    Specimen collection should occur prior to Sulfasalazine administration due to the potential for falsely depressed results  Specimen collection should occur prior to Sulfapyridine administration due to the potential for falsely elevated results   Calcium 06/16/2021 9 2  8 3 - 10 1 mg/dL Final    AST 06/16/2021 22  5 - 45 U/L Final    Specimen collection should occur prior to Sulfasalazine administration due to the potential for falsely depressed results       ALT 06/16/2021 30  12 - 78 U/L Final    Specimen collection should occur prior to Sulfasalazine and/or Sulfapyridine administration due to the potential for falsely depressed results   Alkaline Phosphatase 06/16/2021 72  46 - 116 U/L Final    Total Protein 06/16/2021 7 8  6 4 - 8 2 g/dL Final    Albumin 06/16/2021 3 8  3 5 - 5 0 g/dL Final    Total Bilirubin 06/16/2021 0 60  0 20 - 1 00 mg/dL Final    Use of this assay is not recommended for patients undergoing treatment with eltrombopag due to the potential for falsely elevated results   eGFR 06/16/2021 51  ml/min/1 73sq m Final         Patient Instructions   See your podiatrist as soon as possible  Follow-up with cardiologist     Ariana Brandon for left foot x-ray at your convenience no appointment necessary  Follow with Consultants as per their and our suggestion    Follow up in 2 months or as needed earlier    Follow all instructions as advised and discussed  Take your medications as prescribed  Call the office immediately if you experience any side effects  Ask questions if you do not understand  Keep your scheduled appointment as advised or come sooner if necessary or in doubt  Best time to call for non-urgent matter or questions on weekdays is between 9am and 12 noon  See physician for any new symptoms or worsening of current symptoms  Urgent or emergent situations call 911 and report to nearest emergency room  I spent  30+ minutes taking care of this patient including clinical care, conseling, collaboration, chart, lab and consultaion review as appropriate    Patient is to get labs 1 week(s) prior to next visit if advised             Dr Estevan Rosario MD  [facility]       "This note has been constructed using a voice recognition system  Therefore there may be syntax, spelling, and/or grammatical errors   Please call if you have any questions  "

## 2021-07-20 NOTE — PATIENT INSTRUCTIONS
See your podiatrist as soon as possible  Follow-up with cardiologist     Bettie Mullen for left foot x-ray at your convenience no appointment necessary  Follow with Consultants as per their and our suggestion    Follow up in 2 months or as needed earlier    Follow all instructions as advised and discussed  Take your medications as prescribed  Call the office immediately if you experience any side effects  Ask questions if you do not understand  Keep your scheduled appointment as advised or come sooner if necessary or in doubt  Best time to call for non-urgent matter or questions on weekdays is between 9am and 12 noon  See physician for any new symptoms or worsening of current symptoms  Urgent or emergent situations call 911 and report to nearest emergency room      I spent  30+ minutes taking care of this patient including clinical care, conseling, collaboration, chart, lab and consultaion review as appropriate    Patient is to get labs 1 week(s) prior to next visit if advised

## 2021-07-20 NOTE — ASSESSMENT & PLAN NOTE
Mild edema on the left lower extremity  Minimal edema on the right lower extremity  Recommend decrease salt the do a every day elevation a a  Venous Doppler negative  Recommend to were stockings    Probably multifactorial   Continue diuretics

## 2021-07-20 NOTE — ASSESSMENT & PLAN NOTE
Anxiety is much better    Continue same regimen patient reassured she is actually in good mood today

## 2021-07-20 NOTE — ASSESSMENT & PLAN NOTE
Status post can fall 6 weeks ago on the left midfoot  Patient also has a pain in the left foot multiple different places patient has 2 calluses 1 in the central 4 ft undersurface as well as 1 on the lateral patient also has an the 5th toe tiny growth area  Patient has a clinical DJD of both feet  As well as some mild generalized is swelling is present of the left foot  Patient will be seeing podiatrist   Recommend comfortable Marycarmen's recommend insert recommend closely follow with podiatrist on regular basis    Patient does have a also onychomycosis of the toenails

## 2021-07-20 NOTE — ASSESSMENT & PLAN NOTE
Neuropathy pain is fair controlled  Patient brought records from Dr Benita perdomo who is no longer in this area

## 2021-07-20 NOTE — ASSESSMENT & PLAN NOTE
Hypertension remains well control on losartan 50 mg daily, Toprol- mg daily and amlodipine 5 mg daily and hydrochlorothiazide 12 5 mg daily

## 2021-08-16 DIAGNOSIS — I10 ESSENTIAL HYPERTENSION: ICD-10-CM

## 2021-08-16 RX ORDER — HYDROCHLOROTHIAZIDE 12.5 MG/1
12.5 CAPSULE, GELATIN COATED ORAL DAILY
Qty: 90 CAPSULE | Refills: 3 | Status: SHIPPED | OUTPATIENT
Start: 2021-08-16 | End: 2022-05-03

## 2021-10-15 ENCOUNTER — HOSPITAL ENCOUNTER (OUTPATIENT)
Dept: RADIOLOGY | Facility: HOSPITAL | Age: 76
Discharge: HOME | End: 2021-10-15
Attending: THORACIC SURGERY (CARDIOTHORACIC VASCULAR SURGERY)
Payer: MEDICARE

## 2021-10-15 ENCOUNTER — TELEPHONE (OUTPATIENT)
Dept: CARDIAC SURGERY | Facility: CLINIC | Age: 76
End: 2021-10-15

## 2021-10-15 DIAGNOSIS — C34.91 ADENOCARCINOMA OF RIGHT LUNG (HCC): ICD-10-CM

## 2021-10-15 PROCEDURE — 71250 CT THORAX DX C-: CPT

## 2021-10-15 PROCEDURE — G1004 CDSM NDSC: HCPCS

## 2021-10-26 DIAGNOSIS — I10 ESSENTIAL HYPERTENSION: ICD-10-CM

## 2021-10-26 RX ORDER — METOPROLOL SUCCINATE 100 MG/1
TABLET, EXTENDED RELEASE ORAL
Qty: 90 TABLET | Refills: 3 | Status: SHIPPED | OUTPATIENT
Start: 2021-10-26

## 2021-11-01 ENCOUNTER — TELEMEDICINE (OUTPATIENT)
Dept: CARDIAC SURGERY | Facility: CLINIC | Age: 76
End: 2021-11-01

## 2021-11-01 ENCOUNTER — TELEPHONE (OUTPATIENT)
Dept: CARDIAC SURGERY | Facility: CLINIC | Age: 76
End: 2021-11-01

## 2021-11-01 DIAGNOSIS — C34.91 ADENOCARCINOMA OF RIGHT LUNG (HCC): Primary | ICD-10-CM

## 2021-11-01 PROCEDURE — NC001 PR NO CHARGE: Performed by: PHYSICIAN ASSISTANT

## 2021-11-04 ENCOUNTER — TELEPHONE (OUTPATIENT)
Dept: HEMATOLOGY ONCOLOGY | Facility: CLINIC | Age: 76
End: 2021-11-04

## 2021-11-17 DIAGNOSIS — E78.2 MIXED HYPERLIPIDEMIA: ICD-10-CM

## 2021-11-17 DIAGNOSIS — N18.32 STAGE 3B CHRONIC KIDNEY DISEASE (HCC): ICD-10-CM

## 2021-11-17 DIAGNOSIS — I10 ESSENTIAL HYPERTENSION: Primary | ICD-10-CM

## 2021-11-18 ENCOUNTER — APPOINTMENT (OUTPATIENT)
Dept: LAB | Facility: CLINIC | Age: 76
End: 2021-11-18
Payer: MEDICARE

## 2021-11-18 DIAGNOSIS — N18.32 STAGE 3B CHRONIC KIDNEY DISEASE (HCC): ICD-10-CM

## 2021-11-18 DIAGNOSIS — E78.2 MIXED HYPERLIPIDEMIA: ICD-10-CM

## 2021-11-18 DIAGNOSIS — I10 ESSENTIAL HYPERTENSION: ICD-10-CM

## 2021-11-18 LAB
ALBUMIN SERPL BCP-MCNC: 3.7 G/DL (ref 3.5–5)
ALP SERPL-CCNC: 71 U/L (ref 46–116)
ALT SERPL W P-5'-P-CCNC: 30 U/L (ref 12–78)
ANION GAP SERPL CALCULATED.3IONS-SCNC: 5 MMOL/L (ref 4–13)
AST SERPL W P-5'-P-CCNC: 18 U/L (ref 5–45)
BILIRUB SERPL-MCNC: 0.61 MG/DL (ref 0.2–1)
BUN SERPL-MCNC: 28 MG/DL (ref 5–25)
CALCIUM SERPL-MCNC: 9.1 MG/DL (ref 8.3–10.1)
CHLORIDE SERPL-SCNC: 106 MMOL/L (ref 100–108)
CHOLEST SERPL-MCNC: 139 MG/DL (ref 50–200)
CO2 SERPL-SCNC: 26 MMOL/L (ref 21–32)
CREAT SERPL-MCNC: 1.2 MG/DL (ref 0.6–1.3)
GFR SERPL CREATININE-BSD FRML MDRD: 44 ML/MIN/1.73SQ M
GLUCOSE P FAST SERPL-MCNC: 102 MG/DL (ref 65–99)
HDLC SERPL-MCNC: 56 MG/DL
LDLC SERPL CALC-MCNC: 63 MG/DL (ref 0–100)
NONHDLC SERPL-MCNC: 83 MG/DL
POTASSIUM SERPL-SCNC: 3.8 MMOL/L (ref 3.5–5.3)
PROT SERPL-MCNC: 7.3 G/DL (ref 6.4–8.2)
SODIUM SERPL-SCNC: 137 MMOL/L (ref 136–145)
TRIGL SERPL-MCNC: 98 MG/DL

## 2021-11-18 PROCEDURE — 80061 LIPID PANEL: CPT

## 2021-11-18 PROCEDURE — 80053 COMPREHEN METABOLIC PANEL: CPT

## 2021-11-18 PROCEDURE — 36415 COLL VENOUS BLD VENIPUNCTURE: CPT

## 2021-11-24 ENCOUNTER — OFFICE VISIT (OUTPATIENT)
Dept: INTERNAL MEDICINE CLINIC | Facility: CLINIC | Age: 76
End: 2021-11-24
Payer: MEDICARE

## 2021-11-24 VITALS
SYSTOLIC BLOOD PRESSURE: 128 MMHG | HEIGHT: 67 IN | HEART RATE: 69 BPM | WEIGHT: 190 LBS | TEMPERATURE: 97.2 F | BODY MASS INDEX: 29.82 KG/M2 | OXYGEN SATURATION: 96 % | DIASTOLIC BLOOD PRESSURE: 78 MMHG

## 2021-11-24 DIAGNOSIS — M79.89 LEFT LEG SWELLING: ICD-10-CM

## 2021-11-24 DIAGNOSIS — N18.32 STAGE 3B CHRONIC KIDNEY DISEASE (HCC): ICD-10-CM

## 2021-11-24 DIAGNOSIS — G62.9 NEUROPATHY: ICD-10-CM

## 2021-11-24 DIAGNOSIS — E55.9 VITAMIN D DEFICIENCY: ICD-10-CM

## 2021-11-24 DIAGNOSIS — E53.8 VITAMIN B12 DEFICIENCY: ICD-10-CM

## 2021-11-24 DIAGNOSIS — E78.2 MIXED HYPERLIPIDEMIA: ICD-10-CM

## 2021-11-24 DIAGNOSIS — F41.9 ANXIETY: ICD-10-CM

## 2021-11-24 DIAGNOSIS — E78.5 DYSLIPIDEMIA: ICD-10-CM

## 2021-11-24 DIAGNOSIS — C34.91 ADENOCARCINOMA OF RIGHT LUNG (HCC): Primary | ICD-10-CM

## 2021-11-24 DIAGNOSIS — E04.1 THYROID NODULE: ICD-10-CM

## 2021-11-24 DIAGNOSIS — I25.84 CORONARY ARTERY CALCIFICATION: ICD-10-CM

## 2021-11-24 DIAGNOSIS — Z12.31 SCREENING MAMMOGRAM, ENCOUNTER FOR: ICD-10-CM

## 2021-11-24 DIAGNOSIS — G25.81 RESTLESS LEG SYNDROME: ICD-10-CM

## 2021-11-24 DIAGNOSIS — E66.09 CLASS 1 OBESITY DUE TO EXCESS CALORIES WITHOUT SERIOUS COMORBIDITY WITH BODY MASS INDEX (BMI) OF 30.0 TO 30.9 IN ADULT: ICD-10-CM

## 2021-11-24 DIAGNOSIS — Z23 ENCOUNTER FOR IMMUNIZATION: ICD-10-CM

## 2021-11-24 DIAGNOSIS — R60.0 EDEMA OF BOTH LEGS: ICD-10-CM

## 2021-11-24 DIAGNOSIS — I25.10 CORONARY ARTERY CALCIFICATION: ICD-10-CM

## 2021-11-24 DIAGNOSIS — F41.8 MIXED ANXIETY AND DEPRESSIVE DISORDER: ICD-10-CM

## 2021-11-24 DIAGNOSIS — M79.672 LEFT FOOT PAIN: ICD-10-CM

## 2021-11-24 DIAGNOSIS — I10 ESSENTIAL HYPERTENSION: ICD-10-CM

## 2021-11-24 PROCEDURE — 99214 OFFICE O/P EST MOD 30 MIN: CPT | Performed by: INTERNAL MEDICINE

## 2021-11-24 PROCEDURE — 90662 IIV NO PRSV INCREASED AG IM: CPT | Performed by: INTERNAL MEDICINE

## 2021-11-24 PROCEDURE — G0008 ADMIN INFLUENZA VIRUS VAC: HCPCS | Performed by: INTERNAL MEDICINE

## 2021-11-24 RX ORDER — SODIUM FLUORIDE 1.1 G/100G
GEL, DENTIFRICE ORAL
COMMUNITY
Start: 2021-11-03

## 2021-11-30 ENCOUNTER — OFFICE VISIT (OUTPATIENT)
Dept: CARDIAC SURGERY | Facility: CLINIC | Age: 76
End: 2021-11-30
Payer: MEDICARE

## 2021-11-30 VITALS
WEIGHT: 194.67 LBS | BODY MASS INDEX: 30.55 KG/M2 | HEIGHT: 67 IN | RESPIRATION RATE: 16 BRPM | HEART RATE: 71 BPM | OXYGEN SATURATION: 99 % | SYSTOLIC BLOOD PRESSURE: 140 MMHG | TEMPERATURE: 97.5 F | DIASTOLIC BLOOD PRESSURE: 88 MMHG

## 2021-11-30 DIAGNOSIS — C34.91 ADENOCARCINOMA OF RIGHT LUNG (HCC): Primary | ICD-10-CM

## 2021-11-30 PROCEDURE — 99213 OFFICE O/P EST LOW 20 MIN: CPT | Performed by: PHYSICIAN ASSISTANT

## 2021-12-06 DIAGNOSIS — Z78.9 NEED FOR FOLLOW-UP BY SOCIAL WORKER: Primary | ICD-10-CM

## 2021-12-07 ENCOUNTER — PATIENT OUTREACH (OUTPATIENT)
Dept: CASE MANAGEMENT | Facility: HOSPITAL | Age: 76
End: 2021-12-07

## 2021-12-21 ENCOUNTER — PATIENT OUTREACH (OUTPATIENT)
Dept: CASE MANAGEMENT | Facility: HOSPITAL | Age: 76
End: 2021-12-21

## 2021-12-29 ENCOUNTER — PATIENT OUTREACH (OUTPATIENT)
Dept: CASE MANAGEMENT | Facility: HOSPITAL | Age: 76
End: 2021-12-29

## 2022-01-05 DIAGNOSIS — I10 ESSENTIAL HYPERTENSION: ICD-10-CM

## 2022-01-05 RX ORDER — AMLODIPINE BESYLATE 5 MG/1
TABLET ORAL
Qty: 90 TABLET | Refills: 1 | Status: SHIPPED | OUTPATIENT
Start: 2022-01-05 | End: 2022-04-12 | Stop reason: SDUPTHER

## 2022-01-12 ENCOUNTER — OFFICE VISIT (OUTPATIENT)
Dept: CARDIOLOGY CLINIC | Facility: CLINIC | Age: 77
End: 2022-01-12
Payer: MEDICARE

## 2022-01-12 VITALS
BODY MASS INDEX: 30.61 KG/M2 | DIASTOLIC BLOOD PRESSURE: 70 MMHG | TEMPERATURE: 98.3 F | OXYGEN SATURATION: 98 % | HEIGHT: 67 IN | SYSTOLIC BLOOD PRESSURE: 120 MMHG | WEIGHT: 195 LBS | HEART RATE: 61 BPM

## 2022-01-12 DIAGNOSIS — I11.9 HYPERTENSIVE HEART DISEASE WITHOUT HEART FAILURE: ICD-10-CM

## 2022-01-12 DIAGNOSIS — N18.30 STAGE 3 CHRONIC KIDNEY DISEASE, UNSPECIFIED WHETHER STAGE 3A OR 3B CKD (HCC): ICD-10-CM

## 2022-01-12 DIAGNOSIS — E78.5 DYSLIPIDEMIA: ICD-10-CM

## 2022-01-12 DIAGNOSIS — J43.9 PULMONARY EMPHYSEMA, UNSPECIFIED EMPHYSEMA TYPE (HCC): ICD-10-CM

## 2022-01-12 DIAGNOSIS — F41.8 MIXED ANXIETY AND DEPRESSIVE DISORDER: ICD-10-CM

## 2022-01-12 DIAGNOSIS — R06.02 SHORTNESS OF BREATH: ICD-10-CM

## 2022-01-12 DIAGNOSIS — E66.09 CLASS 1 OBESITY DUE TO EXCESS CALORIES WITHOUT SERIOUS COMORBIDITY WITH BODY MASS INDEX (BMI) OF 31.0 TO 31.9 IN ADULT: ICD-10-CM

## 2022-01-12 DIAGNOSIS — I25.10 CORONARY ARTERY CALCIFICATION SEEN ON CAT SCAN: ICD-10-CM

## 2022-01-12 DIAGNOSIS — E78.2 MIXED HYPERLIPIDEMIA: ICD-10-CM

## 2022-01-12 PROCEDURE — 99214 OFFICE O/P EST MOD 30 MIN: CPT | Performed by: INTERNAL MEDICINE

## 2022-01-12 PROCEDURE — 93000 ELECTROCARDIOGRAM COMPLETE: CPT | Performed by: INTERNAL MEDICINE

## 2022-01-12 NOTE — PROGRESS NOTES
Progress Note- Cardiology  Office  [facility]     Beverly Men 67 y o  female MRN: [id number]  : 1945  Encounter: [id number]    Assessment:  1  Essential hypertension    2  Shortness of breath    3  Dyslipidemia    4  Class 1 obesity due to excess calories without serious comorbidity with body mass index (BMI) of 31 0 to 31 9 in adult    5  Mixed anxiety and depressive disorder    6  Stage 3 chronic kidney disease, unspecified whether stage 3a or 3b CKD ([facility] )    7  Mixed hyperlipidemia    8  Pulmonary emphysema, unspecified emphysema type ([facility] )    9  Coronary artery calcification seen on CAT scan        Discussion Summary & Plan:   1  Essential hypertension/hypertensive heart disease  Patient blood pressure was elevated  Needed better control  She was started on increased dose of amlodipine  Currently she is also on losartan 50 mg daily, metoprolol  mg daily and she was on Dyazide however her creatinine went up  Will discontinue Dyazide and started on hydrochlorothiazide 12 5 mg daily  Her BMP is acceptable labs from 2020 reviewed creatinine 1 2      2  Dyslipidemia  Continue statins  She is on Lipitor 10 mg cholesterol profile acceptable  Cholesterol was 139 with LDL of 63    3  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  Follow up with CT surgery no reoccurrence so far  Clinically she is doing well she has some bilateral rhonchi she had history of emphysema she is aware of it now  No new complaint    4  Left leg swelling  She had history of lower extremity swelling  Much better now  Will need diuretics  Okay to accept creatinine around 1 2-1 3 as long as it is stable  Currently stable    5  Shortness of breath with exertion  Most likely related to her previous history of lung cancer  But she had coronary calcification will check Lexiscan stress test   She can not walk on treadmill    6   History of femoral neuropathy  Currently stable    7  Coronary calcification  Scheduled for Lexiscan stress test already on aspirin and statins  Advised patient to be compliant with blood pressure medications  Labs reviewed with her  Currently stable cardiovascular status  Results of stress test discussed with her  Continue other Rx as before  Counseling :  A description of the counseling:   Goals and Barriers:  Patient's ability to self care: Yes  Medication side effect reviewed with patient in detail and all their questions answered to their satisfaction  Physician Requesting Consult: Obed Lackey MD    HPI:     Mohan German is a 67y o  year old female  Who was initially seen by me in 2015 has past medical history significant for lung cancer status post partial lobectomy right lower lobe  She had a stage IA cancer and she follows up with [facility] thoracic surgery  She also had past medical history significant for hypertension, previous tobacco abuse now quit smoking, emphysema, dyslipidemia, severe DJD with previous left hip surgery now need surgery on her right hip  Since her last visit in 2015 she has gained some weight  She also noted that she occasionally gets bilateral lower extremity swelling left more than right  She was on Dyazide which was recently had as her BUN creatinine might be high and that led to increase in her blood pressure  She also increased swelling she is back on Dyazide now every other day  She also takes losartan 50 mg, metoprolol XL 75 mg daily and atorvastatin  Her previous cardiac workup was in 2015 April when she had a normal stress test and study was non gated  Echo shows at that time she has a normal LV systolic function EF was 83% with grade 1 diastolic dysfunction  She cannot walk much she walks with the help of cane  No nausea no vomiting no PND no chest pain      She denies any history of stenting in between no history of MI no history of heart attack no heart failure  03/24/2021  Above reviewed  Patient came for follow-up  She is under stress because her mother has passed away and she has to take care of her stuff  She is doing well from cardiac point of view  However her blood pressure was found high and primary care doctor increase her amlodipine to 5 mg daily  She also was taking his losartan 50 mg daily, metoprolol  mg daily and Dyazide 4 times a week  Her creatinine has increased to 1 35  She had history of lung cancer status post surgery  She has quit smoking today heart rate 56 beats per minute and EKG shows no other significant abnormality  Her labs from March 15, 2021 reviewed cholesterol profile other labs were acceptable  Last few months she was struggling with her hip pain as well as back pain and she was taking multiple Aleve    01/12/2022  Above reviewed  Patient came for follow-up she is doing well  Her blood pressure has now improved with increase in amlodipine  She is also taking losartan, metoprolol XL and Dyazide 4 times a week  History of lung cancer status post surgery  She used to smoke she quit smoking many years ago  EKG shows normal sinus rhythm heart rate 61 beats per minute and cholesterol profile is acceptable  Recently she had a CT scan done for her lung cancer which shows he has moderate coronary calcification as well as aortic calcification  She denies any new symptom her last nuclear stress test was in 2018  Her labs done in November 2020 20 reviewed are acceptable  She has occasionally got episodes of exertional shortness of breath particularly when she walks to her mailbox or she removes no no symptoms while she is sitting  Review of Systems   Constitutional: Negative for activity change, chills, diaphoresis, fever and unexpected weight change  HENT: Negative for congestion  Eyes: Negative for discharge and redness  Respiratory: Positive for shortness of breath   Negative for cough, chest tightness and wheezing  Exertional shortness of breath   Cardiovascular: Negative  Negative for chest pain, palpitations and leg swelling  Gastrointestinal: Negative for abdominal pain, diarrhea and nausea  Endocrine: Negative  Genitourinary: Negative for decreased urine volume and urgency  Musculoskeletal: Positive for arthralgias  Negative for back pain and gait problem  Skin: Negative for rash and wound  Allergic/Immunologic: Negative  Neurological: Negative for dizziness, seizures, syncope, weakness, light-headedness and headaches  Hematological: Negative  Psychiatric/Behavioral: Negative for agitation and confusion  The patient is nervous/anxious          Historical Information   Past Medical History:   Diagnosis Date    Emphysema lung ([address] )     Hyperlipidemia     Hypertension     Numbness and tingling of foot     Osteoarthritis of right hip     Primary adenocarcinoma of lower lobe of right lung [facility])      Past Surgical History:   Procedure Laterality Date    CATARACT EXTRACTION      CHOLECYSTECTOMY      COLONOSCOPY      JOINT REPLACEMENT      [place] TOTAL HIP ARTHROPLASTY Right 2018    Procedure: ANTERIOR TOTAL HIP ARTHROPLASTY;  Surgeon: Danny Cramer MD;  Location: [facility];  Service: Orthopedics    TOTAL HIP ARTHROPLASTY      TUMOR REMOVAL Right 2015    lower lobe        Social History:  Social History     Substance and Sexual Activity   Alcohol Use Yes    Comment: ocassional      Social History     Substance and Sexual Activity   Drug Use No     Social History     Tobacco Use   Smoking Status Former Smoker    Packs/day: 1 00    Years: 50 00    Pack years: 50 00    Types: Cigarettes    Quit date: 2015    Years since quittin 3   Smokeless Tobacco Never Used          Family History   Problem Relation Age of Onset    Heart disease Mother     No Known Problems Father        Meds/Allergies     No Known Allergies    Current medications:    Current Outpatient Medications:     albuterol (PROVENTIL HFA,VENTOLIN HFA) 90 mcg/act inhaler, Inhale 2 puffs every 6 (six) hours as needed for wheezing or shortness of breath, Disp: 1 Inhaler, Rfl: 5    amLODIPine (NORVASC) 5 mg tablet, TAKE ONE TABLET DAILY, Disp: 90 tablet, Rfl: 1    aspirin (ECOTRIN LOW STRENGTH) 81 mg EC tablet, Take 1 tablet (81 mg total) by mouth 2 (two) times a day, Disp: 60 tablet, Rfl: 0    atorvastatin (LIPITOR) 10 mg tablet, TAKE 1 TABLET DAILY AT BEDTIME, Disp: 90 tablet, Rfl: 3    Denta 5000 Plus 1 1 % CREA, , Disp: , Rfl:     doxycycline (PERIOSTAT) 20 MG tablet, 20 mg 2 (two) times a day  , Disp: , Rfl: 3    gabapentin (NEURONTIN) 300 mg capsule, Take 300 mg by mouth daily , Disp: , Rfl:     hydrochlorothiazide (MICROZIDE) 12 5 mg capsule, Take 1 capsule (12 5 mg total) by mouth daily, Disp: 90 capsule, Rfl: 3    losartan (COZAAR) 50 mg tablet, TAKE 1 TABLET DAILY, Disp: 90 tablet, Rfl: 1    Melatonin 5 MG TABS, Take 10 mg by mouth daily at bedtime , Disp: , Rfl:     metoprolol succinate (TOPROL-XL) 100 mg 24 hr tablet, TAKE 1 TABLET DAILY, Disp: 90 tablet, Rfl: 3    Polyethylene Glycol 400 (BLINK TEARS) 0 25 % SOLN, Apply 1 drop to eye 2 (two) times a day  , Disp: , Rfl:       Objective:     Vitals: Blood pressure 120/70, pulse 61, temperature 98 3 °F (36 8 °C), temperature source Temporal, height 5' 7" (1 702 m), weight 88 5 kg (195 lb), SpO2 98 %  Body mass index is 30 54 kg/m²  Vitals:    01/12/22 1314   Weight: 88 5 kg (195 lb)     BP Readings from Last 3 Encounters:   01/12/22 120/70   11/30/21 140/88   11/24/21 128/78         Physical Exam:   Physical Exam  Constitutional:       General: She is not in acute distress  Appearance: She is well-developed  She is not diaphoretic  Neck:      Thyroid: No thyromegaly  Vascular: No JVD  Trachea: No tracheal deviation  Cardiovascular:      Rate and Rhythm: Normal rate and regular rhythm  Heart sounds: S1 normal and S2 normal  Heart sounds not distant  Murmur heard  Systolic (ejection) murmur is present with a grade of 2/6  No friction rub  No gallop  No S3 or S4 sounds  Comments: S1-S2 regular with 2/6 murmur  Pulmonary:      Effort: Pulmonary effort is normal  No respiratory distress  Breath sounds: No wheezing or rales  Comments: Bilateral air entry with prolonged expiratory phase no rhonchi no wheezing  Chest:      Chest wall: No tenderness  Abdominal:      General: Bowel sounds are normal  There is no distension  Palpations: Abdomen is soft  Tenderness: There is no abdominal tenderness  Musculoskeletal:         General: No deformity  Cervical back: Neck supple  Comments: No edema   Skin:     General: Skin is warm and dry  Coloration: Skin is not pale  Findings: No rash  Neurological:      Mental Status: She is alert and oriented to person, place, and time  Psychiatric:         Behavior: Behavior normal          Judgment: Judgment normal              Labs:     Lab Results   Component Value Date    WBC 9 06 03/15/2021    HGB 14 1 03/15/2021    HCT 45 3 03/15/2021    MCV 92 03/15/2021    RDW 13 8 03/15/2021     03/15/2021     BMP:  Lab Results   Component Value Date     09/20/2015    K 3 8 11/18/2021     11/18/2021    CO2 26 11/18/2021    ANIONGAP 6 09/20/2015    BUN 28 (H) 11/18/2021    CREATININE 1 20 11/18/2021    GLUCOSE 109 09/20/2015    GLUF 102 (H) 11/18/2021    CALCIUM 9 1 11/18/2021    EGFR 44 11/18/2021    MG 2 2 09/20/2015     LFT:  Lab Results   Component Value Date    AST 18 11/18/2021    ALT 30 11/18/2021    ALKPHOS 71 11/18/2021     Lipid Profile:   Lab Results   Component Value Date    HDL 56 11/18/2021    LDLCALC 63 11/18/2021    TRIG 98 11/18/2021         Imaging & Testing     Cardiac testing:       Nuclear stress test 09/07/2018 was normal with EF around 74%    It was [place] stress test     Diagnostic Studies Review Cardio:   Echo Doppler echo Doppler done in April of 2015 shows EF 70%, hyperdynamic LV, grade 1 diastolic dysfunction, trace MR Trace TR  Nuclear stress test done on 04/03/2015 was nonischemic it was non gated study  EKG:    Twelve lead EKG done in our office on 09/06/2018 shows normal sinus rhythm heart rate 72 beats per minute  No significant ST changes  Twelve lead EKG 09/19/2019 shows normal sinus rhythm heart rate 62 beats per minute no significant ST changes  No change from old EKG  Twelve lead EKG 03/07/2020 shows normal sinus rhythm heart rate 72 beats per minute  QS in V2 V3 most likely due to lead location no change from old EKG    Twelve lead EKG 09/24/2020 shows normal sinus rhythm heart rate 74 beats per minute  QS in V1 to V3 not change from old EKG  Dr Jonathan Toledo MD [facility]      "This note has been constructed using a voice recognition system  Therefore there may be syntax, spelling, and/or grammatical errors  Please call if you have any questions

## 2022-02-16 ENCOUNTER — LAB (OUTPATIENT)
Dept: LAB | Facility: CLINIC | Age: 77
End: 2022-02-16
Payer: MEDICARE

## 2022-02-16 DIAGNOSIS — F41.9 ANXIETY: ICD-10-CM

## 2022-02-16 DIAGNOSIS — N18.32 STAGE 3B CHRONIC KIDNEY DISEASE (HCC): ICD-10-CM

## 2022-02-16 DIAGNOSIS — I25.84 CORONARY ARTERY CALCIFICATION: ICD-10-CM

## 2022-02-16 DIAGNOSIS — E55.9 VITAMIN D DEFICIENCY: ICD-10-CM

## 2022-02-16 DIAGNOSIS — C34.91 ADENOCARCINOMA OF RIGHT LUNG (HCC): ICD-10-CM

## 2022-02-16 DIAGNOSIS — M79.89 LEFT LEG SWELLING: ICD-10-CM

## 2022-02-16 DIAGNOSIS — E78.2 MIXED HYPERLIPIDEMIA: ICD-10-CM

## 2022-02-16 DIAGNOSIS — I25.10 CORONARY ARTERY CALCIFICATION: ICD-10-CM

## 2022-02-16 DIAGNOSIS — G25.81 RESTLESS LEG SYNDROME: ICD-10-CM

## 2022-02-16 DIAGNOSIS — E53.8 VITAMIN B12 DEFICIENCY: ICD-10-CM

## 2022-02-16 DIAGNOSIS — I10 ESSENTIAL HYPERTENSION: ICD-10-CM

## 2022-02-16 LAB
25(OH)D3 SERPL-MCNC: 27.1 NG/ML (ref 30–100)
ALBUMIN SERPL BCP-MCNC: 3.7 G/DL (ref 3.5–5)
ALP SERPL-CCNC: 69 U/L (ref 46–116)
ALT SERPL W P-5'-P-CCNC: 33 U/L (ref 12–78)
ANION GAP SERPL CALCULATED.3IONS-SCNC: 6 MMOL/L (ref 4–13)
AST SERPL W P-5'-P-CCNC: 22 U/L (ref 5–45)
BASOPHILS # BLD AUTO: 0.09 THOUSANDS/ΜL (ref 0–0.1)
BASOPHILS NFR BLD AUTO: 1 % (ref 0–1)
BILIRUB SERPL-MCNC: 0.5 MG/DL (ref 0.2–1)
BUN SERPL-MCNC: 23 MG/DL (ref 5–25)
CALCIUM SERPL-MCNC: 9 MG/DL (ref 8.3–10.1)
CHLORIDE SERPL-SCNC: 108 MMOL/L (ref 100–108)
CHOLEST SERPL-MCNC: 141 MG/DL
CO2 SERPL-SCNC: 25 MMOL/L (ref 21–32)
CREAT SERPL-MCNC: 1.22 MG/DL (ref 0.6–1.3)
EOSINOPHIL # BLD AUTO: 0.39 THOUSAND/ΜL (ref 0–0.61)
EOSINOPHIL NFR BLD AUTO: 4 % (ref 0–6)
ERYTHROCYTE [DISTWIDTH] IN BLOOD BY AUTOMATED COUNT: 13.9 % (ref 11.6–15.1)
GFR SERPL CREATININE-BSD FRML MDRD: 42 ML/MIN/1.73SQ M
GLUCOSE P FAST SERPL-MCNC: 104 MG/DL (ref 65–99)
HCT VFR BLD AUTO: 44.2 % (ref 34.8–46.1)
HDLC SERPL-MCNC: 54 MG/DL
HGB BLD-MCNC: 13.9 G/DL (ref 11.5–15.4)
IMM GRANULOCYTES # BLD AUTO: 0.04 THOUSAND/UL (ref 0–0.2)
IMM GRANULOCYTES NFR BLD AUTO: 0 % (ref 0–2)
LDLC SERPL CALC-MCNC: 64 MG/DL (ref 0–100)
LYMPHOCYTES # BLD AUTO: 3.33 THOUSANDS/ΜL (ref 0.6–4.47)
LYMPHOCYTES NFR BLD AUTO: 31 % (ref 14–44)
MCH RBC QN AUTO: 29 PG (ref 26.8–34.3)
MCHC RBC AUTO-ENTMCNC: 31.4 G/DL (ref 31.4–37.4)
MCV RBC AUTO: 92 FL (ref 82–98)
MONOCYTES # BLD AUTO: 0.48 THOUSAND/ΜL (ref 0.17–1.22)
MONOCYTES NFR BLD AUTO: 5 % (ref 4–12)
NEUTROPHILS # BLD AUTO: 6.32 THOUSANDS/ΜL (ref 1.85–7.62)
NEUTS SEG NFR BLD AUTO: 59 % (ref 43–75)
NONHDLC SERPL-MCNC: 87 MG/DL
NRBC BLD AUTO-RTO: 0 /100 WBCS
PLATELET # BLD AUTO: 277 THOUSANDS/UL (ref 149–390)
PMV BLD AUTO: 9.3 FL (ref 8.9–12.7)
POTASSIUM SERPL-SCNC: 4.4 MMOL/L (ref 3.5–5.3)
PROT SERPL-MCNC: 7.7 G/DL (ref 6.4–8.2)
RBC # BLD AUTO: 4.8 MILLION/UL (ref 3.81–5.12)
SODIUM SERPL-SCNC: 139 MMOL/L (ref 136–145)
TRIGL SERPL-MCNC: 113 MG/DL
WBC # BLD AUTO: 10.65 THOUSAND/UL (ref 4.31–10.16)

## 2022-02-16 PROCEDURE — 36415 COLL VENOUS BLD VENIPUNCTURE: CPT

## 2022-02-16 PROCEDURE — 80053 COMPREHEN METABOLIC PANEL: CPT

## 2022-02-16 PROCEDURE — 82306 VITAMIN D 25 HYDROXY: CPT

## 2022-02-16 PROCEDURE — 85025 COMPLETE CBC W/AUTO DIFF WBC: CPT

## 2022-02-16 PROCEDURE — 80061 LIPID PANEL: CPT

## 2022-02-23 ENCOUNTER — RA CDI HCC (OUTPATIENT)
Dept: OTHER | Facility: HOSPITAL | Age: 77
End: 2022-02-23

## 2022-02-24 ENCOUNTER — OFFICE VISIT (OUTPATIENT)
Dept: INTERNAL MEDICINE CLINIC | Facility: CLINIC | Age: 77
End: 2022-02-24
Payer: MEDICARE

## 2022-02-24 VITALS — HEART RATE: 67 BPM | WEIGHT: 192 LBS | HEIGHT: 67 IN | BODY MASS INDEX: 30.13 KG/M2 | OXYGEN SATURATION: 97 %

## 2022-02-24 DIAGNOSIS — I82.4Z2 ACUTE DEEP VEIN THROMBOSIS (DVT) OF DISTAL VEIN OF LEFT LOWER EXTREMITY (HCC): ICD-10-CM

## 2022-02-24 DIAGNOSIS — I25.10 CORONARY ARTERY CALCIFICATION SEEN ON CAT SCAN: ICD-10-CM

## 2022-02-24 DIAGNOSIS — M16.11 OSTEOARTHRITIS OF RIGHT HIP, UNSPECIFIED OSTEOARTHRITIS TYPE: ICD-10-CM

## 2022-02-24 DIAGNOSIS — E55.9 VITAMIN D DEFICIENCY: ICD-10-CM

## 2022-02-24 DIAGNOSIS — I10 ESSENTIAL HYPERTENSION: ICD-10-CM

## 2022-02-24 DIAGNOSIS — M79.89 LEFT LEG SWELLING: ICD-10-CM

## 2022-02-24 DIAGNOSIS — N18.32 STAGE 3B CHRONIC KIDNEY DISEASE (HCC): ICD-10-CM

## 2022-02-24 DIAGNOSIS — I10 HYPERTENSION, UNSPECIFIED TYPE: Primary | ICD-10-CM

## 2022-02-24 DIAGNOSIS — G62.9 NEUROPATHY: ICD-10-CM

## 2022-02-24 PROCEDURE — 99213 OFFICE O/P EST LOW 20 MIN: CPT | Performed by: INTERNAL MEDICINE

## 2022-02-24 NOTE — ASSESSMENT & PLAN NOTE
Lab Results   Component Value Date    EGFR 42 02/16/2022    EGFR 44 11/18/2021    EGFR 51 06/16/2021    CREATININE 1 22 02/16/2022    CREATININE 1 20 11/18/2021    CREATININE 1 07 06/16/2021     May be secondary to HTN  - follow BMP  - avoid nephrotoxic meds  - strict control of BP

## 2022-02-24 NOTE — ASSESSMENT & PLAN NOTE
Hx of DVT provoked by hip replacement  Currently not on [facility]

## 2022-02-24 NOTE — PROGRESS NOTES
[facility] Office Visit Note  22     Rosetta Done 67 y o  female MRN: [id number]  : 1945    Assessment:     1  Hypertension, unspecified type  -     CBC and differential; Future  -     Comprehensive metabolic panel; Future  -     Vitamin D 25 hydroxy; Future    2  Osteoarthritis of right hip, unspecified osteoarthritis type  Assessment & Plan:  S/p R hip replacement  Patient ambulates well      3  Left leg swelling  Assessment & Plan:  Venous doppler was negative for DVT in 21  Amlodipine may be contributing  - continue wearing compression stocking  - leg elevation        4  Vitamin D deficiency   Assessment & Plan:  Patient takes vit D supplement 1000U inconsistently  Level trended 16 8->27 1  - recommended to take on daily basis  - follow up vit D level    Orders:  -     Vitamin D 25 hydroxy; Future    5  Essential hypertension  Assessment & Plan:  Patient is on multiple blood pressure medications including diuretic  The doses are not maximal  Blood pressure suboptimally controled with BP of 150/80 mmHg today in the office  Potassium noted at 4 4  Patient  Is advised to check BP at home twice a day  Patient follows with cardiology she was recently started on hydrochlorothiazide 12 5 mg  Will continue to current medication for now    - continue amlodipine 5 mg daily, metoprolol 100mg daily, losartan 50 mg , HCTZ 12 5 mg daily  - Consider work up for secondary HTN TSH, cortisol level, renin/aldostreron ratio  - follow up in 1 moth next step would be to go up on HCTZ or amlodipine ( however less preferable as patient has mild leg swelling)      6  Coronary artery calcification seen on CAT scan  Assessment & Plan:  CT chest showed calcified coronaries  She admits MCGRAW, denies CP  Her ADL is not limited  Patient is schedules for Nuclear stress test      7   Acute deep vein thrombosis (DVT) of distal vein of left lower extremity (HCC)  Assessment & Plan:  Hx of DVT provoked by hip replacement  Currently not on AC      8  Neuropathy  Assessment & Plan:  Stable continue gabapentin      9  Stage 3b chronic kidney disease [facility])  Assessment & Plan:  Lab Results   Component Value Date    EGFR 42 02/16/2022    EGFR 44 11/18/2021    EGFR 51 06/16/2021    CREATININE 1 22 02/16/2022    CREATININE 1 20 11/18/2021    CREATININE 1 07 06/16/2021     May be secondary to HTN  - follow BMP  - avoid nephrotoxic meds  - strict control of BP          Discussion Summary and Plan: Today's care plan and medications were reviewed with patient in detail and all their questions answered to their satisfaction  Generally fair  Hyperlipidemia continue statin low-cholesterol diet follow-up lab data hypertension fair control chronic low back pain often on  Some knee pain on the left recommend to see orthopedic  COPD fair CA of the lung stable neuropathy fair blood sugar 2 on O2 recommend diet exercise weight loss BMI 29 76  Lung nodule will monitor  Patient will do mammogram, left lower extremities swelling baseline CKD baseline foot pain being followed by podiatrist multiple joint DJD fair restless legs reasonable of will follow back in 3 months all question answered to satisfaction    No chief complaint on file  Subjective:    Patient is here for chronic disease management    Patient Reports she tolerates physical activity well  She denies getting chest pain or SOB with walking  She sometimes can get SOB with shoveling  Patient has scheduled for nuclear stress test     Patient continues to have both lower extremity swelling  Patient had a venous Doppler which was negative  Patient lost 2 lb weight  Patient remains on hydrochlorothiazide 12 5 mg 1 every day  Of her daughter is encouraging her to cut down salt intake  Recommend to continue to wear stocking  Patient is also on amlodipine that may be contributing  Patient is satisfied      Left foot:  Flat feet, patient under care of podiatrist, She also has a small growth on the inner side of the 5th toe nail theShe also has a callus is on the lateral side as well as the middle part of the forefoot  Patient was seen by new podiatrist and had local injection done      Hypertension:  Symptom-free much, however suboptimally controled, remains on metoprolol  mg daily, hydrochlorothiazide 12 5 mg daily, losartan 50 mg daily and amlodipine daily  Recommended to document BP at home  Will consider to go up on HCTZ, amlodipine  Will consider work up for srecondary HTN    Elbow Pain remains sx free    CKD level 3:  hydrochlorothiazide  Electrolytes normal   GFR 44 continue to monitor  CKD level 3  Is secondary to hypertension     CA of the lung:  No cough chest congestion  Being followed by cardiothoracic surgeon  CT chest without signs of recurrence  The patient has not gone  COPD fair good functional capacity  rec Albuterol HFA prn    Blood sugar fair, blood sugar 104 recommend diet and exercise and weight loss  If no improvement with follow A1c    No significant low back pain or radicular radiculopathy at this time  Hyperlipidemia excellent lipid profile normal LFT will continue atorvastatin 10mg daily  The femoral neuropathy on the right thigh saw you improving  Generalized anxiety and depression fair  PHQ9 0 PERRY 7  PERRY-7 Flowsheet Screening      Most Recent Value   Over the last 2 weeks, how often have you been bothered by any of the   following problems?     Feeling nervous, anxious, or on edge 1   Not being able to stop or control worrying 1   Worrying too much about different things 1   Trouble relaxing 0   Being so restless that it is hard to sit still 0   Becoming easily annoyed or irritable 0   Feeling afraid as if something awful might happen 0   PERRY-7 Total Score 3          Thyroid nodule  :01/03/2020:  COMPARISON:  Thyroid ultrasound 1/15/2016 and 2/24/2014  No nodule meets current ACR criteria for requiring biopsy and since there has been 5 years of stability, no more followup is indicated    Colonic polyp studies done on 09/2009, April 2009, and 10/19/2010  Refuses colonoscopy    Osteoarthritis of the hand reasonable  Benign positional vertigo stable  Pt is refusing colonoscpy  Pt canceled October  Appointment due to her mother's death  See 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 as well as her 's paperwork  History of tubular adenoma advised to get colonoscopy done  Insomnia fair  S/p Rigth THR    Femoral neuropathy fair    Pt does not want to see  Urologist and does not want to go for ultrasound of kidney and bladder  Previous important study  MRI:  Cystic or cyst-like lesion along the anterior and mid pole of the left kidney with this region not image on the current study  2  New appearing metabolic blooming artifact extending from the region of the right hip compatible with interval right hip replacement    3  Left T11 superior facet/pedicle marrow edema resolved    4  No significant interval change since the previous MRI of the 08/18/2016 11 the technical differences    5  Multilevel degenerative spondylitic changes of the lumbar spine without central canal stenosis is refer to the full body of report    6  Atherosclerotic changes of the abdominal aorta    7  Transitional anatomy at the lumbosacral study    Subsequently has been under care of also urologist     The also had a EMG and nerve conduction study done  11/30/2018 which revealed right femoral neuropathy, there is evidence of the better peroneal motor nerve neuropathy with axonal feature on which is felt to be primarily due to technical factors including atrophy of the extensor digitorum brevis muscle and hypertrophic skin changes     Continue gabapentin      She is taking gabapentin without side effect  We also talked about cyst on the kidney we never scheduled ultrasound of the kidney  She refuses to go    Encouraged to go for us    11/18/2021:  Blood sugar 102, creatinine 1 2, GFR 44, cholesterol 139, LDL 63, LFT normal,  06/16/2021,:  Cholesterol 147 LDL 63 HDL 60 potassium 4 2 creatinine 1 07 ALT 30 GFR 51 blood sugar 99  03/15/2021:  Creatinine 1 35, blood sugar 104, rest  CMP normal, GFR 38, CBC normal, HDL 53, LDL 86, vitamin-D 16 8  TSH 3RD GENERATON         Value: 1 410(uIU/mL)      Dt: 11/19/2020  Vit D, 25-Hydroxy         Value: 10  6(ng/mL)*       Dt: 07/22/2020  Rheumatoid Factor         Value: Negative           Dt: 10/18/2019    Mammogram:  02/26/2020 negative    CT scan of the chest on 10/12/2020 no reoccurrence  CT scan of the chest 10 01/20/2021:  Postsurgical changes related to the right lower lobe superior segmentectomy, no suspicious finding for recurrent disease, tiny 4 mm left lower lobe nodule, tiny subpleural 2 mm nodule stable, 2 mm right upper lobe nodule no suspicious nodules or masses  Mild to moderate coronary artery calcification noted  Recommend to follow with the cardiologist               The following portions of the patient's history were reviewed and updated as appropriate: allergies, current medications, past family history, past medical history, past social history, past surgical history and problem list     Review of Systems   All other systems reviewed and are negative          Historical Information   Patient Active Problem List   Diagnosis    Adenocarcinoma of right lung ([address] )    Essential hypertension    Dyslipidemia    Primary osteoarthritis involving multiple joints    Left leg swelling    Femoral neuropathy, right    Emphysema lung (HCC)    Osteoarthritis of right hip    Hyperlipidemia    Numbness and tingling of foot    Anxiety    Colon polyp    Thyroid nodule    Mixed anxiety and depressive disorder    Stage 3b chronic kidney disease ([address] )    Diverticulosis    Skin mole    Restless leg syndrome    BPPV (benign paroxysmal positional vertigo)    Class 1 obesity due to excess calories without serious comorbidity with body mass index (BMI) of 30 0 to 30 9 in adult    Lesion of external ear    Tobacco use disorder    Neuropathy    Chorea    Varicose veins of legs    Vitamin B12 deficiency    Edema of both legs    Vitamin D deficiency    Kidney cyst, acquired    Carpal tunnel syndrome    Simple chronic bronchitis (HCC)    Breast cancer screening by mammogram    Generalized edema    Onychomycosis    Acute deep vein thrombosis (DVT) of distal vein of left lower extremity (HCC)    Left foot pain    Coronary artery calcification    Coronary artery calcification seen on CAT scan     Past Medical History:   Diagnosis Date    Emphysema lung (HCC)     Hyperlipidemia     Hypertension     Numbness and tingling of foot     Osteoarthritis of right hip     Primary adenocarcinoma of lower lobe of right lung (HCC)      Past Surgical History:   Procedure Laterality Date    CATARACT EXTRACTION      CHOLECYSTECTOMY      COLONOSCOPY      JOINT REPLACEMENT      AR TOTAL HIP ARTHROPLASTY Right 2018    Procedure: ANTERIOR TOTAL HIP ARTHROPLASTY;  Surgeon: Raciel Zamorano MD;  [facility];  Service: Orthopedics    TOTAL HIP ARTHROPLASTY      TUMOR REMOVAL Right 2015    lower lobe      Social History     Substance and Sexual Activity   Alcohol Use Yes    Comment: ocassional      Social History     Substance and Sexual Activity   Drug Use No     Social History     Tobacco Use   Smoking Status Former Smoker    Packs/day: 1 00    Years: 50 00    Pack years: 50 00    Types: Cigarettes    Quit date: 2015    Years since quittin 4   Smokeless Tobacco Never Used     Family History   Problem Relation Age of Onset    Heart disease Mother     No Known Problems Father      Health Maintenance Due   Topic    COVID-19 Vaccine (1)    Pneumococcal Vaccine: 65+ Years (1 of 2 - PPSV23)    DTaP,Tdap,and Td Vaccines (1 - Tdap)    Osteoporosis Screening     BMI: Followup Plan       Meds/Allergies       Current Outpatient Medications:     albuterol (PROVENTIL HFA,VENTOLIN HFA) 90 mcg/act inhaler, Inhale 2 puffs every 6 (six) hours as needed for wheezing or shortness of breath, Disp: 1 Inhaler, Rfl: 5    amLODIPine (NORVASC) 5 mg tablet, TAKE ONE TABLET DAILY, Disp: 90 tablet, Rfl: 1    aspirin (ECOTRIN LOW STRENGTH) 81 mg EC tablet, Take 1 tablet (81 mg total) by mouth 2 (two) times a day, Disp: 60 tablet, Rfl: 0    atorvastatin (LIPITOR) 10 mg tablet, TAKE 1 TABLET DAILY AT BEDTIME, Disp: 90 tablet, Rfl: 3    Denta 5000 Plus 1 1 % CREA, , Disp: , Rfl:     doxycycline (PERIOSTAT) 20 MG tablet, 20 mg 2 (two) times a day  , Disp: , Rfl: 3    gabapentin (NEURONTIN) 300 mg capsule, Take 300 mg by mouth daily , Disp: , Rfl:     hydrochlorothiazide (MICROZIDE) 12 5 mg capsule, Take 1 capsule (12 5 mg total) by mouth daily, Disp: 90 capsule, Rfl: 3    losartan (COZAAR) 50 mg tablet, TAKE 1 TABLET DAILY, Disp: 90 tablet, Rfl: 1    Melatonin 5 MG TABS, Take 10 mg by mouth daily at bedtime , Disp: , Rfl:     metoprolol succinate (TOPROL-XL) 100 mg 24 hr tablet, TAKE 1 TABLET DAILY, Disp: 90 tablet, Rfl: 3    Polyethylene Glycol 400 (BLINK TEARS) 0 25 % SOLN, Apply 1 drop to eye 2 (two) times a day  , Disp: , Rfl:       Objective:    Vitals:   Pulse 67   Ht 5' 7" (1 702 m)   Wt 87 1 kg (192 lb)   SpO2 97%   BMI 30 07 kg/m²   Body mass index is 30 07 kg/m²  Vitals:    02/24/22 1402   Weight: 87 1 kg (192 lb)       Physical Exam  Constitutional:       General: She is not in acute distress  Appearance: She is well-developed  She is obese  She is not ill-appearing or diaphoretic  HENT:      Head: Normocephalic and atraumatic  Right Ear: External ear normal       Left Ear: External ear normal    Eyes:      General:         Right eye: No discharge  Left eye: No discharge        Conjunctiva/sclera: Conjunctivae normal    Neck:      Thyroid: No thyromegaly  Vascular: No carotid bruit or JVD  Cardiovascular:      Rate and Rhythm: Normal rate and regular rhythm  Heart sounds: Normal heart sounds  No murmur heard  No friction rub  No gallop  Pulmonary:      Effort: No respiratory distress  Breath sounds: Normal breath sounds  No wheezing, rhonchi or rales  Abdominal:      General: Bowel sounds are normal  There is no distension  Palpations: Abdomen is soft  There is no mass  Tenderness: There is no abdominal tenderness  Musculoskeletal:      Cervical back: Neck supple  No rigidity or tenderness  Lumbar back: No swelling, edema, deformity, spasms or tenderness  Decreased range of motion  Right lower leg: No edema  Left lower le+ Edema (trace) present  Feet:      Right foot:      Skin integrity: No ulcer, blister, skin breakdown, erythema or warmth  Left foot:      Skin integrity: No ulcer, blister, skin breakdown, erythema or warmth  Comments: Corbin Myrick Moderate edema is present on the distal left leg  The the left foot is nontender  There is no obvious evidence of any injury a point tenderness or instability  The right lower extremity trace or minimal edema  More moderate varicosities present in both lower extremity  No clinical DVT present  On either side the  Lymphadenopathy:      Cervical: No cervical adenopathy  Skin:     General: Skin is warm  Neurological:      General: No focal deficit present  Mental Status: She is alert  Mental status is at baseline  Cranial Nerves: No cranial nerve deficit  Sensory: No sensory deficit  Motor: No weakness  Coordination: Coordination normal       Gait: Gait normal    Psychiatric:         Mood and Affect: Mood normal  Mood is not anxious  Speech: Speech normal          Behavior: Behavior normal  Behavior is not agitated, aggressive, withdrawn, hyperactive or combative  Thought Content:  Thought content normal  Thought content is not paranoid or delusional  Thought content does not include homicidal or suicidal ideation  Thought content does not include suicidal plan  Judgment: Judgment normal          Lab Review   Lab on 02/16/2022   Component Date Value Ref Range Status    Sodium 02/16/2022 139  136 - 145 mmol/L Final    Potassium 02/16/2022 4 4  3 5 - 5 3 mmol/L Final    Chloride 02/16/2022 108  100 - 108 mmol/L Final    CO2 02/16/2022 25  21 - 32 mmol/L Final    ANION GAP 02/16/2022 6  4 - 13 mmol/L Final    BUN 02/16/2022 23  5 - 25 mg/dL Final    Creatinine 02/16/2022 1 22  0 60 - 1 30 mg/dL Final    Standardized to IDMS reference method    Glucose, Fasting 02/16/2022 104* 65 - 99 mg/dL Final    Specimen collection should occur prior to Sulfasalazine administration due to the potential for falsely depressed results  Specimen collection should occur prior to Sulfapyridine administration due to the potential for falsely elevated results   Calcium 02/16/2022 9 0  8 3 - 10 1 mg/dL Final    AST 02/16/2022 22  5 - 45 U/L Final    Specimen collection should occur prior to Sulfasalazine administration due to the potential for falsely depressed results   ALT 02/16/2022 33  12 - 78 U/L Final    Specimen collection should occur prior to Sulfasalazine and/or Sulfapyridine administration due to the potential for falsely depressed results   Alkaline Phosphatase 02/16/2022 69  46 - 116 U/L Final    Total Protein 02/16/2022 7 7  6 4 - 8 2 g/dL Final    Albumin 02/16/2022 3 7  3 5 - 5 0 g/dL Final    Total Bilirubin 02/16/2022 0 50  0 20 - 1 00 mg/dL Final    Use of this assay is not recommended for patients undergoing treatment with eltrombopag due to the potential for falsely elevated results      eGFR 02/16/2022 42  ml/min/1 73sq m Final    WBC 02/16/2022 10 65* 4 31 - 10 16 Thousand/uL Final    RBC 02/16/2022 4 80  3 81 - 5 12 Million/uL Final    Hemoglobin 02/16/2022 13 9  11 5 - 15 4 g/dL Final    Hematocrit 02/16/2022 44 2  34 8 - 46 1 % Final    MCV 02/16/2022 92  82 - 98 fL Final    MCH 02/16/2022 29 0  26 8 - 34 3 pg Final    MCHC 02/16/2022 31 4  31 4 - 37 4 g/dL Final    RDW 02/16/2022 13 9  11 6 - 15 1 % Final    MPV 02/16/2022 9 3  8 9 - 12 7 fL Final    Platelets 08/80/0781 277  149 - 390 Thousands/uL Final    nRBC 02/16/2022 0  /100 WBCs Final    Neutrophils Relative 02/16/2022 59  43 - 75 % Final    Immat GRANS % 02/16/2022 0  0 - 2 % Final    Lymphocytes Relative 02/16/2022 31  14 - 44 % Final    Monocytes Relative 02/16/2022 5  4 - 12 % Final    Eosinophils Relative 02/16/2022 4  0 - 6 % Final    Basophils Relative 02/16/2022 1  0 - 1 % Final    Neutrophils Absolute 02/16/2022 6 32  1 85 - 7 62 Thousands/µL Final    Immature Grans Absolute 02/16/2022 0 04  0 00 - 0 20 Thousand/uL Final    Lymphocytes Absolute 02/16/2022 3 33  0 60 - 4 47 Thousands/µL Final    Monocytes Absolute 02/16/2022 0 48  0 17 - 1 22 Thousand/µL Final    Eosinophils Absolute 02/16/2022 0 39  0 00 - 0 61 Thousand/µL Final    Basophils Absolute 02/16/2022 0 09  0 00 - 0 10 Thousands/µL Final    Cholesterol 02/16/2022 141  See Comment mg/dL Final    Cholesterol:         Pediatric <18 Years        Desirable          <170 mg/dL      Borderline High    170-199 mg/dL      High               >=200 mg/dL        Adult >=18 Years            Desirable         <200 mg/dL      Borderline High   200-239 mg/dL      High              >239 mg/dL      Triglycerides 02/16/2022 113  See Comment mg/dL Final    Triglyceride:     0-9Y            <75mg/dL     10Y-17Y         <90 mg/dL       >=18Y     Normal          <150 mg/dL     Borderline High 150-199 mg/dL     High            200-499 mg/dL        Very High       >499 mg/dL    Specimen collection should occur prior to N-Acetylcysteine or Metamizole administration due to the potential for falsely depressed results      HDL, Direct 02/16/2022 54  >=50 mg/dL Final    Specimen collection should occur prior to Metamizole administration due to the potential for falsley depressed results   LDL Calculated 02/16/2022 64  0 - 100 mg/dL Final    LDL Cholesterol:     Optimal           <100 mg/dl     Near Optimal      100-129 mg/dl     Above Optimal       Borderline High 130-159 mg/dl       High            160-189 mg/dl       Very High       >189 mg/dl         This screening LDL is a calculated result  It does not have the accuracy of the Direct Measured LDL in the monitoring of patients with hyperlipidemia and/or statin therapy  Direct Measure LDL (CRD149) must be ordered separately in these patients   Non-HDL-Chol (CHOL-HDL) 02/16/2022 87  mg/dl Final    Vit D, 25-Hydroxy 02/16/2022 27 1* 30 0 - 100 0 ng/mL Final         There are no Patient Instructions on file for this visit  Dr Prince Toro MD  [facility]       "This note has been constructed using a voice recognition system  Therefore there may be syntax, spelling, and/or grammatical errors   Please call if you have any questions  "

## 2022-02-24 NOTE — ASSESSMENT & PLAN NOTE
Patient is on multiple blood pressure medications including diuretic  The doses are not maximal  Blood pressure suboptimally controled with BP of 150/80 mmHg today in the office  Potassium noted at 4 4  Patient  Is advised to check BP at home twice a day     Patient follows with cardiology she was recently started on hydrochlorothiazide 12 5 mg  Will continue to current medication for now    - continue amlodipine 5 mg daily, metoprolol 100mg daily, losartan 50 mg , HCTZ 12 5 mg daily  - Consider work up for secondary HTN TSH, cortisol level, renin/aldostreron ratio  - follow up in 1 moth next step would be to go up on HCTZ or amlodipine ( however less preferable as patient has mild leg swelling)

## 2022-02-24 NOTE — ASSESSMENT & PLAN NOTE
CT chest showed calcified coronaries  She admits MCGRAW, denies CP   Her ADL is not limited  Patient is schedules for Nuclear stress test

## 2022-02-24 NOTE — ASSESSMENT & PLAN NOTE
Patient takes vit D supplement 1000U inconsistently  Level trended 16 8->27 1  - recommended to take on daily basis  - follow up vit D level

## 2022-02-24 NOTE — ASSESSMENT & PLAN NOTE
Venous doppler was negative for DVT in 7/7/21  Amlodipine may be contributing  - continue wearing compression stocking  - leg elevation

## 2022-04-12 DIAGNOSIS — I10 ESSENTIAL HYPERTENSION: ICD-10-CM

## 2022-04-12 RX ORDER — LOSARTAN POTASSIUM 50 MG/1
50 TABLET ORAL DAILY
Qty: 90 TABLET | Refills: 1 | Status: SHIPPED | OUTPATIENT
Start: 2022-04-12

## 2022-04-12 RX ORDER — AMLODIPINE BESYLATE 5 MG/1
5 TABLET ORAL DAILY
Qty: 90 TABLET | Refills: 1 | Status: SHIPPED | OUTPATIENT
Start: 2022-04-12 | End: 2022-07-13

## 2022-05-03 DIAGNOSIS — I10 ESSENTIAL HYPERTENSION: ICD-10-CM

## 2022-05-03 RX ORDER — HYDROCHLOROTHIAZIDE 12.5 MG/1
CAPSULE, GELATIN COATED ORAL
Qty: 90 CAPSULE | Refills: 3 | Status: SHIPPED | OUTPATIENT
Start: 2022-05-03

## 2022-05-20 ENCOUNTER — LAB (OUTPATIENT)
Dept: LAB | Facility: CLINIC | Age: 77
End: 2022-05-20
Payer: MEDICARE

## 2022-05-20 DIAGNOSIS — E55.9 VITAMIN D DEFICIENCY: ICD-10-CM

## 2022-05-20 DIAGNOSIS — I10 HYPERTENSION, UNSPECIFIED TYPE: ICD-10-CM

## 2022-05-20 LAB
25(OH)D3 SERPL-MCNC: 34.2 NG/ML (ref 30–100)
BASOPHILS # BLD AUTO: 0.09 THOUSANDS/ΜL (ref 0–0.1)
BASOPHILS NFR BLD AUTO: 1 % (ref 0–1)
EOSINOPHIL # BLD AUTO: 0.49 THOUSAND/ΜL (ref 0–0.61)
EOSINOPHIL NFR BLD AUTO: 5 % (ref 0–6)
ERYTHROCYTE [DISTWIDTH] IN BLOOD BY AUTOMATED COUNT: 13.6 % (ref 11.6–15.1)
HCT VFR BLD AUTO: 42.8 % (ref 34.8–46.1)
HGB BLD-MCNC: 14 G/DL (ref 11.5–15.4)
IMM GRANULOCYTES # BLD AUTO: 0.05 THOUSAND/UL (ref 0–0.2)
IMM GRANULOCYTES NFR BLD AUTO: 1 % (ref 0–2)
LYMPHOCYTES # BLD AUTO: 3.72 THOUSANDS/ΜL (ref 0.6–4.47)
LYMPHOCYTES NFR BLD AUTO: 36 % (ref 14–44)
MCH RBC QN AUTO: 29.4 PG (ref 26.8–34.3)
MCHC RBC AUTO-ENTMCNC: 32.7 G/DL (ref 31.4–37.4)
MCV RBC AUTO: 90 FL (ref 82–98)
MONOCYTES # BLD AUTO: 0.59 THOUSAND/ΜL (ref 0.17–1.22)
MONOCYTES NFR BLD AUTO: 6 % (ref 4–12)
NEUTROPHILS # BLD AUTO: 5.39 THOUSANDS/ΜL (ref 1.85–7.62)
NEUTS SEG NFR BLD AUTO: 51 % (ref 43–75)
NRBC BLD AUTO-RTO: 0 /100 WBCS
PLATELET # BLD AUTO: 272 THOUSANDS/UL (ref 149–390)
PMV BLD AUTO: 9.2 FL (ref 8.9–12.7)
RBC # BLD AUTO: 4.76 MILLION/UL (ref 3.81–5.12)
WBC # BLD AUTO: 10.33 THOUSAND/UL (ref 4.31–10.16)

## 2022-05-20 PROCEDURE — 36415 COLL VENOUS BLD VENIPUNCTURE: CPT

## 2022-05-20 PROCEDURE — 80053 COMPREHEN METABOLIC PANEL: CPT

## 2022-05-20 PROCEDURE — 82306 VITAMIN D 25 HYDROXY: CPT

## 2022-05-20 PROCEDURE — 85025 COMPLETE CBC W/AUTO DIFF WBC: CPT

## 2022-05-21 LAB
ALBUMIN SERPL BCP-MCNC: 3.7 G/DL (ref 3.5–5)
ALP SERPL-CCNC: 76 U/L (ref 46–116)
ALT SERPL W P-5'-P-CCNC: 32 U/L (ref 12–78)
ANION GAP SERPL CALCULATED.3IONS-SCNC: 8 MMOL/L (ref 4–13)
AST SERPL W P-5'-P-CCNC: 25 U/L (ref 5–45)
BILIRUB SERPL-MCNC: 0.52 MG/DL (ref 0.2–1)
BUN SERPL-MCNC: 23 MG/DL (ref 5–25)
CALCIUM SERPL-MCNC: 9.6 MG/DL (ref 8.3–10.1)
CHLORIDE SERPL-SCNC: 105 MMOL/L (ref 100–108)
CO2 SERPL-SCNC: 25 MMOL/L (ref 21–32)
CREAT SERPL-MCNC: 1.22 MG/DL (ref 0.6–1.3)
GFR SERPL CREATININE-BSD FRML MDRD: 42 ML/MIN/1.73SQ M
GLUCOSE P FAST SERPL-MCNC: 102 MG/DL (ref 65–99)
POTASSIUM SERPL-SCNC: 4.2 MMOL/L (ref 3.5–5.3)
PROT SERPL-MCNC: 7.5 G/DL (ref 6.4–8.2)
SODIUM SERPL-SCNC: 138 MMOL/L (ref 136–145)

## 2022-05-26 ENCOUNTER — OFFICE VISIT (OUTPATIENT)
Dept: INTERNAL MEDICINE CLINIC | Facility: CLINIC | Age: 77
End: 2022-05-26
Payer: MEDICARE

## 2022-05-26 VITALS
SYSTOLIC BLOOD PRESSURE: 144 MMHG | HEART RATE: 68 BPM | DIASTOLIC BLOOD PRESSURE: 80 MMHG | OXYGEN SATURATION: 98 % | HEIGHT: 67 IN | WEIGHT: 192 LBS | BODY MASS INDEX: 30.13 KG/M2

## 2022-05-26 DIAGNOSIS — N18.32 STAGE 3B CHRONIC KIDNEY DISEASE (HCC): ICD-10-CM

## 2022-05-26 DIAGNOSIS — J41.0 SIMPLE CHRONIC BRONCHITIS (HCC): ICD-10-CM

## 2022-05-26 DIAGNOSIS — I25.10 CORONARY ARTERY CALCIFICATION: ICD-10-CM

## 2022-05-26 DIAGNOSIS — E78.5 DYSLIPIDEMIA: ICD-10-CM

## 2022-05-26 DIAGNOSIS — I10 ESSENTIAL HYPERTENSION: ICD-10-CM

## 2022-05-26 DIAGNOSIS — M15.9 PRIMARY OSTEOARTHRITIS INVOLVING MULTIPLE JOINTS: ICD-10-CM

## 2022-05-26 DIAGNOSIS — F41.8 MIXED ANXIETY AND DEPRESSIVE DISORDER: ICD-10-CM

## 2022-05-26 DIAGNOSIS — R60.0 EDEMA OF BOTH LEGS: ICD-10-CM

## 2022-05-26 DIAGNOSIS — I25.84 CORONARY ARTERY CALCIFICATION: ICD-10-CM

## 2022-05-26 DIAGNOSIS — C34.91 ADENOCARCINOMA OF RIGHT LUNG (HCC): Primary | ICD-10-CM

## 2022-05-26 DIAGNOSIS — D72.829 LEUKOCYTOSIS, UNSPECIFIED TYPE: ICD-10-CM

## 2022-05-26 DIAGNOSIS — E55.9 VITAMIN D DEFICIENCY: ICD-10-CM

## 2022-05-26 PROCEDURE — 99214 OFFICE O/P EST MOD 30 MIN: CPT | Performed by: INTERNAL MEDICINE

## 2022-05-26 NOTE — PROGRESS NOTES
BMI Counseling: Body mass index is 30 07 kg/m²  The BMI is above normal  Nutrition recommendations include decreasing portion sizes, encouraging healthy choices of fruits and vegetables, decreasing fast food intake, consuming healthier snacks, limiting drinks that contain sugar, moderation in carbohydrate intake, increasing intake of lean protein, reducing intake of saturated and trans fat and reducing intake of cholesterol  Exercise recommendations include exercising 3-5 times per week and strength training exercises  No pharmacotherapy was ordered  Rationale for BMI follow-up plan is due to patient being overweight or obese
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
## 2022-05-26 NOTE — ASSESSMENT & PLAN NOTE
Lab Results   Component Value Date    EGFR 42 05/20/2022    EGFR 42 02/16/2022    EGFR 44 11/18/2021    CREATININE 1 22 05/20/2022    CREATININE 1 22 02/16/2022    CREATININE 1 20 11/18/2021   GFR is baseline  Creat is baseline  Recommend periodic blood test for monitoring of BUN and Creat  Avoid Non Steroidal Antiinflammatory such as ibuprofen, aleve etc   Potassium, HCO3 and calcium are wnl and will require periodic blood test   Bone and Mineral disease monitoring as appropriate    All patient with GFR less than 30( CKD 4 or 5 or 6) advised to follow with nephrologist

## 2022-05-26 NOTE — ASSESSMENT & PLAN NOTE
Pt's depression is well controlled    Pt is tolerating current psych medicine regimen and regimen is effective  Pt does not have any side effects of medicine  Refer to Med List for Psych Medicine  Will continue same regimen  Pt's Anxiety  is well controlled    Pt is tolerating current psych medicine regimen and regimen is effective  Pt does not have any side effects of medicine  Refer to Med List for Psych Medicine  Will continue same regimen      Fair status the patient reassured not requiring much medicine

## 2022-05-26 NOTE — ASSESSMENT & PLAN NOTE
CA of the lung is stable  Will continue to monitor  Seeing be being seen by cardiothoracic surgeon    Patient will follow regularly is CT scan unremarkable

## 2022-05-26 NOTE — ASSESSMENT & PLAN NOTE
Lab Results   Component Value Date    CHOLESTEROL 141 02/16/2022    CHOLESTEROL 139 11/18/2021    CHOLESTEROL 147 06/16/2021     Lab Results   Component Value Date    HDL 54 02/16/2022    HDL 56 11/18/2021    HDL 60 06/16/2021     Lab Results   Component Value Date    TRIG 113 02/16/2022    TRIG 98 11/18/2021    TRIG 119 06/16/2021     Lab Results   Component Value Date    NONHDLC 87 02/16/2022    [place] 83 11/18/2021    NONHDLC 87 06/16/2021   LDL to the target    Will continue atorvastatin 10 mg daily

## 2022-05-26 NOTE — PATIENT INSTRUCTIONS
Follow with Consultants as per their and our suggestion    Follow up in 12 week(s) or as needed earlier    Follow all instructions as advised and discussed  Take your medications as prescribed  Call the office immediately if you experience any side effects  Ask questions if you do not understand  Keep your scheduled appointment as advised or come sooner if necessary or in doubt  Best time to call for non-urgent matter or questions on weekdays is between 9am and 12 noon  See physician for any new symptoms or worsening of current symptoms  Urgent or emergent situations call 911 and report to nearest emergency room  I spent  30 -40 minutes taking care of this patient including clinical care, conseling, collaboration, chart, lab and consultaion review as appropriate      Go for mammogram   Consider getting DEXA scan

## 2022-05-26 NOTE — ASSESSMENT & PLAN NOTE
Hypertension fair control    Continue amlodipine 5 mg daily, hydrochlorothiazide 12 5 mg daily, losartan 50 mg daily, metoprolol succinate 100 mg daily, recommend home blood pressure check

## 2022-06-01 ENCOUNTER — HOSPITAL ENCOUNTER (OUTPATIENT)
Dept: NON INVASIVE DIAGNOSTICS | Facility: HOSPITAL | Age: 77
Discharge: HOME | End: 2022-06-01
Attending: INTERNAL MEDICINE
Payer: MEDICARE

## 2022-06-01 ENCOUNTER — HOSPITAL ENCOUNTER (OUTPATIENT)
Dept: RADIOLOGY | Facility: HOSPITAL | Age: 77
Discharge: HOME | End: 2022-06-01
Attending: INTERNAL MEDICINE
Payer: MEDICARE

## 2022-06-01 DIAGNOSIS — N18.30 STAGE 3 CHRONIC KIDNEY DISEASE, UNSPECIFIED WHETHER STAGE 3A OR 3B CKD (HCC): ICD-10-CM

## 2022-06-01 DIAGNOSIS — F41.8 MIXED ANXIETY AND DEPRESSIVE DISORDER: ICD-10-CM

## 2022-06-01 DIAGNOSIS — J43.9 PULMONARY EMPHYSEMA, UNSPECIFIED EMPHYSEMA TYPE (HCC): ICD-10-CM

## 2022-06-01 DIAGNOSIS — I11.9 HYPERTENSIVE HEART DISEASE WITHOUT HEART FAILURE: ICD-10-CM

## 2022-06-01 DIAGNOSIS — E78.5 DYSLIPIDEMIA: ICD-10-CM

## 2022-06-01 DIAGNOSIS — I25.10 CORONARY ARTERY CALCIFICATION SEEN ON CAT SCAN: ICD-10-CM

## 2022-06-01 DIAGNOSIS — E78.2 MIXED HYPERLIPIDEMIA: ICD-10-CM

## 2022-06-01 DIAGNOSIS — E66.09 CLASS 1 OBESITY DUE TO EXCESS CALORIES WITHOUT SERIOUS COMORBIDITY WITH BODY MASS INDEX (BMI) OF 31.0 TO 31.9 IN ADULT: ICD-10-CM

## 2022-06-01 LAB
CHEST PAIN STATEMENT: NORMAL
MAX DIASTOLIC BP: 65 MMHG
MAX HEART RATE: 79 BPM
MAX PREDICTED HEART RATE: 143 BPM
MAX. SYSTOLIC BP: 165 MMHG
PROTOCOL NAME: NORMAL
REASON FOR TERMINATION: NORMAL
TARGET HR FORMULA: NORMAL
TEST INDICATION: NORMAL
TIME IN EXERCISE PHASE: NORMAL

## 2022-06-01 PROCEDURE — A9502 TC99M TETROFOSMIN: HCPCS

## 2022-06-01 PROCEDURE — 78452 HT MUSCLE IMAGE SPECT MULT: CPT

## 2022-06-01 PROCEDURE — 93017 CV STRESS TEST TRACING ONLY: CPT

## 2022-06-01 PROCEDURE — G1004 CDSM NDSC: HCPCS

## 2022-06-01 RX ADMIN — REGADENOSON 0.4 MG: 0.08 INJECTION, SOLUTION INTRAVENOUS at 10:46

## 2022-06-02 ENCOUNTER — TELEPHONE (OUTPATIENT)
Dept: CARDIOLOGY CLINIC | Facility: CLINIC | Age: 77
End: 2022-06-02

## 2022-06-02 LAB
ATRIAL RATE: 73 BPM
BASELINE ST DEPRESSION: 0 MM
MAX HR PERCENT: 55 %
MAX HR: 79 BPM
P AXIS: 77 DEGREES
PR INTERVAL: 162 MS
QRS AXIS: 59 DEGREES
QRSD INTERVAL: 86 MS
QT INTERVAL: 426 MS
QTC INTERVAL: 469 MS
RATE PRESSURE PRODUCT: NORMAL
SL CV REST NUCLEAR ISOTOPE DOSE: 10.3 MCI
SL CV STRESS NUCLEAR ISOTOPE DOSE: 31.3 MCI
SL CV STRESS RECOVERY BP: NORMAL MMHG
SL CV STRESS RECOVERY HR: 70 BPM
SL CV STRESS RECOVERY O2 SAT: 99 %
STRESS ANGINA INDEX: 1
STRESS BASELINE BP: NORMAL MMHG
STRESS BASELINE HR: 64 BPM
STRESS DUKE TREADMILL SCORE: -1
STRESS O2 SAT REST: 94 %
STRESS PEAK HR: 75 BPM
STRESS POST ESTIMATED WORKLOAD: 1 METS
STRESS POST EXERCISE DUR MIN: 3 MIN
STRESS POST EXERCISE DUR SEC: 0 SEC
STRESS POST O2 SAT PEAK: 100 %
STRESS POST PEAK BP: 165 MMHG
STRESS ST DEPRESSION: 0 MM
T WAVE AXIS: 37 DEGREES
VENTRICULAR RATE: 73 BPM

## 2022-06-02 PROCEDURE — 93018 CV STRESS TEST I&R ONLY: CPT | Performed by: INTERNAL MEDICINE

## 2022-06-02 PROCEDURE — 78452 HT MUSCLE IMAGE SPECT MULT: CPT | Performed by: INTERNAL MEDICINE

## 2022-06-02 PROCEDURE — 93010 ELECTROCARDIOGRAM REPORT: CPT | Performed by: INTERNAL MEDICINE

## 2022-06-02 PROCEDURE — 93016 CV STRESS TEST SUPVJ ONLY: CPT | Performed by: INTERNAL MEDICINE

## 2022-06-02 NOTE — TELEPHONE ENCOUNTER
----- Message from Gina Esquivel MD sent at 6/2/2022  1:51 PM EDT -----  Pt's Patient's stress test is normal    Patient can keep regular appointment  Please call patient with the result

## 2022-07-12 DIAGNOSIS — I10 ESSENTIAL HYPERTENSION: ICD-10-CM

## 2022-07-13 ENCOUNTER — OFFICE VISIT (OUTPATIENT)
Dept: CARDIOLOGY CLINIC | Facility: CLINIC | Age: 77
End: 2022-07-13
Payer: MEDICARE

## 2022-07-13 VITALS
TEMPERATURE: 97 F | HEART RATE: 68 BPM | OXYGEN SATURATION: 98 % | SYSTOLIC BLOOD PRESSURE: 138 MMHG | WEIGHT: 192 LBS | HEIGHT: 67 IN | DIASTOLIC BLOOD PRESSURE: 84 MMHG | BODY MASS INDEX: 30.13 KG/M2

## 2022-07-13 DIAGNOSIS — C34.91 ADENOCARCINOMA OF RIGHT LUNG (HCC): ICD-10-CM

## 2022-07-13 DIAGNOSIS — I11.9 HYPERTENSIVE HEART DISEASE WITHOUT HEART FAILURE: ICD-10-CM

## 2022-07-13 DIAGNOSIS — E78.5 DYSLIPIDEMIA: ICD-10-CM

## 2022-07-13 DIAGNOSIS — E66.09 CLASS 1 OBESITY DUE TO EXCESS CALORIES WITHOUT SERIOUS COMORBIDITY WITH BODY MASS INDEX (BMI) OF 31.0 TO 31.9 IN ADULT: ICD-10-CM

## 2022-07-13 DIAGNOSIS — R06.02 SHORTNESS OF BREATH: ICD-10-CM

## 2022-07-13 DIAGNOSIS — I25.10 CORONARY ARTERY CALCIFICATION SEEN ON CAT SCAN: ICD-10-CM

## 2022-07-13 DIAGNOSIS — E78.2 MIXED HYPERLIPIDEMIA: ICD-10-CM

## 2022-07-13 DIAGNOSIS — N18.30 STAGE 3 CHRONIC KIDNEY DISEASE, UNSPECIFIED WHETHER STAGE 3A OR 3B CKD (HCC): ICD-10-CM

## 2022-07-13 DIAGNOSIS — J43.9 PULMONARY EMPHYSEMA, UNSPECIFIED EMPHYSEMA TYPE (HCC): ICD-10-CM

## 2022-07-13 PROCEDURE — 93000 ELECTROCARDIOGRAM COMPLETE: CPT | Performed by: INTERNAL MEDICINE

## 2022-07-13 PROCEDURE — 99214 OFFICE O/P EST MOD 30 MIN: CPT | Performed by: INTERNAL MEDICINE

## 2022-07-13 RX ORDER — AMLODIPINE BESYLATE 5 MG/1
TABLET ORAL
Qty: 90 TABLET | Refills: 1 | Status: SHIPPED | OUTPATIENT
Start: 2022-07-13 | End: 2022-09-21 | Stop reason: SDUPTHER

## 2022-07-13 NOTE — PROGRESS NOTES
Progress Note- Cardiology  Office  [facility]     Jose Juan Barraza 67 y o  female MRN: [id number]  : 1945  Encounter: [id number]    Assessment:  1  Hypertensive heart disease without heart failure    2  Shortness of breath    3  Coronary artery calcification seen on CAT scan    4  Dyslipidemia    5  Class 1 obesity due to excess calories without serious comorbidity with body mass index (BMI) of 31 0 to 31 9 in adult    6  Mixed hyperlipidemia    7  Pulmonary emphysema, unspecified emphysema type ([facility] )    8  Adenocarcinoma of right lung (HCC)    9  Stage 3 chronic kidney disease, unspecified whether stage 3a or 3b CKD ([facility] )        Discussion Summary & Plan:   1  Essential hypertension/hypertensive heart disease  Patient blood pressure is now much better  She is on amlodipine 5 mg daily  She is also on losartan 50 mg, metoprolol  mg daily and along with hydrochlorothiazide 12 5 mg daily  Her blood pressure and heart rate are acceptable  2  Dyslipidemia  She has a blood test done recently cholesterol profile is acceptable continue statins  3   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  Follow up with CT surgery no reoccurrence so far  Clinically she is doing well she has some bilateral rhonchi she had history of emphysema she is aware of it now  No change in her symptoms    4  Left leg swelling  She had history of lower extremity swelling  Much better now  Will need diuretics  Okay to accept creatinine around 1 2-1 3 as long as it is stable  Currently stable  5   Shortness of breath with exertion  Most likely related to underlying lung disease  She does have a coronary calcification  Nuclear stress test no ischemia continue medical Rx     6  History of femoral neuropathy  Currently stable    7  Coronary calcification      She has probably underlying coronary artery disease which is nonobstructive as nuclear stress test shows no ischemia and no symptoms  She does have aortic calcification  Continue current Rx    Advised patient to be compliant with blood pressure medications  Labs reviewed with her  Currently stable cardiovascular status  Results of stress test discussed with her  Continue other Rx as before  Counseling :  A description of the counseling:   Goals and Barriers:  Patient's ability to self care: Yes  Medication side effect reviewed with patient in detail and all their questions answered to their satisfaction  Physician Requesting Consult: Caleb Lane MD    HPI:     Shahram Caballero is a 67y o  year old female  Who was initially seen by me in 2015 has past medical history significant for lung cancer status post partial lobectomy right lower lobe  She had a stage IA cancer and she follows up with [facility] thoracic surgery  She also had past medical history significant for hypertension, previous tobacco abuse now quit smoking, emphysema, dyslipidemia, severe DJD with previous left hip surgery now need surgery on her right hip  Since her last visit in 2015 she has gained some weight  She also noted that she occasionally gets bilateral lower extremity swelling left more than right  She was on Dyazide which was recently had as her BUN creatinine might be high and that led to increase in her blood pressure  She also increased swelling she is back on Dyazide now every other day  She also takes losartan 50 mg, metoprolol XL 75 mg daily and atorvastatin  Her previous cardiac workup was in 2015 April when she had a normal stress test and study was non gated  Echo shows at that time she has a normal LV systolic function EF was 47% with grade 1 diastolic dysfunction  She cannot walk much she walks with the help of cane  No nausea no vomiting no PND no chest pain      She denies any history of stenting in between no history of MI no history of heart attack no heart failure  01/12/2022  Above reviewed  Patient came for follow-up she is doing well  Her blood pressure has now improved with increase in amlodipine  She is also taking losartan, metoprolol XL and Dyazide 4 times a week  History of lung cancer status post surgery  She used to smoke she quit smoking many years ago  EKG shows normal sinus rhythm heart rate 61 beats per minute and cholesterol profile is acceptable  Recently she had a CT scan done for her lung cancer which shows he has moderate coronary calcification as well as aortic calcification  She denies any new symptom her last nuclear stress test was in 2018  Her labs done in November 2020 20 reviewed are acceptable  She has occasionally got episodes of exertional shortness of breath particularly when she walks to her mailbox or she removes no no symptoms while she is sitting  07/13/2022  Above reviewed  Patient came for follow-up she is doing well  She had history of hypertension currently she is taking losartan, metoprolol XL,  Hydrochlorothiazide 12 5 mg daily along with amlodipine  She had history of lung cancer status post surgery  She used to smoke she quit smoking  On the CT scan she was noted to have moderate coronary calcification as well as aortic calcification  But she denies any new symptoms  Her blood test done in May 2022 as well as cholesterol workup done in February 2022 has been acceptable  Vitals has been stable  Her nuclear stress test done in June 2022, shows no ischemia EF was 78%  She continues to have chronic exertional shortness of breath which is not changed  Her nuclear stress test other labs reviewed with her EKG shows no change from previous EKG no other cardiovascular issues at this time  She is otherwise doing well  Review of Systems   Constitutional: Negative for activity change, chills, diaphoresis, fever and unexpected weight change  HENT: Negative for congestion      Eyes: Negative for discharge and redness  Respiratory: Positive for shortness of breath  Negative for cough, chest tightness and wheezing  Exertional chronic not changed  History of lung cancer status post partial pneumonectomy   Cardiovascular: Negative  Negative for chest pain, palpitations and leg swelling  Gastrointestinal: Negative for abdominal pain, diarrhea and nausea  Endocrine: Negative  Genitourinary: Negative for decreased urine volume and urgency  Musculoskeletal: Positive for arthralgias  Negative for back pain and gait problem  Skin: Negative for rash and wound  Allergic/Immunologic: Negative  Neurological: Negative for dizziness, seizures, syncope, weakness, light-headedness and headaches  Hematological: Negative  Psychiatric/Behavioral: Negative for agitation and confusion  The patient is nervous/anxious          Historical Information   Past Medical History:   Diagnosis Date    Emphy[facility] lung [facility] )     Hyperlipidemia     Hypertension     Numbness and tingling of foot     Osteoarthritis of right hip     Primary adenocarcinoma of lower lobe of right lung [facility])      Past Surgical History:   Procedure Laterality Date    CATARACT EXTRACTION      CHOLECYSTECTOMY      COLONOSCOPY      JOINT REPLACEMENT      AL TOTAL HIP ARTHROPLASTY Right 2018    Procedure: ANTERIOR TOTAL HIP ARTHROPLASTY;  Surgeon: Vance Mustafa MD;  Location: [facility];  Service: Orthopedics    TOTAL HIP ARTHROPLASTY      TUMOR REMOVAL Right 2015    lower lobe        Social History:  Social History     Substance and Sexual Activity   Alcohol Use Yes    Comment: ocassional      Social History     Substance and Sexual Activity   Drug Use No     Social History     Tobacco Use   Smoking Status Former Smoker    Packs/day: 1 00    Years: 50 00    Pack years: 50 00    Types: Cigarettes    Quit date: 2015    Years since quittin 8   Smokeless Tobacco Never Used          Family History Problem Relation Age of Onset    Heart disease Mother     No Known Problems Father        Meds/Allergies     No Known Allergies    Current medications:    Current Outpatient Medications:     albuterol (PROVENTIL HFA,VENTOLIN HFA) 90 mcg/act inhaler, Inhale 2 puffs every 6 (six) hours as needed for wheezing or shortness of breath, Disp: 1 Inhaler, Rfl: 5    amLODIPine (NORVASC) 5 mg tablet, TAKE ONE TABLET DAILY, Disp: 90 tablet, Rfl: 1    aspirin (ECOTRIN LOW STRENGTH) 81 mg EC tablet, Take 1 tablet (81 mg total) by mouth 2 (two) times a day, Disp: 60 tablet, Rfl: 0    atorvastatin (LIPITOR) 10 mg tablet, TAKE 1 TABLET DAILY AT BEDTIME, Disp: 90 tablet, Rfl: 3    Denta 5000 Plus 1 1 % CREA, , Disp: , Rfl:     doxycycline (PERIOSTAT) 20 MG tablet, 20 mg 2 (two) times a day  , Disp: , Rfl: 3    gabapentin (NEURONTIN) 300 mg capsule, Take 300 mg by mouth daily , Disp: , Rfl:     hydrochlorothiazide (MICROZIDE) 12 5 mg capsule, TAKE ONE CAPSULE DAILY, Disp: 90 capsule, Rfl: 3    losartan (COZAAR) 50 mg tablet, Take 1 tablet (50 mg total) by mouth daily, Disp: 90 tablet, Rfl: 1    Melatonin 5 MG TABS, Take 10 mg by mouth daily at bedtime , Disp: , Rfl:     metoprolol succinate (TOPROL-XL) 100 mg 24 hr tablet, TAKE 1 TABLET DAILY, Disp: 90 tablet, Rfl: 3    Polyethylene Glycol 400 0 25 % SOLN, Apply 1 drop to eye 2 (two) times a day  , Disp: , Rfl:       Objective:     Vitals: Blood pressure 138/84, pulse 68, temperature (!) 97 °F (36 1 °C), height 5' 7" (1 702 m), weight 87 1 kg (192 lb), SpO2 98 %  ?  Body mass index is 30 07 kg/m²  Vitals:    07/13/22 1302   Weight: 87 1 kg (192 lb)     BP Readings from Last 3 Encounters:   07/13/22 138/84   05/26/22 144/80   01/12/22 120/70         Physical Exam:   Physical Exam  Constitutional:       General: She is not in acute distress  Appearance: She is well-developed  She is not diaphoretic  Neck:      Thyroid: No thyromegaly  Vascular: No JVD  Trachea: No tracheal deviation  Cardiovascular:      Rate and Rhythm: Normal rate and regular rhythm  Heart sounds: S1 normal and S2 normal  Heart sounds not distant  Murmur heard  Systolic (ejection) murmur is present with a grade of 2/6  No friction rub  No gallop  No S3 or S4 sounds  Pulmonary:      Effort: Pulmonary effort is normal  No respiratory distress  Breath sounds: No wheezing or rales  Comments: Bilateral air entry with prolonged expiratory phase  Coarse breath sound on the right side  Chest:      Chest wall: No tenderness  Abdominal:      General: Bowel sounds are normal  There is no distension  Palpations: Abdomen is soft  Tenderness: There is no abdominal tenderness  Musculoskeletal:         General: No deformity  Cervical back: Neck supple  Comments: No edema   Skin:     General: Skin is warm and dry  Coloration: Skin is not pale  Findings: No rash  Neurological:      Mental Status: She is alert and oriented to person, place, and time     Psychiatric:         Behavior: Behavior normal          Judgment: Judgment normal              Labs:     Lab Results   Component Value Date    WBC 10 33 (H) 05/20/2022    HGB 14 0 05/20/2022    HCT 42 8 05/20/2022    MCV 90 05/20/2022    RDW 13 6 05/20/2022     05/20/2022     BMP:  Lab Results   Component Value Date     09/20/2015    K 4 2 05/20/2022     05/20/2022    CO2 25 05/20/2022    ANIONGAP 6 09/20/2015    BUN 23 05/20/2022    CREATININE 1 22 05/20/2022    GLUCOSE 109 09/20/2015    GLUF 102 (H) 05/20/2022    CALCIUM 9 6 05/20/2022    EGFR 42 05/20/2022    MG 2 2 09/20/2015     LFT:  Lab Results   Component Value Date    AST 25 05/20/2022    ALT 32 05/20/2022    ALKPHOS 76 05/20/2022     Lipid Profile:   Lab Results   Component Value Date    HDL 54 02/16/2022    LDLCALC 64 02/16/2022    TRIG 113 02/16/2022         Imaging & Testing     Cardiac testing:       Nuclear stress test 09/07/2018 was normal with EF around 74%  It was Lexiscan stress test     Diagnostic Studies Review Cardio:   Echo Doppler echo Doppler done in April of 2015 shows EF 70%, hyperdynamic LV, grade 1 diastolic dysfunction, trace MR Trace TR  Nuclear stress test done on 04/03/2015 was nonischemic it was non gated study  Nuclear stress test   Nuclear stress test done June 2022 shows no ischemia EF was around 75%  EKG:    Twelve lead EKG done in our office on 09/06/2018 shows normal sinus rhythm heart rate 72 beats per minute  No significant ST changes  Twelve lead EKG 09/19/2019 shows normal sinus rhythm heart rate 62 beats per minute no significant ST changes  No change from old EKG  Twelve lead EKG 03/07/2020 shows normal sinus rhythm heart rate 72 beats per minute  QS in V2 V3 most likely due to lead location no change from old EKG    Twelve lead EKG 09/24/2020 shows normal sinus rhythm heart rate 74 beats per minute  QS in V1 to V3 not change from old EKG  Twelve lead EKG done on 07/13/2022 shows   Normal sinus rhythm heart rate 68 beats per minute no change from previous EKG  Dr Leopoldo Boas, MD [facility]      "This note has been constructed using a voice recognition system  Therefore there may be syntax, spelling, and/or grammatical errors  Please call if you have any questions

## 2022-08-23 ENCOUNTER — LAB (OUTPATIENT)
Dept: LAB | Facility: CLINIC | Age: 77
End: 2022-08-23
Payer: MEDICARE

## 2022-08-23 DIAGNOSIS — C34.91 ADENOCARCINOMA OF RIGHT LUNG (HCC): ICD-10-CM

## 2022-08-23 DIAGNOSIS — N18.32 STAGE 3B CHRONIC KIDNEY DISEASE (HCC): ICD-10-CM

## 2022-08-23 DIAGNOSIS — I25.84 CORONARY ARTERY CALCIFICATION: ICD-10-CM

## 2022-08-23 DIAGNOSIS — I25.10 CORONARY ARTERY CALCIFICATION: ICD-10-CM

## 2022-08-23 DIAGNOSIS — D72.829 LEUKOCYTOSIS, UNSPECIFIED TYPE: ICD-10-CM

## 2022-08-23 DIAGNOSIS — I10 ESSENTIAL HYPERTENSION: ICD-10-CM

## 2022-08-23 LAB
ANION GAP SERPL CALCULATED.3IONS-SCNC: 5 MMOL/L (ref 4–13)
BASOPHILS # BLD AUTO: 0.07 THOUSANDS/ΜL (ref 0–0.1)
BASOPHILS NFR BLD AUTO: 1 % (ref 0–1)
BUN SERPL-MCNC: 26 MG/DL (ref 5–25)
CALCIUM SERPL-MCNC: 9.2 MG/DL (ref 8.3–10.1)
CHLORIDE SERPL-SCNC: 106 MMOL/L (ref 96–108)
CO2 SERPL-SCNC: 26 MMOL/L (ref 21–32)
CREAT SERPL-MCNC: 1.28 MG/DL (ref 0.6–1.3)
EOSINOPHIL # BLD AUTO: 0.51 THOUSAND/ΜL (ref 0–0.61)
EOSINOPHIL NFR BLD AUTO: 5 % (ref 0–6)
ERYTHROCYTE [DISTWIDTH] IN BLOOD BY AUTOMATED COUNT: 14.1 % (ref 11.6–15.1)
GFR SERPL CREATININE-BSD FRML MDRD: 40 ML/MIN/1.73SQ M
GLUCOSE P FAST SERPL-MCNC: 116 MG/DL (ref 65–99)
HCT VFR BLD AUTO: 43.1 % (ref 34.8–46.1)
HGB BLD-MCNC: 13.6 G/DL (ref 11.5–15.4)
IMM GRANULOCYTES # BLD AUTO: 0.05 THOUSAND/UL (ref 0–0.2)
IMM GRANULOCYTES NFR BLD AUTO: 1 % (ref 0–2)
LYMPHOCYTES # BLD AUTO: 3.81 THOUSANDS/ΜL (ref 0.6–4.47)
LYMPHOCYTES NFR BLD AUTO: 37 % (ref 14–44)
MCH RBC QN AUTO: 29.2 PG (ref 26.8–34.3)
MCHC RBC AUTO-ENTMCNC: 31.6 G/DL (ref 31.4–37.4)
MCV RBC AUTO: 93 FL (ref 82–98)
MONOCYTES # BLD AUTO: 0.55 THOUSAND/ΜL (ref 0.17–1.22)
MONOCYTES NFR BLD AUTO: 5 % (ref 4–12)
NEUTROPHILS # BLD AUTO: 5.24 THOUSANDS/ΜL (ref 1.85–7.62)
NEUTS SEG NFR BLD AUTO: 51 % (ref 43–75)
NRBC BLD AUTO-RTO: 0 /100 WBCS
PLATELET # BLD AUTO: 287 THOUSANDS/UL (ref 149–390)
PMV BLD AUTO: 9.7 FL (ref 8.9–12.7)
POTASSIUM SERPL-SCNC: 4.1 MMOL/L (ref 3.5–5.3)
RBC # BLD AUTO: 4.66 MILLION/UL (ref 3.81–5.12)
SODIUM SERPL-SCNC: 137 MMOL/L (ref 135–147)
WBC # BLD AUTO: 10.23 THOUSAND/UL (ref 4.31–10.16)

## 2022-08-23 PROCEDURE — 36415 COLL VENOUS BLD VENIPUNCTURE: CPT

## 2022-08-23 PROCEDURE — 85025 COMPLETE CBC W/AUTO DIFF WBC: CPT

## 2022-08-23 PROCEDURE — 80048 BASIC METABOLIC PNL TOTAL CA: CPT

## 2022-08-26 ENCOUNTER — OFFICE VISIT (OUTPATIENT)
Dept: INTERNAL MEDICINE CLINIC | Facility: CLINIC | Age: 77
End: 2022-08-26
Payer: MEDICARE

## 2022-08-26 DIAGNOSIS — D22.9 SKIN MOLE: ICD-10-CM

## 2022-08-26 DIAGNOSIS — I10 ESSENTIAL HYPERTENSION: ICD-10-CM

## 2022-08-26 DIAGNOSIS — M79.89 LEFT LEG SWELLING: ICD-10-CM

## 2022-08-26 DIAGNOSIS — E78.5 DYSLIPIDEMIA: ICD-10-CM

## 2022-08-26 DIAGNOSIS — F41.8 MIXED ANXIETY AND DEPRESSIVE DISORDER: ICD-10-CM

## 2022-08-26 DIAGNOSIS — R60.0 EDEMA OF BOTH LEGS: ICD-10-CM

## 2022-08-26 DIAGNOSIS — G25.81 RESTLESS LEG SYNDROME: ICD-10-CM

## 2022-08-26 DIAGNOSIS — I25.10 CORONARY ARTERY CALCIFICATION: ICD-10-CM

## 2022-08-26 DIAGNOSIS — M15.9 PRIMARY OSTEOARTHRITIS INVOLVING MULTIPLE JOINTS: ICD-10-CM

## 2022-08-26 DIAGNOSIS — N18.32 STAGE 3B CHRONIC KIDNEY DISEASE (HCC): ICD-10-CM

## 2022-08-26 DIAGNOSIS — I25.10 CORONARY ARTERY CALCIFICATION SEEN ON CAT SCAN: ICD-10-CM

## 2022-08-26 DIAGNOSIS — J43.9 PULMONARY EMPHYSEMA, UNSPECIFIED EMPHYSEMA TYPE (HCC): Primary | ICD-10-CM

## 2022-08-26 DIAGNOSIS — J41.0 SIMPLE CHRONIC BRONCHITIS (HCC): ICD-10-CM

## 2022-08-26 DIAGNOSIS — I25.84 CORONARY ARTERY CALCIFICATION: ICD-10-CM

## 2022-08-26 DIAGNOSIS — N39.3 FEMALE STRESS INCONTINENCE: ICD-10-CM

## 2022-08-26 PROCEDURE — 99214 OFFICE O/P EST MOD 30 MIN: CPT | Performed by: INTERNAL MEDICINE

## 2022-08-26 NOTE — ASSESSMENT & PLAN NOTE
The weight is 194 she needs to lose weight    BMI Counseling:       The BMI is above normal  Know Body weight goal    Weigh yourself daily or weekly as per your doctor    Nutrition recommendations include decreasing portion sizes, encouraging healthy choices of fruits and vegetables, decreasing     fast food intake, consuming healthier snacks, limiting drinks that contain sugar, moderation in carbohydrate intake, increasing     intake of lean protein, reducing intake of saturated and trans fat and reducing intake of cholesterol

## 2022-08-26 NOTE — ASSESSMENT & PLAN NOTE
Some dyspnea on exertion  Paragraphs going for CT scan of the chest for surveillance of CT scan of the cancer of the lung      To hold of of pulmonary function test     Uses inhaler as needed

## 2022-08-26 NOTE — ASSESSMENT & PLAN NOTE
hypertension well controlled    Will continue losartan 50 mg daily metoprolol succinate 100 mg daily amlodipine 5 mg daily

## 2022-08-26 NOTE — ASSESSMENT & PLAN NOTE
Pt's Anxiety  is well controlled    Pt is tolerating current psych medicine regimen and regimen is effective  Pt does not have any side effects of medicine  Refer to Med [facility] for Psych Medicine  Will continue same regimen  Pt's depression is well controlled    Pt is tolerating current psych medicine regimen and regimen is effective  Pt does not have any side effects of medicine  Will continue same regimen    Remains off medications

## 2022-08-26 NOTE — PROGRESS NOTES
[facility] Office Visit Note  22     Shahram Colon 65 y o  female MRN: [id number]  : 1945    Assessment:     1  Pulmonary emphysema, unspecified emphysema type ([address] )  Assessment & Plan:  Some dyspnea on exertion  Paragraphs going for CT scan of the chest for surveillance of CT scan of the cancer of the lung  To hold of of pulmonary function test     Uses inhaler as needed      2  Simple chronic bronchitis (HCC)  Assessment & Plan:  Sx free     For amlodipine prn          3  Essential hypertension  Assessment & Plan:  hypertension well controlled  Will continue losartan 50 mg daily metoprolol succinate 100 mg daily amlodipine 5 mg daily      4  Coronary artery calcification  Assessment & Plan:  2022:  Home is nuclear stress test:    Normal pharmacologic nuclear stress test with no evidence of significant reversible ischemia or prior infarction    The stress ECG is negative for ischemia after pharmacologic stress, without reproduction of symptoms    Perfusion: Comparison of post Lexiscan and resting perfusion images revealed no evidence of significant ischemia or prior infarction  Prone images were not obtained    Stress Function: Left ventricular function post-stress is normal   Calculated ejection fraction is 78%    Perfusion Defect Conclusion: There is no evidence of transient ischemic dilation (TID)    no caridac sx no angina          5  Coronary artery calcification seen on CAT scan  Assessment & Plan:  2022:  Home is nuclear stress test:    Normal pharmacologic nuclear stress test with no evidence of significant reversible ischemia or prior infarction    The stress ECG is negative for ischemia after pharmacologic stress, without reproduction of symptoms    Perfusion: Comparison of post Lexiscan and resting perfusion images revealed no evidence of significant ischemia or prior infarction  Prone images were not obtained      Stress Function: Left ventricular function post-stress is normal   Calculated ejection fraction is 78%    Perfusion Defect Conclusion: There is no evidence of transient ischemic dilation (TID)         6  Primary osteoarthritis involving multiple joints    7  Skin mole  Assessment & Plan: Will be following with skin doctor in near future      8  Stage 3b chronic kidney disease [facility])  Assessment & Plan:  Lab Results   Component Value Date    EGFR 40 08/23/2022    EGFR 42 05/20/2022    EGFR 42 02/16/2022    CREATININE 1 28 08/23/2022    CREATININE 1 22 05/20/2022    CREATININE 1 22 02/16/2022         9  Left leg swelling  Assessment & Plan:  Leg swelling is better  10  Dyslipidemia  Assessment & Plan:  Lab Results   Component Value Date    CHOLESTEROL 141 02/16/2022    CHOLESTEROL 139 11/18/2021    CHOLESTEROL 147 06/16/2021     Lab Results   Component Value Date    HDL 54 02/16/2022    HDL 56 11/18/2021    HDL 60 06/16/2021     Lab Results   Component Value Date    TRIG 113 02/16/2022    TRIG 98 11/18/2021    TRIG 119 06/16/2021     Lab Results   Component Value Date    NONHDLC 87 02/16/2022    [place] 83 11/18/2021    NONHDLC 87 06/16/2021   Continue atorvastatin 10 mg daily LDL would to the control ALT normal        11  Mixed anxiety and depressive disorder  Assessment & Plan:  Pt's Anxiety  is well controlled    Pt is tolerating current psych medicine regimen and regimen is effective  Pt does not have any side effects of medicine  Refer to [facility] for Psych Medicine  Will continue same regimen  Pt's depression is well controlled    Pt is tolerating current psych medicine regimen and regimen is effective  Pt does not have any side effects of medicine  Will continue same regimen  Remains off medications      12  Restless leg syndrome  Assessment & Plan:  Restless leg stable      13  Edema of both legs  Assessment & Plan:  Better        14  Female stress incontinence  -     Ambulatory referral to Urology;  Future  - Ambulatory referral to Urology; Future          Discussion Summary and Plan: Today's care plan and medications were reviewed with patient in detail and all their questions answered to their satisfaction  Chief Complaint   Patient presents with    Hypertension    Hyperlipidemia    COPD    Cancer     Lung cancer, urinary incontinence,    Obesity    Follow-up    CKD III    Abnormal Lab        Subjective:    Patient is here for chronic disease management      Peripheral edema is much better  Tolerating hydrochlorothiazide without side effect  Hypertension fair control  Remains on amlodipine 5 mg daily, losartan 50 mg daily, hydrochlorothiazide 12 5 mg daily, a and metoprolol succinate 100 mg daily  CKD level 3 GFR around 40-45 baseline the remains on hydrochlorothiazide to the this is baseline electrolytes normal calcium normal CO2 normal hemoglobin fair will monitor  WBC slightly elevated 10  33  watch cbc wbc 10-11 K  Range watch,rest cbc ok, no hematology sx no lymphadenopathy    Cancer of the lung denies any cough chest congestion shortness of breath patientwa is cleared by thoracic surgeon for another year  Getting some surveillance CT scan of the chest periodically  COPD breathing fair denies cough chest congestion shortness of breath at rest   Does have some dyspnea on exertion  Albuterol prn     MCGRAW:  Patient's functional capacity is limited  Negative stress test, going for CT Scan of chest per thoracic surgeon    h DJD knee no major pain  Generalized anxiety and depression is fair  Adjusting to the life  PHQ 9 0 courtney 7 fair see report    Hyperlipidemia continue atorvastatin 10 mg daily  Hyperlipidemia excellent lipid profile normal LFT will continue atorvastatin 10mg daily  The femoral neuropathy on the right thigh saw you improving        Thyroid nodule  :01/03/2020:  COMPARISON:  Thyroid ultrasound 1/15/2016 and 2/24/2014  No nodule meets current ACR criteria for requiring biopsy and since there has been 5 years of stability, no more followup is indicated    Colonic polyp studies done on 09/2009, April 2009, and 10/19/2010  Refuses colonoscopy    Benign positional vertigo stable  Pt is refusing colonoscpy  Pt canceled October  Appointment due to her mother's death  See 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 as well as her 's paperwork  History of tubular adenoma advised to get colonoscopy done  Insomnia fair  S/p Rigth THR    Femoral neuropathy fair    Pt does not want to see  Urologist and does not want to go for ultrasound of kidney and bladder -reported on previous visit    Previous important study  MRI:  Cystic or cyst-like lesion along the anterior and mid pole of the left kidney with this region not image on the current study  2  New appearing metabolic blooming artifact extending from the region of the right hip compatible with interval right hip replacement    3  Left T11 superior facet/pedicle marrow edema resolved    4  No significant interval change since the previous MRI of the 08/18/2016 11 the technical differences    5  Multilevel degenerative spondylitic changes of the lumbar spine without central canal stenosis is refer to the full body of report    6  Atherosclerotic changes of the abdominal aorta    7  Transitional anatomy at the lumbosacral study    Subsequently has been under care of also urologist     The also had a EMG and nerve conduction study done  11/30/2018 which revealed right femoral neuropathy, there is evidence of the better peroneal motor nerve neuropathy with axonal feature on which is felt to be primarily due to technical factors including atrophy of the extensor digitorum brevis muscle and hypertrophic skin changes     Continue gabapentin      She is taking gabapentin without side effect  We also talked about cyst on the kidney we never scheduled ultrasound of the kidney  She refuses to go   Encouraged to go for us       08/23/2022:  CBC normal blood sugar 116 creatinine 1 28 GFR 40  05/20/2022:  Vitamin-D 34, WBC 10 33, blood sugar 102 GFR 42 rest of the CMP normal  11/18/2021:  Blood sugar 102, creatinine 1 2, GFR 44, cholesterol 139, LDL 63, LFT normal,  06/16/2021,:  Cholesterol 147 LDL 63 HDL 60 potassium 4 2 creatinine 1 07 ALT 30 GFR 51 blood sugar 99  03/15/2021:  Creatinine 1 35, blood sugar 104, rest  CMP normal, GFR 38, CBC normal, HDL 53, LDL 86, vitamin-D 16 8  TSH 3RD GENERATON         Value: 1 410(uIU/mL)      Dt: 11/19/2020  Vit D, 25-Hydroxy         Value: 10  6(ng/mL)*       Dt: 07/22/2020  Rheumatoid Factor         Value: Negative           Dt: 10/18/2019    Mammogram:  02/26/2020 negative    CT scan of the chest on 10/12/2020 no reoccurrence  CT scan of the chest 10 01/20/2021:  Postsurgical changes related to the right lower lobe superior segmentectomy, no suspicious finding for recurrent disease, tiny 4 mm left lower lobe nodule, tiny subpleural 2 mm nodule stable, 2 mm right upper lobe nodule no suspicious nodules or masses  Mild to moderate coronary artery calcification noted    Recommend to follow with the cardiologist     Lab Results       Component                Value               Date                       CHOLESTEROL              141                 02/16/2022                 CHOLESTEROL              139                 11/18/2021                 CHOLESTEROL              147                 06/16/2021            Lab Results       Component                Value               Date                       HDL                      54                  02/16/2022                 HDL                      56                  11/18/2021                 HDL                      60                  06/16/2021            Lab Results       Component                Value               Date                       TRIG                     113                 02/16/2022                 TRIG 98                  11/18/2021                 TRIG                     119                 06/16/2021            Lab Results       Component                Value               Date                       NONHDLC                  87                  02/16/2022                 [place]                  83                  11/18/2021                 [place]                  87                  06/16/2021 06/01/2022:  Nuclear stress test:  Normal pharmacologic nuclear stress test with no evidence of significant reversible ischemia or prior infarction    The stress ECG is negative for ischemia after pharmacologic stress, without reproduction of symptoms    Perfusion: Comparison of post Lexiscan and resting perfusion images revealed no evidence of significant ischemia or prior infarction  Prone images were not obtained    Stress Function: Left ventricular function post-stress is normal   Calculated ejection fraction is 78%    Perfusion Defect Conclusion: There is no evidence of transient ischemic dilation (TID)               The following portions of the patient's history were reviewed and updated as appropriate: allergies, current medications, past family history, past medical history, past social history, past surgical history and problem list     Review of Systems   All other systems reviewed and are negative          Historical Information   Patient Active Problem List   Diagnosis    Adenocarcinoma of right lung [facility] )    Essential hypertension    Dyslipidemia    Primary osteoarthritis involving multiple joints    Left leg swelling    Femoral neuropathy, right    Emphysema lung (HCC)    Osteoarthritis of right hip    Hyperlipidemia    Numbness and tingling of foot    Anxiety    Colon polyp    Thyroid nodule    Mixed anxiety and depressive disorder    Stage 3b chronic kidney disease ([address] )    Diverticulosis    Skin mole    Restless leg syndrome    BPPV (benign paroxysmal positional vertigo)    Class 1 obesity due to excess calories without serious comorbidity with body mass index (BMI) of 30 0 to 30 9 in adult    Lesion of external ear    Tobacco use disorder    Neuropathy    Chorea    Varicose veins of legs    Vitamin B12 deficiency    Edema of both legs    Vitamin D deficiency    Kidney cyst, acquired    Carpal tunnel syndrome    Simple chronic bronchitis (HCC)    Breast cancer screening by mammogram    Generalized edema    Onychomycosis    Acute deep vein thrombosis (DVT) of distal vein of left lower extremity (HCC)    Left foot pain    Coronary artery calcification    Coronary artery calcification seen on CAT scan     Past Medical History:   Diagnosis Date    Emphysema lung (HCC)     Hyperlipidemia     Hypertension     Numbness and tingling of foot     Osteoarthritis of right hip     Primary adenocarcinoma of lower lobe of right lung ([address] )      Past Surgical History:   Procedure Laterality Date    CATARACT EXTRACTION      CHOLECYSTECTOMY      COLONOSCOPY      JOINT REPLACEMENT      OK TOTAL HIP ARTHROPLASTY Right 2018    Procedure: ANTERIOR TOTAL HIP ARTHROPLASTY;  Surgeon: Aide Shaw MD;  Location: [facility];  Service: Orthopedics    TOTAL HIP ARTHROPLASTY      TUMOR REMOVAL Right 2015    lower lobe      Social History     Substance and Sexual Activity   Alcohol Use Yes    Comment: ocassional      Social History     Substance and Sexual Activity   Drug Use No     Social History     Tobacco Use   Smoking Status Former Smoker    Packs/day: 1 00    Years: 50 00    Pack years: 50 00    Types: Cigarettes    Quit date: 2015    Years since quittin 9   Smokeless Tobacco Never Used     Family History   Problem Relation Age of Onset    Heart disease Mother     No Known Problems Father      Health Maintenance Due   Topic    COVID-19 Vaccine (1)    Pneumococcal Vaccine: 65+ Years (1 - PCV)    Medicare Annual Wellness Visit (AWV)     Influenza Vaccine (1)      Meds/Allergies       Current Outpatient Medications:     albuterol (PROVENTIL HFA,VENTOLIN HFA) 90 mcg/act inhaler, Inhale 2 puffs every 6 (six) hours as needed for wheezing or shortness of breath, Disp: 1 Inhaler, Rfl: 5    amLODIPine (NORVASC) 5 mg tablet, TAKE ONE TABLET DAILY, Disp: 90 tablet, Rfl: 1    aspirin (ECOTRIN LOW STRENGTH) 81 mg EC tablet, Take 1 tablet (81 mg total) by mouth 2 (two) times a day, Disp: 60 tablet, Rfl: 0    atorvastatin (LIPITOR) 10 mg tablet, TAKE 1 TABLET DAILY AT BEDTIME, Disp: 90 tablet, Rfl: 3    gabapentin (NEURONTIN) 300 mg capsule, Take 300 mg by mouth daily , Disp: , Rfl:     hydrochlorothiazide (MICROZIDE) 12 5 mg capsule, TAKE ONE CAPSULE DAILY, Disp: 90 capsule, Rfl: 3    losartan (COZAAR) 50 mg tablet, Take 1 tablet (50 mg total) by mouth daily, Disp: 90 tablet, Rfl: 1    Melatonin 5 MG TABS, Take 10 mg by mouth daily at bedtime , Disp: , Rfl:     metoprolol succinate (TOPROL-XL) 100 mg 24 hr tablet, TAKE 1 TABLET DAILY, Disp: 90 tablet, Rfl: 3    Polyethylene Glycol 400 0 25 % SOLN, Apply 1 drop to eye 2 (two) times a day  , Disp: , Rfl:     Denta 5000 Plus 1 1 % CREA, , Disp: , Rfl:       Objective:    Vitals:   /79   Pulse 67   Ht 5' 7" (1 702 m)   Wt 88 kg (194 lb)   SpO2 97%   BMI 30 38 kg/m²   Body mass index is 30 38 kg/m²  Vitals:    08/26/22 1441   Weight: 88 kg (194 lb)       Physical Exam  Constitutional:       General: She is not in acute distress  Appearance: She is well-developed  She is not ill-appearing or diaphoretic  HENT:      Head: Normocephalic and atraumatic  Right Ear: External ear normal       Left Ear: External ear normal    Eyes:      General: Lids are normal          Right eye: No discharge  Left eye: No discharge  Conjunctiva/sclera: Conjunctivae normal    Neck:      Thyroid: No thyroid mass, thyromegaly or thyroid tenderness  Vascular: Normal carotid pulses  No carotid bruit, hepatojugular reflux or JVD  Trachea: Trachea normal    Cardiovascular:      Rate and Rhythm: Regular rhythm  Heart sounds: Normal heart sounds  Heart sounds not distant  No murmur heard  No friction rub  No gallop  No S4 sounds  Pulmonary:      Effort: No respiratory distress  Breath sounds: Normal breath sounds  No wheezing or rales  Comments: Fair air entry  Abdominal:      General: Bowel sounds are normal  There is no distension  Palpations: There is no mass  Tenderness: There is no rebound  Musculoskeletal:      Right lower leg: No edema  Left lower leg: No edema  Lymphadenopathy:      Cervical: No cervical adenopathy  Skin:     General: Skin is warm  Coloration: Skin is not jaundiced or pale  Findings: No rash  Neurological:      General: No focal deficit present  Mental Status: She is alert and oriented to person, place, and time  Coordination: Coordination normal    Psychiatric:         Attention and Perception: Attention and perception normal          Mood and Affect: Mood normal          Behavior: Behavior normal          Thought Content:  Thought content normal          Judgment: Judgment normal          Lab Review   Lab on 08/23/2022   Component Date Value Ref Range Status    WBC 08/23/2022 10 23 (A) 4 31 - 10 16 Thousand/uL Final    RBC 08/23/2022 4 66  3 81 - 5 12 Million/uL Final    Hemoglobin 08/23/2022 13 6  11 5 - 15 4 g/dL Final    Hematocrit 08/23/2022 43 1  34 8 - 46 1 % Final    MCV 08/23/2022 93  82 - 98 fL Final    MCH 08/23/2022 29 2  26 8 - 34 3 pg Final    MCHC 08/23/2022 31 6  31 4 - 37 4 g/dL Final    RDW 08/23/2022 14 1  11 6 - 15 1 % Final    MPV 08/23/2022 9 7  8 9 - 12 7 fL Final    Platelets 61/35/2089 287  149 - 390 Thousands/uL Final    nRBC 08/23/2022 0  /100 WBCs Final    Neutrophils Relative 08/23/2022 51  43 - 75 % Final    Immat GRANS % 08/23/2022 1  0 - 2 % Final    Lymphocytes Relative 08/23/2022 37  14 - 44 % Final    Monocytes Relative 08/23/2022 5  4 - 12 % Final    Eosinophils Relative 08/23/2022 5  0 - 6 % Final    Basophils Relative 08/23/2022 1  0 - 1 % Final    Neutrophils Absolute 08/23/2022 5 24  1 85 - 7 62 Thousands/µL Final    Immature Grans Absolute 08/23/2022 0 05  0 00 - 0 20 Thousand/uL Final    Lymphocytes Absolute 08/23/2022 3 81  0 60 - 4 47 Thousands/µL Final    Monocytes Absolute 08/23/2022 0 55  0 17 - 1 22 Thousand/µL Final    Eosinophils Absolute 08/23/2022 0 51  0 00 - 0 61 Thousand/µL Final    Basophils Absolute 08/23/2022 0 07  0 00 - 0 10 Thousands/µL Final    Sodium 08/23/2022 137  135 - 147 mmol/L Final    Potassium 08/23/2022 4 1  3 5 - 5 3 mmol/L Final    Chloride 08/23/2022 106  96 - 108 mmol/L Final    CO2 08/23/2022 26  21 - 32 mmol/L Final    ANION GAP 08/23/2022 5  4 - 13 mmol/L Final    BUN 08/23/2022 26 (A) 5 - 25 mg/dL Final    Creatinine 08/23/2022 1 28  0 60 - 1 30 mg/dL Final    Standardized to IDMS reference method    Glucose, Fasting 08/23/2022 116 (A) 65 - 99 mg/dL Final    Specimen collection should occur prior to Sulfasalazine administration due to the potential for falsely depressed results  Specimen collection should occur prior to Sulfapyridine administration due to the potential for falsely elevated results   Calcium 08/23/2022 9 2  8 3 - 10 1 mg/dL Final    eGFR 08/23/2022 40  ml/min/1 73sq m Final         Patient Instructions     Urinary Incontinence   AMBULATORY CARE:   Urinary incontinence (UI)  is when you lose control of your bladder  UI develops because your bladder cannot store or empty urine properly  The 3 most common types of UI are stress incontinence, urge incontinence, or both  Common symptoms include the following: You feel like your bladder does not empty completely when you urinate  You urinate often and need to urinate immediately      You leak urine when you sleep, or you wake up with the urge to urinate  You leak urine when you cough, sneeze, exercise, or laugh  Call your doctor if:   You have severe pain  You are confused or cannot think clearly  You have a fever  You see blood in your urine  You have pain when you urinate  You have new or worse pain, even after treatment  Your mouth feels dry or you have vision changes  Your urine is cloudy or smells bad  You have questions or concerns about your condition or care  Medicines:   Medicines  may be given to help strengthen your bladder control  Take your medicine as directed  Contact your healthcare provider if you think your medicine is not helping or if you have side effects  Tell him or her if you are allergic to any medicine  Keep a list of the medicines, vitamins, and herbs you take  Include the amounts, and when and why you take them  Bring the list or the pill bottles to follow-up visits  Carry your medicine list with you in case of an emergency  Do pelvic muscle exercises often:  Your pelvic muscles help you stop urinating  Squeeze these muscles tight for 5 seconds, then relax for 5 seconds  Gradually work up to squeezing for 10 seconds  Do 3 sets of 15 repetitions a day, or as directed  This will help strengthen your pelvic muscles and improve bladder control  Train your bladder:  Go to the bathroom at set times, such as every 2 hours, even if you do not feel the urge to go  You can also try to hold your urine when you feel the urge to go  For example, hold your urine for 5 minutes when you feel the urge to go  As that becomes easier, hold your urine for 10 minutes  Self-care:   Keep a UI record  Write down how often you leak urine and how much you leak  Make a note of what you were doing when you leaked urine  Drink liquids as directed  Ask your healthcare provider how much liquid to drink each day and which liquids are best for you   You may need to limit the amount of liquid you drink to help control your urine leakage  Do not drink any liquid right before you go to bed  Limit or do not have drinks that contain caffeine or alcohol  Prevent constipation  Eat a variety of high-fiber foods  Good examples are high-fiber cereals, beans, vegetables, and whole-grain breads  Prune juice may help make your bowel movement softer  Walking is the best way to trigger your intestines to have a bowel movement  Exercise regularly and maintain a healthy weight  Ask your healthcare provider how much you should weigh and about the best exercise plan for you  Weight loss and exercise will decrease pressure on your bladder and help you control your leakage  Ask him or her to help you create a weight loss plan if you are overweight  Use a catheter as directed  to help empty your bladder  A catheter is a tiny, plastic tube that is put into your bladder to drain your urine  Your healthcare provider may tell you to use a catheter to prevent your bladder from getting too full and leaking urine  Go to behavior therapy as directed  Behavior therapy may be used to help you learn to control your urge to urinate  Follow up with your doctor as directed:  Write down your questions so you remember to ask them during your visits  © Copyright [employer] 2022 Information is for End User's use only and may not be sold, redistributed or otherwise used for commercial purposes  All illustrations and images included in CareNotes® are the copyrighted property of [employer]  or [facility]   The above information is an  only  It is not intended as medical advice for individual conditions or treatments  Talk to your doctor, nurse or pharmacist before following any medical regimen to see if it is safe and effective for you  Going see the urologist   Paragraphs lose weight  Do Kegel exercise at home      Follow with Consultants as per their and our suggestion    Follow up in 12 week(s) or as needed earlier    Follow all instructions as advised and discussed  Take your medications as prescribed  Call the office immediately if you experience any side effects  Ask questions if you do not understand  Keep your scheduled appointment as advised or come sooner if necessary or in doubt  Best time to call for non-urgent matter or questions on weekdays is between 9am and 12 noon  See physician for any new symptoms or worsening of current symptoms  Urgent or emergent situations call 911 and report to nearest emergency room  I spent  30 -40 minutes taking care of this patient including clinical care, conseling, collaboration, chart, lab and consultaion review as appropriate    Patient is to get labs 1 week(s) prior to next visit if advised             Dr Alejandra Baez MD  [facility] - [place]       "This note has been constructed using a voice recognition system  Therefore there may be syntax, spelling, and/or grammatical errors   Please call if you have any questions  "

## 2022-08-26 NOTE — PATIENT INSTRUCTIONS
Urinary Incontinence   AMBULATORY CARE:   Urinary incontinence (UI)  is when you lose control of your bladder  UI develops because your bladder cannot store or empty urine properly  The 3 most common types of UI are stress incontinence, urge incontinence, or both  Common symptoms include the following: You feel like your bladder does not empty completely when you urinate  You urinate often and need to urinate immediately  You leak urine when you sleep, or you wake up with the urge to urinate  You leak urine when you cough, sneeze, exercise, or laugh  Call your doctor if:   You have severe pain  You are confused or cannot think clearly  You have a fever  You see blood in your urine  You have pain when you urinate  You have new or worse pain, even after treatment  Your mouth feels dry or you have vision changes  Your urine is cloudy or smells bad  You have questions or concerns about your condition or care  Medicines:   Medicines  may be given to help strengthen your bladder control  Take your medicine as directed  Contact your healthcare provider if you think your medicine is not helping or if you have side effects  Tell him or her if you are allergic to any medicine  Keep a list of the medicines, vitamins, and herbs you take  Include the amounts, and when and why you take them  Bring the list or the pill bottles to follow-up visits  Carry your medicine list with you in case of an emergency  Do pelvic muscle exercises often:  Your pelvic muscles help you stop urinating  Squeeze these muscles tight for 5 seconds, then relax for 5 seconds  Gradually work up to squeezing for 10 seconds  Do 3 sets of 15 repetitions a day, or as directed  This will help strengthen your pelvic muscles and improve bladder control  Train your bladder:  Go to the bathroom at set times, such as every 2 hours, even if you do not feel the urge to go   You can also try to hold your urine when you feel the urge to go  For example, hold your urine for 5 minutes when you feel the urge to go  As that becomes easier, hold your urine for 10 minutes  Self-care:   Keep a UI record  Write down how often you leak urine and how much you leak  Make a note of what you were doing when you leaked urine  Drink liquids as directed  Ask your healthcare provider how much liquid to drink each day and which liquids are best for you  You may need to limit the amount of liquid you drink to help control your urine leakage  Do not drink any liquid right before you go to bed  Limit or do not have drinks that contain caffeine or alcohol  Prevent constipation  Eat a variety of high-fiber foods  Good examples are high-fiber cereals, beans, vegetables, and whole-grain breads  Prune juice may help make your bowel movement softer  Walking is the best way to trigger your intestines to have a bowel movement  Exercise regularly and maintain a healthy weight  Ask your healthcare provider how much you should weigh and about the best exercise plan for you  Weight loss and exercise will decrease pressure on your bladder and help you control your leakage  Ask him or her to help you create a weight loss plan if you are overweight  Use a catheter as directed  to help empty your bladder  A catheter is a tiny, plastic tube that is put into your bladder to drain your urine  Your healthcare provider may tell you to use a catheter to prevent your bladder from getting too full and leaking urine  Go to behavior therapy as directed  Behavior therapy may be used to help you learn to control your urge to urinate  Follow up with your doctor as directed:  Write down your questions so you remember to ask them during your visits  © Copyright [employer] 2022 Information is for End User's use only and may not be sold, redistributed or otherwise used for commercial purposes   All illustrations and images included in CareNotes® are the copyrighted property of [employer]  or [address]   The above information is an  only  It is not intended as medical advice for individual conditions or treatments  Talk to your doctor, nurse or pharmacist before following any medical regimen to see if it is safe and effective for you  Going see the urologist   Paragraphs lose weight  Do Kegel exercise at home  Follow with Consultants as per their and our suggestion    Follow up in 12 week(s) or as needed earlier    Follow all instructions as advised and discussed  Take your medications as prescribed  Call the office immediately if you experience any side effects  Ask questions if you do not understand  Keep your scheduled appointment as advised or come sooner if necessary or in doubt  Best time to call for non-urgent matter or questions on weekdays is between 9am and 12 noon  See physician for any new symptoms or worsening of current symptoms  Urgent or emergent situations call 911 and report to nearest emergency room      I spent  30 -40 minutes taking care of this patient including clinical care, conseling, collaboration, chart, lab and consultaion review as appropriate    Patient is to get labs 1 week(s) prior to next visit if advised

## 2022-08-26 NOTE — ASSESSMENT & PLAN NOTE
06/01/2022:  Home is nuclear stress test:    Normal pharmacologic nuclear stress test with no evidence of significant reversible ischemia or prior infarction    The stress ECG is negative for ischemia after pharmacologic stress, without reproduction of symptoms    Perfusion: Comparison of post Lexiscan and resting perfusion images revealed no evidence of significant ischemia or prior infarction  Prone images were not obtained    Stress Function: Left ventricular function post-stress is normal   Calculated ejection fraction is 78%    Perfusion Defect Conclusion: There is no evidence of transient ischemic dilation (TID)

## 2022-08-26 NOTE — ASSESSMENT & PLAN NOTE
06/01/2022:  Home is nuclear stress test:    Normal pharmacologic nuclear stress test with no evidence of significant reversible ischemia or prior infarction    The stress ECG is negative for ischemia after pharmacologic stress, without reproduction of symptoms    Perfusion: Comparison of post Lexiscan and resting perfusion images revealed no evidence of significant ischemia or prior infarction  Prone images were not obtained    Stress Function: Left ventricular function post-stress is normal   Calculated ejection fraction is 78%    Perfusion Defect Conclusion: There is no evidence of transient ischemic dilation (TID)      no caridac sx no angina

## 2022-08-26 NOTE — ASSESSMENT & PLAN NOTE
Lab Results   Component Value Date    CHOLESTEROL 141 02/16/2022    CHOLESTEROL 139 11/18/2021    CHOLESTEROL 147 06/16/2021     Lab Results   Component Value Date    HDL 54 02/16/2022    HDL 56 11/18/2021    HDL 60 06/16/2021     Lab Results   Component Value Date    TRIG 113 02/16/2022    TRIG 98 11/18/2021    TRIG 119 06/16/2021     Lab Results   Component Value Date    NONHDLC 87 02/16/2022    [place] 83 11/18/2021    NONHDLC 87 06/16/2021   Continue atorvastatin 10 mg daily LDL would to the control ALT normal

## 2022-08-26 NOTE — ASSESSMENT & PLAN NOTE
Lab Results   Component Value Date    EGFR 40 08/23/2022    EGFR 42 05/20/2022    EGFR 42 02/16/2022    CREATININE 1 28 08/23/2022    CREATININE 1 22 05/20/2022    CREATININE 1 22 02/16/2022

## 2022-08-26 NOTE — ASSESSMENT & PLAN NOTE
Going for CT scan of the chest with cardiothoracic surgeon in near future and she will follow up with them  She remains sedated relatively symptom-free  Last CT scan of the chest was don which was basically unremarkable/unchanged e on 10/15/2021    Unchanged nodule, coronary calcification, postoperative changes,      Some dyspnea on exertion otherwise unremarkable recommend breathing test pt refuses

## 2022-08-28 VITALS
OXYGEN SATURATION: 97 % | HEART RATE: 67 BPM | WEIGHT: 194 LBS | BODY MASS INDEX: 30.45 KG/M2 | HEIGHT: 67 IN | SYSTOLIC BLOOD PRESSURE: 130 MMHG | DIASTOLIC BLOOD PRESSURE: 79 MMHG

## 2022-09-21 DIAGNOSIS — J41.0 SIMPLE CHRONIC BRONCHITIS (HCC): ICD-10-CM

## 2022-09-21 DIAGNOSIS — I10 ESSENTIAL HYPERTENSION: ICD-10-CM

## 2022-09-21 RX ORDER — AMLODIPINE BESYLATE 5 MG/1
5 TABLET ORAL DAILY
Qty: 90 TABLET | Refills: 1 | Status: SHIPPED | OUTPATIENT
Start: 2022-09-21

## 2022-09-21 RX ORDER — ALBUTEROL SULFATE 90 UG/1
2 AEROSOL, METERED RESPIRATORY (INHALATION) EVERY 6 HOURS PRN
Qty: 18 G | Refills: 1 | Status: SHIPPED | OUTPATIENT
Start: 2022-09-21

## 2022-09-22 DIAGNOSIS — I10 ESSENTIAL HYPERTENSION: ICD-10-CM

## 2022-09-22 RX ORDER — HYDROCHLOROTHIAZIDE 12.5 MG/1
12.5 CAPSULE, GELATIN COATED ORAL DAILY
Qty: 90 CAPSULE | Refills: 3 | Status: SHIPPED | OUTPATIENT
Start: 2022-09-22

## 2022-10-12 ENCOUNTER — HOSPITAL ENCOUNTER (OUTPATIENT)
Dept: RADIOLOGY | Facility: HOSPITAL | Age: 77
Discharge: HOME | End: 2022-10-12
Payer: MEDICARE

## 2022-10-12 DIAGNOSIS — C34.91 ADENOCARCINOMA OF RIGHT LUNG (HCC): ICD-10-CM

## 2022-10-12 PROCEDURE — 71250 CT THORAX DX C-: CPT

## 2022-10-12 PROCEDURE — G1004 CDSM NDSC: HCPCS

## 2022-10-18 ENCOUNTER — OFFICE VISIT (OUTPATIENT)
Dept: CARDIAC SURGERY | Facility: CLINIC | Age: 77
End: 2022-10-18
Payer: MEDICARE

## 2022-10-18 VITALS
HEART RATE: 72 BPM | RESPIRATION RATE: 17 BRPM | TEMPERATURE: 97.9 F | WEIGHT: 195.99 LBS | BODY MASS INDEX: 30.76 KG/M2 | DIASTOLIC BLOOD PRESSURE: 72 MMHG | OXYGEN SATURATION: 96 % | SYSTOLIC BLOOD PRESSURE: 152 MMHG | HEIGHT: 67 IN

## 2022-10-18 DIAGNOSIS — C34.91 ADENOCARCINOMA OF RIGHT LUNG (HCC): Primary | ICD-10-CM

## 2022-10-18 PROCEDURE — 99213 OFFICE O/P EST LOW 20 MIN: CPT | Performed by: THORACIC SURGERY (CARDIOTHORACIC VASCULAR SURGERY)

## 2022-10-18 NOTE — ASSESSMENT & PLAN NOTE
I had a discussion with Ms Smith after personally reviewing her CT scan, which did not reveal any evidence of metastatic or recurrent disease  She has two pulmonary nodules that have remained stable, without any new pulmonary nodules  She is stable from a thoracic surgery standpoint  We will have her return to our office in one year with a new CT scan for continued surveillance  She is in agreement with the plan

## 2022-10-18 NOTE — PROGRESS NOTES
Assessment/Plan:    Adenocarcinoma of right lung ([address] )  I had a discussion with Ms Smith after personally reviewing her CT scan, which did not reveal any evidence of metastatic or recurrent disease  She has two pulmonary nodules that have remained stable, without any new pulmonary nodules  She is stable from a thoracic surgery standpoint  We will have her return to our office in one year with a new CT scan for continued surveillance  She is in agreement with the plan  Diagnoses and all orders for this visit:    Adenocarcinoma of right lung [facility])  -     CT chest wo contrast; Future          Thoracic History      Cancer Staging  Adenocarcinoma of right lung [facility])  Staging form: Lung, AJCC 7th Edition  - Clinical stage from 9/24/2015: Stage IA (T1a, N0, M0) - Signed by Alessia Spence PA-C on 10/18/2022  Histopathologic type: Adenocarcinoma, NOS  Stage prefix: Initial diagnosis  Biopsy of metastatic site performed: No  Histologic grade (G): G2  Lymph-vascular invasion (LVI): LVI present/identified, NOS  Pleural/elastic layer invasion: PL0    Cancer Staging  No matching staging information was found for the patient  Oncology History    No history exists  Patient ID: Pj Tamayo is a 65 y o  female  ECOG 0     HPI     Ms Girma Angel is a 64 yo female who underwent a right thoracoscopic superior segmentectomy on 9/17/15 for stage IA adenocarcinoma  She was last seen on 11/30/21 at which point her CT scan demonstrated a stable 4 mm left lower lobe nodule and 2 mm right upper lobe nodules  She returns today with a CT from 10/12/22, which reveals a stable 2 mm right upper lobe and 4 mm left lower lobe nodule  There are no new nodules or lymphadenopathy  She is having allergies with a cough and green/gray sputum production and PND  She denies fever, chills, new bone pains, hemoptysis, or weight loss  She had some teeth extraction       The following portions of the patient's history were reviewed and updated as appropriate: allergies, current medications, past family history, past medical history, past social history, past surgical history and problem list     Review of Systems      Objective:   Physical Exam  Vitals reviewed  Constitutional:       General: She is not in acute distress  Appearance: Normal appearance  HENT:      Head: Normocephalic and atraumatic  Eyes:      General: No scleral icterus  Extraocular Movements: Extraocular movements intact  Cardiovascular:      Rate and Rhythm: Normal rate and regular rhythm  Heart sounds: Normal heart sounds  No murmur heard  Pulmonary:      Effort: Pulmonary effort is normal  No respiratory distress  Breath sounds: Normal breath sounds  No wheezing  Musculoskeletal:      Cervical back: Normal range of motion and neck supple  Lymphadenopathy:      Cervical: No cervical adenopathy  Skin:     General: Skin is warm and dry  Neurological:      Mental Status: She is alert and oriented to person, place, and time  Psychiatric:         Mood and Affect: Mood normal          Behavior: Behavior normal          Thought Content: Thought content normal      /72   Pulse 72   Temp 97 9 °F (36 6 °C)   Resp 17   Ht 5' 7" (1 702 m)   Wt 88 9 kg (195 lb 15 8 oz)   SpO2 96%   BMI 30 70 kg/m²     CT chest wo contrast    Result Date: 10/17/2022  Narrative CT CHEST WITHOUT IV CONTRAST INDICATION:   C34 91: Malignant neoplasm of unspecified part of right bronchus or lung  Right lower lobe superior segmentectomy for adenocarcinoma on 9/17/2015  COMPARISON:  Chest CT 10/15/2021, 10/8/2020, and 10/10/2017  TECHNIQUE: Chest CT without intravenous contrast   Axial, sagittal, coronal 2D reformats and coronal MIPS from source data  Radiation dose length product (DLP):  421 09 mGy-cm   Radiation dose exposure minimized using iterative reconstruction and automated exposure control  FINDINGS: LUNGS:  No recurrent tumor   Stable 2 mm juxtapleural right upper lobe nodule (3/57) and 4 mm juxtapleural lateral basal left lower lobe nodule (3/97, stable since 2017  Right lower lobe superior segmentectomy  AIRWAYS: No significant filling defects  PLEURA:  Unremarkable  HEART/GREAT VESSELS:  Normal heart size  Mild coronary artery calcification indicating atherosclerotic heart disease  MEDIASTINUM AND KARIE:  Unremarkable  CHEST WALL AND LOWER NECK: Unremarkable  UPPER ABDOMEN:  Cholecystectomy  Focal fat adjacent to the falciform ligament  OSSEOUS STRUCTURES: Mild degenerative disease in the spine with mild curvature     Impression Nothing to indicate recurrent tumor    Workstation performed: [id number]

## 2022-11-25 ENCOUNTER — RA CDI HCC (OUTPATIENT)
Dept: OTHER | Facility: HOSPITAL | Age: 77
End: 2022-11-25

## 2022-11-25 NOTE — PROGRESS NOTES
[address]  coding opportunities       Chart reviewed, no opportunity found: CHART REVIEWED, NO OPPORTUNITY FOUND        Patients Insurance     Medicare Insurance: Medicare

## 2022-12-02 ENCOUNTER — OFFICE VISIT (OUTPATIENT)
Dept: INTERNAL MEDICINE CLINIC | Facility: CLINIC | Age: 77
End: 2022-12-02

## 2022-12-02 VITALS — HEIGHT: 67 IN | OXYGEN SATURATION: 98 % | BODY MASS INDEX: 30.29 KG/M2 | HEART RATE: 71 BPM | WEIGHT: 193 LBS

## 2022-12-02 DIAGNOSIS — C34.91 ADENOCARCINOMA OF RIGHT LUNG (HCC): Primary | ICD-10-CM

## 2022-12-02 DIAGNOSIS — E66.09 CLASS 1 OBESITY DUE TO EXCESS CALORIES WITHOUT SERIOUS COMORBIDITY WITH BODY MASS INDEX (BMI) OF 30.0 TO 30.9 IN ADULT: ICD-10-CM

## 2022-12-02 DIAGNOSIS — R20.0 NUMBNESS AND TINGLING OF FOOT: ICD-10-CM

## 2022-12-02 DIAGNOSIS — K63.5 POLYP OF COLON, UNSPECIFIED PART OF COLON, UNSPECIFIED TYPE: ICD-10-CM

## 2022-12-02 DIAGNOSIS — E78.5 DYSLIPIDEMIA: ICD-10-CM

## 2022-12-02 DIAGNOSIS — N18.32 STAGE 3B CHRONIC KIDNEY DISEASE (HCC): ICD-10-CM

## 2022-12-02 DIAGNOSIS — N28.1 KIDNEY CYST, ACQUIRED: ICD-10-CM

## 2022-12-02 DIAGNOSIS — Z23 NEED FOR VACCINATION: ICD-10-CM

## 2022-12-02 DIAGNOSIS — Z00.00 MEDICARE ANNUAL WELLNESS VISIT, SUBSEQUENT: ICD-10-CM

## 2022-12-02 DIAGNOSIS — I10 ESSENTIAL HYPERTENSION: ICD-10-CM

## 2022-12-02 DIAGNOSIS — E55.9 VITAMIN D DEFICIENCY: ICD-10-CM

## 2022-12-02 DIAGNOSIS — R60.0 EDEMA OF BOTH LEGS: ICD-10-CM

## 2022-12-02 DIAGNOSIS — G57.21 FEMORAL NEUROPATHY, RIGHT: ICD-10-CM

## 2022-12-02 DIAGNOSIS — I25.84 CORONARY ARTERY CALCIFICATION: ICD-10-CM

## 2022-12-02 DIAGNOSIS — R20.2 NUMBNESS AND TINGLING OF FOOT: ICD-10-CM

## 2022-12-02 DIAGNOSIS — I25.10 CORONARY ARTERY CALCIFICATION: ICD-10-CM

## 2022-12-02 DIAGNOSIS — J43.9 PULMONARY EMPHYSEMA, UNSPECIFIED EMPHYSEMA TYPE (HCC): ICD-10-CM

## 2022-12-02 DIAGNOSIS — E04.1 THYROID NODULE: ICD-10-CM

## 2022-12-02 PROBLEM — R60.1 GENERALIZED EDEMA: Status: RESOLVED | Noted: 2021-06-22 | Resolved: 2022-12-02

## 2022-12-02 PROBLEM — M79.89 LEFT LEG SWELLING: Status: RESOLVED | Noted: 2018-09-06 | Resolved: 2022-12-02

## 2022-12-02 NOTE — ASSESSMENT & PLAN NOTE
10/12/2022:  CT scan of the chest:   Nothing to indicate recurrent tumor  Stable 2 mm juxtapleural right upper lobe nodule (3/57) and 4 mm juxtapleural lateral basal left lower lobe nodule (3/97, stable since 2017  Right lower lobe superior segmentectomy        Seen by surgeon subsequently  The notes reviewed    No reoccurrence by symptoms exam or radiologically

## 2022-12-02 NOTE — ASSESSMENT & PLAN NOTE
MRI:  Cystic or cyst-like lesion along the anterior and mid pole of the left kidney with this region not image on the current study      Again recommend to see urologist patient will think about it

## 2022-12-02 NOTE — PROGRESS NOTES
Assessment and Plan:     Problem List Items Addressed This Visit        Digestive    Colon polyp     10/19/2010:  Colonoscopy:  5 polyps removed    Patient refuses colonoscopy            Endocrine    Thyroid nodule       Respiratory    Adenocarcinoma of right lung [facility]) - Primary     10/12/2022:  CT scan of the chest:   Nothing to indicate recurrent tumor  Stable 2 mm juxtapleural right upper lobe nodule (3/57) and 4 mm juxtapleural lateral basal left lower lobe nodule (3/97, stable since 2017  Right lower lobe superior segmentectomy        Seen by surgeon subsequently  The notes reviewed  No reoccurrence by symptoms exam or radiologically           Relevant Orders    Comprehensive metabolic panel    CBC and differential    Emphysema lung (HCC)     Occasional dyspnea does have albuterol uses mostly at nighttime  Breathing fair  Up-to-date with vaccination         Relevant Orders    Comprehensive metabolic panel    CBC and differential       Cardiovascular and Mediastinum    Essential hypertension     Hypertension well controlled  Continue hydrochlorothiazide 12 5 mg daily, amlodipine 5 mg daily, metoprolol succinate 100 mg daily  Paragraphs due for CMP prior to the next visit         Relevant Orders    Comprehensive metabolic panel    CBC and differential    Lipid panel    Coronary artery calcification     Symptom-free  Negative stress test on 06/01/2022  Will continue to monitor  This was incidental finding on the CT scan            Nervous and Auditory    Femoral neuropathy, right     Fair at this time  Genitourinary    Stage 3b chronic kidney disease [facility])     Lab Results   Component Value Date    EGFR 40 08/23/2022    EGFR 42 05/20/2022    EGFR 42 02/16/2022    CREATININE 1 28 08/23/2022    CREATININE 1 22 05/20/2022    CREATININE 1 22 02/16/2022   GFR is baseline  Creat is baseline     Recommend periodic blood test for monitoring of BUN and Creat  Avoid Non Steroidal Antiinflammatory such as ibuprofen, aleve etc   Potassium, HCO3 and calcium are wnl and will require periodic blood test   Bone and Mineral disease monitoring as appropriate  All patient with GFR less than 30( CKD 4 or 5 or 6) advised to follow with nephrologist            Relevant Orders    Comprehensive metabolic panel    Kidney cyst, acquired     MRI:  Cystic or cyst-like lesion along the anterior and mid pole of the left kidney with this region not image on the current study  Again recommend to see urologist patient will think about it            Other    Dyslipidemia    Relevant Orders    Comprehensive metabolic panel    Lipid panel    Numbness and tingling of foot     Fair at this time  Class 1 obesity due to excess calories without serious comorbidity with body mass index (BMI) of 30 0 to 30 9 in adult    Edema of both legs     Edema of leg is better at this time  Remains on hydrochlorothiazide  Check CMP next visit         Vitamin D deficiency     Due for vitamin-D next visit         Relevant Orders    Vitamin D 25 hydroxy   Other Visit Diagnoses     Medicare annual wellness visit, subsequent        Need for vaccination        Relevant Orders    influenza vaccine, high-dose, PF 0 7 mL (FLUZONE HIGH-DOSE) (Completed)           Preventive health issues were discussed with patient, and age appropriate screening tests were ordered as noted in patient's After Visit Summary  Personalized health advice and appropriate referrals for health education or preventive services given if needed, as noted in patient's After Visit Summary  History of Present Illness:     Patient presents for a Medicare Wellness Visit      Patient is here for chronic disease management      Peripheral edema is much better  Tolerating hydrochlorothiazide without side effect    Trace on the left right unremarkable    Hypertension , excellent control   Remains on amlodipine 5 mg daily, losartan 50 mg daily, hydrochlorothiazide 12 5 mg daily, a and metoprolol succinate 100 mg daily  CKD level 3 GFR around 40-45 baseline the remains on hydrochlorothiazide to the this is baseline electrolytes normal calcium normal CO2 normal hemoglobin fair will monitor  WBC slightly elevated 10  33  watch cbc wbc 10-11 K  Range watch,rest cbc ok, no hematology sx no lymphadenopathy, due for CBC next visit    Cancer of the lung denies any cough chest congestion shortness of breath patientwa is cleared by thoracic surgeon for another year  Getting some surveillance CT scan of the chest periodically  , seen by surgeon in October  CT scan of the chest reviewed  No reoccurrence  Seven years has been stable    COPD breathing fair denies cough chest congestion shortness of breath at rest   Does have some dyspnea on exertion  Albuterol prn     MCGRAW:  Patient's functional capacity is limited  Negative stress test, going for CT Scan of chest per thoracic surgeon    DJD knee no major pain  Generalized anxiety and depression is fair  Adjusting to the life  PHQ 9 0 courtney 7 fair see report    Hyperlipidemia continue atorvastatin 10 mg daily  Due for lipid profile and CMP prior to the next visit    Hyperlipidemia excellent lipid profile normal LFT will continue atorvastatin 10mg daily  The femoral neuropathy on the right thigh saw you improving  Thyroid nodule  :01/03/2020:  COMPARISON:  Thyroid ultrasound 1/15/2016 and 2/24/2014  No nodule meets current ACR criteria for requiring biopsy and since there has been 5 years of stability, no more followup is indicated    Colonic polyp studies done on 09/2009, April 2009, and 10/19/2010  Refuses colonoscopy    Benign positional vertigo stable  Pt is refusing colonoscpy  Pt canceled October  Appointment due to her mother's death  See 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 as well as her 's paperwork  History of tubular adenoma advised to get colonoscopy done      Insomnia fair     S/p Rigth THR    Femoral neuropathy fair    Pt does not want to see  Urologist and does not want to go for ultrasound of kidney and bladder  , we had given the name of the your urologist hopefully she will see    Previous important study  MRI:  Cystic or cyst-like lesion along the anterior and mid pole of the left kidney with this region not image on the current study  2  New appearing metabolic blooming artifact extending from the region of the right hip compatible with interval right hip replacement    3  Left T11 superior facet/pedicle marrow edema resolved    4  No significant interval change since the previous MRI of the 08/18/2016 11 the technical differences    5  Multilevel degenerative spondylitic changes of the lumbar spine without central canal stenosis is refer to the full body of report    6  Atherosclerotic changes of the abdominal aorta    7  Transitional anatomy at the lumbosacral study      Femoral neuropathy confirm by EMG nerve conduction study on 11/30/2018 stable at this time there is also evidence of the peroneal motor nerve neuropathy with exercise external feature please refer to the for coffee of report remains on gabapentin      She is taking gabapentin without side effect  08/23/2022:  CBC normal blood sugar 116 creatinine 1 28 GFR 40  05/20/2022:  Vitamin-D 34, WBC 10 33, blood sugar 102 GFR 42 rest of the CMP normal  11/18/2021:  Blood sugar 102, creatinine 1 2, GFR 44, cholesterol 139, LDL 63, LFT normal,  06/16/2021,:  Cholesterol 147 LDL 63 HDL 60 potassium 4 2 creatinine 1 07 ALT 30 GFR 51 blood sugar 99  03/15/2021:  Creatinine 1 35, blood sugar 104, rest  CMP normal, GFR 38, CBC normal, HDL 53, LDL 86, vitamin-D 16 8  TSH 3RD GENERATON         Value: 1 410(uIU/mL)      Dt: 11/19/2020  Vit D, 25-Hydroxy         Value: 10  6(ng/mL)*       Dt: 07/22/2020  Rheumatoid Factor         Value: Negative           Dt: 10/18/2019    Mammogram:  02/26/2020 negative    CT scan of the chest on 10/12/2020 no reoccurrence  CT scan of the chest 10 01/20/2021:  Postsurgical changes related to the right lower lobe superior segmentectomy, no suspicious finding for recurrent disease, tiny 4 mm left lower lobe nodule, tiny subpleural 2 mm nodule stable, 2 mm right upper lobe nodule no suspicious nodules or masses  Mild to moderate coronary artery calcification noted  Recommend to follow with the cardiologist         06/01/2022:  Nuclear stress test:•  Normal pharmacologic nuclear stress test with no evidence of significant reversible ischemia or prior infarction  •  The stress ECG is negative for ischemia after pharmacologic stress, without reproduction of symptoms  •  Perfusion: Comparison of post Lexiscan and resting perfusion images revealed no evidence of significant ischemia or prior infarction  Prone images were not obtained  •  Stress Function: Left ventricular function post-stress is normal   Calculated ejection fraction is 78%  •  Perfusion Defect Conclusion: There is no evidence of transient ischemic dilation (TID)              Patient Care Team:  Felisa Parker MD as PCP - MD Amarilis Meyers MD     Review of Systems:     Review of Systems   Constitutional: Negative for chills, fatigue, fever and unexpected weight change  HENT: Negative for ear pain, postnasal drip, sinus pain and sore throat  Eyes: Negative for pain and redness  Respiratory: Negative for cough and shortness of breath  Cardiovascular: Negative for chest pain, palpitations and leg swelling  Gastrointestinal: Negative for abdominal pain, diarrhea and nausea  Endocrine: Negative for cold intolerance, polydipsia and polyuria  Genitourinary: Negative for dysuria, frequency and urgency  Musculoskeletal: Negative for arthralgias, gait problem and myalgias  Skin: Negative for rash  Allergic/Immunologic: Negative      Neurological: Negative for dizziness and headaches  Hematological: Negative for adenopathy  Psychiatric/Behavioral: Negative for behavioral problems          Problem List:     Patient Active Problem List   Diagnosis   • Adenocarcinoma of right lung [facility])   • Essential hypertension   • Dyslipidemia   • Primary osteoarthritis involving multiple joints   • Femoral neuropathy, right   • Emphysema lung (HCC)   • Osteoarthritis of right hip   • Hyperlipidemia   • Numbness and tingling of foot   • Anxiety   • Colon polyp   • Thyroid nodule   • Mixed anxiety and depressive disorder   • Stage 3b chronic kidney disease (HCC)   • Diverticulosis   • Skin mole   • Restless leg syndrome   • BPPV (benign paroxysmal positional vertigo)   • Class 1 obesity due to excess calories without serious comorbidity with body mass index (BMI) of 30 0 to 30 9 in adult   • Lesion of external ear   • Tobacco use disorder   • Neuropathy   • Chorea   • Varicose veins of legs   • Vitamin B12 deficiency   • Edema of both legs   • Vitamin D deficiency   • Kidney cyst, acquired   • Carpal tunnel syndrome   • Simple chronic bronchitis (HCC)   • Breast cancer screening by mammogram   • Onychomycosis   • Acute deep vein thrombosis (DVT) of distal vein of left lower extremity (HCC)   • Left foot pain   • Coronary artery calcification   • Coronary artery calcification seen on CAT scan      Past Medical and Surgical History:     Past Medical History:   Diagnosis Date   • Emphysema lung ([address] )    • Hyperlipidemia    • Hypertension    • Numbness and tingling of foot    • Osteoarthritis of right hip    • Primary adenocarcinoma of lower lobe of right lung ([address] )      Past Surgical History:   Procedure Laterality Date   • CATARACT EXTRACTION     • CHOLECYSTECTOMY     • COLONOSCOPY     • JOINT REPLACEMENT     • VT TOTAL HIP ARTHROPLASTY Right 9/14/2018    Procedure: ANTERIOR TOTAL HIP ARTHROPLASTY;  Surgeon: Daisy Oro MD;  Location: [facility];  Service: Orthopedics   • TOTAL HIP ARTHROPLASTY     • TUMOR REMOVAL Right 2015    lower lobe       Family History:     Family History   Problem Relation Age of Onset   • Heart disease Mother    • No Known Problems Father       Social History:     Social History     Socioeconomic History   • Marital status:      Spouse name: None   • Number of children: None   • Years of education: None   • Highest education level: None   Occupational History   • None   Tobacco Use   • Smoking status: Former     Packs/day: 1 00     Years: 50 00     Pack years: 50 00     Types: Cigarettes     Quit date: 2015     Years since quittin 2   • Smokeless tobacco: Never   Substance and Sexual Activity   • Alcohol use: Yes     Comment: ocassional    • Drug use: No   • Sexual activity: None   Other Topics Concern   • None   Social History Narrative   • None     Social Determinants of Health     Financial Resource Strain: Low Risk    • Difficulty of Paying Living Expenses: Not hard at all   Food Insecurity: Not on file   Transportation Needs: No Transportation Needs   • Lack of Transportation (Medical): No   • Lack of Transportation (Non-Medical):  No   Physical Activity: Not on file   Stress: Not on file   Social Connections: Not on file   Intimate Partner Violence: Not on file   Housing Stability: Not on file      Medications and Allergies:     Current Outpatient Medications   Medication Sig Dispense Refill   • albuterol (PROVENTIL HFA,VENTOLIN HFA) 90 mcg/act inhaler Inhale 2 puffs every 6 (six) hours as needed for wheezing or shortness of breath 18 g 1   • amLODIPine (NORVASC) 5 mg tablet Take 1 tablet (5 mg total) by mouth daily 90 tablet 1   • aspirin (ECOTRIN LOW STRENGTH) 81 mg EC tablet Take 1 tablet (81 mg total) by mouth 2 (two) times a day 60 tablet 0   • atorvastatin (LIPITOR) 10 mg tablet TAKE 1 TABLET DAILY AT BEDTIME 90 tablet 1   • Denta 5000 Plus 1 1 % CREA      • gabapentin (NEURONTIN) 300 mg capsule Take 300 mg by mouth daily      • hydrochlorothiazide (MICROZIDE) 12 5 mg capsule Take 1 capsule (12 5 mg total) by mouth daily 90 capsule 3   • losartan (COZAAR) 50 mg tablet TAKE 1 TABLET DAILY 90 tablet 1   • Melatonin 5 MG TABS Take 10 mg by mouth daily at bedtime      • metoprolol succinate (TOPROL-XL) 100 mg 24 hr tablet TAKE 1 TABLET DAILY 90 tablet 1   • Polyethylene Glycol 400 0 25 % SOLN Apply 1 drop to eye 2 (two) times a day         No current facility-administered medications for this visit  No Known Allergies   Immunizations:     Immunization History   Administered Date(s) Administered   • Influenza, high dose seasonal 0 7 mL 11/23/2020, 11/24/2021, 12/02/2022   • Influenza, injectable, quadrivalent, preservative free 0 5 mL 10/24/2019      Health Maintenance:         Topic Date Due   • Hepatitis C Screening  Completed         Topic Date Due   • Hepatitis B Vaccine (1 of 3 - 3-dose series) Never done   • COVID-19 Vaccine (1) Never done   • Pneumococcal Vaccine: 65+ Years (1 - PCV) Never done      Medicare Screening Tests and Risk Assessments:     Eloisa Hanna is here for her Subsequent Wellness visit  Last Medicare Wellness visit information reviewed, patient interviewed and updates made to the record today  Health Risk Assessment:   Patient rates overall health as good  Patient feels that their physical health rating is same  Patient is satisfied with their life  Eyesight was rated as same  Hearing was rated as same  Patient feels that their emotional and mental health rating is same  Patients states they are never, rarely angry  Patient states they are sometimes unusually tired/fatigued  Pain experienced in the last 7 days has been none  Patient states that she has experienced no weight loss or gain in last 6 months  Weight stays the same  Fall Risk Screening:    In the past year, patient has experienced: no history of falling in past year      Urinary Incontinence Screening:   Patient has not leaked urine accidently in the last six months  No issues  Home Safety:  Patient does not have trouble with stairs inside or outside of their home  Patient has working smoke alarms and has working carbon monoxide detector  Home safety hazards include: none  Goes slow on stairs  Uses banisters  No home hazards  Pt feels safe in home  Lives alone  Daughter looks in on her  Nutrition:   Current diet is Regular  Follows a balanced diet  Veggies and proteins  Medications:   Patient is currently taking over-the-counter supplements  OTC medications include: see medication list  Patient is able to manage medications  No issues with meds  Activities of Daily Living (ADLs)/Instrumental Activities of Daily Living (IADLs):   Walk and transfer into and out of bed and chair?: Yes  Dress and groom yourself?: Yes    Bathe or shower yourself?: Yes    Feed yourself? Yes  Do your laundry/housekeeping?: Yes  Manage your money, pay your bills and track your expenses?: Yes  Make your own meals?: Yes    Do your own shopping?: Yes    ADL comments: Pt is independent  Previous Hospitalizations:   Any hospitalizations or ED visits within the last 12 months?: No      Hospitalization Comments: Chronic Conditions have been stable  Advance Care Planning:   Living will: Yes    Durable POA for healthcare: Yes    Advanced directive: Yes    Advanced directive counseling given: Yes    Five wishes given: No    Patient declined ACP directive: No    End of Life Decisions reviewed with patient: Yes    Provider agrees with end of life decisions: Yes      Comments: Encouraged to discuss with family  ACP uploaded      Cognitive Screening:   Provider or family/friend/caregiver concerned regarding cognition?: No    PREVENTIVE SCREENINGS      Cardiovascular Screening:    General: Screening Not Indicated and History Lipid Disorder      Diabetes Screening:     General: Screening Current      Colorectal Cancer Screening:     General: Risks and Benefits Discussed and Screening Not Indicated      Breast Cancer Screening:     General: Screening Current      Cervical Cancer Screening:    General: Screening Not Indicated      Osteoporosis Screening:    General: Risks and Benefits Discussed and Patient Declines      Abdominal Aortic Aneurysm (AAA) Screening:        General: Risks and Benefits Discussed and Screening Not Indicated      Lung Cancer Screening:     General: Screening Not Indicated and History Lung Cancer      Hepatitis C Screening:    General: Screening Current      Preventive Screening Comments: Is getting a flu shot today  Goes to eye exam refgularly  Screening, Brief Intervention, and Referral to Treatment (SBIRT)    Screening  Typical number of drinks in a day: 0  Typical number of drinks in a week: 0  Interpretation: Low risk drinking behavior  AUDIT-C Screenin) How often did you have a drink containing alcohol in the past year? never  2) How many drinks did you have on a typical day when you were drinking in the past year? 0  3) How often did you have 6 or more drinks on one occasion in the past year? never    AUDIT-C Score: 0  Interpretation: Score 0-2 (female): Negative screen for alcohol misuse    Single Item Drug Screening:  How often have you used an illegal drug (including marijuana) or a prescription medication for non-medical reasons in the past year? never    Single Item Drug Screen Score: 0  Interpretation: Negative screen for possible drug use disorder    Brief Intervention  Alcohol & drug use screenings were reviewed  No concerns regarding substance use disorder identified  Other Counseling Topics:   Skin self-exam and regular weightbearing exercise  No results found  Physical Exam:     Pulse 71   Ht 5' 7" (1 702 m)   Wt 87 5 kg (193 lb)   SpO2 98%   BMI 30 23 kg/m²     Physical Exam  Constitutional:       General: She is not in acute distress  Appearance: She is well-developed and well-nourished  She is obese   She is not ill-appearing, toxic-appearing or diaphoretic  HENT:      Head: Normocephalic and atraumatic  Right Ear: External ear normal       Left Ear: External ear normal    Eyes:      General:         Right eye: No discharge  Left eye: No discharge  Conjunctiva/sclera: Conjunctivae normal    Neck:      Thyroid: No thyromegaly  Vascular: No JVD  Cardiovascular:      Rate and Rhythm: Regular rhythm  Heart sounds: Normal heart sounds  No murmur heard  No gallop  Pulmonary:      Effort: No respiratory distress  Breath sounds: Normal breath sounds  No stridor  No wheezing, rhonchi or rales  Abdominal:      General: Bowel sounds are normal  There is no distension  Palpations: There is no mass  Tenderness: There is no abdominal tenderness  There is no rebound  Musculoskeletal:      Right lower leg: No edema  Left lower leg: Edema (Trace edema left lower extremity baseline) present  Lymphadenopathy:      Cervical: No cervical adenopathy  Skin:     General: Skin is warm  Findings: No rash  Neurological:      Mental Status: She is oriented to person, place, and time  Mental status is at baseline  Coordination: Coordination normal    Psychiatric:         Mood and Affect: Mood and affect and mood normal          Behavior: Behavior normal          Thought Content:  Thought content normal          Judgment: Judgment normal           Rodolfo Ellis MD

## 2022-12-02 NOTE — ASSESSMENT & PLAN NOTE
Some generalized anxiety  Worried about going for urine duty  She is not ideal continued anyway  Patient reassured    Will do paperwork if needed

## 2022-12-02 NOTE — ASSESSMENT & PLAN NOTE
Hypertension well controlled  Continue hydrochlorothiazide 12 5 mg daily, amlodipine 5 mg daily, metoprolol succinate 100 mg daily    Paragraphs due for CMP prior to the next visit

## 2022-12-02 NOTE — ASSESSMENT & PLAN NOTE
Lab Results   Component Value Date    EGFR 40 08/23/2022    EGFR 42 05/20/2022    EGFR 42 02/16/2022    CREATININE 1 28 08/23/2022    CREATININE 1 22 05/20/2022    CREATININE 1 22 02/16/2022   GFR is baseline  Creat is baseline  Recommend periodic blood test for monitoring of BUN and Creat  Avoid Non Steroidal Antiinflammatory such as ibuprofen, aleve etc   Potassium, HCO3 and calcium are wnl and will require periodic blood test   Bone and Mineral disease monitoring as appropriate    All patient with GFR less than 30( CKD 4 or 5 or 6) advised to follow with nephrologist

## 2022-12-02 NOTE — PATIENT INSTRUCTIONS
Think about going for mammogram     See dermatologist regarding small tiny lesion on your right upper chest    Follow with Consultants as per their and our suggestion    Follow up in 12 week(s) or as needed earlier    Follow all instructions as advised and discussed  Take your medications as prescribed  Call the office immediately if you experience any side effects  Ask questions if you do not understand  Keep your scheduled appointment as advised or come sooner if necessary or in doubt  Best time to call for non-urgent matter or questions on weekdays is between 9am and 12 noon  See physician for any new symptoms or worsening of current symptoms  Urgent or emergent situations call 911 and report to nearest emergency room  I spent  30 -40 minutes taking care of this patient including clinical care, conseling, collaboration, chart, lab and consultaion review as appropriate  Additional time were spent in annual wellness  Patient is to get labs 1 week(s) prior to next visit if advised            Medicare Preventive Visit Patient Instructions  Thank you for completing your Welcome to Medicare Visit or Medicare Annual Wellness Visit today  Your next wellness visit will be due in one year (12/3/2023)  The screening/preventive services that you may require over the next 5-10 years are detailed below  Some tests may not apply to you based off risk factors and/or age  Screening tests ordered at today's visit but not completed yet may show as past due  Also, please note that scanned in results may not display below    Preventive Screenings:  Service Recommendations Previous Testing/Comments   Colorectal Cancer Screening  * Colonoscopy    * Fecal Occult Blood Test (FOBT)/Fecal Immunochemical Test (FIT)  * Fecal DNA/Cologuard Test  * Flexible Sigmoidoscopy Age: 39-70 years old   Colonoscopy: every 10 years (may be performed more frequently if at higher risk)  OR  FOBT/FIT: every 1 year OR  Cologuard: every 3 years  OR  Sigmoidoscopy: every 5 years  Screening may be recommended earlier than age 39 if at higher risk for colorectal cancer  Also, an individualized decision between you and your healthcare provider will decide whether screening between the ages of 74-80 would be appropriate  Colonoscopy: 10/19/2010  FOBT/FIT: Not on file  Cologuard: Not on file  Sigmoidoscopy: Not on file          Breast Cancer Screening Age: 36 years old  Frequency: every 1-2 years  Not required if history of left and right mastectomy Mammogram: 02/26/2020        Cervical Cancer Screening Between the ages of 21-29, pap smear recommended once every 3 years  Between the ages of 33-67, can perform pap smear with HPV co-testing every 5 years  Recommendations may differ for women with a history of total hysterectomy, cervical cancer, or abnormal pap smears in past  Pap Smear: Not on file        Hepatitis C Screening Once for adults born between 1945 and 1965  More frequently in patients at high risk for Hepatitis C Hep C Antibody: 03/03/2017        Diabetes Screening 1-2 times per year if you're at risk for diabetes or have pre-diabetes Fasting glucose: 116 mg/dL (8/23/2022)  A1C: No results in last 5 years (No results in last 5 years)      Cholesterol Screening Once every 5 years if you don't have a lipid disorder  May order more often based on risk factors  Lipid panel: 02/16/2022          Other Preventive Screenings Covered by Medicare:  Abdominal Aortic Aneurysm (AAA) Screening: covered once if your at risk  You're considered to be at risk if you have a family history of AAA    Lung Cancer Screening: covers low dose CT scan once per year if you meet all of the following conditions: (1) Age 50-69; (2) No signs or symptoms of lung cancer; (3) Current smoker or have quit smoking within the last 15 years; (4) You have a tobacco smoking history of at least 20 pack years (packs per day multiplied by number of years you smoked); (5) You get a written order from a healthcare provider  Glaucoma Screening: covered annually if you're considered high risk: (1) You have diabetes OR (2) Family history of glaucoma OR (3)  aged 48 and older OR (3)  American aged 72 and older  Osteoporosis Screening: covered every 2 years if you meet one of the following conditions: (1) You're estrogen deficient and at risk for osteoporosis based off medical history and other findings; (2) Have a vertebral abnormality; (3) On glucocorticoid therapy for more than 3 months; (4) Have primary hyperparathyroidism; (5) On osteoporosis medications and need to assess response to drug therapy  Last bone density test (DXA Scan): Not on file  HIV Screening: covered annually if you're between the age of 12-76  Also covered annually if you are younger than 13 and older than 72 with risk factors for HIV infection  For pregnant patients, it is covered up to 3 times per pregnancy  Immunizations:  Immunization Recommendations   Influenza Vaccine Annual influenza vaccination during flu season is recommended for all persons aged >= 6 months who do not have contraindications   Pneumococcal Vaccine   * Pneumococcal conjugate vaccine = PCV13 (Prevnar 13), PCV15 (Vaxneuvance), PCV20 (Prevnar 20)  * Pneumococcal polysaccharide vaccine = PPSV23 (Pneumovax) Adults 25-60 years old: 1-3 doses may be recommended based on certain risk factors  Adults 72 years old: 1-2 doses may be recommended based off what pneumonia vaccine you previously received   Hepatitis B Vaccine 3 dose series if at intermediate or high risk (ex: diabetes, end stage renal disease, liver disease)   Tetanus (Td) Vaccine - COST NOT COVERED BY MEDICARE PART B Following completion of primary series, a booster dose should be given every 10 years to maintain immunity against tetanus  Td may also be given as tetanus wound prophylaxis     Tdap Vaccine - COST NOT COVERED BY MEDICARE PART B Recommended at least once for all adults  For pregnant patients, recommended with each pregnancy  Shingles Vaccine (Shingrix) - COST NOT COVERED BY MEDICARE PART B  2 shot series recommended in those aged 48 and above     Health Maintenance Due:      Topic Date Due    Hepatitis C Screening  Completed     Immunizations Due:      Topic Date Due    Hepatitis B Vaccine (1 of 3 - 3-dose series) Never done    COVID-19 Vaccine (1) Never done    Pneumococcal Vaccine: 65+ Years (1 - PCV) Never done    Influenza Vaccine (1) 09/01/2022     Advance Directives   What are advance directives? Advance directives are legal documents that state your wishes and plans for medical care  These plans are made ahead of time in case you lose your ability to make decisions for yourself  Advance directives can apply to any medical decision, such as the treatments you want, and if you want to donate organs  What are the types of advance directives? There are many types of advance directives, and each state has rules about how to use them  You may choose a combination of any of the following:  Living will: This is a written record of the treatment you want  You can also choose which treatments you do not want, which to limit, and which to stop at a certain time  This includes surgery, medicine, IV fluid, and tube feedings  Durable power of  for healthcare [facility]): This is a written record that states who you want to make healthcare choices for you when you are unable to make them for yourself  This person, called a proxy, is usually a family member or a friend  You may choose more than 1 proxy  Do not resuscitate (DNR) order:  A DNR order is used in case your heart stops beating or you stop breathing  It is a request not to have certain forms of treatment, such as CPR  A DNR order may be included in other types of advance directives  Medical directive:   This covers the care that you want if you are in a coma, near death, or unable to make decisions for yourself  You can list the treatments you want for each condition  Treatment may include pain medicine, surgery, blood transfusions, dialysis, IV or tube feedings, and a ventilator (breathing machine)  Values history: This document has questions about your views, beliefs, and how you feel and think about life  This information can help others choose the care that you would choose  Why are advance directives important? An advance directive helps you control your care  Although spoken wishes may be used, it is better to have your wishes written down  Spoken wishes can be misunderstood, or not followed  Treatments may be given even if you do not want them  An advance directive may make it easier for your family to make difficult choices about your care  Urinary Incontinence   Urinary incontinence (UI)  is when you lose control of your bladder  UI develops because your bladder cannot store or empty urine properly  The 3 most common types of UI are stress incontinence, urge incontinence, or both  Medicines:   May be given to help strengthen your bladder control  Report any side effects of medication to your healthcare provider  Do pelvic muscle exercises often:  Your pelvic muscles help you stop urinating  Squeeze these muscles tight for 5 seconds, then relax for 5 seconds  Gradually work up to squeezing for 10 seconds  Do 3 sets of 15 repetitions a day, or as directed  This will help strengthen your pelvic muscles and improve bladder control  Train your bladder:  Go to the bathroom at set times, such as every 2 hours, even if you do not feel the urge to go  You can also try to hold your urine when you feel the urge to go  For example, hold your urine for 5 minutes when you feel the urge to go  As that becomes easier, hold your urine for 10 minutes  Self-care:   Keep a UI record  Write down how often you leak urine and how much you leak   Make a note of what you were doing when you leaked urine  Drink liquids as directed  You may need to limit the amount of liquid you drink to help control your urine leakage  Do not drink any liquid right before you go to bed  Limit or do not have drinks that contain caffeine or alcohol  Prevent constipation  Eat a variety of high-fiber foods  Good examples are high-fiber cereals, beans, vegetables, and whole-grain breads  Walking is the best way to trigger your intestines to have a bowel movement  Exercise regularly and maintain a healthy weight  Weight loss and exercise will decrease pressure on your bladder and help you control your leakage  Use a catheter as directed  to help empty your bladder  A catheter is a tiny, plastic tube that is put into your bladder to drain your urine  Go to behavior therapy as directed  Behavior therapy may be used to help you learn to control your urge to urinate  Weight Management   Why it is important to manage your weight:  Being overweight increases your risk of health conditions such as heart disease, high blood pressure, type 2 diabetes, and certain types of cancer  It can also increase your risk for osteoarthritis, sleep apnea, and other respiratory problems  Aim for a slow, steady weight loss  Even a small amount of weight loss can lower your risk of health problems  How to lose weight safely:  A safe and healthy way to lose weight is to eat fewer calories and get regular exercise  You can lose up about 1 pound a week by decreasing the number of calories you eat by 500 calories each day  Healthy meal plan for weight management:  A healthy meal plan includes a variety of foods, contains fewer calories, and helps you stay healthy  A healthy meal plan includes the following:  Eat whole-grain foods more often  A healthy meal plan should contain fiber  Fiber is the part of grains, fruits, and vegetables that is not broken down by your body   Whole-grain foods are healthy and provide extra fiber in your diet  Some examples of whole-grain foods are whole-wheat breads and pastas, oatmeal, brown rice, and bulgur  Eat a variety of vegetables every day  Include dark, leafy greens such as spinach, kale, tati greens, and mustard greens  Eat yellow and orange vegetables such as carrots, sweet potatoes, and winter squash  Eat a variety of fruits every day  Choose fresh or canned fruit (canned in its own juice or light syrup) instead of juice  Fruit juice has very little or no fiber  Eat low-fat dairy foods  Drink fat-free (skim) milk or 1% milk  Eat fat-free yogurt and low-fat cottage cheese  Try low-fat cheeses such as mozzarella and other reduced-fat cheeses  Choose meat and other protein foods that are low in fat  Choose beans or other legumes such as split peas or lentils  Choose fish, skinless poultry (chicken or turkey), or lean cuts of red meat (beef or pork)  Before you cook meat or poultry, cut off any visible fat  Use less fat and oil  Try baking foods instead of frying them  Add less fat, such as margarine, sour cream, regular salad dressing and mayonnaise to foods  Eat fewer high-fat foods  Some examples of high-fat foods include french fries, doughnuts, ice cream, and cakes  Eat fewer sweets  Limit foods and drinks that are high in sugar  This includes candy, cookies, regular soda, and sweetened drinks  Exercise:  Exercise at least 30 minutes per day on most days of the week  Some examples of exercise include walking, biking, dancing, and swimming  You can also fit in more physical activity by taking the stairs instead of the elevator or parking farther away from stores  Ask your healthcare provider about the best exercise plan for you  © Copyright [employer] 2018 Information is for End User's use only and may not be sold, redistributed or otherwise used for commercial purposes   All illustrations and images included in CareNotes® are the copyrighted property of [employer] A M , Inc  or [employer] [address]

## 2022-12-02 NOTE — ASSESSMENT & PLAN NOTE
Occasional dyspnea does have albuterol uses mostly at nighttime  Breathing fair    Up-to-date with vaccination

## 2022-12-02 NOTE — ASSESSMENT & PLAN NOTE
Symptom-free  Negative stress test on 06/01/2022  Will continue to monitor    This was incidental finding on the CT scan

## 2023-01-17 ENCOUNTER — OFFICE VISIT (OUTPATIENT)
Dept: INTERNAL MEDICINE CLINIC | Facility: CLINIC | Age: 78
End: 2023-01-17

## 2023-01-17 ENCOUNTER — APPOINTMENT (OUTPATIENT)
Dept: LAB | Facility: HOSPITAL | Age: 78
End: 2023-01-17

## 2023-01-17 ENCOUNTER — HOSPITAL ENCOUNTER (OUTPATIENT)
Dept: RADIOLOGY | Facility: HOSPITAL | Age: 78
Discharge: HOME | End: 2023-01-17
Attending: INTERNAL MEDICINE

## 2023-01-17 VITALS
TEMPERATURE: 97.5 F | HEART RATE: 80 BPM | DIASTOLIC BLOOD PRESSURE: 80 MMHG | HEIGHT: 67 IN | OXYGEN SATURATION: 98 % | BODY MASS INDEX: 30.29 KG/M2 | SYSTOLIC BLOOD PRESSURE: 140 MMHG | WEIGHT: 193 LBS

## 2023-01-17 DIAGNOSIS — C34.91 ADENOCARCINOMA OF RIGHT LUNG (HCC): ICD-10-CM

## 2023-01-17 DIAGNOSIS — E55.9 VITAMIN D DEFICIENCY: ICD-10-CM

## 2023-01-17 DIAGNOSIS — R19.7 DIARRHEA, UNSPECIFIED TYPE: ICD-10-CM

## 2023-01-17 DIAGNOSIS — N18.32 STAGE 3B CHRONIC KIDNEY DISEASE (HCC): ICD-10-CM

## 2023-01-17 DIAGNOSIS — J43.9 PULMONARY EMPHYSEMA, UNSPECIFIED EMPHYSEMA TYPE (HCC): ICD-10-CM

## 2023-01-17 DIAGNOSIS — E78.5 DYSLIPIDEMIA: ICD-10-CM

## 2023-01-17 DIAGNOSIS — R19.7 DIARRHEA, UNSPECIFIED TYPE: Primary | ICD-10-CM

## 2023-01-17 DIAGNOSIS — I10 ESSENTIAL HYPERTENSION: ICD-10-CM

## 2023-01-17 DIAGNOSIS — I82.4Z2 ACUTE DEEP VEIN THROMBOSIS (DVT) OF DISTAL VEIN OF LEFT LOWER EXTREMITY (HCC): ICD-10-CM

## 2023-01-17 LAB
25(OH)D3 SERPL-MCNC: 15.8 NG/ML (ref 30–100)
ALBUMIN SERPL BCP-MCNC: 4.1 G/DL (ref 3.5–5)
ALP SERPL-CCNC: 92 U/L (ref 46–116)
ALT SERPL W P-5'-P-CCNC: 26 U/L (ref 12–78)
ANION GAP SERPL CALCULATED.3IONS-SCNC: 7 MMOL/L (ref 4–13)
AST SERPL W P-5'-P-CCNC: 27 U/L (ref 5–45)
BILIRUB SERPL-MCNC: 0.51 MG/DL (ref 0.2–1)
BUN SERPL-MCNC: 26 MG/DL (ref 5–25)
CALCIUM SERPL-MCNC: 9.1 MG/DL (ref 8.3–10.1)
CHLORIDE SERPL-SCNC: 100 MMOL/L (ref 96–108)
CO2 SERPL-SCNC: 29 MMOL/L (ref 21–32)
CREAT SERPL-MCNC: 1.38 MG/DL (ref 0.6–1.3)
ERYTHROCYTE [DISTWIDTH] IN BLOOD BY AUTOMATED COUNT: 13.2 % (ref 11.6–15.1)
ERYTHROCYTE [SEDIMENTATION RATE] IN BLOOD: 26 MM/HOUR (ref 0–29)
GFR SERPL CREATININE-BSD FRML MDRD: 36 ML/MIN/1.73SQ M
GLUCOSE SERPL-MCNC: 100 MG/DL (ref 65–140)
HCT VFR BLD AUTO: 45.6 % (ref 34.8–46.1)
HGB BLD-MCNC: 14.6 G/DL (ref 11.5–15.4)
MCH RBC QN AUTO: 28.9 PG (ref 26.8–34.3)
MCHC RBC AUTO-ENTMCNC: 32 G/DL (ref 31.4–37.4)
MCV RBC AUTO: 90 FL (ref 82–98)
PLATELET # BLD AUTO: 260 THOUSANDS/UL (ref 149–390)
PMV BLD AUTO: 9 FL (ref 8.9–12.7)
POTASSIUM SERPL-SCNC: 3.5 MMOL/L (ref 3.5–5.3)
PROT SERPL-MCNC: 7.7 G/DL (ref 6.4–8.4)
RBC # BLD AUTO: 5.06 MILLION/UL (ref 3.81–5.12)
SARS-COV-2 AG UPPER RESP QL IA: NEGATIVE
SODIUM SERPL-SCNC: 136 MMOL/L (ref 135–147)
VALID CONTROL: NORMAL
WBC # BLD AUTO: 10.52 THOUSAND/UL (ref 4.31–10.16)

## 2023-01-17 NOTE — PROGRESS NOTES
Name: Tony Kaur      : 1945      MRN: [id number]  Encounter Provider: Loki Lam MD  Encounter Date: 2023   Encounter department: [facility] INTERNAL MEDICINE    Assessment & Plan     1  Diarrhea, unspecified type  Assessment & Plan:  Differential diagnosis discussed with the patient's  We will do CBC CMP sed rate  We will do CT scan of abdomen and pelvis without contrast     Will also do the stool for culture as well as stool for C  Difficile ,  Stool for WBC stool for blood  We got appointment for patient to see gastroenterologist tomorrow  Recommend plenty of fluid, soft diet,  COVID test is being done in the office today  Will follow back in 48 hours  Orders:  -     POCT Rapid Covid Ag  -     CBC; Future; Expected date: 2023  -     Comprehensive metabolic panel; Future; Expected date: 2023  -     Sedimentation rate, automated; Future; Expected date: 2023  -     Stool Enteric Bacterial Panel by PCR; Future  -     Clostridium difficile toxin by PCR; Future  -     Ambulatory referral to Gastroenterology; Future  -     CT abdomen pelvis wo contrast; Future; Expected date: 2023  -     White Blood Cells, Stool by Gram Stain; Future  -     Occult Blood, Fecal Immunochemical; Future    2  Stage 3b chronic kidney disease ([facility] [address] )  -     CBC; Future; Expected date: 2023  -     Comprehensive metabolic panel; Future; Expected date: 2023    3  Pulmonary emphysema, unspecified emphysema type ([address] )    4  Essential hypertension  -     CBC; Future; Expected date: 2023  -     Comprehensive metabolic panel; Future; Expected date: 2023    5  Adenocarcinoma of right lung ([address] )    6  Acute deep vein thrombosis (DVT) of distal vein of left lower extremity (HCC)           Subjective     This is a call in appointment  Chief complaint diarrhea  Patient presents with 10 days history of diarrhea    Patient usually have 4-5 loose bowel movement in the morning and in the afternoon whenever she goes to pass urine she does lose some control and has some loose bowel movement  Patient thinks that her bowel movement float in the water  She denies any travel  Patient denies any abdominal pain nausea vomiting  Patient denies any fever or chills  Patient denies any hematemesis melena  She thinks that wiping from the hemorrhoids he does get little blood there  Patient denies any unusual weakness  Patient does have a some chronic runny nose denies any symptoms suggestive of COVID at this time  Patient is a known case of multiple medical problem including hypertension, CKD level 3, CT of the lung, degenerative joint disease, hyperlipidemia, COPD and other as outlined in the full body of report    Last colonoscopy was many years ago  Review of Systems   Constitutional: Negative for appetite change, fatigue, fever and unexpected weight change  HENT: Negative for congestion, ear pain and sore throat  Eyes: Negative for pain and redness  Respiratory: Negative for cough and shortness of breath  Cardiovascular: Negative for chest pain, palpitations and leg swelling  Gastrointestinal: Positive for anal bleeding and diarrhea  Negative for abdominal pain, constipation, nausea, rectal pain and vomiting  Endocrine: Negative for cold intolerance, polydipsia and polyuria  Genitourinary: Negative for dysuria, frequency and urgency  Musculoskeletal: Negative for arthralgias, gait problem and myalgias  Skin: Negative for rash  Allergic/Immunologic: Negative  Neurological: Negative for dizziness and headaches  Hematological: Negative for adenopathy  Psychiatric/Behavioral: Negative for behavioral problems, confusion, decreased concentration, dysphoric mood, hallucinations and self-injury  The patient is nervous/anxious  The patient is not hyperactive          Past Medical History:   Diagnosis Date   • Emphysema lung (HCC)    • Hyperlipidemia    • Hypertension    • Numbness and tingling of foot    • Osteoarthritis of right hip    • Primary adenocarcinoma of lower lobe of right lung [facility])      Past Surgical History:   Procedure Laterality Date   • CATARACT EXTRACTION     • CHOLECYSTECTOMY     • COLONOSCOPY     • JOINT REPLACEMENT     • TN ARTHRP ACETBLR/PROX FEM PROSTC AGRFT/ALGRFT Right 2018    Procedure: ANTERIOR TOTAL HIP ARTHROPLASTY;  Surgeon: Vikram Amaya MD;  Location: [facility];  Service: Orthopedics   • TOTAL HIP ARTHROPLASTY     • TUMOR REMOVAL Right 2015    lower lobe      Family History   Problem Relation Age of Onset   • Heart disease Mother    • No Known Problems Father      Social History     Socioeconomic History   • Marital status:      Spouse name: None   • Number of children: None   • Years of education: None   • Highest education level: None   Occupational History   • None   Tobacco Use   • Smoking status: Former     Packs/day: 1 00     Years: 50 00     Pack years: 50 00     Types: Cigarettes     Quit date: 2015     Years since quittin 3   • Smokeless tobacco: Never   Substance and Sexual Activity   • Alcohol use: Yes     Comment: ocassional    • Drug use: No   • Sexual activity: None   Other Topics Concern   • None   Social History Narrative   • None     Social Determinants of Health     Financial Resource Strain: Low Risk    • Difficulty of Paying Living Expenses: Not hard at all   Food Insecurity: Not on file   Transportation Needs: No Transportation Needs   • Lack of Transportation (Medical): No   • Lack of Transportation (Non-Medical):  No   Physical Activity: Not on file   Stress: Not on file   Social Connections: Not on file   Intimate Partner Violence: Not on file   Housing Stability: Not on file     Current Outpatient Medications on File Prior to Visit   Medication Sig   • albuterol (PROVENTIL HFA,VENTOLIN HFA) 90 mcg/act inhaler Inhale 2 puffs every 6 (six) hours as needed for wheezing or shortness of breath   • amLODIPine (NORVASC) 5 mg tablet Take 1 tablet (5 mg total) by mouth daily   • aspirin (ECOTRIN LOW STRENGTH) 81 mg EC tablet Take 1 tablet (81 mg total) by mouth 2 (two) times a day   • atorvastatin (LIPITOR) 10 mg tablet TAKE 1 TABLET DAILY AT BEDTIME   • Denta 5000 Plus 1 1 % CREA    • gabapentin (NEURONTIN) 300 mg capsule Take 300 mg by mouth daily    • hydrochlorothiazide (MICROZIDE) 12 5 mg capsule Take 1 capsule (12 5 mg total) by mouth daily   • losartan (COZAAR) 50 mg tablet TAKE 1 TABLET DAILY   • Melatonin 5 MG TABS Take 10 mg by mouth daily at bedtime    • metoprolol succinate (TOPROL-XL) 100 mg 24 hr tablet TAKE 1 TABLET DAILY   • Polyethylene Glycol 400 0 25 % SOLN Apply 1 drop to eye 2 (two) times a day       No Known Allergies  Immunization History   Administered Date(s) Administered   • Influenza, high dose seasonal 0 7 mL 11/23/2020, 11/24/2021, 12/02/2022   • Influenza, injectable, quadrivalent, preservative free 0 5 mL 10/24/2019       Objective     /80   Pulse 80   Temp 97 5 °F (36 4 °C)   Ht 5' 7" (1 702 m)   Wt 87 5 kg (193 lb)   SpO2 98%   BMI 30 23 kg/m²     Physical Exam  Constitutional:       General: She is not in acute distress  Appearance: She is well-developed  She is not ill-appearing or diaphoretic  HENT:      Head: Normocephalic and atraumatic  Comments: Tongue hydration appears adequate  Right Ear: External ear normal       Left Ear: External ear normal       Mouth/Throat:      Pharynx: No oropharyngeal exudate or posterior oropharyngeal erythema  Eyes:      General: Lids are normal          Right eye: No discharge  Left eye: No discharge  Conjunctiva/sclera: Conjunctivae normal    Neck:      Thyroid: No thyroid mass or thyromegaly  Vascular: No carotid bruit or JVD  Trachea: Trachea normal    Cardiovascular:      Rate and Rhythm: Regular rhythm        Heart sounds: Normal heart sounds  Pulmonary:      Effort: No respiratory distress  Breath sounds: No wheezing, rhonchi or rales  Abdominal:      General: Bowel sounds are normal  There is distension  Palpations: Abdomen is soft  There is no mass  Tenderness: There is no abdominal tenderness  There is no right CVA tenderness, left CVA tenderness, guarding or rebound  Hernia: No hernia is present  Comments: Patient thinks that her belly is chronically distended  Musculoskeletal:      Right lower leg: No edema  Left lower leg: No edema  Lymphadenopathy:      Cervical: No cervical adenopathy  Skin:     General: Skin is warm  Coloration: Skin is not jaundiced or pale  Findings: No rash  Neurological:      General: No focal deficit present  Mental Status: She is alert and oriented to person, place, and time         Rasheed Pavon MD

## 2023-01-17 NOTE — ASSESSMENT & PLAN NOTE
Differential diagnosis discussed with the patient's  We will do CBC CMP sed rate  We will do CT scan of abdomen and pelvis without contrast     Will also do the stool for culture as well as stool for C  Difficile ,  Stool for WBC stool for blood  We got appointment for patient to see gastroenterologist tomorrow  Recommend plenty of fluid, soft diet,  COVID test is being done in the office today  Will follow back in 48 hours

## 2023-01-17 NOTE — PATIENT INSTRUCTIONS
Go for the blood test today CBC CMP and sed rate  We will make arrangement for the CAT scan of abdomen and pelvis today  Also you will need a stool test you will get container from the hospital laboratory please collect your stool and give it back to them to check for various bacteria and C  difficile  Drink plenty of fluid  Stay on the soft diet avoid hard meat  If you have any fever chills major nausea vomiting or weakness go to the emergency room  I would like you to see gastroenterologist we will give you the name telephone number and assist if you need any help  I like to see you back in 2 days

## 2023-01-18 ENCOUNTER — APPOINTMENT (OUTPATIENT)
Dept: LAB | Facility: HOSPITAL | Age: 78
End: 2023-01-18

## 2023-01-18 ENCOUNTER — CONSULT (OUTPATIENT)
Dept: GASTROENTEROLOGY | Facility: CLINIC | Age: 78
End: 2023-01-18

## 2023-01-18 VITALS
OXYGEN SATURATION: 75 % | WEIGHT: 187 LBS | TEMPERATURE: 97.8 F | HEART RATE: 97 BPM | SYSTOLIC BLOOD PRESSURE: 134 MMHG | HEIGHT: 67 IN | DIASTOLIC BLOOD PRESSURE: 82 MMHG | BODY MASS INDEX: 29.35 KG/M2

## 2023-01-18 DIAGNOSIS — R19.7 DIARRHEA, UNSPECIFIED TYPE: ICD-10-CM

## 2023-01-18 DIAGNOSIS — R19.7 DIARRHEA, UNSPECIFIED TYPE: Primary | ICD-10-CM

## 2023-01-18 DIAGNOSIS — D12.6 ADENOMATOUS POLYP OF COLON, UNSPECIFIED PART OF COLON: ICD-10-CM

## 2023-01-18 LAB — HEMOCCULT STL QL IA: NEGATIVE

## 2023-01-18 NOTE — PROGRESS NOTES
[facility] - Outpatient Consultation  Boom Race 65 y o  female MRN: [id number]  Encounter: [id number]          ASSESSMENT AND PLAN:      1  Diarrhea, unspecified type  For 2 weeks duration  Stool studies are pending  Denies any recent antibiotics or travel  Had a CAT scan abdomen and pelvis unremarkable except for diverticulosis right kidney nodule which needs follow-up  We discussed at the office  Felicia stop milk products start a probiotic for now  Additionally have asked her to see you in the future regarding her creatinine of 1 38 and BUN of 26  Due to chronic nature we will schedule colonoscopy  We will maintain hydration in the meantime   - Colonoscopy; Future    2  Adenomatous polyp of colon, unspecified part of colon  Overdue for surveillance colonoscopy  She will otherwise follow-up in 4 months  - Colonoscopy; Future    ______________________________________________________________________    HPI: Very pleasant 78-year-old lady with 2 weeks of frequent bowel motions at least 6 times a day  No blood associated with this  She had lab work done BUN and creatinine are mildly elevated she has occasional abdominal cramping with the bowel motions  They do not wake her at night  She had no recent travel or antibiotics  Office stimulates her morning bowel motions  Had a colonoscopy in 2010 and had 5 polyps removed, which the exact number unobtainable were tubular adenomas  No family history of celiac disease inflammatory bowel disease  Further no family history of hepatitis or pancreatitis or gastrointestinal malignancies  She denies any upper GI symptoms  REVIEW OF SYSTEMS:    CONSTITUTIONAL: Denies any fever, chills, rigors, and weight loss  HEENT: No earache or tinnitus  Denies hearing loss or visual disturbances  CARDIOVASCULAR: No chest pain or palpitations     RESPIRATORY: Denies any cough, hemoptysis, shortness of breath or dyspnea on exertion  GASTROINTESTINAL: As noted in the History of Present Illness  GENITOURINARY: No problems with urination  Denies any hematuria or dysuria  NEUROLOGIC: No dizziness or vertigo, denies headaches  MUSCULOSKELETAL: Denies any muscle or joint pain  SKIN: Denies skin rashes or itching  ENDOCRINE: Denies excessive thirst  Denies intolerance to heat or cold  PSYCHOSOCIAL: Denies depression or anxiety  Denies any recent memory loss         Historical Information   Past Medical History:   Diagnosis Date   • Cancer ([place] [address] )     lung   • Emphysema lung ([place] [address] )    • Hyperlipidemia    • Hypertension    • Lung cancer ([place] [address] )    • Numbness and tingling of foot    • Osteoarthritis of right hip    • Primary adenocarcinoma of lower lobe of right lung (HCC)      Past Surgical History:   Procedure Laterality Date   • CATARACT EXTRACTION     • CHOLECYSTECTOMY     • COLONOSCOPY     • JOINT REPLACEMENT     • DC ARTHRP ACETBLR/PROX FEM PROSTC AGRFT/ALGRFT Right 2018    Procedure: ANTERIOR TOTAL HIP ARTHROPLASTY;  Surgeon: Guido Yu MD;  Location: [facility];  Service: Orthopedics   • TOTAL HIP ARTHROPLASTY     • TUMOR REMOVAL Right 2015    lower lobe      Social History   Social History     Substance and Sexual Activity   Alcohol Use Yes    Comment: ocassional      Social History     Substance and Sexual Activity   Drug Use No     Social History     Tobacco Use   Smoking Status Former   • Packs/day: 1 00   • Years: 50 00   • Pack years: 50 00   • Types: Cigarettes   • Quit date: 2015   • Years since quittin 3   Smokeless Tobacco Never     Family History   Problem Relation Age of Onset   • Heart disease Mother    • No Known Problems Father        Meds/Allergies       Current Outpatient Medications:   •  albuterol (PROVENTIL HFA,VENTOLIN HFA) 90 mcg/act inhaler  •  amLODIPine (NORVASC) 5 mg tablet  •  aspirin (ECOTRIN LOW STRENGTH) 81 mg EC tablet  •  atorvastatin (LIPITOR) 10 mg tablet  •  Denta 5000 Plus 1 1 % CREA  •  gabapentin (NEURONTIN) 300 mg capsule  •  hydrochlorothiazide (MICROZIDE) 12 5 mg capsule  •  losartan (COZAAR) 50 mg tablet  •  Melatonin 5 MG TABS  •  metoprolol succinate (TOPROL-XL) 100 mg 24 hr tablet  •  Polyethylene Glycol 400 0 25 % SOLN    Allergies   Allergen Reactions   • Other Other (See Comments)     seasonal           Objective     Blood pressure 134/82, pulse 97, temperature 97 8 °F (36 6 °C), height 5' 7" (1 702 m), weight 84 8 kg (187 lb), SpO2 (!) 75 %  Body mass index is 29 29 kg/m²  PHYSICAL EXAM:      General Appearance:   Alert, cooperative, no distress   HEENT:   Normocephalic, atraumatic, anicteric      Neck:  Supple, symmetrical, trachea midline   Lungs:   Clear to auscultation bilaterally; no rales, rhonchi or wheezing; respirations unlabored    Heart[de-identified]   Regular rate and rhythm; no murmur, rub, or gallop  Abdomen:   Soft, non-tender, non-distended; normal bowel sounds; no masses, no organomegaly    Genitalia:   Deferred    Rectal:   Deferred    Extremities:  No cyanosis, clubbing or edema    Pulses:  2+ and symmetric    Skin:  No jaundice, rashes, or lesions    Lymph nodes:  No palpable cervical lymphadenopathy        Lab Results:   No visits with results within 1 Day(s) from this visit     Latest known visit with results is:   Appointment on 01/17/2023   Component Date Value   • WBC 01/17/2023 10 52 (H)    • RBC 01/17/2023 5 06    • Hemoglobin 01/17/2023 14 6    • Hematocrit 01/17/2023 45 6    • MCV 01/17/2023 90    • MCH 01/17/2023 28 9    • MCHC 01/17/2023 32 0    • RDW 01/17/2023 13 2    • Platelets 80/96/6083 260    • MPV 01/17/2023 9 0    • Sodium 01/17/2023 136    • Potassium 01/17/2023 3 5    • Chloride 01/17/2023 100    • CO2 01/17/2023 29    • ANION GAP 01/17/2023 7    • BUN 01/17/2023 26 (H)    • Creatinine 01/17/2023 1 38 (H)    • Glucose 01/17/2023 100    • Calcium 01/17/2023 9 1    • AST 01/17/2023 27    • ALT 01/17/2023 26    • Alkaline Phosphatase 01/17/2023 92    • Total Protein 01/17/2023 7 7    • Albumin 01/17/2023 4 1    • Total Bilirubin 01/17/2023 0 51    • eGFR 01/17/2023 36    • Sed Rate 01/17/2023 26    • Vit D, 25-Hydroxy 01/17/2023 15 8 (L)          Radiology Results:   CT abdomen pelvis wo contrast    Result Date: 1/17/2023  Narrative: CT ABDOMEN AND PELVIS WITHOUT IV CONTRAST INDICATION:   R19 7: Diarrhea, unspecified  COMPARISON:  4/21/2009 TECHNIQUE:  CT examination of the abdomen and pelvis was performed without intravenous contrast  Lack of IV contrast limits the sensitivity for pathology within the visualized solid organs  Lack of oral contrast limits the sensitivity for pathology within the bowel  Axial, sagittal, and coronal 2D reformatted images were created from the source data and submitted for interpretation  Radiation dose length product (DLP) for this visit:  706 09 mGy-cm   This examination, like all CT scans performed in the [facility], was performed utilizing techniques to minimize radiation dose exposure, including the use of iterative  reconstruction and automated exposure control  Enteric contrast was not administered  FINDINGS: ABDOMEN LOWER CHEST:  No clinically significant abnormality identified in the visualized lower chest  LIVER/BILIARY TREE:  Unremarkable  GALLBLADDER:  Gallbladder is surgically absent  SPLEEN:  Unremarkable  PANCREAS:  Unremarkable  ADRENAL GLANDS:  Unremarkable  KIDNEYS/URETERS:  There is a 1 3 cm exophytic nodule from the right kidney (2/36)  This does not have Hounsfield units of a simple cyst   Consider follow-up ultrasound  No kidney stones or hydronephrosis  STOMACH AND BOWEL:  There is colonic diverticulosis without evidence of acute diverticulitis  APPENDIX:  No findings to suggest appendicitis  ABDOMINOPELVIC CAVITY:  No ascites  No pneumoperitoneum  No lymphadenopathy  VESSELS:  Atherosclerotic changes with 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   PELVIS REPRODUCTIVE ORGANS:  Partially obscured due to streak artifact  URINARY BLADDER:  Partially obscured due to streak artifact  ABDOMINAL WALL/INGUINAL REGIONS:  Unremarkable  OSSEOUS STRUCTURES:  No acute fracture or destructive osseous lesion  Bilateral total hip replacements  Impression: Colonic diverticulosis without evidence of diverticulitis  Incidental 1 3 cm exophytic nodule from the right kidney  Nonemergent follow-up renal ultrasound recommended  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

## 2023-01-18 NOTE — PATIENT INSTRUCTIONS
For now stop milk, start a probiotic, we will await your stool studies  Maintain hydration and you will need follow-up of your kidney function      Scheduled date of colonoscopy (as of today): 3/20/23  Physician performing colonoscopy:  Dr April Hoffman  Location of colonoscopy: [facility]  Bowel prep reviewed with patient: Miralax/Dulcolas  Instructions reviewed with patient by:  Clearances:  N/A

## 2023-01-19 LAB
C DIFF TOX GENS STL QL NAA+PROBE: NEGATIVE
CAMPYLOBACTER DNA SPEC NAA+PROBE: NORMAL
SALMONELLA DNA SPEC QL NAA+PROBE: NORMAL
SHIGA TOXIN STX GENE SPEC NAA+PROBE: NORMAL
SHIGELLA DNA SPEC QL NAA+PROBE: NORMAL
WBC STL QL MICRO: NORMAL

## 2023-01-25 ENCOUNTER — NURSE TRIAGE (OUTPATIENT)
Dept: OTHER | Facility: OTHER | Age: 78
End: 2023-01-25

## 2023-01-25 NOTE — TELEPHONE ENCOUNTER
Patient called in to report diarrhea persists and patient wants to know if she can take Imodium  Moderate yesteday with 4 episodes; today, patient reports she is "squirting" watery stool  Reports increase in gas and gas pain  Triage RN was not able to answer due to providers concern with Bun/creatinine results  Mouth is dry; encouraged patient to increase fluid intake  Provider also advised patient to stop drinking milk; patient want to know if she can have lactose-free milk,  Please follow up with patient to provide care advise for her concerns  Reason for Disposition  • MODERATE diarrhea (e g , 4-6 times / day more than normal) and age > 79 years    Answer Assessment - Initial Assessment Questions  1  DIARRHEA SEVERITY: "How bad is the diarrhea?" "How many extra stools have you had in the past 24 hours than normal?"     - NO DIARRHEA (SCALE 0)    - MILD (SCALE 1-3): Few loose or mushy BMs; increase of 1-3 stools over normal daily number of stools; mild increase in ostomy output  -  MODERATE (SCALE 4-7): Increase of 4-6 stools daily over normal; moderate increase in ostomy output  * SEVERE (SCALE 8-10; OR 'WORST POSSIBLE'): Increase of 7 or more stools daily over normal; moderate increase in ostomy output; incontinence  Moderate (4) yesterday; none today  2  ONSET: "When did the diarrhea begin?"       4th week  3  BM CONSISTENCY: "How loose or watery is the diarrhea?"       Squirts; loose at times; watery at times; aggravating the hemorrhoids; blood on paper  4  VOMITING: "Are you also vomiting?" If Yes, ask: "How many times in the past 24 hours?"       Denies  5  ABDOMINAL PAIN: "Are you having any abdominal pain?" If Yes, ask: "What does it feel like?" (e g , crampy, dull, intermittent, constant)       Gas pain  6   ABDOMINAL PAIN SEVERITY: If present, ask: "How bad is the pain?"  (e g , Scale 1-10; mild, moderate, or severe)    - MILD (1-3): doesn't interfere with normal activities, abdomen soft and not tender to touch     - MODERATE (4-7): interferes with normal activities or awakens from sleep, tender to touch     - SEVERE (8-10): excruciating pain, doubled over, unable to do any normal activities        Denies  7  ORAL INTAKE: If vomiting, "Have you been able to drink liquids?" "How much fluids have you had in the past 24 hours?"      Yes  8  HYDRATION: "Any signs of dehydration?" (e g , dry mouth [not just dry lips], too weak to stand, dizziness, new weight loss) "When did you last urinate?"      Mouth is dry  Last void was an hour ago  9  EXPOSURE: "Have you traveled to a foreign country recently?" "Have you been exposed to anyone with diarrhea?" "Could you have eaten any food that was spoiled?"      Denies  10  ANTIBIOTIC USE: "Are you taking antibiotics now or have you taken antibiotics in the past 2 months?"        Denies  11  OTHER SYMPTOMS: "Do you have any other symptoms?" (e g , fever, blood in stool)        blood on paper; mucus in stools  12   PREGNANCY: "Is there any chance you are pregnant?" "When was your last menstrual period?"        Post menopausal    Protocols used: DIARRHEA-ADULT-OH

## 2023-01-25 NOTE — TELEPHONE ENCOUNTER
Regarding: Medication question  ----- Message from Maryann Mascorro sent at 1/25/2023  1:49 PM EST -----  " I am calling because I would like to know if its ok to take imodium I still having diarrhea "

## 2023-02-20 ENCOUNTER — OFFICE VISIT (OUTPATIENT)
Dept: UROLOGY | Facility: CLINIC | Age: 78
End: 2023-02-20

## 2023-02-20 VITALS
RESPIRATION RATE: 18 BRPM | OXYGEN SATURATION: 99 % | DIASTOLIC BLOOD PRESSURE: 80 MMHG | BODY MASS INDEX: 28.77 KG/M2 | HEART RATE: 82 BPM | HEIGHT: 67 IN | WEIGHT: 183.3 LBS | SYSTOLIC BLOOD PRESSURE: 142 MMHG

## 2023-02-20 DIAGNOSIS — R35.1 NOCTURIA: ICD-10-CM

## 2023-02-20 DIAGNOSIS — R31.29 MICROHEMATURIA: Primary | ICD-10-CM

## 2023-02-20 LAB
BACTERIA UR QL AUTO: ABNORMAL /HPF
BILIRUB UR QL STRIP: NEGATIVE
CLARITY UR: CLEAR
COLOR UR: ABNORMAL
GLUCOSE UR STRIP-MCNC: NEGATIVE MG/DL
HGB UR QL STRIP.AUTO: NEGATIVE
KETONES UR STRIP-MCNC: NEGATIVE MG/DL
LEUKOCYTE ESTERASE UR QL STRIP: ABNORMAL
NITRITE UR QL STRIP: NEGATIVE
NON-SQ EPI CELLS URNS QL MICRO: ABNORMAL /HPF
PH UR STRIP.AUTO: 6.5 [PH]
PROT UR STRIP-MCNC: ABNORMAL MG/DL
RBC #/AREA URNS AUTO: ABNORMAL /HPF
SP GR UR STRIP.AUTO: 1.01 (ref 1–1.03)
UROBILINOGEN UR STRIP-ACNC: <2 MG/DL
WBC #/AREA URNS AUTO: ABNORMAL /HPF

## 2023-02-20 RX ORDER — AMOXICILLIN 500 MG/1
CAPSULE ORAL
COMMUNITY
Start: 2023-02-02

## 2023-02-20 NOTE — LETTER
2023     Karine Kessler, [address] [address]    Patient: Minna Leyden   YOB: 1945   Date of Visit: 2023       Dear Dr Sasha Garzon: Thank you for referring Arline Berrios to me for evaluation  Below are my notes for this consultation  If you have questions, please do not hesitate to call me  I look forward to following your patient along with you  Sincerely,        Kostas Arce MD        CC: No Recipients  Kostas Arce MD  2023 11:16 AM  Signed    UROLOGY OUTPATIENT CONSULT NOTE     Name: Minna Leyden  : 1945  MRN: [id number]  Date of Service: 2023      CHIEF COMPLAINT   Nocturia    History of Present Illness:     Minna Leyden is a 65 y o  female presenting for evaluation of nocturia  When she initially scheduled this appointment she was having episodes of bedwetting  She would wake up and she would be what but she had not gotten the urge to urinate and did not awaken to urinate  She thinks she may have been dealing with constipation at that time  While awaiting this appointment she has since developed diarrhea within the past 2 weeks  Since she has had diarrhea she has had no issues with urinary incontinence  She does still have nocturia and wakes up about 4 times overnight sometimes every couple of hours  She has no daytime urinary frequency and urinates about every 4 hours during the day  She goes to bed around 10 PM and drinks about a half a cup of water after taking her meds with applesauce  She is now taking Imodium which is somewhat helping with her diarrhea  She does take hydrochlorothiazide but takes this in the morning  She is a prior smoker  She smoked 1 pack/day for 50 years and quit in   She has a history of lung cancer and had a resection  She denies gross hematuria but has a urinalysis that showed 2-4 RBCs per high-power field in 2018    She reports seeing Dr Brenda Rivera years ago  At time he performed a cystoscopy and recommended quitting smoking  He also recommend annual cystoscopy while she continues to smoke  She did not follow-up because a cystoscopy was painful      PAST MEDICAL HISTORY:     Past Medical History:   Diagnosis Date   • Cancer ([facility] )     lung   • Emphysema lung ([facility] )    • Hyperlipidemia    • Hypertension    • Lung cancer ([facility] )    • Numbness and tingling of foot    • Osteoarthritis of right hip    • Primary adenocarcinoma of lower lobe of right lung ([facility] )        PAST SURGICAL HISTORY:     Past Surgical History:   Procedure Laterality Date   • CATARACT EXTRACTION     • CHOLECYSTECTOMY     • COLONOSCOPY     • JOINT REPLACEMENT     • VA ARTHRP ACETBLR/PROX FEM PROSTC AGRFT/ALGRFT Right 9/14/2018    Procedure: ANTERIOR TOTAL HIP ARTHROPLASTY;  Surgeon: Max Whitaker MD;  Location: [address] [place] [address];  Service: Orthopedics   • TOTAL HIP ARTHROPLASTY     • TUMOR REMOVAL Right 2015    lower lobe        CURRENT MEDICATIONS:     Current Outpatient Medications   Medication Sig Dispense Refill   • albuterol (PROVENTIL HFA,VENTOLIN HFA) 90 mcg/act inhaler Inhale 2 puffs every 6 (six) hours as needed for wheezing or shortness of breath 18 g 1   • amLODIPine (NORVASC) 5 mg tablet Take 1 tablet (5 mg total) by mouth daily 90 tablet 1   • amoxicillin (AMOXIL) 500 mg capsule TAKE FOUR CAPSULES BY MOUTH ONE HOUR BEFORE APPOINTMENT     • aspirin (ECOTRIN LOW STRENGTH) 81 mg EC tablet Take 1 tablet (81 mg total) by mouth 2 (two) times a day 60 tablet 0   • atorvastatin (LIPITOR) 10 mg tablet TAKE 1 TABLET DAILY AT BEDTIME 90 tablet 1   • Denta 5000 Plus 1 1 % CREA      • gabapentin (NEURONTIN) 300 mg capsule Take 300 mg by mouth daily      • hydrochlorothiazide (MICROZIDE) 12 5 mg capsule Take 1 capsule (12 5 mg total) by mouth daily 90 capsule 3   • losartan (COZAAR) 50 mg tablet TAKE 1 TABLET DAILY 90 tablet 1   • Melatonin 5 MG TABS Take 10 mg by mouth daily at bedtime As needed • metoprolol succinate (TOPROL-XL) 100 mg 24 hr tablet TAKE 1 TABLET DAILY 90 tablet 1   • Polyethylene Glycol 400 0 25 % SOLN Apply 1 drop to eye 2 (two) times a day As needed       No current facility-administered medications for this visit  ALLERGIES:     Allergies   Allergen Reactions   • Other Other (See Comments)     seasonal       SOCIAL HISTORY:     Social History     Socioeconomic History   • Marital status:      Spouse name: None   • Number of children: None   • Years of education: None   • Highest education level: None   Occupational History   • None   Tobacco Use   • Smoking status: Former     Packs/day: 1 00     Years: 50 00     Pack years: 50 00     Types: Cigarettes     Quit date: 2015     Years since quittin 4   • Smokeless tobacco: Never   Vaping Use   • Vaping Use: Never used   Substance and Sexual Activity   • Alcohol use: Yes     Comment: ocassional    • Drug use: No   • Sexual activity: None   Other Topics Concern   • None   Social History Narrative   • None     Social Determinants of Health     Financial Resource Strain: Low Risk    • Difficulty of Paying Living Expenses: Not hard at all   Food Insecurity: Not on file   Transportation Needs: No Transportation Needs   • Lack of Transportation (Medical): No   • Lack of Transportation (Non-Medical): No   Physical Activity: Not on file   Stress: Not on file   Social Connections: Not on file   Intimate Partner Violence: Not on file   Housing Stability: Not on file       FAMILY HISTORY:     Family History   Problem Relation Age of Onset   • Heart disease Mother    • No Known Problems Father        REVIEW OF SYSTEMS:     Pertinent positives and negatives in HPI      PHYSICAL EXAM:     /80 (BP Location: Left arm, Patient Position: Sitting, Cuff Size: Large)   Pulse 82   Resp 18   Ht 5' 7" (1 702 m)   Wt 83 1 kg (183 lb 4 8 oz)   SpO2 99%   BMI 28 71 kg/m²     General:  Healthy appearing female in no acute distress  Head:  Normocephalic, atraumatic  Eyes: no scleral icterus  ENMT: Nares patent, moist mucous membranes  Cardiovascular:  Regular rate  Respiratory:  Patient has unlabored respirations  Abdomen:  Abdomen nondistended  Musculoskeletal: Normal gait  Neurological: Grossly intact  Psych: Normal affect      LABS:     CBC:   Lab Results   Component Value Date    WBC 10 52 (H) 01/17/2023    HGB 14 6 01/17/2023    HCT 45 6 01/17/2023    MCV 90 01/17/2023     01/17/2023       BMP:   Lab Results   Component Value Date    GLUCOSE 109 09/20/2015    CALCIUM 9 1 01/17/2023     09/20/2015    K 3 5 01/17/2023    CO2 29 01/17/2023     01/17/2023    BUN 26 (H) 01/17/2023    CREATININE 1 38 (H) 01/17/2023       IMAGING:     CT ABDOMEN AND PELVIS WITHOUT IV CONTRAST     INDICATION:   R19 7: Diarrhea, unspecified      COMPARISON:  4/21/2009     TECHNIQUE:  CT examination of the abdomen and pelvis was performed without intravenous contrast  Lack of IV contrast limits the sensitivity for pathology within the visualized solid organs  Lack of oral contrast limits the sensitivity for pathology   within the bowel  Axial, sagittal, and coronal 2D reformatted images were created from the source data and submitted for interpretation       Radiation dose length product (DLP) for this visit:  706 09 mGy-cm     This examination, like all CT scans performed in the [facility], was performed utilizing techniques to minimize radiation dose exposure, including the use of iterative   reconstruction and automated exposure control       Enteric contrast was not administered       FINDINGS:     ABDOMEN     LOWER CHEST:  No clinically significant abnormality identified in the visualized lower chest      LIVER/BILIARY TREE:  Unremarkable      GALLBLADDER:  Gallbladder is surgically absent      SPLEEN:  Unremarkable      PANCREAS:  Unremarkable      ADRENAL GLANDS:  Unremarkable      KIDNEYS/URETERS:  There is a 1 3 cm exophytic nodule from the right kidney (2/36)  This does not have Hounsfield units of a simple cyst   Consider follow-up ultrasound  No kidney stones or hydronephrosis      STOMACH AND BOWEL:  There is colonic diverticulosis without evidence of acute diverticulitis      APPENDIX:  No findings to suggest appendicitis      ABDOMINOPELVIC CAVITY:  No ascites  No pneumoperitoneum  No lymphadenopathy      VESSELS:  Atherosclerotic changes with 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      PELVIS     REPRODUCTIVE ORGANS:  Partially obscured due to streak artifact      URINARY BLADDER:  Partially obscured due to streak artifact      ABDOMINAL WALL/INGUINAL REGIONS:  Unremarkable      OSSEOUS STRUCTURES:  No acute fracture or destructive osseous lesion  Bilateral total hip replacements      IMPRESSION:     Colonic diverticulosis without evidence of diverticulitis  Incidental 1 3 cm exophytic nodule from the right kidney  Nonemergent follow-up renal ultrasound recommended  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  ASSESSMENT:     65 y o  female with resolved urinary incontinence, nocturia, history of microhematuria, and incidental finding of 1 3 cm exophytic nodule in the right kidney  I independently reviewed the imaging of her CT scan which demonstrates a 1 3 cm exophytic nodule on the right kidney  The CT was performed without contrast so it is poorly characterized  There is no prior abdominal imaging for comparison  The patient thinks that this was picked up on a prior scan at some point  Renal ultrasound is recommended  Given the size of this mass and no prior imaging would recommend further characterization within 6 months  If her urinalysis returns with recurrent microhematuria would recommend CT urogram now with a cystoscopy  If her urinalysis is negative for microhematuria may have a renal ultrasound in 6 months  PLAN:     1   Urinalysis and culture (inadequate urine volume for culture, UA alone sent)   2  Renal ultrasound versus CT urogram based on urine results  3  If urine returns microhematuria patient will require cystoscopy as well    Rosy Julien MD  [facility]    Portions of the above record have been created with voice recognition software  Occasional wrong word or "sound alike" substitution may have occurred due to the inherent limitations of voice recognition software  Please read the chart carefully and recognize, using context, where substitution may have occurred  For further clarification, please contact me directly

## 2023-02-20 NOTE — PROGRESS NOTES
UROLOGY OUTPATIENT CONSULT NOTE     Name: Eleni Madrigal  : 1945  MRN: [id number]  Date of Service: 2023      CHIEF COMPLAINT   Nocturia    History of Present Illness:     Eleni Madrigal is a 65 y o  female presenting for evaluation of nocturia  When she initially scheduled this appointment she was having episodes of bedwetting  She would wake up and she would be what but she had not gotten the urge to urinate and did not awaken to urinate  She thinks she may have been dealing with constipation at that time  While awaiting this appointment she has since developed diarrhea within the past 2 weeks  Since she has had diarrhea she has had no issues with urinary incontinence  She does still have nocturia and wakes up about 4 times overnight sometimes every couple of hours  She has no daytime urinary frequency and urinates about every 4 hours during the day  She goes to bed around 10 PM and drinks about a half a cup of water after taking her meds with applesauce  She is now taking Imodium which is somewhat helping with her diarrhea  She does take hydrochlorothiazide but takes this in the morning  She is a prior smoker  She smoked 1 pack/day for 50 years and quit in   She has a history of lung cancer and had a resection  She denies gross hematuria but has a urinalysis that showed 2-4 RBCs per high-power field in 2018  She reports seeing Dr Salvador Pena years ago  At time he performed a cystoscopy and recommended quitting smoking  He also recommend annual cystoscopy while she continues to smoke  She did not follow-up because a cystoscopy was painful      PAST MEDICAL HISTORY:     Past Medical History:   Diagnosis Date   • Cancer ([facility] )     lung   • Emphysema lung ([facility] )    • Hyperlipidemia    • Hypertension    • Lung cancer ([facility] )    • Numbness and tingling of foot    • Osteoarthritis of right hip    • Primary adenocarcinoma of lower lobe of right lung ([facility] )        PAST SURGICAL HISTORY:     Past Surgical History:   Procedure Laterality Date   • CATARACT EXTRACTION     • CHOLECYSTECTOMY     • COLONOSCOPY     • JOINT REPLACEMENT     • MS ARTHRP ACETBLR/PROX FEM PROSTC AGRFT/ALGRFT Right 9/14/2018    Procedure: ANTERIOR TOTAL HIP ARTHROPLASTY;  Surgeon: Elyssa Lares MD;  Location: [address] [place] [address];  Service: Orthopedics   • TOTAL HIP ARTHROPLASTY     • TUMOR REMOVAL Right 2015    lower lobe        CURRENT MEDICATIONS:     Current Outpatient Medications   Medication Sig Dispense Refill   • albuterol (PROVENTIL HFA,VENTOLIN HFA) 90 mcg/act inhaler Inhale 2 puffs every 6 (six) hours as needed for wheezing or shortness of breath 18 g 1   • amLODIPine (NORVASC) 5 mg tablet Take 1 tablet (5 mg total) by mouth daily 90 tablet 1   • amoxicillin (AMOXIL) 500 mg capsule TAKE FOUR CAPSULES BY MOUTH ONE HOUR BEFORE APPOINTMENT     • aspirin (ECOTRIN LOW STRENGTH) 81 mg EC tablet Take 1 tablet (81 mg total) by mouth 2 (two) times a day 60 tablet 0   • atorvastatin (LIPITOR) 10 mg tablet TAKE 1 TABLET DAILY AT BEDTIME 90 tablet 1   • Denta 5000 Plus 1 1 % CREA      • gabapentin (NEURONTIN) 300 mg capsule Take 300 mg by mouth daily      • hydrochlorothiazide (MICROZIDE) 12 5 mg capsule Take 1 capsule (12 5 mg total) by mouth daily 90 capsule 3   • losartan (COZAAR) 50 mg tablet TAKE 1 TABLET DAILY 90 tablet 1   • Melatonin 5 MG TABS Take 10 mg by mouth daily at bedtime As needed     • metoprolol succinate (TOPROL-XL) 100 mg 24 hr tablet TAKE 1 TABLET DAILY 90 tablet 1   • Polyethylene Glycol 400 0 25 % SOLN Apply 1 drop to eye 2 (two) times a day As needed       No current facility-administered medications for this visit  ALLERGIES:     Allergies   Allergen Reactions   • Other Other (See Comments)     seasonal       SOCIAL HISTORY:     Social History     Socioeconomic History   • Marital status:       Spouse name: None   • Number of children: None   • Years of education: None   • Highest education level: None Occupational History   • None   Tobacco Use   • Smoking status: Former     Packs/day: 1 00     Years: 50 00     Pack years: 50 00     Types: Cigarettes     Quit date: 2015     Years since quittin 4   • Smokeless tobacco: Never   Vaping Use   • Vaping Use: Never used   Substance and Sexual Activity   • Alcohol use: Yes     Comment: ocassional    • Drug use: No   • Sexual activity: None   Other Topics Concern   • None   Social History Narrative   • None     Social Determinants of Health     Financial Resource Strain: Low Risk    • Difficulty of Paying Living Expenses: Not hard at all   Food Insecurity: Not on file   Transportation Needs: No Transportation Needs   • Lack of Transportation (Medical): No   • Lack of Transportation (Non-Medical): No   Physical Activity: Not on file   Stress: Not on file   Social Connections: Not on file   Intimate Partner Violence: Not on file   Housing Stability: Not on file       FAMILY HISTORY:     Family History   Problem Relation Age of Onset   • Heart disease Mother    • No Known Problems Father        REVIEW OF SYSTEMS:     Pertinent positives and negatives in HPI      PHYSICAL EXAM:     /80 (BP Location: Left arm, Patient Position: Sitting, Cuff Size: Large)   Pulse 82   Resp 18   Ht 5' 7" (1 702 m)   Wt 83 1 kg (183 lb 4 8 oz)   SpO2 99%   BMI 28 71 kg/m²     General:  Healthy appearing female in no acute distress  Head:  Normocephalic, atraumatic  Eyes: no scleral icterus  ENMT: Nares patent, moist mucous membranes  Cardiovascular:  Regular rate  Respiratory:  Patient has unlabored respirations     Abdomen:  Abdomen nondistended  Musculoskeletal: Normal gait  Neurological: Grossly intact  Psych: Normal affect      LABS:     CBC:   Lab Results   Component Value Date    WBC 10 52 (H) 2023    HGB 14 6 2023    HCT 45 6 2023    MCV 90 2023     2023       BMP:   Lab Results   Component Value Date    GLUCOSE 109 2015 CALCIUM 9 1 01/17/2023     09/20/2015    K 3 5 01/17/2023    CO2 29 01/17/2023     01/17/2023    BUN 26 (H) 01/17/2023    CREATININE 1 38 (H) 01/17/2023       IMAGING:     CT ABDOMEN AND PELVIS WITHOUT IV CONTRAST     INDICATION:   R19 7: Diarrhea, unspecified      COMPARISON:  4/21/2009     TECHNIQUE:  CT examination of the abdomen and pelvis was performed without intravenous contrast  Lack of IV contrast limits the sensitivity for pathology within the visualized solid organs  Lack of oral contrast limits the sensitivity for pathology   within the bowel  Axial, sagittal, and coronal 2D reformatted images were created from the source data and submitted for interpretation       Radiation dose length product (DLP) for this visit:  706 09 mGy-cm   This examination, like all CT scans performed in the [facility], was performed utilizing techniques to minimize radiation dose exposure, including the use of iterative   reconstruction and automated exposure control       Enteric contrast was not administered       FINDINGS:     ABDOMEN     LOWER CHEST:  No clinically significant abnormality identified in the visualized lower chest      LIVER/BILIARY TREE:  Unremarkable      GALLBLADDER:  Gallbladder is surgically absent      SPLEEN:  Unremarkable      PANCREAS:  Unremarkable      ADRENAL GLANDS:  Unremarkable      KIDNEYS/URETERS:  There is a 1 3 cm exophytic nodule from the right kidney (2/36)  This does not have Hounsfield units of a simple cyst   Consider follow-up ultrasound  No kidney stones or hydronephrosis      STOMACH AND BOWEL:  There is colonic diverticulosis without evidence of acute diverticulitis      APPENDIX:  No findings to suggest appendicitis      ABDOMINOPELVIC CAVITY:  No ascites  No pneumoperitoneum    No lymphadenopathy      VESSELS:  Atherosclerotic changes with 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      PELVIS     REPRODUCTIVE ORGANS:  Partially obscured due to streak artifact      URINARY BLADDER:  Partially obscured due to streak artifact      ABDOMINAL WALL/INGUINAL REGIONS:  Unremarkable      OSSEOUS STRUCTURES:  No acute fracture or destructive osseous lesion  Bilateral total hip replacements      IMPRESSION:     Colonic diverticulosis without evidence of diverticulitis  Incidental 1 3 cm exophytic nodule from the right kidney  Nonemergent follow-up renal ultrasound recommended  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  ASSESSMENT:     65 y o  female with resolved urinary incontinence, nocturia, history of microhematuria, and incidental finding of 1 3 cm exophytic nodule in the right kidney  I independently reviewed the imaging of her CT scan which demonstrates a 1 3 cm exophytic nodule on the right kidney  The CT was performed without contrast so it is poorly characterized  There is no prior abdominal imaging for comparison  The patient thinks that this was picked up on a prior scan at some point  Renal ultrasound is recommended  Given the size of this mass and no prior imaging would recommend further characterization within 6 months  If her urinalysis returns with recurrent microhematuria would recommend CT urogram now with a cystoscopy  If her urinalysis is negative for microhematuria may have a renal ultrasound in 6 months  PLAN:     1  Urinalysis and culture (inadequate urine volume for culture, UA alone sent)  2  Renal ultrasound versus CT urogram based on urine results  3  If urine returns microhematuria patient will require cystoscopy as well    Humphrey Agudelo MD  [facility]    Portions of the above record have been created with voice recognition software  Occasional wrong word or "sound alike" substitution may have occurred due to the inherent limitations of voice recognition software  Please read the chart carefully and recognize, using context, where substitution may have occurred    For further clarification, please contact me directly

## 2023-02-21 ENCOUNTER — TELEPHONE (OUTPATIENT)
Dept: OTHER | Facility: OTHER | Age: 78
End: 2023-02-21

## 2023-02-21 ENCOUNTER — TELEPHONE (OUTPATIENT)
Dept: OTHER | Facility: HOSPITAL | Age: 78
End: 2023-02-21

## 2023-02-21 DIAGNOSIS — R80.9 PROTEINURIA, UNSPECIFIED TYPE: Primary | ICD-10-CM

## 2023-02-21 DIAGNOSIS — N28.89 RIGHT RENAL MASS: ICD-10-CM

## 2023-02-21 NOTE — TELEPHONE ENCOUNTER
I spoke with the lab and only a UA was sent not a UC  Do you need her to go get a UC done? She would need to go to a lab to have this done  Please advise

## 2023-02-21 NOTE — TELEPHONE ENCOUNTER
Call placed to patient and spoke with her  I reviewed the recommendations of Dr Sabrina Walsh with the patient  She understands, but is asking if these recommendations can be mailed to her  She does not access [facility] and has a lot going on right now  Pt would also like a call back when it gets closer to her appointment time to schedule 6 month follow up  Will forward to clerical to arrange for appointment

## 2023-02-21 NOTE — PROGRESS NOTES
Urinalysis is negative for microhematuria but does demonstrate proteinuria  A 1 3 cm exophytic nodule on the right kidney was noted on recent CT scan for which a renal ultrasound was recommended  Given its small size I recommend renal ultrasound in 6 months  Nephrology referral has been placed for proteinuria  Recent creatinine is noted to be somewhat elevated compared to her baseline

## 2023-02-22 ENCOUNTER — TELEPHONE (OUTPATIENT)
Dept: NEPHROLOGY | Facility: CLINIC | Age: 78
End: 2023-02-22

## 2023-02-22 NOTE — TELEPHONE ENCOUNTER
New Patient Intake Form   Patient Details   Austen Rodriguez     1945     [id number]     Insurance Information   Name of [employer]   Does the patient need an insurance referral? no   If patient has [employer] Yomaira, please ask if they will be using their Shelli Sones  Appointment Information   Who is calling to schedule? If not patient, what is callers name? [address]   Referring Provider  Dr Kaela Carreon   Reason for Appt (Diagnosis) proteinuria    Does Patient have labs/urine done at [facility]? If not, where do they go? List the date of last lab / urine yes   Has patient been hospitalized recently? If yes, list name and location of hospital they were in no   Has patient been seen by a Nephrologist before? If yes, list name, location and phone number no   Has the patient had renal imaging done? If so, list the most recent date and type of imaging yes - CT in [facility]   Does patient have a history of Kidney Stones? no   Appointment Details   Is there a referral on file?  yes    Appointment Date 5/4    Location  [place]   Miscellaneous

## 2023-02-24 ENCOUNTER — RA CDI HCC (OUTPATIENT)
Dept: OTHER | Facility: HOSPITAL | Age: 78
End: 2023-02-24

## 2023-03-03 ENCOUNTER — LAB (OUTPATIENT)
Dept: LAB | Facility: CLINIC | Age: 78
End: 2023-03-03

## 2023-03-03 ENCOUNTER — TELEPHONE (OUTPATIENT)
Dept: NEPHROLOGY | Facility: CLINIC | Age: 78
End: 2023-03-03

## 2023-03-03 DIAGNOSIS — C34.91 ADENOCARCINOMA OF RIGHT LUNG (HCC): ICD-10-CM

## 2023-03-03 DIAGNOSIS — I10 ESSENTIAL HYPERTENSION: ICD-10-CM

## 2023-03-03 DIAGNOSIS — N18.32 STAGE 3B CHRONIC KIDNEY DISEASE (HCC): ICD-10-CM

## 2023-03-03 DIAGNOSIS — E78.5 DYSLIPIDEMIA: ICD-10-CM

## 2023-03-03 DIAGNOSIS — J43.9 PULMONARY EMPHYSEMA, UNSPECIFIED EMPHYSEMA TYPE (HCC): ICD-10-CM

## 2023-03-03 LAB
ALBUMIN SERPL BCP-MCNC: 4.1 G/DL (ref 3.5–5)
ALP SERPL-CCNC: 75 U/L (ref 46–116)
ALT SERPL W P-5'-P-CCNC: 27 U/L (ref 12–78)
ANION GAP SERPL CALCULATED.3IONS-SCNC: 4 MMOL/L (ref 4–13)
AST SERPL W P-5'-P-CCNC: 25 U/L (ref 5–45)
BASOPHILS # BLD AUTO: 0.08 THOUSANDS/ÂΜL (ref 0–0.1)
BASOPHILS NFR BLD AUTO: 1 % (ref 0–1)
BILIRUB SERPL-MCNC: 0.69 MG/DL (ref 0.2–1)
BUN SERPL-MCNC: 22 MG/DL (ref 5–25)
CALCIUM SERPL-MCNC: 9.8 MG/DL (ref 8.3–10.1)
CHLORIDE SERPL-SCNC: 104 MMOL/L (ref 96–108)
CO2 SERPL-SCNC: 29 MMOL/L (ref 21–32)
CREAT SERPL-MCNC: 1.25 MG/DL (ref 0.6–1.3)
EOSINOPHIL # BLD AUTO: 0.4 THOUSAND/ÂΜL (ref 0–0.61)
EOSINOPHIL NFR BLD AUTO: 4 % (ref 0–6)
ERYTHROCYTE [DISTWIDTH] IN BLOOD BY AUTOMATED COUNT: 13.5 % (ref 11.6–15.1)
GFR SERPL CREATININE-BSD FRML MDRD: 41 ML/MIN/1.73SQ M
GLUCOSE SERPL-MCNC: 117 MG/DL (ref 65–140)
HCT VFR BLD AUTO: 43.4 % (ref 34.8–46.1)
HGB BLD-MCNC: 14.2 G/DL (ref 11.5–15.4)
IMM GRANULOCYTES # BLD AUTO: 0.04 THOUSAND/UL (ref 0–0.2)
IMM GRANULOCYTES NFR BLD AUTO: 0 % (ref 0–2)
LYMPHOCYTES # BLD AUTO: 3.49 THOUSANDS/ÂΜL (ref 0.6–4.47)
LYMPHOCYTES NFR BLD AUTO: 32 % (ref 14–44)
MCH RBC QN AUTO: 29.4 PG (ref 26.8–34.3)
MCHC RBC AUTO-ENTMCNC: 32.7 G/DL (ref 31.4–37.4)
MCV RBC AUTO: 90 FL (ref 82–98)
MONOCYTES # BLD AUTO: 0.56 THOUSAND/ÂΜL (ref 0.17–1.22)
MONOCYTES NFR BLD AUTO: 5 % (ref 4–12)
NEUTROPHILS # BLD AUTO: 6.47 THOUSANDS/ÂΜL (ref 1.85–7.62)
NEUTS SEG NFR BLD AUTO: 58 % (ref 43–75)
NRBC BLD AUTO-RTO: 0 /100 WBCS
PLATELET # BLD AUTO: 317 THOUSANDS/UL (ref 149–390)
PMV BLD AUTO: 9.6 FL (ref 8.9–12.7)
POTASSIUM SERPL-SCNC: 3.8 MMOL/L (ref 3.5–5.3)
PROT SERPL-MCNC: 7.5 G/DL (ref 6.4–8.4)
RBC # BLD AUTO: 4.83 MILLION/UL (ref 3.81–5.12)
SODIUM SERPL-SCNC: 137 MMOL/L (ref 135–147)
WBC # BLD AUTO: 11.04 THOUSAND/UL (ref 4.31–10.16)

## 2023-03-06 ENCOUNTER — TELEPHONE (OUTPATIENT)
Dept: GASTROENTEROLOGY | Facility: CLINIC | Age: 78
End: 2023-03-06

## 2023-03-06 NOTE — TELEPHONE ENCOUNTER
Spoke with patient reminding her of her colonoscopy 3/20 with Dr Gutiérrez  She will need a , be called Friday prior to procedure with arrival time, be on clear liquids with bowel prep day before  She has instructions and prep for Miralax, Dulcolax and Gatorade

## 2023-03-08 ENCOUNTER — OFFICE VISIT (OUTPATIENT)
Dept: INTERNAL MEDICINE CLINIC | Facility: CLINIC | Age: 78
End: 2023-03-08

## 2023-03-08 VITALS
BODY MASS INDEX: 27.94 KG/M2 | DIASTOLIC BLOOD PRESSURE: 80 MMHG | HEART RATE: 75 BPM | WEIGHT: 178 LBS | HEIGHT: 67 IN | OXYGEN SATURATION: 96 % | SYSTOLIC BLOOD PRESSURE: 128 MMHG

## 2023-03-08 DIAGNOSIS — E55.9 VITAMIN D DEFICIENCY: ICD-10-CM

## 2023-03-08 DIAGNOSIS — G62.9 NEUROPATHY: ICD-10-CM

## 2023-03-08 DIAGNOSIS — E04.1 THYROID NODULE: ICD-10-CM

## 2023-03-08 DIAGNOSIS — E78.5 DYSLIPIDEMIA: ICD-10-CM

## 2023-03-08 DIAGNOSIS — N28.1 KIDNEY CYST, ACQUIRED: ICD-10-CM

## 2023-03-08 DIAGNOSIS — J43.9 PULMONARY EMPHYSEMA, UNSPECIFIED EMPHYSEMA TYPE (HCC): ICD-10-CM

## 2023-03-08 DIAGNOSIS — I10 ESSENTIAL HYPERTENSION: ICD-10-CM

## 2023-03-08 DIAGNOSIS — M15.9 PRIMARY OSTEOARTHRITIS INVOLVING MULTIPLE JOINTS: ICD-10-CM

## 2023-03-08 DIAGNOSIS — C34.91 ADENOCARCINOMA OF RIGHT LUNG (HCC): ICD-10-CM

## 2023-03-08 DIAGNOSIS — R60.0 EDEMA OF BOTH LEGS: ICD-10-CM

## 2023-03-08 DIAGNOSIS — J41.0 SIMPLE CHRONIC BRONCHITIS (HCC): ICD-10-CM

## 2023-03-08 DIAGNOSIS — G25.81 RESTLESS LEG SYNDROME: ICD-10-CM

## 2023-03-08 DIAGNOSIS — E66.09 CLASS 1 OBESITY DUE TO EXCESS CALORIES WITHOUT SERIOUS COMORBIDITY WITH BODY MASS INDEX (BMI) OF 30.0 TO 30.9 IN ADULT: ICD-10-CM

## 2023-03-08 DIAGNOSIS — I25.10 CORONARY ARTERY CALCIFICATION SEEN ON CAT SCAN: ICD-10-CM

## 2023-03-08 DIAGNOSIS — R19.7 DIARRHEA, UNSPECIFIED TYPE: ICD-10-CM

## 2023-03-08 DIAGNOSIS — N18.32 STAGE 3B CHRONIC KIDNEY DISEASE (HCC): Primary | ICD-10-CM

## 2023-03-08 DIAGNOSIS — K57.90 DIVERTICULOSIS: ICD-10-CM

## 2023-03-08 DIAGNOSIS — K63.5 POLYP OF COLON, UNSPECIFIED PART OF COLON, UNSPECIFIED TYPE: ICD-10-CM

## 2023-03-08 PROBLEM — H61.90 LESION OF EXTERNAL EAR: Status: RESOLVED | Noted: 2019-08-25 | Resolved: 2023-03-08

## 2023-03-08 PROBLEM — D72.829 LEUKOCYTOSIS: Status: ACTIVE | Noted: 2023-03-08

## 2023-03-08 RX ORDER — GABAPENTIN 300 MG/1
300 CAPSULE ORAL DAILY
Qty: 90 CAPSULE | Refills: 1 | Status: SHIPPED | OUTPATIENT
Start: 2023-03-08

## 2023-03-08 NOTE — PATIENT INSTRUCTIONS
Stop aspirin 1 week before colonoscopy  After colonoscopy start taking vitamin D 1000 unit daily  BMI Counseling: The BMI is above normal  Know Body weight goal    Weigh yourself daily or weekly as per your doctor    Nutrition recommendations include decreasing portion sizes, encouraging healthy choices of fruits and vegetables, decreasing     fast food intake, consuming healthier snacks, limiting drinks that contain sugar, moderation in carbohydrate intake, increasing     intake of lean protein, reducing intake of saturated and trans fat and reducing intake of cholesterol    Follow with Consultants as per their and our suggestion    Follow up in 12 week(s) or as needed earlier    Follow all instructions as advised and discussed  Take your medications as prescribed  Call the office immediately if you experience any side effects  Ask questions if you do not understand  Keep your scheduled appointment as advised or come sooner if necessary or in doubt  Best time to call for non-urgent matter or questions on weekdays is between 9am and 12 noon  See physician for any new symptoms or worsening of current symptoms  Urgent or emergent situations call 911 and report to nearest emergency room

## 2023-03-08 NOTE — ASSESSMENT & PLAN NOTE
Anxiety somewhat worse due to daughter coming back home, having problem with the pipe, some other social issues    Generally seems to be doing fair

## 2023-03-08 NOTE — ASSESSMENT & PLAN NOTE
Stable thyroid nodule over 5 years no further need for follow-up    Last ultrasound reviewed from 2020

## 2023-03-08 NOTE — PROGRESS NOTES
Name: Tejinder Bullock      : 1945      MRN: [id number]  Encounter Provider: Paula Loving MD  Encounter Date: 3/8/2023   [address]     1  Stage 3b chronic kidney disease [facility])  Assessment & Plan:  Lab Results   Component Value Date    EGFR 41 2023    EGFR 36 2023    EGFR 40 2022    CREATININE 1 25 2023    CREATININE 1 38 (H) 2023    CREATININE 1 28 2022   GFR is baseline  Creat is baseline  Electrolytes stable  Recommend periodic blood test for monitoring of  Renal Function, lytes, GFR and urine for MA/Creat  Avoid Non Steroidal Antiinflammatory such as ibuprofen, aleve etc     Bone and Mineral disease monitoring as appropriate  All patient with GFR less than 30( CKD 4 or 5 or 6) are advised to follow with nephrologist   Plan for ultrasound  Patient will be seeing nephrologist in the near future    Orders:  -     Comprehensive metabolic panel; Future; Expected date: 2023    2  Kidney cyst, acquired  Assessment & Plan:  Urologist impression noted  The patient had reportedly same thing in the remote past   Repeat his ultrasound is being planned in 6 months  3  Essential hypertension  -     Comprehensive metabolic panel; Future; Expected date: 2023    4  Adenocarcinoma of right lung ([address] )    5  Pulmonary emphysema, unspecified emphysema type ([address] )    6  Simple chronic bronchitis ([address] )    7  Thyroid nodule  Assessment & Plan:  Stable thyroid nodule over 5 years no further need for follow-up  Last ultrasound reviewed from       8   Class 1 obesity due to excess calories without serious comorbidity with body mass index (BMI) of 30 0 to 30 9 in adult  Assessment & Plan:  BMI Counseling:    Lost little weight is helpful  The BMI is above normal  Know Body weight goal    Weigh yourself daily or weekly as per your doctor    Nutrition recommendations include decreasing portion sizes, encouraging healthy choices of fruits and vegetables, decreasing     fast food intake, consuming healthier snacks, limiting drinks that contain sugar, moderation in carbohydrate intake, increasing     intake of lean protein, reducing intake of saturated and trans fat and reducing intake of cholesterol        9  Diarrhea, unspecified type  Assessment & Plan:  1/18/2023: Stool negative for Salmonella, Shigella, Campylobacter, C  difficile, leukocytes, as well as negative for fecal to Hemoccult immunological  1/17/2023: WBC 10 52 rest of the CBC normal status of a chronic top normal WBC 4 times in last 1 year BUN 26 creatinine 1 38 rest of the CMP normal vitamin D15 8, sed rate 26  3/3/2023: WBC 11 4 rest of the CBC normal CMP normal with creatinine 1 25    Appears to have IBS component related to stress  Her bowel movement is multiple but soft  Going for colonoscopy  Remote history of diverticulosis and colonic polyp  She is drinking Lactaid free milk that is probably helping her  Orders:  -     Comprehensive metabolic panel; Future; Expected date: 06/08/2023    10  Coronary artery calcification seen on CAT scan    11  Neuropathy  Assessment & Plan:  Patient was given gabapentin by orthopedic for neuropathy in the feet  Fair control  We will continue to monitor  Orders:  -     gabapentin (NEURONTIN) 300 mg capsule; Take 1 capsule (300 mg total) by mouth daily    12  Polyp of colon, unspecified part of colon, unspecified type  Assessment & Plan:  Going for colonoscopy  History of colonic polyp  Probably has IBS underlying  13  Diverticulosis  Assessment & Plan:  Diverticulosis fair  Recommend high-fiber diet  Going for colonoscopy  14  Primary osteoarthritis involving multiple joints    15  Vitamin D deficiency  Assessment & Plan:  1/15/2023: Vitamin D level 17        16  Restless leg syndrome  Assessment & Plan:  No further cramps in the leg    Orders:  -     Comprehensive metabolic panel; Future; Expected date: 06/08/2023    17  Edema of both legs  Assessment & Plan:  No further edema of the leg    Orders:  -     Comprehensive metabolic panel; Future; Expected date: 06/08/2023    18  Dyslipidemia  -     Lipid panel; Future; Expected date: 06/08/2023      BMI Counseling: Body mass index is 27 88 kg/m²  The BMI is above normal  Nutrition recommendations include decreasing portion sizes, encouraging healthy choices of fruits and vegetables, decreasing fast food intake and consuming healthier snacks  Rationale for BMI follow-up plan is due to patient being overweight or obese  Subjective       Here for follow-up  As above diarrhea stool studies are negative  Going for colonoscopy history of colonic polyp and diverticulosis  Drinking Lactaid free milk that seems to be helping  Bowel movements soft but multiple usually after bowel movement after meal   No bowel movement at nighttime  Recommend high-fiber diet  Adenocarcinoma of lung and COPD breathing fair being followed by surgeon  Up-to-date with the CAT scan  Denies any cough chest congestion shortness of breath    Hypertension fair control  Varicose vein reasonable  Remote history of DVT recommend baby aspirin once a day  Coronary artery calcification stable  Femoral neuropathy fair  No neuropathy in the feet fair  Remains on gabapentin well controlled  Osteoarthritis fair  CKD level 3 to be seen by nephrologist   Will monitor lab data GFR in the range of 35-40  Electrolytes stable  Kidney cyst seen by urologist no urinary symptoms going for ultrasound due to CKD cannot do CAT scan with dye  Had had probably in the past    Vitamin D B12 reasonable level, BMI recommend diet, restless leg stable, generalized anxiety somewhat worse due to social use situations not depressed at this time  WBC top normal will monitor differential unremarkable  Vitamin D low recommend oral supplement    Edema fair         1/18/2023: Stool negative for Salmonella, Shigella, Campylobacter, C  difficile, leukocytes, as well as negative for fecal to Hemoccult immunological  1/17/2023: WBC 10 52 rest of the CBC normal status of a chronic top normal WBC 4 times in last 1 year BUN 26 creatinine 1 38 rest of the CMP normal vitamin D15 8, sed rate 26  3/3/2023: WBC 11 4 rest of the CBC normal CMP normal with creatinine 1 25    Review of Systems   Constitutional: Negative for appetite change, fatigue, fever and unexpected weight change  HENT: Negative for congestion, ear pain and sore throat  Eyes: Negative for pain and redness  Respiratory: Negative for cough and shortness of breath  Cardiovascular: Negative for chest pain, palpitations and leg swelling  Gastrointestinal: Negative for abdominal pain, diarrhea, nausea and vomiting  Endocrine: Negative for cold intolerance, polydipsia and polyuria  Genitourinary: Negative for dysuria, frequency and urgency  Musculoskeletal: Negative for arthralgias, gait problem and myalgias  Skin: Negative for rash  Allergic/Immunologic: Negative  Neurological: Negative for dizziness and headaches  Hematological: Negative for adenopathy  Psychiatric/Behavioral: Negative for behavioral problems, decreased concentration, dysphoric mood and hallucinations  The patient is nervous/anxious  The patient is not hyperactive          Past Medical History:   Diagnosis Date   • Cancer [address] [address] )     lung   • Emphysema lung ([facility] [address] )    • Hyperlipidemia    • Hypertension    • Lung cancer ([facility] [address] )    • Numbness and tingling of foot    • Osteoarthritis of right hip    • Primary adenocarcinoma of lower lobe of right lung [facility])      Past Surgical History:   Procedure Laterality Date   • CATARACT EXTRACTION     • CHOLECYSTECTOMY     • COLONOSCOPY     • JOINT REPLACEMENT     • IL ARTHRP ACETBLR/PROX FEM PROSTC AGRFT/ALGRFT Right 9/14/2018    Procedure: ANTERIOR TOTAL HIP ARTHROPLASTY;  Surgeon: Elijah Borja MD;  Location: [facility];  Service: Orthopedics   • TOTAL HIP ARTHROPLASTY     • TUMOR REMOVAL Right 2015    lower lobe      Family History   Problem Relation Age of Onset   • Heart disease Mother    • No Known Problems Father      Social History     Socioeconomic History   • Marital status:      Spouse name: None   • Number of children: None   • Years of education: None   • Highest education level: None   Occupational History   • None   Tobacco Use   • Smoking status: Former     Packs/day: 1 00     Years: 50 00     Pack years: 50 00     Types: Cigarettes     Quit date: 2015     Years since quittin 5   • Smokeless tobacco: Never   Vaping Use   • Vaping Use: Never used   Substance and Sexual Activity   • Alcohol use: Yes     Comment: ocassional    • Drug use: No   • Sexual activity: None   Other Topics Concern   • None   Social History Narrative   • None     Social Determinants of Health     Financial Resource Strain: Low Risk    • Difficulty of Paying Living Expenses: Not hard at all   Food Insecurity: Not on file   Transportation Needs: No Transportation Needs   • Lack of Transportation (Medical): No   • Lack of Transportation (Non-Medical):  No   Physical Activity: Not on file   Stress: Not on file   Social Connections: Not on file   Intimate Partner Violence: Not on file   Housing Stability: Not on file     Current Outpatient Medications on File Prior to Visit   Medication Sig   • albuterol (PROVENTIL HFA,VENTOLIN HFA) 90 mcg/act inhaler Inhale 2 puffs every 6 (six) hours as needed for wheezing or shortness of breath   • amLODIPine (NORVASC) 5 mg tablet TAKE 1 TABLET DAILY   • amoxicillin (AMOXIL) 500 mg capsule TAKE FOUR CAPSULES BY MOUTH ONE HOUR BEFORE APPOINTMENT   • aspirin (ECOTRIN LOW STRENGTH) 81 mg EC tablet Take 1 tablet (81 mg total) by mouth 2 (two) times a day   • atorvastatin (LIPITOR) 10 mg tablet TAKE 1 TABLET DAILY AT BEDTIME   • Denta 5000 Plus 1 1 % CREA • hydrochlorothiazide (MICROZIDE) 12 5 mg capsule Take 1 capsule (12 5 mg total) by mouth daily   • losartan (COZAAR) 50 mg tablet TAKE 1 TABLET DAILY   • Melatonin 5 MG TABS Take 10 mg by mouth daily at bedtime As needed   • metoprolol succinate (TOPROL-XL) 100 mg 24 hr tablet TAKE 1 TABLET DAILY   • Polyethylene Glycol 400 0 25 % SOLN Apply 1 drop to eye 2 (two) times a day As needed   • [DISCONTINUED] gabapentin (NEURONTIN) 300 mg capsule Take 300 mg by mouth daily      Allergies   Allergen Reactions   • Other Other (See Comments)     seasonal     Immunization History   Administered Date(s) Administered   • INFLUENZA 12/02/2022   • Influenza, high dose seasonal 0 7 mL 11/23/2020, 11/24/2021, 12/02/2022   • Influenza, injectable, quadrivalent, preservative free 0 5 mL 10/24/2019       Objective     /80   Pulse 75   Ht 5' 7" (1 702 m)   Wt 80 7 kg (178 lb)   SpO2 96%   BMI 27 88 kg/m²     Physical Exam  Constitutional:       General: She is not in acute distress  Appearance: She is well-developed  She is not ill-appearing or diaphoretic  HENT:      Head: Normocephalic and atraumatic  Right Ear: External ear normal       Left Ear: External ear normal    Eyes:      General: Lids are normal          Right eye: No discharge  Left eye: No discharge  Conjunctiva/sclera: Conjunctivae normal    Neck:      Thyroid: No thyroid mass or thyromegaly  Vascular: No carotid bruit or JVD  Trachea: Trachea normal    Cardiovascular:      Rate and Rhythm: Regular rhythm  Heart sounds: Normal heart sounds  Pulmonary:      Effort: No respiratory distress  Breath sounds: No wheezing, rhonchi or rales  Abdominal:      Palpations: There is no mass  Tenderness: There is no abdominal tenderness  Musculoskeletal:      Right lower leg: No edema  Left lower leg: No edema  Lymphadenopathy:      Cervical: No cervical adenopathy  Skin:     General: Skin is warm  Coloration: Skin is not jaundiced or pale  Findings: No rash  Neurological:      Mental Status: She is alert  Psychiatric:         Attention and Perception: Attention and perception normal          Mood and Affect: Mood is anxious  Mood is not depressed  Affect is not flat  Behavior: Behavior normal          Thought Content:  Thought content normal          Cognition and Memory: Cognition and memory normal        Lissa Vitale MD

## 2023-03-08 NOTE — ASSESSMENT & PLAN NOTE
BMI Counseling:    Lost little weight is helpful  The BMI is above normal  Know Body weight goal    Weigh yourself daily or weekly as per your doctor    Nutrition recommendations include decreasing portion sizes, encouraging healthy choices of fruits and vegetables, decreasing     fast food intake, consuming healthier snacks, limiting drinks that contain sugar, moderation in carbohydrate intake, increasing     intake of lean protein, reducing intake of saturated and trans fat and reducing intake of cholesterol

## 2023-03-08 NOTE — ASSESSMENT & PLAN NOTE
Lab Results   Component Value Date    LDLCALC 64 02/16/2022       Lab Results   Component Value Date    ALT 27 03/03/2023     Lab Results   Component Value Date    CHOLESTEROL 141 02/16/2022    CHOLESTEROL 139 11/18/2021    CHOLESTEROL 147 06/16/2021     Lab Results   Component Value Date    HDL 54 02/16/2022    HDL 56 11/18/2021    HDL 60 06/16/2021     Lab Results   Component Value Date    TRIG 113 02/16/2022    TRIG 98 11/18/2021    TRIG 119 06/16/2021     Lab Results   Component Value Date    NONHDLC 87 02/16/2022    [place] 83 11/18/2021    [place] 87 06/16/2021

## 2023-03-08 NOTE — ASSESSMENT & PLAN NOTE
Lab Results   Component Value Date    EGFR 41 03/03/2023    EGFR 36 01/17/2023    EGFR 40 08/23/2022    CREATININE 1 25 03/03/2023    CREATININE 1 38 (H) 01/17/2023    CREATININE 1 28 08/23/2022   GFR is baseline  Creat is baseline  Electrolytes stable  Recommend periodic blood test for monitoring of  Renal Function, lytes, GFR and urine for MA/Creat  Avoid Non Steroidal Antiinflammatory such as ibuprofen, aleve etc     Bone and Mineral disease monitoring as appropriate  All patient with GFR less than 30( CKD 4 or 5 or 6) are advised to follow with nephrologist   Plan for ultrasound    Patient will be seeing nephrologist in the near future

## 2023-03-08 NOTE — ASSESSMENT & PLAN NOTE
1/18/2023: Stool negative for Salmonella, Shigella, Campylobacter, C  difficile, leukocytes, as well as negative for fecal to Hemoccult immunological  1/17/2023: WBC 10 52 rest of the CBC normal status of a chronic top normal WBC 4 times in last 1 year BUN 26 creatinine 1 38 rest of the CMP normal vitamin D15 8, sed rate 26  3/3/2023: WBC 11 4 rest of the CBC normal CMP normal with creatinine 1 25    Appears to have IBS component related to stress  Her bowel movement is multiple but soft  Going for colonoscopy  Remote history of diverticulosis and colonic polyp  She is drinking Lactaid free milk that is probably helping her

## 2023-03-08 NOTE — ASSESSMENT & PLAN NOTE
Patient was given gabapentin by orthopedic for neuropathy in the feet  Fair control  We will continue to monitor

## 2023-03-08 NOTE — ASSESSMENT & PLAN NOTE
Urologist impression noted  The patient had reportedly same thing in the remote past   Repeat his ultrasound is being planned in 6 months

## 2023-03-08 NOTE — ASSESSMENT & PLAN NOTE
Lab Results   Component Value Date    WBC 11 04 (H) 03/03/2023    HGB 14 2 03/03/2023    HCT 43 4 03/03/2023    MCV 90 03/03/2023     03/03/2023   Top normal WBC  Benign hemoglobin and platelet  Will monitor probably related to stress chronic    Will monitor the trend

## 2023-03-10 NOTE — TELEPHONE ENCOUNTER
I called and spoke to patient and advised her to keep her appt with the dentist to see what is going on with her gums and then she can talk to the dentist to see if she should keep her procedure as scheduled or reschedule it to a later date depending on what the issue with her gums is  She verbalized understanding and will c/b if she needs to r/s   Thank you

## 2023-03-10 NOTE — TELEPHONE ENCOUNTER
Patient called today regarding she has a Possible Oral Gum Infection, Patient will see her Dentist on March 14th  Patient is Scheduled for a Colonoscopy on March 20th  Please call Patient back to discuss

## 2023-03-15 ENCOUNTER — NURSE TRIAGE (OUTPATIENT)
Dept: OTHER | Facility: OTHER | Age: 78
End: 2023-03-15

## 2023-03-15 NOTE — TELEPHONE ENCOUNTER
Reason for Disposition  • Bowel prep for colonoscopy, questions about    Answer Assessment - Initial Assessment Questions  1  DATE/TIME: "When did you have your colonoscopy?"       Scheduled for 3/20/23  2  MAIN CONCERN: "What is your main concern right now?" "What questions do you have?"       Pt has questions about specific foods that she can and cannot eat for the 5 days prior, and if she can take tylenol during those days  All questions answered  Informed patient to call back if she has any other concerns; she was appreciative      Protocols used: COLONOSCOPY SYMPTOMS AND QUESTIONS-ADULT-

## 2023-03-15 NOTE — TELEPHONE ENCOUNTER
Regarding: Prep questions  ----- Message from Malcolm Li sent at 3/15/2023  1:52 PM EDT -----  "I have some medication and  dietary questions for  the colonoscopy prep "

## 2023-03-20 ENCOUNTER — ANESTHESIA (OUTPATIENT)
Dept: GASTROENTEROLOGY | Facility: AMBULARY SURGERY CENTER | Age: 78
End: 2023-03-20

## 2023-03-20 ENCOUNTER — ANESTHESIA EVENT (OUTPATIENT)
Dept: GASTROENTEROLOGY | Facility: AMBULARY SURGERY CENTER | Age: 78
End: 2023-03-20

## 2023-03-20 ENCOUNTER — HOSPITAL ENCOUNTER (OUTPATIENT)
Dept: GASTROENTEROLOGY | Facility: AMBULARY SURGERY CENTER | Age: 78
Setting detail: OUTPATIENT SURGERY
Discharge: HOME | End: 2023-03-20
Attending: INTERNAL MEDICINE

## 2023-03-20 VITALS
SYSTOLIC BLOOD PRESSURE: 155 MMHG | DIASTOLIC BLOOD PRESSURE: 70 MMHG | RESPIRATION RATE: 18 BRPM | HEART RATE: 77 BPM | OXYGEN SATURATION: 97 % | TEMPERATURE: 97.6 F

## 2023-03-20 DIAGNOSIS — D12.6 ADENOMATOUS POLYP OF COLON, UNSPECIFIED PART OF COLON: ICD-10-CM

## 2023-03-20 DIAGNOSIS — R19.7 DIARRHEA, UNSPECIFIED TYPE: ICD-10-CM

## 2023-03-20 RX ORDER — LIDOCAINE HYDROCHLORIDE 10 MG/ML
INJECTION, SOLUTION EPIDURAL; INFILTRATION; INTRACAUDAL; PERINEURAL AS NEEDED
Status: DISCONTINUED | OUTPATIENT
Start: 2023-03-20 | End: 2023-03-20

## 2023-03-20 RX ORDER — PROPOFOL 10 MG/ML
INJECTION, EMULSION INTRAVENOUS CONTINUOUS PRN
Status: DISCONTINUED | OUTPATIENT
Start: 2023-03-20 | End: 2023-03-20

## 2023-03-20 RX ORDER — PROPOFOL 10 MG/ML
INJECTION, EMULSION INTRAVENOUS AS NEEDED
Status: DISCONTINUED | OUTPATIENT
Start: 2023-03-20 | End: 2023-03-20

## 2023-03-20 RX ORDER — SODIUM CHLORIDE, SODIUM LACTATE, POTASSIUM CHLORIDE, CALCIUM CHLORIDE 600; 310; 30; 20 MG/100ML; MG/100ML; MG/100ML; MG/100ML
INJECTION, SOLUTION INTRAVENOUS CONTINUOUS PRN
Status: DISCONTINUED | OUTPATIENT
Start: 2023-03-20 | End: 2023-03-20

## 2023-03-20 RX ADMIN — PROPOFOL 50 MG: 10 INJECTION, EMULSION INTRAVENOUS at 11:40

## 2023-03-20 RX ADMIN — PROPOFOL 50 MG: 10 INJECTION, EMULSION INTRAVENOUS at 11:45

## 2023-03-20 RX ADMIN — PROPOFOL 120 MCG/KG/MIN: 10 INJECTION, EMULSION INTRAVENOUS at 11:36

## 2023-03-20 RX ADMIN — PROPOFOL 100 MG: 10 INJECTION, EMULSION INTRAVENOUS at 11:36

## 2023-03-20 RX ADMIN — LIDOCAINE HYDROCHLORIDE 50 MG: 10 INJECTION, SOLUTION EPIDURAL; INFILTRATION; INTRACAUDAL; PERINEURAL at 11:36

## 2023-03-20 RX ADMIN — SODIUM CHLORIDE, SODIUM LACTATE, POTASSIUM CHLORIDE, AND CALCIUM CHLORIDE: .6; .31; .03; .02 INJECTION, SOLUTION INTRAVENOUS at 10:55

## 2023-03-20 NOTE — ANESTHESIA POSTPROCEDURE EVALUATION
Post-Op Assessment Note    CV Status:  Stable  Pain Score: 0    Pain management: adequate     Mental Status:  Arousable and sleepy   Hydration Status:  Stable   PONV Controlled:  None   Airway Patency:  Patent and adequate      Post Op Vitals Reviewed: Yes      Staff: CRNA         No notable events documented      /70 (03/20/23 1205)    Temp      Pulse 59 (03/20/23 1205)   Resp 16 (03/20/23 1205)    SpO2 98 % (03/20/23 1205)

## 2023-03-20 NOTE — H&P
History and Physical - [facility]  Minna Leyden 65 y o  female MRN: [id number]                  HPI: Minna Leyden is a 65y o  year old female who presents for colonoscopy  Admits to polyps last colonoscopy greater than 10 years ago      REVIEW OF SYSTEMS: Per the HPI, and otherwise unremarkable      Historical Information   Past Medical History:   Diagnosis Date   • Cancer ([address] )     lung   • Diarrhea     wt loss from 192lb to 178lb ()   • Emphysema lung (HCC)    • Hemorrhoid     possible hemorrhoid with small amount of bleeding   • Hyperlipidemia    • Hypertension    • Lung cancer (HCC)    • Numbness and tingling of foot    • Osteoarthritis of right hip    • Primary adenocarcinoma of lower lobe of right lung (HCC)      Past Surgical History:   Procedure Laterality Date   • CATARACT EXTRACTION Bilateral    • CHOLECYSTECTOMY     • COLONOSCOPY     • JOINT REPLACEMENT Bilateral     hips-   • IL ARTHRP ACETBLR/PROX FEM PROSTC AGRFT/ALGRFT Right 2018    Procedure: ANTERIOR TOTAL HIP ARTHROPLASTY;  Surgeon: Ellie Birmingham MD;  Location: [facility];  Service: Orthopedics   • TOTAL HIP ARTHROPLASTY     • TUMOR REMOVAL Right 2015    lower lobe      Social History   Social History     Substance and Sexual Activity   Alcohol Use Yes    Comment: on occ     Social History     Substance and Sexual Activity   Drug Use Never     Social History     Tobacco Use   Smoking Status Former   • Packs/day: 1 00   • Years: 50 00   • Pack years: 50 00   • Types: Cigarettes   • Quit date: 2015   • Years since quittin 5   Smokeless Tobacco Never     Family History   Problem Relation Age of Onset   • Heart disease Mother    • No Known Problems Father        Meds/Allergies       Current Outpatient Medications:   •  amLODIPine (NORVASC) 5 mg tablet  •  atorvastatin (LIPITOR) 10 mg tablet  •  hydrochlorothiazide (MICROZIDE) 12 5 mg capsule  •  losartan (COZAAR) 50 mg tablet  •  metoprolol succinate (TOPROL-XL) 100 mg 24 hr tablet  •  Polyethylene Glycol 400 0 25 % SOLN  •  acetaminophen (TYLENOL) 500 mg tablet  •  albuterol (PROVENTIL HFA,VENTOLIN HFA) 90 mcg/act inhaler  •  amoxicillin (AMOXIL) 500 mg capsule  •  aspirin (ECOTRIN LOW STRENGTH) 81 mg EC tablet  •  Denta 5000 Plus 1 1 % CREA  •  gabapentin (NEURONTIN) 300 mg capsule  •  Melatonin 5 MG TABS  •  Polyethylene Glycol 3350 (MIRALAX PO)    Allergies   Allergen Reactions   • Other Other (See Comments)     seasonal       Objective     BP (!) 198/88   Pulse 82   Temp 97 6 °F (36 4 °C) (Temporal)   Resp 16   SpO2 99%       PHYSICAL EXAM    Gen: NAD  Head: NCAT  CV: RRR  CHEST: Clear  ABD: soft, NT/ND  EXT: no edema      ASSESSMENT/PLAN:  This is a 65y o  year old female here for colonoscopy, and she is stable and optimized for her procedure

## 2023-03-20 NOTE — ANESTHESIA PREPROCEDURE EVALUATION
Procedure:  COLONOSCOPY    Relevant Problems   CARDIO   (+) Acute deep vein thrombosis (DVT) of distal vein of left lower extremity (HCC)   (+) Coronary artery calcification   (+) Essential hypertension   (+) Hyperlipidemia      /RENAL   (+) Kidney cyst, acquired   (+) Stage 3b chronic kidney disease (HCC)      MUSCULOSKELETAL   (+) Osteoarthritis of right hip   (+) Primary osteoarthritis involving multiple joints      NEURO/PSYCH   (+) Anxiety   (+) Mixed anxiety and depressive disorder   (+) Numbness and tingling of foot      PULMONARY   (+) Emphysema lung (HCC)   (+) Simple chronic bronchitis (HCC)      Respiratory   (+) Adenocarcinoma of right lung (HCC)      Digestive   (+) Colon polyp   (+) Diverticulosis      Nervous and Auditory   (+) BPPV (benign paroxysmal positional vertigo)   (+) Femoral neuropathy, right   (+) Neuropathy      Other   (+) Dyslipidemia   (+) Edema of both legs        Physical Exam    Airway    Mallampati score: II  TM Distance: >3 FB  Neck ROM: full     Dental       Cardiovascular      Pulmonary      Other Findings        Anesthesia Plan  ASA Score- 3     Anesthesia Type- IV sedation with anesthesia with ASA Monitors  Additional Monitors:   Airway Plan:           Plan Factors-Exercise tolerance (METS): >4 METS  Chart reviewed  EKG reviewed  Existing labs reviewed  Patient summary reviewed  Patient is not a current smoker  Induction-     Postoperative Plan-     Informed Consent- Anesthetic plan and risks discussed with patient  I personally reviewed this patient with the CRNA  Discussed and agreed on the Anesthesia Plan with the CRNA  Rubens Hyde

## 2023-03-30 NOTE — RESULT ENCOUNTER NOTE
Patient was informed via my chart biopsies were benign  Repeat colonoscopy 3 years due to multiple polyps removed

## 2023-05-04 ENCOUNTER — CONSULT (OUTPATIENT)
Dept: NEPHROLOGY | Facility: CLINIC | Age: 78
End: 2023-05-04

## 2023-05-04 VITALS
DIASTOLIC BLOOD PRESSURE: 84 MMHG | BODY MASS INDEX: 27.94 KG/M2 | HEART RATE: 72 BPM | SYSTOLIC BLOOD PRESSURE: 144 MMHG | HEIGHT: 67 IN | WEIGHT: 178 LBS

## 2023-05-04 DIAGNOSIS — M89.9 CHRONIC KIDNEY DISEASE-MINERAL AND BONE DISORDER: ICD-10-CM

## 2023-05-04 DIAGNOSIS — I12.9 BENIGN HYPERTENSION WITH CKD (CHRONIC KIDNEY DISEASE) STAGE III (HCC): ICD-10-CM

## 2023-05-04 DIAGNOSIS — N18.9 CHRONIC KIDNEY DISEASE-MINERAL AND BONE DISORDER: ICD-10-CM

## 2023-05-04 DIAGNOSIS — R80.9 PROTEINURIA, UNSPECIFIED TYPE: ICD-10-CM

## 2023-05-04 DIAGNOSIS — I10 ESSENTIAL HYPERTENSION: ICD-10-CM

## 2023-05-04 DIAGNOSIS — E55.9 VITAMIN D DEFICIENCY: ICD-10-CM

## 2023-05-04 DIAGNOSIS — N28.1 KIDNEY CYST, ACQUIRED: ICD-10-CM

## 2023-05-04 DIAGNOSIS — E83.9 CHRONIC KIDNEY DISEASE-MINERAL AND BONE DISORDER: ICD-10-CM

## 2023-05-04 DIAGNOSIS — N18.32 STAGE 3B CHRONIC KIDNEY DISEASE (HCC): Primary | ICD-10-CM

## 2023-05-04 DIAGNOSIS — N18.30 BENIGN HYPERTENSION WITH CKD (CHRONIC KIDNEY DISEASE) STAGE III (HCC): ICD-10-CM

## 2023-05-04 RX ORDER — AMLODIPINE BESYLATE 10 MG/1
10 TABLET ORAL DAILY
Qty: 90 TABLET | Refills: 1 | Status: SHIPPED | OUTPATIENT
Start: 2023-05-04

## 2023-05-04 RX ORDER — LOSARTAN POTASSIUM 50 MG/1
50 TABLET ORAL DAILY
Qty: 90 TABLET | Refills: 1
Start: 2023-05-04

## 2023-05-04 RX ORDER — METOPROLOL SUCCINATE 100 MG/1
100 TABLET, EXTENDED RELEASE ORAL DAILY
Qty: 90 TABLET | Refills: 1
Start: 2023-05-04

## 2023-05-04 RX ORDER — AMLODIPINE BESYLATE 10 MG/1
10 TABLET ORAL DAILY
Qty: 90 TABLET | Refills: 1 | Status: SHIPPED | OUTPATIENT
Start: 2023-05-04 | End: 2023-05-04 | Stop reason: SDUPTHER

## 2023-05-04 RX ORDER — HYDROCHLOROTHIAZIDE 12.5 MG/1
12.5 CAPSULE, GELATIN COATED ORAL EVERY MORNING
Qty: 90 CAPSULE | Refills: 1
Start: 2023-05-04

## 2023-05-04 NOTE — PATIENT INSTRUCTIONS
You have chronic kidney disease (CKD) stage III and your kidney function is stable  Please increase Amlodipine to 10 mg daily  In June 2023, check your BP at home twice a day for 1 week and send us the readings  Your goal BP < 130/80  Increase your Vitamin D to 2000 units daily  Follow up in 4 months - please obtain blood work 1-2 weeks prior to the appointment  Please avoid taking NSAIDs (Ibuprofen, Motrin, Aleve, Advil, Naproxen, Celebrex, etc )  Make sure you are eating healthy and have adequate exercise

## 2023-05-04 NOTE — PROGRESS NOTES
NEPHROLOGY OUTPATIENT CONSULTATION   Carmen Puri 65 y o  female MRN: [id number]  Date: 05/04/23  Reason for consultation: Initial evaluation of CKD    ASSESSMENT and PLAN:  1  Chronic kidney disease, stage IIIB  · Baseline creatinine is around 1 1 - 1 4   · Etiology of CKD is suspected to be hypertensive nephrosclerosis complicated by previous NSAID use  · Renal function is stable  Creatinine 1 25 in March 2023  · A renal US has already been ordered by urology  · Her UA has proteinuria and no hematuria  · Will check a UPC ratio for urine protein quantification  · For treatment, she needs better BP control  · She is already avoiding NSAIDs  2  Hypertension:  · BP is above goal (<130/80)  · Current medications: Metoprolol succinate 100 mg daily, losartan 50 mg daily, hydrochlorothiazide 12 5 mg daily, amlodipine 5 mg daily  · Increase amlodipine to 10 mg daily  · I advised her to check her BP at home twice a day for 1 week and send in readings in June 2023     3  Mineral bone disease:  · Vitamin D level is low  15 8 in Jan 2023  · Increase vitamin D to 2000 units daily  · Check PTH and phosphorus  4  R renal cyst  · Follow up with urology  Patient Instructions   You have chronic kidney disease (CKD) stage III and your kidney function is stable  Please increase Amlodipine to 10 mg daily  In June 2023, check your BP at home twice a day for 1 week and send us the readings  Your goal BP < 130/80  Increase your Vitamin D to 2000 units daily  Follow up in 4 months - please obtain blood work 1-2 weeks prior to the appointment  Please avoid taking NSAIDs (Ibuprofen, Motrin, Aleve, Advil, Naproxen, Celebrex, etc )  Make sure you are eating healthy and have adequate exercise  HISTORY OF PRESENT ILLNESS:  Requesting Physician: Shell Desai MD    Carmen Puri is a 65 y o  female who was referred for initial evaluation of chronic kidney disease    She is accompanied by her daughter during today's visit  Sabina Reece has a history of hypertension, prediabetes, hyperlipidemia, right lung adenocarcinoma s/p resection, COPD, degenerative joint disease s/p bilateral hip replacement, obesity, skin cancer, thyroid nodules  She reports being aware of abnormal eGFR readings from her PCP  Based on my review of the records, I note that her creatinine has ranged between 1 07 and 1 40 based on labs from February 2019 to March 2023  Prior to that, her creatinine ranged between 0 91 and 1 34 from 2015 to 2018  Her most recent labs are from March 3, 2023 which revealed a creatinine of 1 25  During a visit in January 2023 with her PCP, Dr Stacy Loaiza, she was noted to have diarrhea  A CT scan was ordered then which showed a 1 3 cm right renal nodule  She was referred to urology and was recently seen by Dr Cj Parekh noted the presence of proteinuria on the UA and she was sent to us for further evaluation  No proteinuria quantification has been done  On further questioning, it appears that Sabina Reece and his daughter have been told of abnormal renal function in the past and a nephrology referral had been discussed by Dr Stacy Loaiza in the past      She has hypertension and is on multiple medications  She does not check her BP at home  From what I gather, her BP at doctors offices has been above goal      She used to use NSAIDs heavily - daily for years  She stopped about 2 years ago  PAST MEDICAL HISTORY:  1  HTN: Diagnosed over 20 years ago  No admissions for HTN urgency  Highest SBP recalled is > 180 mm Hg  2  PreDM  3  HLP: she takes atorvastatin  4  R sided lung adenocarcinoma stage 1A s/p superior segmentectomy in Sep 2015: Follows with Dr Henry Garduno for surveillance  5  COPD:   6  DJD s/p bilateral hip replacement  7  Obesity  8  Skin cancer s/p excision  9  Colonic polyps: last C-scope March 2023  Follows with Dr Connie Ware     10  Thyroid nodules    No known history of CAD, CHF, CVA, PAD, REZA, thromboembolism, liver disease, GERD, nephrolithiasis, psychiatric disease  PAST SURGICAL HISTORY:  1  R lung superior segmentectomy   2  Bilateral hip replacement  3  Cholecystectomy  4  Cataract surgery  ALLERGIES:  Allergies   Allergen Reactions    Other Other (See Comments)     seasonal     SOCIAL HISTORY:  · Former smoker consuming 1 PPD since age 15  Stopped smoking in 2014  · Rare alcohol use  · No IVDA  FAMILY HISTORY:  · Negative for ESRD       MEDICATIONS:    Current Outpatient Medications:     acetaminophen (TYLENOL) 500 mg tablet, Take 500 mg by mouth every 6 (six) hours as needed for mild pain, Disp: , Rfl:     albuterol (PROVENTIL HFA,VENTOLIN HFA) 90 mcg/act inhaler, Inhale 2 puffs every 6 (six) hours as needed for wheezing or shortness of breath, Disp: 18 g, Rfl: 1    amLODIPine (NORVASC) 5 mg tablet, TAKE 1 TABLET DAILY (Patient taking differently: 5 mg daily at bedtime), Disp: 90 tablet, Rfl: 1    amoxicillin (AMOXIL) 500 mg capsule, TAKE FOUR CAPSULES BY MOUTH ONE HOUR BEFORE APPOINTMENT, Disp: , Rfl:     aspirin (ECOTRIN LOW STRENGTH) 81 mg EC tablet, Take 1 tablet (81 mg total) by mouth 2 (two) times a day (Patient taking differently: Take 81 mg by mouth daily with dinner), Disp: 60 tablet, Rfl: 0    atorvastatin (LIPITOR) 10 mg tablet, TAKE 1 TABLET DAILY AT BEDTIME, Disp: 90 tablet, Rfl: 3    gabapentin (NEURONTIN) 300 mg capsule, Take 1 capsule (300 mg total) by mouth daily (Patient taking differently: Take 300 mg by mouth daily at bedtime), Disp: 90 capsule, Rfl: 1    hydrochlorothiazide (MICROZIDE) 12 5 mg capsule, Take 1 capsule (12 5 mg total) by mouth daily (Patient taking differently: Take 12 5 mg by mouth every morning), Disp: 90 capsule, Rfl: 3    losartan (COZAAR) 50 mg tablet, TAKE 1 TABLET DAILY (Patient taking differently: 50 mg every morning), Disp: 90 tablet, Rfl: 1    Melatonin 5 MG TABS, Take 10 mg by mouth daily at bedtime As needed, Disp: ", Rfl:     metoprolol succinate (TOPROL-XL) 100 mg 24 hr tablet, TAKE 1 TABLET DAILY, Disp: 90 tablet, Rfl: 1    Wheat Dextrin (BENEFIBER PO), Take by mouth 2 (two) times a day, Disp: , Rfl:     Denta 5000 Plus 1 1 % CREA, , Disp: , Rfl:     Polyethylene Glycol 3350 (MIRALAX PO), Take by mouth 1 (one) time 238 gm with dulcolax (Patient not taking: Reported on 5/4/2023), Disp: , Rfl:     Polyethylene Glycol 400 0 25 % SOLN, Apply 1 drop to eye 2 (two) times a day As needed (Patient not taking: Reported on 5/4/2023), Disp: , Rfl:     REVIEW OF SYSTEMS:  Review of Systems   Constitutional: Positive for fatigue  Negative for appetite change, chills and fever  HENT: Positive for postnasal drip and rhinorrhea  Negative for hearing loss, sinus pressure and sinus pain  Eyes: Negative for visual disturbance  Respiratory: Negative for cough and shortness of breath  Cardiovascular: Positive for leg swelling (on and off  )  Negative for chest pain  Gastrointestinal: Positive for diarrhea  Negative for abdominal pain, nausea and vomiting  Genitourinary: Positive for hematuria (microscopic  No gross hematuria  )  Negative for difficulty urinating and dysuria  Musculoskeletal: Positive for arthralgias and back pain  Allergic/Immunologic: Negative for immunocompromised state  Neurological: Positive for light-headedness  Negative for dizziness  Hematological: Bruises/bleeds easily  Psychiatric/Behavioral: Positive for dysphoric mood  The patient is nervous/anxious  All the systems were reviewed and were negative except as documented on the HPI  PHYSICAL EXAMINATION:  /84 (BP Location: Left arm, Patient Position: Sitting, Cuff Size: Standard)   Pulse 72   Ht 5' 7\" (1 702 m)   Wt 80 7 kg (178 lb)   BMI 27 88 kg/m²   Current Weight: Weight - Scale: 80 7 kg (178 lb) Body mass index is 27 88 kg/m²    General: conscious, cooperative, no distress  Skin: dry  Eyes: pink conjunctivae  ENT: moist " mucous membranes  Respiratory: equal chest expansion, clear breath sounds  Cardiovascular: distinct heart sounds, normal rate, regular rhythm, no rub  Abdomen: soft, non tender, non distended, normal bowel sounds  Extremities: no edema  Genitourinary: no morrison catheter  Neuro: awake, alert  Psych: appropriate affect    LABORATORY RESULTS:  Lab Results   Component Value Date    WBC 11 04 (H) 2023    HGB 14 2 2023    HCT 43 4 2023    MCV 90 2023     2023     Lab Results   Component Value Date    SODIUM 137 2023    K 3 8 2023     2023    CO2 29 2023    BUN 22 2023    CREATININE 1 25 2023    GLUC 117 2023    CALCIUM 9 8 2023     IMAGIN23 CT abdomen -incidental 1 3 cm exophytic nodule on the right kidney  No hydronephrosis

## 2023-06-01 DIAGNOSIS — N18.30 BENIGN HYPERTENSION WITH CKD (CHRONIC KIDNEY DISEASE) STAGE III (HCC): ICD-10-CM

## 2023-06-01 DIAGNOSIS — I12.9 BENIGN HYPERTENSION WITH CKD (CHRONIC KIDNEY DISEASE) STAGE III (HCC): ICD-10-CM

## 2023-06-01 RX ORDER — LOSARTAN POTASSIUM 50 MG/1
50 TABLET ORAL DAILY
Qty: 90 TABLET | Refills: 1 | Status: SHIPPED | OUTPATIENT
Start: 2023-06-01

## 2023-06-02 ENCOUNTER — OFFICE VISIT (OUTPATIENT)
Dept: GASTROENTEROLOGY | Facility: CLINIC | Age: 78
End: 2023-06-02

## 2023-06-02 VITALS
BODY MASS INDEX: 26.37 KG/M2 | HEIGHT: 67 IN | SYSTOLIC BLOOD PRESSURE: 151 MMHG | HEART RATE: 61 BPM | WEIGHT: 168 LBS | DIASTOLIC BLOOD PRESSURE: 61 MMHG

## 2023-06-02 DIAGNOSIS — R19.7 DIARRHEA, UNSPECIFIED TYPE: Primary | ICD-10-CM

## 2023-06-02 DIAGNOSIS — D12.6 ADENOMATOUS POLYP OF COLON, UNSPECIFIED PART OF COLON: ICD-10-CM

## 2023-06-02 NOTE — PROGRESS NOTES
[facility] - Outpatient Follow-up Note  Fátima Weaver 65 y o  female MRN: [id number]  Encounter: [id number]          ASSESSMENT AND PLAN:      1  Diarrhea, unspecified type  Marked improvement with avoidance of milk products  Has 1-2 bowel motions in the morning no blood    2  Adenomatous polyp of colon, unspecified part of colon  Colon cancer screening up-to-date  Next colonoscopy March 2026  To otherwise follow-up in a year    ______________________________________________________________________    SUBJECTIVE: Very pleasant 69-year-old female status post colonoscopy was found to have tubular adenomas discussed results  She was having significant diarrhea it is markedly improved avoidance of milk products  She is mostly concerned about her renal function and is followed by nephrologist   She has no other gastrointestinal complaints  REVIEW OF SYSTEMS IS OTHERWISE NEGATIVE        Historical Information   Past Medical History:   Diagnosis Date   • Cancer ([facility] )     lung   • Diarrhea     wt loss from 192lb to 178lb (1/23)   • Emphysema lung ([facility])    • Hemorrhoid     possible hemorrhoid with small amount of bleeding   • Hyperlipidemia    • Hypertension    • Lung cancer ([facility] )    • Numbness and tingling of foot    • Osteoarthritis of right hip    • Primary adenocarcinoma of lower lobe of right lung ([facility] )      Past Surgical History:   Procedure Laterality Date   • CATARACT EXTRACTION Bilateral    • CHOLECYSTECTOMY     • COLONOSCOPY     • JOINT REPLACEMENT Bilateral     hips-   • OR ARTHRP ACETBLR/PROX FEM PROSTC AGRFT/ALGRFT Right 09/14/2018    Procedure: ANTERIOR TOTAL HIP ARTHROPLASTY;  Surgeon: Fran Sandoval MD;  Location: [facility];  Service: Orthopedics   • TOTAL HIP ARTHROPLASTY     • TUMOR REMOVAL Right 2015    lower lobe      Social History   Social History     Substance and Sexual Activity   Alcohol Use Yes    Comment: on occ     Social History     Substance and "Sexual Activity   Drug Use Never     Social History     Tobacco Use   Smoking Status Former   • Packs/day: 1 00   • Years: 50 00   • Total pack years: 50 00   • Types: Cigarettes   • Quit date: 2015   • Years since quittin 7   Smokeless Tobacco Never     Family History   Problem Relation Age of Onset   • Heart disease Mother    • No Known Problems Father        Meds/Allergies       Current Outpatient Medications:   •  acetaminophen (TYLENOL) 500 mg tablet  •  albuterol (PROVENTIL HFA,VENTOLIN HFA) 90 mcg/act inhaler  •  amLODIPine (NORVASC) 10 mg tablet  •  amoxicillin (AMOXIL) 500 mg capsule  •  aspirin (ECOTRIN LOW STRENGTH) 81 mg EC tablet  •  atorvastatin (LIPITOR) 10 mg tablet  •  Denta 5000 Plus 1 1 % CREA  •  gabapentin (NEURONTIN) 300 mg capsule  •  hydrochlorothiazide (MICROZIDE) 12 5 mg capsule  •  losartan (COZAAR) 50 mg tablet  •  metoprolol succinate (TOPROL-XL) 100 mg 24 hr tablet  •  Wheat Dextrin (BENEFIBER PO)  •  Melatonin 5 MG TABS  •  Polyethylene Glycol 400 0 25 % SOLN    Allergies   Allergen Reactions   • Other Other (See Comments)     seasonal           Objective     Blood pressure 151/61, pulse 61, height 5' 7\" (1 702 m), weight 76 2 kg (168 lb)  Body mass index is 26 31 kg/m²  PHYSICAL EXAM:      General Appearance:   Alert, cooperative, no distress   HEENT:   Normocephalic, atraumatic, anicteric      Neck:  Supple, symmetrical, trachea midline   Lungs:   Clear to auscultation bilaterally; no rales, rhonchi or wheezing; respirations unlabored    Heart[de-identified]   Regular rate and rhythm; no murmur, rub, or gallop     Abdomen:   Soft, non-tender, non-distended; normal bowel sounds; no masses, no organomegaly    Genitalia:   Deferred    Rectal:   Deferred    Extremities:  No cyanosis, clubbing or edema    Pulses:  2+ and symmetric    Skin:  No jaundice, rashes, or lesions    Lymph nodes:  No palpable cervical lymphadenopathy        Lab Results:   No visits with results within 1 Day(s) from " this visit  Latest known visit with results is:   Hospital Outpatient Visit on 03/20/2023   Component Date Value   • Case Report 03/20/2023                      Value:Surgical Pathology Report                         Case: [id number]                                   Authorizing Provider:  Coni Winchester MD    Collected:           03/20/2023 1157              Ordering Location:     [facility]   Received:            03/20/2023 [address]                                                                       Pathologist:           Homar Vang MD                                                                 Specimens:   A) - Large Intestine, Sigmoid Colon, sigmoid polyps x5- cold snare                                  B) - Rectum, rectal polyps x4 cold snare                                                  • Addendum 03/20/2023                      Value: This result contains rich text formatting which cannot be displayed here  • Final Diagnosis 03/20/2023                      Value: This result contains rich text formatting which cannot be displayed here  • Note 03/20/2023                      Value: This result contains rich text formatting which cannot be displayed here  • Additional Information 03/20/2023                      Value: This result contains rich text formatting which cannot be displayed here  • Gross Description 03/20/2023                      Value: This result contains rich text formatting which cannot be displayed here  Radiology Results:   No results found

## 2023-06-05 ENCOUNTER — LAB (OUTPATIENT)
Dept: LAB | Facility: CLINIC | Age: 78
End: 2023-06-05
Payer: MEDICARE

## 2023-06-05 DIAGNOSIS — I10 ESSENTIAL HYPERTENSION: ICD-10-CM

## 2023-06-05 DIAGNOSIS — G25.81 RESTLESS LEG SYNDROME: ICD-10-CM

## 2023-06-05 DIAGNOSIS — E78.5 DYSLIPIDEMIA: ICD-10-CM

## 2023-06-05 DIAGNOSIS — N18.32 STAGE 3B CHRONIC KIDNEY DISEASE (HCC): ICD-10-CM

## 2023-06-05 DIAGNOSIS — R60.0 EDEMA OF BOTH LEGS: ICD-10-CM

## 2023-06-05 DIAGNOSIS — R19.7 DIARRHEA, UNSPECIFIED TYPE: ICD-10-CM

## 2023-06-05 LAB
ALBUMIN SERPL BCP-MCNC: 3.9 G/DL (ref 3.5–5)
ALP SERPL-CCNC: 72 U/L (ref 46–116)
ALT SERPL W P-5'-P-CCNC: 42 U/L (ref 12–78)
ANION GAP SERPL CALCULATED.3IONS-SCNC: 2 MMOL/L (ref 4–13)
AST SERPL W P-5'-P-CCNC: 49 U/L (ref 5–45)
BILIRUB SERPL-MCNC: 0.71 MG/DL (ref 0.2–1)
BUN SERPL-MCNC: 30 MG/DL (ref 5–25)
CALCIUM SERPL-MCNC: 9.7 MG/DL (ref 8.3–10.1)
CHLORIDE SERPL-SCNC: 103 MMOL/L (ref 96–108)
CHOLEST SERPL-MCNC: 114 MG/DL
CO2 SERPL-SCNC: 31 MMOL/L (ref 21–32)
CREAT SERPL-MCNC: 1.45 MG/DL (ref 0.6–1.3)
GFR SERPL CREATININE-BSD FRML MDRD: 34 ML/MIN/1.73SQ M
GLUCOSE P FAST SERPL-MCNC: 115 MG/DL (ref 65–99)
HDLC SERPL-MCNC: 60 MG/DL
LDLC SERPL CALC-MCNC: 36 MG/DL (ref 0–100)
NONHDLC SERPL-MCNC: 54 MG/DL
POTASSIUM SERPL-SCNC: 3.8 MMOL/L (ref 3.5–5.3)
PROT SERPL-MCNC: 7.5 G/DL (ref 6.4–8.4)
SODIUM SERPL-SCNC: 136 MMOL/L (ref 135–147)
TRIGL SERPL-MCNC: 91 MG/DL

## 2023-06-05 PROCEDURE — 80053 COMPREHEN METABOLIC PANEL: CPT

## 2023-06-05 PROCEDURE — 36415 COLL VENOUS BLD VENIPUNCTURE: CPT

## 2023-06-09 ENCOUNTER — OFFICE VISIT (OUTPATIENT)
Dept: INTERNAL MEDICINE CLINIC | Facility: CLINIC | Age: 78
End: 2023-06-09
Payer: MEDICARE

## 2023-06-09 ENCOUNTER — APPOINTMENT (OUTPATIENT)
Dept: LAB | Facility: CLINIC | Age: 78
End: 2023-06-09
Payer: MEDICARE

## 2023-06-09 VITALS
WEIGHT: 166.6 LBS | BODY MASS INDEX: 26.15 KG/M2 | HEIGHT: 67 IN | HEART RATE: 56 BPM | DIASTOLIC BLOOD PRESSURE: 60 MMHG | OXYGEN SATURATION: 100 % | SYSTOLIC BLOOD PRESSURE: 110 MMHG

## 2023-06-09 DIAGNOSIS — R74.01 ELEVATED AST (SGOT): ICD-10-CM

## 2023-06-09 DIAGNOSIS — Z12.31 BREAST CANCER SCREENING BY MAMMOGRAM: ICD-10-CM

## 2023-06-09 DIAGNOSIS — R19.7 DIARRHEA, UNSPECIFIED TYPE: ICD-10-CM

## 2023-06-09 DIAGNOSIS — I12.9 BENIGN HYPERTENSION WITH CKD (CHRONIC KIDNEY DISEASE) STAGE III (HCC): ICD-10-CM

## 2023-06-09 DIAGNOSIS — F41.8 MIXED ANXIETY AND DEPRESSIVE DISORDER: ICD-10-CM

## 2023-06-09 DIAGNOSIS — R60.0 EDEMA OF BOTH LEGS: ICD-10-CM

## 2023-06-09 DIAGNOSIS — N28.1 KIDNEY CYST, ACQUIRED: ICD-10-CM

## 2023-06-09 DIAGNOSIS — Z13.1 SCREENING FOR DIABETES MELLITUS: ICD-10-CM

## 2023-06-09 DIAGNOSIS — E04.1 THYROID NODULE: ICD-10-CM

## 2023-06-09 DIAGNOSIS — N18.32 STAGE 3B CHRONIC KIDNEY DISEASE (HCC): ICD-10-CM

## 2023-06-09 DIAGNOSIS — E66.09 CLASS 1 OBESITY DUE TO EXCESS CALORIES WITHOUT SERIOUS COMORBIDITY WITH BODY MASS INDEX (BMI) OF 30.0 TO 30.9 IN ADULT: ICD-10-CM

## 2023-06-09 DIAGNOSIS — F41.9 ANXIETY: ICD-10-CM

## 2023-06-09 DIAGNOSIS — E74.39 GLUCOSE INTOLERANCE: ICD-10-CM

## 2023-06-09 DIAGNOSIS — R73.03 PREDIABETES: ICD-10-CM

## 2023-06-09 DIAGNOSIS — K63.5 POLYP OF COLON, UNSPECIFIED PART OF COLON, UNSPECIFIED TYPE: ICD-10-CM

## 2023-06-09 DIAGNOSIS — R19.7 DIARRHEA, UNSPECIFIED TYPE: Primary | ICD-10-CM

## 2023-06-09 DIAGNOSIS — Z12.31 SCREENING MAMMOGRAM, ENCOUNTER FOR: ICD-10-CM

## 2023-06-09 DIAGNOSIS — E55.9 VITAMIN D DEFICIENCY: ICD-10-CM

## 2023-06-09 DIAGNOSIS — R63.4 WEIGHT LOSS: ICD-10-CM

## 2023-06-09 DIAGNOSIS — C34.91 ADENOCARCINOMA OF RIGHT LUNG (HCC): ICD-10-CM

## 2023-06-09 DIAGNOSIS — K57.90 DIVERTICULOSIS: ICD-10-CM

## 2023-06-09 DIAGNOSIS — D72.829 LEUKOCYTOSIS, UNSPECIFIED TYPE: ICD-10-CM

## 2023-06-09 DIAGNOSIS — J43.9 PULMONARY EMPHYSEMA, UNSPECIFIED EMPHYSEMA TYPE (HCC): ICD-10-CM

## 2023-06-09 DIAGNOSIS — N18.30 BENIGN HYPERTENSION WITH CKD (CHRONIC KIDNEY DISEASE) STAGE III (HCC): ICD-10-CM

## 2023-06-09 LAB
ALBUMIN SERPL BCP-MCNC: 3.9 G/DL (ref 3.5–5)
ALP SERPL-CCNC: 75 U/L (ref 46–116)
ALT SERPL W P-5'-P-CCNC: 39 U/L (ref 12–78)
ANION GAP SERPL CALCULATED.3IONS-SCNC: 5 MMOL/L (ref 4–13)
AST SERPL W P-5'-P-CCNC: 33 U/L (ref 5–45)
BILIRUB SERPL-MCNC: 0.6 MG/DL (ref 0.2–1)
BUN SERPL-MCNC: 36 MG/DL (ref 5–25)
CALCIUM SERPL-MCNC: 9.8 MG/DL (ref 8.3–10.1)
CHLORIDE SERPL-SCNC: 104 MMOL/L (ref 96–108)
CO2 SERPL-SCNC: 29 MMOL/L (ref 21–32)
CREAT SERPL-MCNC: 1.56 MG/DL (ref 0.6–1.3)
ERYTHROCYTE [DISTWIDTH] IN BLOOD BY AUTOMATED COUNT: 13.2 % (ref 11.6–15.1)
EST. AVERAGE GLUCOSE BLD GHB EST-MCNC: 131 MG/DL
GFR SERPL CREATININE-BSD FRML MDRD: 31 ML/MIN/1.73SQ M
GLUCOSE SERPL-MCNC: 113 MG/DL (ref 65–140)
HBA1C MFR BLD: 6.2 %
HCT VFR BLD AUTO: 41.4 % (ref 34.8–46.1)
HGB BLD-MCNC: 14 G/DL (ref 11.5–15.4)
MCH RBC QN AUTO: 30.6 PG (ref 26.8–34.3)
MCHC RBC AUTO-ENTMCNC: 33.8 G/DL (ref 31.4–37.4)
MCV RBC AUTO: 91 FL (ref 82–98)
PLATELET # BLD AUTO: 274 THOUSANDS/UL (ref 149–390)
PMV BLD AUTO: 9.8 FL (ref 8.9–12.7)
POTASSIUM SERPL-SCNC: 3.6 MMOL/L (ref 3.5–5.3)
PROT SERPL-MCNC: 7.5 G/DL (ref 6.4–8.4)
RBC # BLD AUTO: 4.57 MILLION/UL (ref 3.81–5.12)
SODIUM SERPL-SCNC: 138 MMOL/L (ref 135–147)
T4 FREE SERPL-MCNC: 1.03 NG/DL (ref 0.61–1.12)
TSH SERPL DL<=0.05 MIU/L-ACNC: 1.37 UIU/ML (ref 0.45–4.5)
WBC # BLD AUTO: 10.32 THOUSAND/UL (ref 4.31–10.16)

## 2023-06-09 PROCEDURE — 99214 OFFICE O/P EST MOD 30 MIN: CPT | Performed by: INTERNAL MEDICINE

## 2023-06-09 PROCEDURE — 83036 HEMOGLOBIN GLYCOSYLATED A1C: CPT

## 2023-06-09 PROCEDURE — 36415 COLL VENOUS BLD VENIPUNCTURE: CPT

## 2023-06-09 PROCEDURE — 84443 ASSAY THYROID STIM HORMONE: CPT

## 2023-06-09 PROCEDURE — 84439 ASSAY OF FREE THYROXINE: CPT

## 2023-06-09 PROCEDURE — 80053 COMPREHEN METABOLIC PANEL: CPT

## 2023-06-09 PROCEDURE — 85027 COMPLETE CBC AUTOMATED: CPT

## 2023-06-09 NOTE — ASSESSMENT & PLAN NOTE
Lab Results   Component Value Date    CREATININE 1 45 (H) 06/05/2023    CREATININE 1 25 03/03/2023    CREATININE 1 38 (H) 01/17/2023    EGFR 34 06/05/2023    EGFR 41 03/03/2023    EGFR 36 01/17/2023   Encouraged to check blood pressure at home  Remains on amlodipine 10 mg daily, hydrochlorothiazide 12 5 mg every day, losartan 50 mg daily and metoprolol succinate 100 mg daily  We will check CMP    Blood pressure is reasonable slightly on the tighter side  Discussed with the patient's that if the systolic blood pressure is less than 100 patient should not take hydrochlorothiazide/HydroDIURIL or and losartan that day    This may cause decreased perfusion in the kidney and may make kidney function worse

## 2023-06-09 NOTE — ASSESSMENT & PLAN NOTE
3/23//2023: Colonoscopy:9 colon polyps removed, some acute angulation in the left side of the colon, diverticulosis and mixed hemorrhoids     RECOMMENDATION:    Repeat colonoscopy in 3 years     • Personal history of colon polyps        High-fiber diet recommended         10/19/2010:  Colonoscopy:  5 polyps removed

## 2023-06-09 NOTE — ASSESSMENT & PLAN NOTE
3/23//2023: Colonoscopy:9 colon polyps removed, some acute angulation in the left side of the colon, diverticulosis and mixed hemorrhoids     RECOMMENDATION:    Repeat colonoscopy in 3 years     • Personal history of colon polyps        High-fiber diet recommended         10/19/2010:  Colonoscopy:  5 polyps removed    Diarrhea improved  Stool studies were negative in January 2023  Patient is thought to have milk intolerance  She has made significant changes and not drinking milk  Her bowel movement is now 1-2 soft to formed and much better  We will continue same regimen    Also continue high-fiber diet colonoscopy reviewed Will be due for colonoscopy in 2026

## 2023-06-09 NOTE — ASSESSMENT & PLAN NOTE
Lab Results   Component Value Date    HCT 43 4 03/03/2023    HGB 14 2 03/03/2023    MCV 90 03/03/2023     03/03/2023    WBC 11 04 (H) 03/03/2023

## 2023-06-09 NOTE — PROGRESS NOTES
Name: Salima Austin      : 1945      MRN: [id number]  Encounter Provider: Colton Salomon MD  Encounter Date: 2023   Encounter department: [address]     1  Diarrhea, unspecified type  Assessment & Plan:  3/23//2023: Colonoscopy:9 colon polyps removed, some acute angulation in the left side of the colon, diverticulosis and mixed hemorrhoids     RECOMMENDATION:    Repeat colonoscopy in 3 years     Personal history of colon polyps        High-fiber diet recommended         10/19/2010:  Colonoscopy:  5 polyps removed    Diarrhea improved  Stool studies were negative in 2023  Patient is thought to have milk intolerance  She has made significant changes and not drinking milk  Her bowel movement is now 1-2 soft to formed and much better  We will continue same regimen  Also continue high-fiber diet colonoscopy reviewed Will be due for colonoscopy in     Orders:  -     CT chest abdomen pelvis w contrast; Future; Expected date: 2023  -     TSH, 3rd generation; Future; Expected date: 2023  -     T4, free; Future; Expected date: 2023  -     Hemoglobin A1C; Future; Expected date: 2023    2  Polyp of colon, unspecified part of colon, unspecified type  Assessment & Plan:  3/23//2023: Colonoscopy:9 colon polyps removed, some acute angulation in the left side of the colon, diverticulosis and mixed hemorrhoids     RECOMMENDATION:    Repeat colonoscopy in 3 years     Personal history of colon polyps        High-fiber diet recommended         10/19/2010:  Colonoscopy:  5 polyps removed      3   Diverticulosis  Assessment & Plan:  3/23//2023: Colonoscopy:9 colon polyps removed, some acute angulation in the left side of the colon, diverticulosis and mixed hemorrhoids     RECOMMENDATION:    Repeat colonoscopy in 3 years     Personal history of colon polyps        High-fiber diet recommended         10/19/2010:  Colonoscopy: 5 polyps removed      Continue high-fiber diet        4  Thyroid nodule  -     TSH, 3rd generation; Future; Expected date: 06/09/2023  -     T4, free; Future; Expected date: 06/09/2023  -     US thyroid; Future; Expected date: 06/23/2023    5  Adenocarcinoma of right lung [facility])  Assessment & Plan:  No pulmonary symptoms no cough chest congestion shortness of breath or hemoptysis  Last CT scan of the chest last year done  Recent weight loss noted  Pulmonary status appears stable but we will proceed with a CT scan of the chest abdomen and pelvis      6  Stage 3b chronic kidney disease [facility])  Assessment & Plan:  Lab Results   Component Value Date    CREATININE 1 45 (H) 06/05/2023    CREATININE 1 25 03/03/2023    CREATININE 1 38 (H) 01/17/2023    EGFR 34 06/05/2023    EGFR 41 03/03/2023    EGFR 36 01/17/2023   GFR is baseline  Creat is baseline  Electrolytes stable  Recommend periodic blood test for monitoring of  Renal Function, lytes, GFR and urine for MA/Creat  Avoid Non Steroidal Antiinflammatory such as ibuprofen, aleve etc     Bone and Mineral disease monitoring as appropriate  All patient with GFR less than 30( CKD 4 or 5 or 6) are advised to follow with nephrologist     Discussed with patient and patient's daughter regarding GFR 3B  Fluctuation  Potassium has been reasonable  Albumin has been reasonable  1+ protein in the urine analysis noted  Patient seen by nephrologist   Patient will be seeing nephrology for the kidney smart educator  Patient over aggressively has been monitoring sugar and protein as well as low sodium and potassium intake  Weight loss appears to be voluntary  Recommend to be reasonable and it also may consider seeing a dietitian    Orders:  -     Comprehensive metabolic panel; Future; Expected date: 06/09/2023  -     CBC; Future; Expected date: 06/09/2023    7   Elevated AST (SGOT)  Assessment & Plan:  Elevated AST with rest of the LFT normal   Different sources of AST reviewed  No obvious anemia or hemolysis  No obvious bony issues  Rest liver function is stable  Will recommend follow-up LFT on September 1 with all other blood    Orders:  -     CT chest abdomen pelvis w contrast; Future; Expected date: 06/16/2023  -     Comprehensive metabolic panel; Future; Expected date: 06/09/2023    8  Leukocytosis, unspecified type  Assessment & Plan:  Lab Results   Component Value Date    HCT 43 4 03/03/2023    HGB 14 2 03/03/2023    MCV 90 03/03/2023     03/03/2023    WBC 11 04 (H) 03/03/2023           9  Class 1 obesity due to excess calories without serious comorbidity with body mass index (BMI) of 30 0 to 30 9 in adult  Assessment & Plan:  Weight reviewed  The used to be in the range of 119 last year or since January he has been more aggressive in diet as well as had some diarrhea  Weight is appears to be voluntary weight loss  Nothing to suggest malignancy  Recommend to be modest and it right continue to monitor weight weekly  If continues to have weight loss may require repeat CT scan of chest abdomen and pelvis  Recent CT scan of the chest were unremarkable    1/17/2023: CT scan of the abdomen and pelvis:Colonic diverticulosis without evidence of diverticulitis  Incidental 1 3 cm exophytic nodule from the right kidney  Nonemergent follow-up renal ultrasound recommended  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  10/12/2022: CT scan of the chest:   Nothing to indicate recurrent tumor  We talked about doing calorie count  Daily weight  See dietitian start increase his food intake  We talked about doing CT scan of chest abdomen pelvis they are willing to go we will order 1          10  Pulmonary emphysema, unspecified emphysema type [facility])  Assessment & Plan:  Breathing is fair  Uses inhaler as needed  Generally fair functional functional capacity      11   Benign hypertension with CKD (chronic kidney disease) stage III [facility])  Assessment & Plan:  Lab Results   Component Value Date    CREATININE 1 45 (H) 06/05/2023    CREATININE 1 25 03/03/2023    CREATININE 1 38 (H) 01/17/2023    EGFR 34 06/05/2023    EGFR 41 03/03/2023    EGFR 36 01/17/2023   Encouraged to check blood pressure at home  Remains on amlodipine 10 mg daily, hydrochlorothiazide 12 5 mg every day, losartan 50 mg daily and metoprolol succinate 100 mg daily  We will check CMP    Blood pressure is reasonable slightly on the tighter side  Discussed with the patient's that if the systolic blood pressure is less than 100 patient should not take hydrochlorothiazide/HydroDIURIL or and losartan that day  This may cause decreased perfusion in the kidney and may make kidney function worse      12  Kidney cyst, acquired  Assessment & Plan:  Never seen a kidney cyst going for ultrasound of kidney  13  Anxiety    14  Mixed anxiety and depressive disorder  Assessment & Plan:  Anxiety and depression are fair  She was swearing a lot about kidney and was being overaggressive patient reassured  Will continue to monitor  Generally fair      15  Edema of both legs    16  Vitamin D deficiency    17  Breast cancer screening by mammogram  Assessment & Plan: We talked about doing mammogram   She is willing to go we will order      18  Weight loss  Assessment & Plan:  Weight loss noted  It appears that patient has been overaggressive in the diet and has been worried about kidney also has been watching sodium potassium calorie count cholesterol also prior to that it was complicated by diarrhea  Differential diagnosis discussed  We will check CBC CMP TSH hemoglobin A1c  Recommend to see dietitian eat enough do not be overaggressive  Given history of C of the lung and weight weight loss will do CT scan of chest abdomen and pelvis without contrast     Orders:  -     CT chest abdomen pelvis w contrast; Future; Expected date: 06/16/2023  -     TSH, 3rd generation;  Future; Expected date: 06/09/2023  -     T4, free; Future; Expected date: 06/09/2023  -     Comprehensive metabolic panel; Future; Expected date: 06/09/2023  -     CBC; Future; Expected date: 06/09/2023  -     US thyroid; Future; Expected date: 06/23/2023    19  Screening mammogram, encounter for  -     Mammo screening bilateral w 3d & cad; Future; Expected date: 06/23/2023    20  Screening for diabetes mellitus  -     Hemoglobin A1C; Future; Expected date: 06/09/2023    21  Glucose intolerance  Assessment & Plan:  Check hemoglobin A1c    Orders:  -     Hemoglobin A1C; Future; Expected date: 06/09/2023    22  Prediabetes  Assessment & Plan:  Blood sugar 100 1017  Will check hemoglobin A1c    Orders:  -     Hemoglobin A1C; Future; Expected date: 06/09/2023           Subjective             Here for follow-up  Patient's diarrhea is better colonoscopy reviewed felt to be secondary to milk intolerance also overaggressive diet were reviewed now  Patient also has a chronic kidney disease 3B  Patient is overly concerned about potassium protein milk lost weight weight down to 166 probably 30 pound weight loss differential diagnosis discussed they would like to pursue further weight loss work-up though yield will be low  Or    Also had thyroid nodule patient never went for the further work-up  Also has a CT of the lung last CT scan of the chest abdomen pelvis were unremarkable we will reevaluate following work-up  Again    Also blood sugar borderline check a Melamin A1c check thyroid function    COPD fair, hypertensive discomfort, no symptoms related to reoccurrence of adenocarcinoma of lung, AAA AST slightly high, neuropathy fair, CKD level 3B fair,        1/18/2023: Stool negative for Salmonella, Shigella, Campylobacter, C  difficile, leukocytes, as well as negative for fecal to Hemoccult immunological  1/17/2023: WBC 10 52 rest of the CBC normal status of a chronic top normal WBC 4 times in last 1 year BUN 26 creatinine 1 38 rest of the CMP normal vitamin D15 8, sed rate 26  3/3/2023: WBC 11 4 rest of the CBC normal CMP normal with creatinine 1 25  Lab on 06/05/2023  Sodium                    Value: 136(mmol/L)        Dt: 06/05/2023  Potassium                 Value: 3 8(mmol/L)        Dt: 06/05/2023  Chloride                  Value: 103(mmol/L)        Dt: 06/05/2023  CO2                       Value: 31(mmol/L)         Dt: 06/05/2023  ANION GAP                 Value: 2(mmol/L) (L)      Dt: 06/05/2023  BUN                       Value: 30(mg/dL) (H)      Dt: 06/05/2023  Creatinine                Value: 1 45(mg/dL) (H)    Dt: 06/05/2023  Glucose, Fasting          Value: 115(mg/dL) (H)     Dt: 06/05/2023  Calcium                   Value: 9 7(mg/dL)         Dt: 06/05/2023  AST                       Value: 49(U/L) (H)        Dt: 06/05/2023  ALT                       Value: 42(U/L)            Dt: 06/05/2023  Alkaline Phosphatase      Value: 72(U/L)            Dt: 06/05/2023  Total Protein             Value: 7 5(g/dL)          Dt: 06/05/2023  Albumin                   Value: 3 9(g/dL)          Dt: 06/05/2023  Total Bilirubin           Value: 0 71(mg/dL)        Dt: 06/05/2023  eGFR                      Value: 34(ml/min/1 73sq m) Dt: 06/05/2023  ------------ - 2 weeks  No results found  Review of Systems   Constitutional: Negative for chills, fatigue and fever  HENT: Negative for ear pain, sore throat and trouble swallowing  Eyes: Negative for pain and visual disturbance  Respiratory: Negative for cough and shortness of breath  Cardiovascular: Negative for chest pain and palpitations  Gastrointestinal: Negative for abdominal pain, blood in stool, constipation, diarrhea, nausea, rectal pain and vomiting  Endocrine: Negative for polydipsia  Genitourinary: Negative for dysuria and hematuria  Musculoskeletal: Negative for arthralgias and back pain  Skin: Negative for color change and rash  Neurological: Positive for dizziness (when bending)   Negative for seizures, syncope and headaches  Hematological: Bruises/bleeds easily  Psychiatric/Behavioral: Negative for agitation, confusion, decreased concentration, dysphoric mood, hallucinations, self-injury, sleep disturbance and suicidal ideas  The patient is nervous/anxious  The patient is not hyperactive  All other systems reviewed and are negative  Past Medical History:   Diagnosis Date   • Cancer ([facility] )     lung   • Diarrhea     wt loss from 192lb to 178lb ()   • Emphysema lung ([facility])    • Hemorrhoid     possible hemorrhoid with small amount of bleeding   • Hyperlipidemia    • Hypertension    • Lung cancer ([facility] )    • Numbness and tingling of foot    • Osteoarthritis of right hip    • Primary adenocarcinoma of lower lobe of right lung [facility])      Past Surgical History:   Procedure Laterality Date   • CATARACT EXTRACTION Bilateral    • CHOLECYSTECTOMY     • COLONOSCOPY     • JOINT REPLACEMENT Bilateral     hips-   • ND ARTHRP ACETBLR/PROX FEM PROSTC AGRFT/ALGRFT Right 2018    Procedure: ANTERIOR TOTAL HIP ARTHROPLASTY;  Surgeon: Denisse Ohara MD;  Location: [facility];  Service: Orthopedics   • TOTAL HIP ARTHROPLASTY     • TUMOR REMOVAL Right 2015    lower lobe      Family History   Problem Relation Age of Onset   • Heart disease Mother    • No Known Problems Father      Social History     Socioeconomic History   • Marital status:       Spouse name: None   • Number of children: None   • Years of education: None   • Highest education level: None   Occupational History   • None   Tobacco Use   • Smoking status: Former     Packs/day: 1 00     Years: 50 00     Total pack years: 50 00     Types: Cigarettes     Quit date: 2015     Years since quittin 7   • Smokeless tobacco: Never   Vaping Use   • Vaping Use: Never used   Substance and Sexual Activity   • Alcohol use: Yes     Comment: on occ   • Drug use: Never   • Sexual activity: None   Other Topics Concern   • None   Social History Narrative   • None     Social Determinants of Health     Financial Resource Strain: Low Risk  (12/2/2022)    Overall Financial Resource Strain (CARDIA)    • Difficulty of Paying Living Expenses: Not hard at all   Food Insecurity: Not on file   Transportation Needs: No Transportation Needs (12/2/2022)    PRAPARE - Transportation    • Lack of Transportation (Medical): No    • Lack of Transportation (Non-Medical):  No   Physical Activity: Not on file   Stress: Not on file   Social Connections: Not on file   Intimate Partner Violence: Not on file   Housing Stability: Not on file     Current Outpatient Medications on File Prior to Visit   Medication Sig   • acetaminophen (TYLENOL) 500 mg tablet Take 500 mg by mouth every 6 (six) hours as needed for mild pain   • albuterol (PROVENTIL HFA,VENTOLIN HFA) 90 mcg/act inhaler Inhale 2 puffs every 6 (six) hours as needed for wheezing or shortness of breath   • amLODIPine (NORVASC) 10 mg tablet Take 1 tablet (10 mg total) by mouth daily   • amoxicillin (AMOXIL) 500 mg capsule TAKE FOUR CAPSULES BY MOUTH ONE HOUR BEFORE APPOINTMENT   • aspirin (ECOTRIN LOW STRENGTH) 81 mg EC tablet Take 1 tablet (81 mg total) by mouth 2 (two) times a day (Patient taking differently: Take 81 mg by mouth daily with dinner)   • atorvastatin (LIPITOR) 10 mg tablet TAKE 1 TABLET DAILY AT BEDTIME   • gabapentin (NEURONTIN) 300 mg capsule Take 1 capsule (300 mg total) by mouth daily (Patient taking differently: Take 300 mg by mouth daily at bedtime)   • hydrochlorothiazide (MICROZIDE) 12 5 mg capsule Take 1 capsule (12 5 mg total) by mouth every morning   • losartan (COZAAR) 50 mg tablet Take 1 tablet (50 mg total) by mouth daily   • Melatonin 5 MG TABS Take 10 mg by mouth daily at bedtime As needed   • metoprolol succinate (TOPROL-XL) 100 mg 24 hr tablet Take 1 tablet (100 mg total) by mouth daily   • Polyethylene Glycol 400 0 25 % SOLN Apply 1 drop to eye 2 (two) times a day As needed   • Wheat Dextrin "(BENEFIBER PO) Take by mouth 2 (two) times a day   • [DISCONTINUED] Denta 5000 Plus 1 1 % CREA  (Patient not taking: Reported on 6/9/2023)     Allergies   Allergen Reactions   • Other Other (See Comments)     seasonal     Immunization History   Administered Date(s) Administered   • INFLUENZA 12/02/2022   • Influenza, high dose seasonal 0 7 mL 11/23/2020, 11/24/2021, 12/02/2022   • Influenza, injectable, quadrivalent, preservative free 0 5 mL 10/24/2019       Objective     /60   Pulse 56   Ht 5' 7\" (1 702 m)   Wt 75 6 kg (166 lb 9 6 oz)   SpO2 100%   BMI 26 09 kg/m²     Physical Exam  Vitals and nursing note reviewed  Constitutional:       Appearance: Normal appearance  She is well-developed  She is not ill-appearing  HENT:      Head: Normocephalic and atraumatic  Right Ear: External ear normal       Left Ear: External ear normal       Nose: Nose normal       Mouth/Throat:      Pharynx: Oropharynx is clear  Eyes:      General: Lids are normal          Left eye: No discharge  Conjunctiva/sclera: Conjunctivae normal    Neck:      Thyroid: No thyroid mass or thyromegaly  Vascular: No carotid bruit or JVD  Trachea: Trachea normal    Cardiovascular:      Rate and Rhythm: Normal rate and regular rhythm  Pulses: Normal pulses  Heart sounds: Normal heart sounds  Pulmonary:      Effort: Pulmonary effort is normal  No respiratory distress  Breath sounds: Normal breath sounds  No stridor  No wheezing, rhonchi or rales  Chest:      Chest wall: No tenderness  Abdominal:      General: Bowel sounds are normal  There is no distension  Palpations: There is no mass  Tenderness: There is no abdominal tenderness  There is no guarding or rebound  Hernia: No hernia is present  Musculoskeletal:      Cervical back: Normal range of motion  No rigidity or tenderness  Right lower leg: No edema  Left lower leg: No edema     Lymphadenopathy:      Cervical: No " cervical adenopathy  Skin:     General: Skin is warm  Findings: No bruising, erythema or lesion  Neurological:      General: No focal deficit present  Mental Status: She is alert and oriented to person, place, and time  Coordination: Coordination normal       Gait: Gait normal    Psychiatric:         Attention and Perception: Attention and perception normal          Mood and Affect: Mood is anxious  Mood is not depressed  Affect is not flat           Behavior: Behavior normal          Cognition and Memory: Cognition and memory normal          Judgment: Judgment normal        Carlos Kwok MD

## 2023-06-09 NOTE — ASSESSMENT & PLAN NOTE
Weight loss noted  It appears that patient has been overaggressive in the diet and has been worried about kidney also has been watching sodium potassium calorie count cholesterol also prior to that it was complicated by diarrhea  Differential diagnosis discussed  We will check CBC CMP TSH hemoglobin A1c  Recommend to see dietitian eat enough do not be overaggressive      Given history of C of the lung and weight weight loss will do CT scan of chest abdomen and pelvis without contrast

## 2023-06-09 NOTE — ASSESSMENT & PLAN NOTE
Elevated AST with rest of the LFT normal   Different sources of AST reviewed  No obvious anemia or hemolysis  No obvious bony issues  Rest liver function is stable    Will recommend follow-up LFT on September 1 with all other blood

## 2023-06-09 NOTE — ASSESSMENT & PLAN NOTE
Anxiety and depression are fair  She was swearing a lot about kidney and was being overaggressive patient reassured  Will continue to monitor    Generally fair

## 2023-06-09 NOTE — ASSESSMENT & PLAN NOTE
Lab Results   Component Value Date    LDLCALC 36 06/05/2023     Lab Results   Component Value Date    CHOLESTEROL 114 06/05/2023    CHOLESTEROL 141 02/16/2022    CHOLESTEROL 139 11/18/2021     Lab Results   Component Value Date    HDL 60 06/05/2023    HDL 54 02/16/2022    HDL 56 11/18/2021     Lab Results   Component Value Date    TRIG 91 06/05/2023    TRIG 113 02/16/2022    TRIG 98 11/18/2021     Lab Results   Component Value Date    NONHDLC 54 06/05/2023    [place] 87 02/16/2022    [place] 83 11/18/2021     He on atorvastatin 10 mg daily #to the target      ALT is normal

## 2023-06-09 NOTE — ASSESSMENT & PLAN NOTE
3/23//2023: Colonoscopy:9 colon polyps removed, some acute angulation in the left side of the colon, diverticulosis and mixed hemorrhoids     RECOMMENDATION:    Repeat colonoscopy in 3 years     • Personal history of colon polyps        High-fiber diet recommended         10/19/2010:  Colonoscopy:  5 polyps removed      Continue high-fiber diet

## 2023-06-09 NOTE — ASSESSMENT & PLAN NOTE
Lab Results   Component Value Date    CREATININE 1 45 (H) 06/05/2023    CREATININE 1 25 03/03/2023    CREATININE 1 38 (H) 01/17/2023    EGFR 34 06/05/2023    EGFR 41 03/03/2023    EGFR 36 01/17/2023   Seen by Dr Tez Woodard  For to be seen by kidney smart people  Bone mineral per them  Additional blood test for September 1 ordered      Bone mineral disease is being followed by kidney doctor

## 2023-06-09 NOTE — ASSESSMENT & PLAN NOTE
Lab Results   Component Value Date    CREATININE 1 45 (H) 06/05/2023    CREATININE 1 25 03/03/2023    CREATININE 1 38 (H) 01/17/2023    EGFR 34 06/05/2023    EGFR 41 03/03/2023    EGFR 36 01/17/2023   GFR is baseline  Creat is baseline  Electrolytes stable  Recommend periodic blood test for monitoring of  Renal Function, lytes, GFR and urine for MA/Creat  Avoid Non Steroidal Antiinflammatory such as ibuprofen, aleve etc     Bone and Mineral disease monitoring as appropriate  All patient with GFR less than 30( CKD 4 or 5 or 6) are advised to follow with nephrologist     Discussed with patient and patient's daughter regarding GFR 3B  Fluctuation  Potassium has been reasonable  Albumin has been reasonable  1+ protein in the urine analysis noted  Patient seen by nephrologist   Patient will be seeing nephrology for the kidney smart educator  Patient over aggressively has been monitoring sugar and protein as well as low sodium and potassium intake  Weight loss appears to be voluntary    Recommend to be reasonable and it also may consider seeing a dietitian

## 2023-06-09 NOTE — ASSESSMENT & PLAN NOTE
Lab Results   Component Value Date    LDLCALC 36 06/05/2023     Lab Results   Component Value Date    ALT 42 06/05/2023     Lab Results   Component Value Date    CHOLESTEROL 114 06/05/2023    CHOLESTEROL 141 02/16/2022    CHOLESTEROL 139 11/18/2021     Lab Results   Component Value Date    HDL 60 06/05/2023    HDL 54 02/16/2022    HDL 56 11/18/2021     Lab Results   Component Value Date    TRIG 91 06/05/2023    TRIG 113 02/16/2022    TRIG 98 11/18/2021     Lab Results   Component Value Date    NONHDLC 54 06/05/2023    [place] 87 02/16/2022    [place] 83 11/18/2021   Continue low-cholesterol diet and atorvastatin 10 mg daily

## 2023-06-09 NOTE — ASSESSMENT & PLAN NOTE
No pulmonary symptoms no cough chest congestion shortness of breath or hemoptysis  Last CT scan of the chest last year done  Recent weight loss noted    Pulmonary status appears stable but we will proceed with a CT scan of the chest abdomen and pelvis

## 2023-06-09 NOTE — ASSESSMENT & PLAN NOTE
Weight reviewed  The used to be in the range of 119 last year or since January he has been more aggressive in diet as well as had some diarrhea  Weight is appears to be voluntary weight loss  Nothing to suggest malignancy  Recommend to be modest and it right continue to monitor weight weekly  If continues to have weight loss may require repeat CT scan of chest abdomen and pelvis  Recent CT scan of the chest were unremarkable    1/17/2023: CT scan of the abdomen and pelvis:Colonic diverticulosis without evidence of diverticulitis  Incidental 1 3 cm exophytic nodule from the right kidney  Nonemergent follow-up renal ultrasound recommended  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

## 2023-07-18 ENCOUNTER — HOSPITAL ENCOUNTER (OUTPATIENT)
Dept: RADIOLOGY | Facility: HOSPITAL | Age: 78
Discharge: HOME | End: 2023-07-18
Attending: INTERNAL MEDICINE
Payer: MEDICARE

## 2023-07-18 DIAGNOSIS — R74.01 ELEVATED AST (SGOT): ICD-10-CM

## 2023-07-18 DIAGNOSIS — R63.4 WEIGHT LOSS: ICD-10-CM

## 2023-07-18 DIAGNOSIS — R19.7 DIARRHEA, UNSPECIFIED TYPE: ICD-10-CM

## 2023-07-18 PROCEDURE — 71250 CT THORAX DX C-: CPT

## 2023-07-18 PROCEDURE — G1004 CDSM NDSC: HCPCS

## 2023-07-18 PROCEDURE — 74176 CT ABD & PELVIS W/O CONTRAST: CPT

## 2023-07-28 ENCOUNTER — OFFICE VISIT (OUTPATIENT)
Dept: INTERNAL MEDICINE CLINIC | Facility: CLINIC | Age: 78
End: 2023-07-28
Payer: MEDICARE

## 2023-07-28 VITALS
HEART RATE: 65 BPM | DIASTOLIC BLOOD PRESSURE: 62 MMHG | BODY MASS INDEX: 25.74 KG/M2 | WEIGHT: 164 LBS | SYSTOLIC BLOOD PRESSURE: 122 MMHG | HEIGHT: 67 IN | OXYGEN SATURATION: 97 %

## 2023-07-28 DIAGNOSIS — G62.9 NEUROPATHY: ICD-10-CM

## 2023-07-28 DIAGNOSIS — R19.7 DIARRHEA, UNSPECIFIED TYPE: ICD-10-CM

## 2023-07-28 DIAGNOSIS — N28.89 RENAL MASS: ICD-10-CM

## 2023-07-28 DIAGNOSIS — C34.91 ADENOCARCINOMA OF RIGHT LUNG (HCC): ICD-10-CM

## 2023-07-28 DIAGNOSIS — K57.90 DIVERTICULOSIS: Primary | ICD-10-CM

## 2023-07-28 DIAGNOSIS — R51.9 CHRONIC NONINTRACTABLE HEADACHE, UNSPECIFIED HEADACHE TYPE: ICD-10-CM

## 2023-07-28 DIAGNOSIS — I25.10 CORONARY ARTERY CALCIFICATION: ICD-10-CM

## 2023-07-28 DIAGNOSIS — E04.1 THYROID NODULE: ICD-10-CM

## 2023-07-28 DIAGNOSIS — H81.10 BENIGN PAROXYSMAL POSITIONAL VERTIGO, UNSPECIFIED LATERALITY: ICD-10-CM

## 2023-07-28 DIAGNOSIS — N18.32 STAGE 3B CHRONIC KIDNEY DISEASE (HCC): ICD-10-CM

## 2023-07-28 DIAGNOSIS — R74.01 ELEVATED AST (SGOT): ICD-10-CM

## 2023-07-28 DIAGNOSIS — G89.29 CHRONIC NONINTRACTABLE HEADACHE, UNSPECIFIED HEADACHE TYPE: ICD-10-CM

## 2023-07-28 DIAGNOSIS — E78.2 MIXED HYPERLIPIDEMIA: ICD-10-CM

## 2023-07-28 DIAGNOSIS — R63.4 WEIGHT LOSS: ICD-10-CM

## 2023-07-28 DIAGNOSIS — R73.03 PREDIABETES: ICD-10-CM

## 2023-07-28 DIAGNOSIS — I25.84 CORONARY ARTERY CALCIFICATION: ICD-10-CM

## 2023-07-28 DIAGNOSIS — I12.9 BENIGN HYPERTENSION WITH CKD (CHRONIC KIDNEY DISEASE) STAGE III (HCC): ICD-10-CM

## 2023-07-28 DIAGNOSIS — J43.9 PULMONARY EMPHYSEMA, UNSPECIFIED EMPHYSEMA TYPE (HCC): ICD-10-CM

## 2023-07-28 DIAGNOSIS — K63.5 POLYP OF COLON, UNSPECIFIED PART OF COLON, UNSPECIFIED TYPE: ICD-10-CM

## 2023-07-28 DIAGNOSIS — I83.93 VARICOSE VEINS OF BOTH LOWER EXTREMITIES, UNSPECIFIED WHETHER COMPLICATED: ICD-10-CM

## 2023-07-28 DIAGNOSIS — N18.30 BENIGN HYPERTENSION WITH CKD (CHRONIC KIDNEY DISEASE) STAGE III (HCC): ICD-10-CM

## 2023-07-28 DIAGNOSIS — D72.829 LEUKOCYTOSIS, UNSPECIFIED TYPE: ICD-10-CM

## 2023-07-28 PROBLEM — J41.0 SIMPLE CHRONIC BRONCHITIS (HCC): Status: RESOLVED | Noted: 2020-07-30 | Resolved: 2023-07-28

## 2023-07-28 PROCEDURE — 99214 OFFICE O/P EST MOD 30 MIN: CPT | Performed by: INTERNAL MEDICINE

## 2023-07-28 RX ORDER — GABAPENTIN 300 MG/1
300 CAPSULE ORAL DAILY
Qty: 90 CAPSULE | Refills: 1 | Status: SHIPPED | OUTPATIENT
Start: 2023-07-28

## 2023-07-28 NOTE — ASSESSMENT & PLAN NOTE
Lab Results   Component Value Date    EGFR 31 06/09/2023    EGFR 34 06/05/2023    EGFR 41 03/03/2023    CREATININE 1.56 (H) 06/09/2023    CREATININE 1.45 (H) 06/05/2023    CREATININE 1.25 03/03/2023   Renal functions reviewed. Mildly progressive decrease in the creatinine. We talked about may have to discontinue hydrochlorothiazide if renal functions gets worse. Recommend aggressive hydration. Recommend enough calorie. Will be going for blood test by nephrologist in September. I will do BMP next week with. Last 1 was 6 weeks ago.   Continue to monitor

## 2023-07-28 NOTE — ASSESSMENT & PLAN NOTE
Neuropathy and twitching in the is fair.   Continue gabapentin given by her podiatrist.  No change in the dose

## 2023-07-28 NOTE — ASSESSMENT & PLAN NOTE
Subtle weight loss. Recent CT scan of chest, abdomen reviewed. Colonoscopy reviewed. Patient has been watching diet more aggressively. Recommend to eat 1800 italia a day. Daily weight. Recommend nutritional supplement.   We will monitor the trend

## 2023-07-28 NOTE — PROGRESS NOTES
Name: Damaris Lam      : 1945      MRN: [id number]  Encounter Provider: Andrey Barger MD  Encounter Date: 2023   Encounter department: [address]     1. Diverticulosis  Assessment & Plan:  Colonoscopy: 3/20/2023:9 colon polyps removed, some acute angulation in the left side of the colon, diverticulosis and mixed hemorrhoids     RECOMMENDATION:    Repeat colonoscopy in 3 years    Personal history of colon polyps  High-fiber diet recommended          2. Neuropathy  Assessment & Plan:  Neuropathy and twitching in the is fair. Continue gabapentin given by her podiatrist.  No change in the dose    Orders:  -     gabapentin (NEURONTIN) 300 mg capsule; Take 1 capsule (300 mg total) by mouth daily  -     Basic metabolic panel; Future    3. Polyp of colon, unspecified part of colon, unspecified type  Assessment & Plan:  Colonoscopy: 3/20/2023:9 colon polyps removed, some acute angulation in the left side of the colon, diverticulosis and mixed hemorrhoids     RECOMMENDATION:    Repeat colonoscopy in 3 years    Personal history of colon polyps  High-fiber diet recommended          4. Elevated AST (SGOT)  Assessment & Plan:  Lab Results   Component Value Date     2015    SODIUM 138 2023    K 3.6 2023     2023    CO2 29 2023    ANIONGAP 6 2015    AGAP 5 2023    BUN 36 (H) 2023    CREATININE 1.56 (H) 2023    GLUC 113 2023    GLUF 115 (H) 2023    CALCIUM 9.8 2023    AST 33 2023    ALT 39 2023    ALKPHOS 75 2023    TP 7.5 2023    TBILI 0.60 2023    EGFR 31 2023         Orders:  -     Basic metabolic panel; Future    5.  Leukocytosis, unspecified type  Assessment & Plan:  Lab Results   Component Value Date    WBC 10.32 (H) 2023    HGB 14.0 2023    HCT 41.4 2023    MCV 91 2023     2023   WBC count is better      6. Diarrhea, unspecified type  Assessment & Plan:  Diarrhea finally improved. Stool studies from 1/18/2023 reviewed unremarkable. Colonoscopy from March 2023 reviewed please refer to the full copy of report. Patient is watching diet protein and milk products. Now 1-3 soft bowel movement. We will continue to monitor      7. Weight loss  Assessment & Plan:  Subtle weight loss. Recent CT scan of chest, abdomen reviewed. Colonoscopy reviewed. Patient has been watching diet more aggressively. Recommend to eat 1800 italia a day. Daily weight. Recommend nutritional supplement. We will monitor the trend      8. Prediabetes  Assessment & Plan:  Days. Hemoglobin A1c 6.2. Recommend continue carb controlled diet      9. Stage 3b chronic kidney disease [facility])  Assessment & Plan:  Lab Results   Component Value Date    EGFR 31 06/09/2023    EGFR 34 06/05/2023    EGFR 41 03/03/2023    CREATININE 1.56 (H) 06/09/2023    CREATININE 1.45 (H) 06/05/2023    CREATININE 1.25 03/03/2023   Renal functions reviewed. Mildly progressive decrease in the creatinine. We talked about may have to discontinue hydrochlorothiazide if renal functions gets worse. Recommend aggressive hydration. Recommend enough calorie. Will be going for blood test by nephrologist in September. I will do BMP next week with. Last 1 was 6 weeks ago. Continue to monitor    Orders:  -     Basic metabolic panel; Future    10. Adenocarcinoma of right lung [facility])  Assessment & Plan:  Chest abdomen and pelvis: 7-: No new concerning pulmonary nodules. Nothing to indicate recurrent adenocarcinoma.     Abdomen/pelvis:  Redemonstrated 13 mm exophytic nodule involving the right lower pole of the kidney, for which renal ultrasound was previously recommended    rec to continue to follow with thoracic surgeon    Orders:  -     Basic metabolic panel; Future    11.  Thyroid nodule  Assessment & Plan:  Patient did not get chance to go for thyroid ultrasound she will schedule her own ultrasound in near future with particularly when she goes for the ultrasound of the kidney      12. Renal mass  Assessment & Plan:  7/18/2023: CT scan of chest abdomen and pelvis    No new concerning pulmonary nodules. Nothing to indicate recurrent adenocarcinoma.     Abdomen/pelvis:  Redemonstrated 13 mm exophytic nodule involving the right lower pole of the kidney, for which renal ultrasound was previously recommended      Ordered by urologist for ultrasound of the kidney patient will be scheduled in the near future and will follow up with her. 13. Mixed hyperlipidemia  Assessment & Plan:  Lab Results   Component Value Date    LDLCALC 36 06/05/2023     Lab Results   Component Value Date    ALT 39 06/09/2023     Lab Results   Component Value Date    CHOLESTEROL 114 06/05/2023    CHOLESTEROL 141 02/16/2022    CHOLESTEROL 139 11/18/2021     Lab Results   Component Value Date    HDL 60 06/05/2023    HDL 54 02/16/2022    HDL 56 11/18/2021     Lab Results   Component Value Date    TRIG 91 06/05/2023    TRIG 113 02/16/2022    TRIG 98 11/18/2021     Lab Results   Component Value Date    NONHDLC 54 06/05/2023    [address] 87 02/16/2022    [address] 83 11/18/2021   Continue atorvastatin 10 mg daily        14. Benign paroxysmal positional vertigo, unspecified laterality  Assessment & Plan: We had a positive vertigo but generally better. She has a classic symptoms of positional particularly when she bends her head to look down she gets dizziness though it is better      15. Pulmonary emphysema, unspecified emphysema type ([address])  Assessment & Plan:  Frequent cough chest congestion shortness of breath or hemoptysis. He uses albuterol as needed and infrequently. Recent CT scan of the chest abdomen and pelvis as follows    7/18/2023: CT scan of chest abdomen and pelvis    No new concerning pulmonary nodules.  Nothing to indicate recurrent adenocarcinoma.     Abdomen/pelvis:  Redemonstrated 13 mm exophytic nodule involving the right lower pole of the kidney, for which renal ultrasound was previously recommended      16. Benign hypertension with CKD (chronic kidney disease) stage III ([address])    17. Coronary artery calcification  Assessment & Plan:  Continue risk factor management. No chest pain. Nuclear stress test 6/1/2022 reviewed no perfusion defect      18. Varicose veins of both lower extremities, unspecified whether complicated  Assessment & Plan:  3) fairly stable. 19. Chronic nonintractable headache, unspecified headache type           Subjective         In the middle of the June patient had COVID self-limiting disease resolved. Mild disease. Patient did not contact anyone and did not need to did not take Paxlovid. Patient educated. Here for chronic disease. Diarrhea: resolved, if eats butter or greasy, she gets,  BM 2-3 soft    Colonoscopy: 3/20/2023:9 colon polyps removed, some acute angulation in the left side of the colon, diverticulosis and mixed hemorrhoids     RECOMMENDATION:    Repeat colonoscopy in 3 years    Personal history of colon polyps  High-fiber diet recommended    Recent colonoscopy reviewed, prediabetes reasonable, neuropathy fair, positional vertigo reasonable and poor positional, no symptoms related to COPD hypertension, arthritis fair, renal functions mild decline noted for surveillance. Diarrhea improved. Leukocytosis noted. Weight loss holding somewhat voluntary basis trying to watch a different type of diet. Chronic headache started after COVID or around the time of COVID. Mild sometime in a.m. not constant usually in the back of the head/neck. Better with the Tylenol. No focal neurological symptoms. No further work-up such as MRI of the cervical spine and head per her choice if she is no better or she is worse she will let me know.   No other focal neurological symptoms    Also had thyroid nodule patient never went for the further work-up. Also has a CT of the lung last CT scan of the chest abdomen pelvis were unremarkable we will reevaluate following work-up. Again. Also blood sugar borderline check a Melamin A1c check thyroid function    COPD fair, hypertensive discomfort, no symptoms related to reoccurrence of adenocarcinoma of lung, AAA AST slightly high, neuropathy fair, CKD level 3B fair,    This is following up fairly well. Deivert diverticulosis stable,    1/18/2023: Stool negative for Salmonella, Shigella, Campylobacter, C. difficile, leukocytes, as well as negative for fecal to Hemoccult immunological  1/17/2023: WBC 10.52 rest of the CBC normal status of a chronic top normal WBC 4 times in last 1 year BUN 26 creatinine 1.38 rest of the CMP normal vitamin D15.8, sed rate 26  3/3/2023: WBC 11.4 rest of the CBC normal CMP normal with creatinine 1.25  6/9/2023: Hemoglobin A1c 6.2 with average sugar 131, TSH normal 1.37, free T41.03 Normal, CMP normal except creatinine 1.56 BUN 36 and GFR 31, WBC 10.32 rest of the CBC normal,    6/5/2023: BUN 30 creatinine 1.45 GFR 34 rest of the CMP normal except AST 49, lipid profile normal with LDL 36,          Review of Systems   Constitutional: Negative for chills, fatigue and fever. HENT: Negative for ear pain, sore throat and trouble swallowing. Eyes: Negative for pain and visual disturbance. Respiratory: Negative for cough and shortness of breath. Cardiovascular: Negative for chest pain and palpitations. Gastrointestinal: Negative for abdominal pain, constipation, diarrhea and vomiting. Genitourinary: Negative for difficulty urinating, dysuria, flank pain and hematuria. Musculoskeletal: Positive for neck pain and neck stiffness. Negative for arthralgias, back pain and myalgias. Skin: Negative for rash and wound. Neurological: Negative for dizziness, seizures, syncope and headaches.    Psychiatric/Behavioral: Negative for confusion, decreased concentration, dysphoric mood, hallucinations and sleep disturbance. The patient is not nervous/anxious. All other systems reviewed and are negative. Past Medical History:   Diagnosis Date   • Cancer ([address])     lung   • Diarrhea     wt loss from 192lb to 178lb ()   • Emphysema lung (HCC)    • Hemorrhoid     possible hemorrhoid with small amount of bleeding   • Hyperlipidemia    • Hypertension    • Lung cancer ([address])    • Numbness and tingling of foot    • Osteoarthritis of right hip    • Primary adenocarcinoma of lower lobe of right lung [facility])      Past Surgical History:   Procedure Laterality Date   • CATARACT EXTRACTION Bilateral    • CHOLECYSTECTOMY     • COLONOSCOPY     • JOINT REPLACEMENT Bilateral     hips-   • CA ARTHRP ACETBLR/PROX FEM PROSTC AGRFT/ALGRFT Right 2018    Procedure: ANTERIOR TOTAL HIP ARTHROPLASTY;  Surgeon: Liya Sparks MD;  [facility];  Service: Orthopedics   • TOTAL HIP ARTHROPLASTY     • TUMOR REMOVAL Right 2015    lower lobe      Family History   Problem Relation Age of Onset   • Heart disease Mother    • No Known Problems Father      Social History     Socioeconomic History   • Marital status:       Spouse name: None   • Number of children: None   • Years of education: None   • Highest education level: None   Occupational History   • None   Tobacco Use   • Smoking status: Former     Packs/day: 1.00     Years: 50.00     Total pack years: 50.00     Types: Cigarettes     Quit date: 2015     Years since quittin.8   • Smokeless tobacco: Never   Vaping Use   • Vaping Use: Never used   Substance and Sexual Activity   • Alcohol use: Yes     Comment: on occ   • Drug use: Never   • Sexual activity: None   Other Topics Concern   • None   Social History Narrative   • None     Social Determinants of Health     Financial Resource Strain: Low Risk  (2022)    Overall Financial Resource Strain (CARDIA)    • Difficulty of Paying Living Expenses: Not hard at all   Food Insecurity: Not on file   Transportation Needs: No Transportation Needs (12/2/2022)    PRAPARE - Transportation    • Lack of Transportation (Medical): No    • Lack of Transportation (Non-Medical):  No   Physical Activity: Not on file   Stress: Not on file   Social Connections: Not on file   Intimate Partner Violence: Not on file   Housing Stability: Not on file     Current Outpatient Medications on File Prior to Visit   Medication Sig   • acetaminophen (TYLENOL) 500 mg tablet Take 500 mg by mouth every 6 (six) hours as needed for mild pain   • albuterol (PROVENTIL HFA,VENTOLIN HFA) 90 mcg/act inhaler Inhale 2 puffs every 6 (six) hours as needed for wheezing or shortness of breath   • amLODIPine (NORVASC) 10 mg tablet Take 1 tablet (10 mg total) by mouth daily   • amoxicillin (AMOXIL) 500 mg capsule TAKE FOUR CAPSULES BY MOUTH ONE HOUR BEFORE APPOINTMENT   • aspirin (ECOTRIN LOW STRENGTH) 81 mg EC tablet Take 1 tablet (81 mg total) by mouth 2 (two) times a day (Patient taking differently: Take 81 mg by mouth daily with dinner)   • atorvastatin (LIPITOR) 10 mg tablet TAKE 1 TABLET DAILY AT BEDTIME   • hydrochlorothiazide (MICROZIDE) 12.5 mg capsule Take 1 capsule (12.5 mg total) by mouth every morning   • losartan (COZAAR) 50 mg tablet Take 1 tablet (50 mg total) by mouth daily   • metoprolol succinate (TOPROL-XL) 100 mg 24 hr tablet Take 1 tablet (100 mg total) by mouth daily   • Polyethylene Glycol 400 0.25 % SOLN Apply 1 drop to eye 2 (two) times a day As needed   • Wheat Dextrin (BENEFIBER PO) Take by mouth 2 (two) times a day   • [DISCONTINUED] gabapentin (NEURONTIN) 300 mg capsule Take 1 capsule (300 mg total) by mouth daily (Patient taking differently: Take 300 mg by mouth daily at bedtime)   • [DISCONTINUED] Melatonin 5 MG TABS Take 10 mg by mouth daily at bedtime As needed     Allergies   Allergen Reactions   • Other Other (See Comments)     seasonal     Immunization History   Administered Date(s) Administered   • INFLUENZA 12/02/2022   • Influenza, high dose seasonal 0.7 mL 11/23/2020, 11/24/2021, 12/02/2022   • Influenza, injectable, quadrivalent, preservative free 0.5 mL 10/24/2019       Objective     /62   Pulse 65   Ht 5' 7" (1.702 m)   Wt 74.4 kg (164 lb)   SpO2 97%   BMI 25.69 kg/m²     Physical Exam  Vitals and nursing note reviewed. Constitutional:       Appearance: Normal appearance. She is well-developed. She is obese. She is not ill-appearing. HENT:      Head: Normocephalic and atraumatic. Right Ear: External ear normal.      Left Ear: External ear normal.      Nose: Nose normal. No congestion or rhinorrhea. Mouth/Throat:      Pharynx: Oropharynx is clear. Eyes:      General: Lids are normal. No scleral icterus. Right eye: No discharge. Left eye: No discharge. Conjunctiva/sclera: Conjunctivae normal.   Neck:      Thyroid: No thyroid mass or thyromegaly. Vascular: No carotid bruit or JVD. Trachea: Trachea normal.   Cardiovascular:      Rate and Rhythm: Normal rate and regular rhythm. Pulses: Normal pulses. Heart sounds: Normal heart sounds. Pulmonary:      Effort: Pulmonary effort is normal. No respiratory distress. Breath sounds: Normal breath sounds. Abdominal:      General: Bowel sounds are normal. There is no distension. Tenderness: There is no abdominal tenderness. Musculoskeletal:      Cervical back: No rigidity or tenderness. Right lower leg: No edema. Left lower leg: No edema. Lymphadenopathy:      Cervical: No cervical adenopathy. Skin:     General: Skin is warm. Coloration: Skin is not jaundiced or pale. Findings: No rash. Neurological:      General: No focal deficit present. Mental Status: She is alert and oriented to person, place, and time. Sensory: No sensory deficit. Motor: No weakness.       Gait: Gait normal.   Psychiatric: Attention and Perception: Attention and perception normal.         Mood and Affect: Mood normal.         Behavior: Behavior normal.         Thought Content:  Thought content normal.         Cognition and Memory: Cognition and memory normal.         Judgment: Judgment normal.       Clarice Angelucci, MD

## 2023-07-28 NOTE — ASSESSMENT & PLAN NOTE
Diarrhea finally improved. Stool studies from 1/18/2023 reviewed unremarkable. Colonoscopy from March 2023 reviewed please refer to the full copy of report. Patient is watching diet protein and milk products. Now 1-3 soft bowel movement.   We will continue to monitor

## 2023-07-28 NOTE — ASSESSMENT & PLAN NOTE
Patient did not get chance to go for thyroid ultrasound she will schedule her own ultrasound in near future with particularly when she goes for the ultrasound of the kidney

## 2023-07-28 NOTE — ASSESSMENT & PLAN NOTE
7/18/2023: CT scan of chest abdomen and pelvis    No new concerning pulmonary nodules. Nothing to indicate recurrent adenocarcinoma.     Abdomen/pelvis:  Redemonstrated 13 mm exophytic nodule involving the right lower pole of the kidney, for which renal ultrasound was previously recommended      Ordered by urologist for ultrasound of the kidney patient will be scheduled in the near future and will follow up with her.

## 2023-07-28 NOTE — ASSESSMENT & PLAN NOTE
Lab Results   Component Value Date    LDLCALC 36 06/05/2023     Lab Results   Component Value Date    ALT 39 06/09/2023     Lab Results   Component Value Date    CHOLESTEROL 114 06/05/2023    CHOLESTEROL 141 02/16/2022    CHOLESTEROL 139 11/18/2021     Lab Results   Component Value Date    HDL 60 06/05/2023    HDL 54 02/16/2022    HDL 56 11/18/2021     Lab Results   Component Value Date    TRIG 91 06/05/2023    TRIG 113 02/16/2022    TRIG 98 11/18/2021     Lab Results   Component Value Date    NONHDLC 54 06/05/2023    [email or web address] 02/16/2022    [email or web address] 83 11/18/2021   Continue atorvastatin 10 mg daily

## 2023-07-28 NOTE — PATIENT INSTRUCTIONS
No visits with results within 2 Week(s) from this visit. Latest known visit with results is:   Appointment on 06/09/2023   Component Date Value    TSH 3RD GENERATON 06/09/2023 1.371     Free T4 06/09/2023 1.03     Sodium 06/09/2023 138     Potassium 06/09/2023 3.6     Chloride 06/09/2023 104     CO2 06/09/2023 29     ANION GAP 06/09/2023 5     BUN 06/09/2023 36 (H)     Creatinine 06/09/2023 1.56 (H)     Glucose 06/09/2023 113     Calcium 06/09/2023 9.8     AST 06/09/2023 33     ALT 06/09/2023 39     Alkaline Phosphatase 06/09/2023 75     Total Protein 06/09/2023 7.5     Albumin 06/09/2023 3.9     Total Bilirubin 06/09/2023 0.60     eGFR 06/09/2023 31     WBC 06/09/2023 10.32 (H)     RBC 06/09/2023 4.57     Hemoglobin 06/09/2023 14.0     Hematocrit 06/09/2023 41.4     MCV 06/09/2023 91     MCH 06/09/2023 30.6     MCHC 06/09/2023 33.8     RDW 06/09/2023 13.2     Platelets 78/94/4984 274     MPV 06/09/2023 9.8     Hemoglobin A1C 06/09/2023 6.2 (H)     EAG 06/09/2023 131     - 2 weeks        . plenty of Fluid. Go for BMP next week. Go for additional blood test as ordered by kidney doctor next month. Follow with Consultants as per their and our suggestion    Follow up in 2 months or as needed earlier    Follow all instructions as advised and discussed. Take your medications as prescribed. Call the office immediately if you experience any side effects. Ask questions if you do not understand. Keep your scheduled appointment as advised or come sooner if necessary or in doubt. Best time to call for non-urgent matter or questions on weekdays is between 9am and 12 noon. See physician for any new symptoms or worsening of current symptoms. Urgent or emergent situations call 911 and report to nearest emergency room.     I spent  time taking care of this patient including clinical care, conseling, collaboration, chart, lab and consultant's follow up note,images report, documentation, pre visit  review as appropriate. Patient is to get labs 1 week(s) prior to next visit if advised    Weigh yourself weekly, eat at least 1800-calorie diet.   If weight loss continues to let me know

## 2023-07-28 NOTE — ASSESSMENT & PLAN NOTE
Lab Results   Component Value Date    WBC 10.32 (H) 06/09/2023    HGB 14.0 06/09/2023    HCT 41.4 06/09/2023    MCV 91 06/09/2023     06/09/2023   WBC count is better

## 2023-07-28 NOTE — ASSESSMENT & PLAN NOTE
Colonoscopy: 3/20/2023:9 colon polyps removed, some acute angulation in the left side of the colon, diverticulosis and mixed hemorrhoids     RECOMMENDATION:    Repeat colonoscopy in 3 years    Personal history of colon polyps  High-fiber diet recommended

## 2023-07-28 NOTE — ASSESSMENT & PLAN NOTE
Lab Results   Component Value Date     09/20/2015    SODIUM 138 06/09/2023    K 3.6 06/09/2023     06/09/2023    CO2 29 06/09/2023    ANIONGAP 6 09/20/2015    AGAP 5 06/09/2023    BUN 36 (H) 06/09/2023    CREATININE 1.56 (H) 06/09/2023    GLUC 113 06/09/2023    GLUF 115 (H) 06/05/2023    CALCIUM 9.8 06/09/2023    AST 33 06/09/2023    ALT 39 06/09/2023    ALKPHOS 75 06/09/2023    TP 7.5 06/09/2023    TBILI 0.60 06/09/2023    EGFR 31 06/09/2023

## 2023-07-28 NOTE — ASSESSMENT & PLAN NOTE
We had a positive vertigo but generally better.   She has a classic symptoms of positional particularly when she bends her head to look down she gets dizziness though it is better

## 2023-07-28 NOTE — ASSESSMENT & PLAN NOTE
Continue risk factor management. No chest pain.     Nuclear stress test 6/1/2022 reviewed no perfusion defect

## 2023-07-28 NOTE — ASSESSMENT & PLAN NOTE
Frequent cough chest congestion shortness of breath or hemoptysis. He uses albuterol as needed and infrequently. Recent CT scan of the chest abdomen and pelvis as follows    7/18/2023: CT scan of chest abdomen and pelvis    No new concerning pulmonary nodules.  Nothing to indicate recurrent adenocarcinoma.     Abdomen/pelvis:  Redemonstrated 13 mm exophytic nodule involving the right lower pole of the kidney, for which renal ultrasound was previously recommended

## 2023-07-28 NOTE — ASSESSMENT & PLAN NOTE
Chest abdomen and pelvis: 7-: No new concerning pulmonary nodules.  Nothing to indicate recurrent adenocarcinoma.     Abdomen/pelvis:  Redemonstrated 13 mm exophytic nodule involving the right lower pole of the kidney, for which renal ultrasound was previously recommended    rec to continue to follow with thoracic surgeon

## 2023-08-09 ENCOUNTER — APPOINTMENT (OUTPATIENT)
Dept: LAB | Facility: CLINIC | Age: 78
End: 2023-08-09
Payer: MEDICARE

## 2023-08-09 DIAGNOSIS — N18.32 STAGE 3B CHRONIC KIDNEY DISEASE (HCC): ICD-10-CM

## 2023-08-09 DIAGNOSIS — R74.01 ELEVATED AST (SGOT): ICD-10-CM

## 2023-08-09 DIAGNOSIS — C34.91 ADENOCARCINOMA OF RIGHT LUNG (HCC): ICD-10-CM

## 2023-08-09 DIAGNOSIS — G62.9 NEUROPATHY: ICD-10-CM

## 2023-08-09 LAB
ANION GAP SERPL CALCULATED.3IONS-SCNC: 5 MMOL/L
BUN SERPL-MCNC: 25 MG/DL (ref 5–25)
CALCIUM SERPL-MCNC: 9.6 MG/DL (ref 8.3–10.1)
CHLORIDE SERPL-SCNC: 106 MMOL/L (ref 96–108)
CO2 SERPL-SCNC: 27 MMOL/L (ref 21–32)
CREAT SERPL-MCNC: 1.46 MG/DL (ref 0.6–1.3)
GFR SERPL CREATININE-BSD FRML MDRD: 33 ML/MIN/1.73SQ M
GLUCOSE P FAST SERPL-MCNC: 110 MG/DL (ref 65–99)
POTASSIUM SERPL-SCNC: 3.8 MMOL/L (ref 3.5–5.3)
SODIUM SERPL-SCNC: 138 MMOL/L (ref 135–147)

## 2023-08-09 PROCEDURE — 36415 COLL VENOUS BLD VENIPUNCTURE: CPT

## 2023-08-09 PROCEDURE — 80048 BASIC METABOLIC PNL TOTAL CA: CPT

## 2023-08-11 ENCOUNTER — HOSPITAL ENCOUNTER (OUTPATIENT)
Dept: RADIOLOGY | Facility: HOSPITAL | Age: 78
Discharge: HOME | End: 2023-08-11
Attending: STUDENT IN AN ORGANIZED HEALTH CARE EDUCATION/TRAINING PROGRAM
Payer: MEDICARE

## 2023-08-11 ENCOUNTER — HOSPITAL ENCOUNTER (OUTPATIENT)
Dept: RADIOLOGY | Facility: HOSPITAL | Age: 78
Discharge: HOME | End: 2023-08-11
Attending: INTERNAL MEDICINE
Payer: MEDICARE

## 2023-08-11 DIAGNOSIS — E04.1 THYROID NODULE: ICD-10-CM

## 2023-08-11 DIAGNOSIS — N28.89 RIGHT RENAL MASS: ICD-10-CM

## 2023-08-11 DIAGNOSIS — R63.4 WEIGHT LOSS: ICD-10-CM

## 2023-08-11 PROCEDURE — 76536 US EXAM OF HEAD AND NECK: CPT

## 2023-08-11 PROCEDURE — 76770 US EXAM ABDO BACK WALL COMP: CPT

## 2023-08-18 ENCOUNTER — TELEPHONE (OUTPATIENT)
Age: 78
End: 2023-08-18

## 2023-08-18 NOTE — TELEPHONE ENCOUNTER
Radiology Test Results - Radiology Calling with report update    Pt under care of: Dr. Thomas Chung in [facility] Completed:  US kidney and bladder with PVR    Significant Findings - Please review

## 2023-08-21 DIAGNOSIS — N28.9 RENAL LESION: Primary | ICD-10-CM

## 2023-08-21 NOTE — TELEPHONE ENCOUNTER
Please call patient to inform her that the renal ultrasound did not demonstrate the specific lesion that we were looking at that was previous seen on previous CT. radiology is recommending that we obtain a specialist with a CT with and without contrast for further evaluation.   Patient can keep appoint with Dr. Senthil Taylor to discuss the above in person but please help with scheduling CT of the abdomen with and without contrast soon as possible

## 2023-08-23 ENCOUNTER — OFFICE VISIT (OUTPATIENT)
Dept: UROLOGY | Facility: CLINIC | Age: 78
End: 2023-08-23
Payer: MEDICARE

## 2023-08-23 ENCOUNTER — TELEPHONE (OUTPATIENT)
Dept: UROLOGY | Facility: CLINIC | Age: 78
End: 2023-08-23

## 2023-08-23 VITALS
HEART RATE: 56 BPM | WEIGHT: 161.4 LBS | DIASTOLIC BLOOD PRESSURE: 68 MMHG | HEIGHT: 67 IN | BODY MASS INDEX: 25.33 KG/M2 | SYSTOLIC BLOOD PRESSURE: 128 MMHG | OXYGEN SATURATION: 100 %

## 2023-08-23 DIAGNOSIS — N28.89 RIGHT RENAL MASS: Primary | ICD-10-CM

## 2023-08-23 PROCEDURE — 99214 OFFICE O/P EST MOD 30 MIN: CPT | Performed by: STUDENT IN AN ORGANIZED HEALTH CARE EDUCATION/TRAINING PROGRAM

## 2023-08-23 NOTE — PROGRESS NOTES
Urology Ambulatory Progress Note  8/23/2023    Piyush Dickens  1945  [id number]      Assessment/Plan:  Problem List Items Addressed This Visit        Other    Right renal mass - Primary     Assessment:  Right renal mass which is unchanged in size over a 6-month period. This mass is not reliably noted on recent renal ultrasound. Regardless, I discussed the management of a solid renal mass which would typically be managed with surveillance up to 3 cm at which point the risk of microscopic metastatic disease increases. Since obtaining immediate imaging would not  I recommend return to clinic in 6 months with imaging. She has been advised to avoid iodinated contrast agents presumably due to renal function. We will obtain an MRI rather than a CT for this reason. Plan:  • RTC in 6 months with MRI prior           Relevant Orders    MRI abdomen w wo contrast         Chief Complaint: Follow-up for microhematuria, right renal mass    History of Present Illness  Piyush Dickens is a 77 y.o. female presenting in f/u for right renal mass and microhematuria. She was found to have a 1.3cm right LP mass seen on CT 1/2023. Repeat CT 7/2023 showed a stable mass unchanged in size. Renal US was recommended and obtained but did not demonstrate any lower pole lesion that corresponds to the exophytic lesion aforementioned. I independently reviewed the imaging. The patient denies any recent or past history of gross hematuria. Her last urinalysis February 2023 was negative for blood. She saw nephrology May 2023 and they are working on improved blood pressure control. She has Stage IIIB CKD. Cre 1.46, eGFR 33. She has no complaints today.        Past Medical History  Past Medical History:   Diagnosis Date   • Cancer ([address])     lung   • Diarrhea     wt loss from 192lb to 178lb (1/23)   • Emphysema lung (HCC)    • Hemorrhoid     possible hemorrhoid with small amount of bleeding   • Hyperlipidemia    • Hypertension • Lung cancer ([address])    • Numbness and tingling of foot    • Osteoarthritis of right hip    • Primary adenocarcinoma of lower lobe of right lung (HCC)        Past Surgical History  Past Surgical History:   Procedure Laterality Date   • CATARACT EXTRACTION Bilateral    • CHOLECYSTECTOMY     • COLONOSCOPY     • JOINT REPLACEMENT Bilateral     hips-   • ND ARTHRP ACETBLR/PROX FEM PROSTC AGRFT/ALGRFT Right 09/14/2018    Procedure: ANTERIOR TOTAL HIP ARTHROPLASTY;  Surgeon: Feliberto Roque MD;  Location: [facility];  Service: Orthopedics   • TOTAL HIP ARTHROPLASTY     • TUMOR REMOVAL Right 2015    lower lobe        Physical Exam  /68 (BP Location: Right arm, Patient Position: Sitting, Cuff Size: Adult)   Pulse 56   Ht 5' 7" (1.702 m)   Wt 73.2 kg (161 lb 6.4 oz)   SpO2 100%   BMI 25.28 kg/m²     General:  Healthy appearing female in no acute distress. Head:  Normocephalic, atraumatic. Cardiovascular:  Regular rate  Respiratory:  Patient has unlabored respirations. Results  RENAL ULTRASOUND     INDICATION:   N28.89: Other specified disorders of kidney and ureter. Mass in the lower pole of the right kidney noted on recent unenhanced CT. ultrasound for further evaluation.     COMPARISON: None     TECHNIQUE:   Ultrasound of the retroperitoneum was performed with a curvilinear transducer utilizing volumetric sweeps and still imaging techniques.     FINDINGS:     KIDNEYS:     Right kidney:  10.5 x 5.0 x 5.0 cm. Volume 138.5 mL  Normal echogenicity and contour. Cortical thickness:  Normal.  No suspicious masses detected. 1.5 x 1.1 x 1.2 cm lower pole simple cortical cyst. 0.8 x 0.8 x 0.9 cm upper pole simple cortical cyst. Neither appears to correspond to the mass demonstrated on prior CT. No hydronephrosis. No shadowing calculi. No perinephric fluid collections.     Left kidney:  9.2 x 4.9 x 4.0 cm. Volume 95.8 mL  Normal echogenicity and contour.   Cortical thickness:  Normal.  No suspicious masses detected. 8 x 5 x 8 mm upper pole simple cortical cyst.  No hydronephrosis. No shadowing calculi. No perinephric fluid collections.     URETERS:  Nonvisualized.     BLADDER:  Normally distended. No focal thickening or mass lesions. Bilateral ureteral jets detected.     IMPRESSION:     Small simple cortical cysts in both kidneys.     No right lower pole mass identified that corresponds to the exophytic lesion demonstrated on prior unenhanced CT from 7/18/2023. This suggest the possibility of an isoechoic solid renal mass. Therefore, further evaluation with pre and postcontrast CT or   MRI of the abdomen is recommended in order to accurately characterize this presumed right renal tumor. Regan Stern MD  [facility]    Portions of the above record have been created with voice recognition software. Occasional wrong word or "sound alike" substitution may have occurred due to the inherent limitations of voice recognition software. Please read the chart carefully and recognize, using context, where substitution may have occurred. For further clarification, please contact me directly.

## 2023-08-23 NOTE — ASSESSMENT & PLAN NOTE
Assessment:  Right renal mass which is unchanged in size over a 6-month period. This mass is not reliably noted on recent renal ultrasound. Regardless, I discussed the management of a solid renal mass which would typically be managed with surveillance up to 3 cm at which point the risk of microscopic metastatic disease increases. Since obtaining immediate imaging would not  I recommend return to clinic in 6 months with imaging. She has been advised to avoid iodinated contrast agents presumably due to renal function. We will obtain an MRI rather than a CT for this reason.     Plan:  • RTC in 6 months with MRI prior

## 2023-08-28 DIAGNOSIS — N18.30 BENIGN HYPERTENSION WITH CKD (CHRONIC KIDNEY DISEASE) STAGE III (HCC): ICD-10-CM

## 2023-08-28 DIAGNOSIS — I12.9 BENIGN HYPERTENSION WITH CKD (CHRONIC KIDNEY DISEASE) STAGE III (HCC): ICD-10-CM

## 2023-08-28 RX ORDER — HYDROCHLOROTHIAZIDE 12.5 MG/1
12.5 CAPSULE, GELATIN COATED ORAL DAILY
Qty: 90 CAPSULE | Refills: 3 | Status: SHIPPED | OUTPATIENT
Start: 2023-08-28

## 2023-08-29 ENCOUNTER — TELEPHONE (OUTPATIENT)
Dept: HEMATOLOGY ONCOLOGY | Facility: CLINIC | Age: 78
End: 2023-08-29

## 2023-08-29 DIAGNOSIS — Z85.118 HISTORY OF LUNG CANCER: Primary | ICD-10-CM

## 2023-08-29 NOTE — TELEPHONE ENCOUNTER
Since her appointment is not until the end of October with Dr. Macario Diane and her previous CT scan was in July, that is a 3.5 month difference, which is somewhat significant. I would say she will need a repeat CT scan of the chest without contrast prior to her appointment.

## 2023-08-29 NOTE — TELEPHONE ENCOUNTER
Spoke with pt to inform her of the future appointment dates for her CT and follow-up. She scheduled for the CT 07/29/24 and follow-up is 08/06/24. Pt was appreciative of this call.

## 2023-08-29 NOTE — TELEPHONE ENCOUNTER
Spoke with patient. July CT scan clear of recurrent disease. Will obtain CT in one year. Please cancel October CT chest and follow up, and schedule new CT for July 2024 and a follow up appointment. Thank you.

## 2023-08-29 NOTE — TELEPHONE ENCOUNTER
Patient called to find out if she needs the CT and MRI but according to Dr. Aurea Slaughter notes she will only need an MRI before her next 6 month f/u.

## 2023-08-29 NOTE — TELEPHONE ENCOUNTER
Patient Call    Who are you speaking with? Patient    If it is not the patient, are they listed on an active communication consent form? N/A   What is the reason for this call? Patient calling in wanting to know if her scan she completed for her PCP can be read by Dr Vibha Streeter, instead of having to do another CT for our office before her appointment. Does this require a call back? Yes   If a call back is required, please list best call back number [phone]   If a call back is required, advise that a message will be forwarded to their care team and someone will return their call as soon as possible. Did you relay this information to the patient?  Yes

## 2023-09-12 ENCOUNTER — APPOINTMENT (OUTPATIENT)
Dept: LAB | Facility: CLINIC | Age: 78
End: 2023-09-12
Payer: MEDICARE

## 2023-09-12 DIAGNOSIS — N18.32 STAGE 3B CHRONIC KIDNEY DISEASE (HCC): ICD-10-CM

## 2023-09-12 LAB
25(OH)D3 SERPL-MCNC: 51.2 NG/ML (ref 30–100)
ALBUMIN SERPL BCP-MCNC: 4.2 G/DL (ref 3.5–5)
ANION GAP SERPL CALCULATED.3IONS-SCNC: 9 MMOL/L
BACTERIA UR QL AUTO: ABNORMAL /HPF
BILIRUB UR QL STRIP: NEGATIVE
BUN SERPL-MCNC: 29 MG/DL (ref 5–25)
CALCIUM SERPL-MCNC: 9.6 MG/DL (ref 8.4–10.2)
CHLORIDE SERPL-SCNC: 101 MMOL/L (ref 96–108)
CLARITY UR: CLEAR
CO2 SERPL-SCNC: 27 MMOL/L (ref 21–32)
COLOR UR: ABNORMAL
CREAT SERPL-MCNC: 1.44 MG/DL (ref 0.6–1.3)
CREAT UR-MCNC: 57.7 MG/DL
ERYTHROCYTE [DISTWIDTH] IN BLOOD BY AUTOMATED COUNT: 13.3 % (ref 11.6–15.1)
GFR SERPL CREATININE-BSD FRML MDRD: 34 ML/MIN/1.73SQ M
GLUCOSE P FAST SERPL-MCNC: 105 MG/DL (ref 65–99)
GLUCOSE UR STRIP-MCNC: NEGATIVE MG/DL
HCT VFR BLD AUTO: 40.4 % (ref 34.8–46.1)
HGB BLD-MCNC: 13.3 G/DL (ref 11.5–15.4)
HGB UR QL STRIP.AUTO: NEGATIVE
KETONES UR STRIP-MCNC: NEGATIVE MG/DL
LEUKOCYTE ESTERASE UR QL STRIP: ABNORMAL
MAGNESIUM SERPL-MCNC: 2 MG/DL (ref 1.9–2.7)
MCH RBC QN AUTO: 30.1 PG (ref 26.8–34.3)
MCHC RBC AUTO-ENTMCNC: 32.9 G/DL (ref 31.4–37.4)
MCV RBC AUTO: 91 FL (ref 82–98)
NITRITE UR QL STRIP: NEGATIVE
NON-SQ EPI CELLS URNS QL MICRO: ABNORMAL /HPF
PH UR STRIP.AUTO: 6 [PH]
PHOSPHATE SERPL-MCNC: 3.8 MG/DL (ref 2.3–4.1)
PLATELET # BLD AUTO: 304 THOUSANDS/UL (ref 149–390)
PMV BLD AUTO: 9.8 FL (ref 8.9–12.7)
POTASSIUM SERPL-SCNC: 3.9 MMOL/L (ref 3.5–5.3)
PROT UR STRIP-MCNC: NEGATIVE MG/DL
PROT UR-MCNC: 5 MG/DL
PROT/CREAT UR: 0.09 MG/G{CREAT} (ref 0–0.1)
PTH-INTACT SERPL-MCNC: 75.5 PG/ML (ref 12–88)
RBC # BLD AUTO: 4.42 MILLION/UL (ref 3.81–5.12)
RBC #/AREA URNS AUTO: ABNORMAL /HPF
SODIUM SERPL-SCNC: 137 MMOL/L (ref 135–147)
SP GR UR STRIP.AUTO: 1.01 (ref 1–1.03)
UROBILINOGEN UR STRIP-ACNC: <2 MG/DL
WBC # BLD AUTO: 9.27 THOUSAND/UL (ref 4.31–10.16)
WBC #/AREA URNS AUTO: ABNORMAL /HPF

## 2023-09-12 PROCEDURE — 84156 ASSAY OF PROTEIN URINE: CPT

## 2023-09-12 PROCEDURE — 36415 COLL VENOUS BLD VENIPUNCTURE: CPT

## 2023-09-12 PROCEDURE — 82306 VITAMIN D 25 HYDROXY: CPT

## 2023-09-12 PROCEDURE — 83735 ASSAY OF MAGNESIUM: CPT

## 2023-09-12 PROCEDURE — 83970 ASSAY OF PARATHORMONE: CPT

## 2023-09-12 PROCEDURE — 85027 COMPLETE CBC AUTOMATED: CPT

## 2023-09-12 PROCEDURE — 82570 ASSAY OF URINE CREATININE: CPT

## 2023-09-12 PROCEDURE — 81001 URINALYSIS AUTO W/SCOPE: CPT

## 2023-09-12 PROCEDURE — 80069 RENAL FUNCTION PANEL: CPT

## 2023-09-21 ENCOUNTER — OFFICE VISIT (OUTPATIENT)
Dept: NEPHROLOGY | Facility: CLINIC | Age: 78
End: 2023-09-21
Payer: MEDICARE

## 2023-09-21 VITALS
WEIGHT: 160.8 LBS | HEART RATE: 68 BPM | BODY MASS INDEX: 25.24 KG/M2 | HEIGHT: 67 IN | SYSTOLIC BLOOD PRESSURE: 130 MMHG | DIASTOLIC BLOOD PRESSURE: 64 MMHG

## 2023-09-21 DIAGNOSIS — N28.1 KIDNEY CYST, ACQUIRED: ICD-10-CM

## 2023-09-21 DIAGNOSIS — N18.32 STAGE 3B CHRONIC KIDNEY DISEASE (HCC): Primary | ICD-10-CM

## 2023-09-21 DIAGNOSIS — N18.30 BENIGN HYPERTENSION WITH CKD (CHRONIC KIDNEY DISEASE) STAGE III (HCC): ICD-10-CM

## 2023-09-21 DIAGNOSIS — N18.9 CHRONIC KIDNEY DISEASE-MINERAL AND BONE DISORDER: ICD-10-CM

## 2023-09-21 DIAGNOSIS — E83.9 CHRONIC KIDNEY DISEASE-MINERAL AND BONE DISORDER: ICD-10-CM

## 2023-09-21 DIAGNOSIS — M89.9 CHRONIC KIDNEY DISEASE-MINERAL AND BONE DISORDER: ICD-10-CM

## 2023-09-21 DIAGNOSIS — I12.9 BENIGN HYPERTENSION WITH CKD (CHRONIC KIDNEY DISEASE) STAGE III (HCC): ICD-10-CM

## 2023-09-21 PROCEDURE — 99214 OFFICE O/P EST MOD 30 MIN: CPT | Performed by: PHYSICIAN ASSISTANT

## 2023-09-21 RX ORDER — CHOLECALCIFEROL (VITAMIN D3) 125 MCG
2000 CAPSULE ORAL DAILY
COMMUNITY

## 2023-09-21 RX ORDER — CALCIUM CARBONATE/VITAMIN D3 600 MG-10
TABLET ORAL
COMMUNITY

## 2023-09-21 NOTE — PROGRESS NOTES
Assessment and Plan:    Sunday Don was seen today for follow-up. Diagnoses and all orders for this visit:    Stage 3b chronic kidney disease ([address])  -     Basic metabolic panel; Future  -     Protein / creatinine ratio, urine; Future  -     CBC; Future  -     PTH, intact; Future  -     Vitamin D Panel; Future  -     Phosphorus; Future  -     Magnesium; Future    Benign hypertension with CKD (chronic kidney disease) stage III (HCC)    Chronic kidney disease-mineral and bone disorder    Kidney cyst, acquired      Chronic Kidney Disease stage 3b- Baseline creatinine 1.1-1.4. 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 Kidney Smart: completed    Proteinuria- Minimal proteinuria. Hypertension- Antihypertensive regimen includes hctz am, metoprolol pm, losartan am, and amlodipine qhs. Avoid salt. Avoid NSAIDs. Stay active and exercise. BP acceptable. Secondary Hyperparathyroidism, renal- PTH acceptable. Continue vitamin D supplementation. Right Renal Cyst- follow up with urology    Follow up with Dr. Katlin Kinney in 6 months. Please call the office with any questions or concerns. Reason for Visit: Follow-up (CKD. 3)    HPI: Kandi Leiva is a 77 y.o. female who is here for follow up of chronic kidney disease. She first saw Dr. Katlin Kinney in May. She presents with her daughter. She is very concerned about her diet and has been trying to cut out sweets and high potassium foods. She attended kidney smart and is following what they told her to do. Her daughter also helps her manage this. She holds her BP meds if her BP is <120 per Dr. Hunter Lopez. ROS: A complete review of systems was performed and was negative unless otherwise noted in the history of present illness. Allergies:    Other    Medications:     Current Outpatient Medications:   •  acetaminophen (TYLENOL) 500 mg tablet, Take 500 mg by mouth every 6 (six) hours as needed for mild pain, Disp: , Rfl:   •  albuterol (PROVENTIL HFA,VENTOLIN HFA) 90 mcg/act inhaler, Inhale 2 puffs every 6 (six) hours as needed for wheezing or shortness of breath, Disp: 18 g, Rfl: 1  •  amLODIPine (NORVASC) 10 mg tablet, Take 1 tablet (10 mg total) by mouth daily, Disp: 90 tablet, Rfl: 1  •  aspirin (ECOTRIN LOW STRENGTH) 81 mg EC tablet, Take 1 tablet (81 mg total) by mouth 2 (two) times a day (Patient taking differently: Take 81 mg by mouth daily with dinner), Disp: 60 tablet, Rfl: 0  •  atorvastatin (LIPITOR) 10 mg tablet, TAKE 1 TABLET DAILY AT BEDTIME, Disp: 90 tablet, Rfl: 3  •  Cholecalciferol (Vitamin D3) 50 MCG (2000 UT) TABS, Take 2,000 Units by mouth daily, Disp: , Rfl:   •  gabapentin (NEURONTIN) 300 mg capsule, Take 1 capsule (300 mg total) by mouth daily, Disp: 90 capsule, Rfl: 1  •  hydrochlorothiazide (MICROZIDE) 12.5 mg capsule, TAKE 1 CAPSULE DAILY, Disp: 90 capsule, Rfl: 3  •  losartan (COZAAR) 50 mg tablet, Take 1 tablet (50 mg total) by mouth daily, Disp: 90 tablet, Rfl: 1  •  metoprolol succinate (TOPROL-XL) 100 mg 24 hr tablet, Take 1 tablet (100 mg total) by mouth daily, Disp: 90 tablet, Rfl: 1  •  Omega 3 1200 MG CAPS, Take by mouth Omega XL daily, Disp: , Rfl:   •  amoxicillin (AMOXIL) 500 mg capsule, TAKE FOUR CAPSULES BY MOUTH ONE HOUR BEFORE APPOINTMENT (Patient not taking: Reported on 8/23/2023), Disp: , Rfl:   •  Polyethylene Glycol 400 0.25 % SOLN, Apply 1 drop to eye 2 (two) times a day As needed, Disp: , Rfl:   •  Wheat Dextrin (BENEFIBER PO), Take by mouth 2 (two) times a day (Patient not taking: Reported on 8/23/2023), Disp: , Rfl:     Past Medical History:   Diagnosis Date   • Cancer ([address])     lung   • Diarrhea     wt loss from 192lb to 178lb (1/23)   • Emphysema lung (HCC)    • Hemorrhoid     possible hemorrhoid with small amount of bleeding   • Hyperlipidemia    • Hypertension    • Lung cancer (HCC)    • Numbness and tingling of foot    • Osteoarthritis of right hip    • Primary adenocarcinoma of lower lobe of right lung [facility])      Past Surgical History:   Procedure Laterality Date   • CATARACT EXTRACTION Bilateral    • CHOLECYSTECTOMY     • COLONOSCOPY     • JOINT REPLACEMENT Bilateral     hips-   • ME ARTHRP ACETBLR/PROX FEM PROSTC AGRFT/ALGRFT Right 09/14/2018    Procedure: ANTERIOR TOTAL HIP ARTHROPLASTY;  Surgeon: Lucien Alfonso MD;  Location: [facility] OR;  Service: Orthopedics   • TOTAL HIP ARTHROPLASTY     • TUMOR REMOVAL Right 2015    lower lobe      Family History   Problem Relation Age of Onset   • Heart disease Mother    • No Known Problems Father       reports that she quit smoking about 8 years ago. Her smoking use included cigarettes. She has a 50.00 pack-year smoking history. She has never used smokeless tobacco. She reports current alcohol use. She reports that she does not use drugs. Physical Exam:   Vitals:    09/21/23 1319 09/21/23 1359   BP:  130/64   BP Location:  Left arm   Patient Position:  Sitting   Cuff Size:  Standard   Pulse:  68   Weight: 72.9 kg (160 lb 12.8 oz)    Height: 5' 7" (1.702 m)      Body mass index is 25.18 kg/m². General: NAD  Neuro: AAO  Skin: no rash  Eyes: anicteric  ENMT: mm moist  Neck: no masses  Respiratory: CTAB  Cardiovascular: RRR  Extremities: no edema  Gastrointestinal: soft nt nd    Procedure:  No results found for this or any previous visit. Lab Results   Component Value Date    GLUCOSE 109 09/20/2015    CALCIUM 9.6 09/12/2023     09/20/2015    K 3.9 09/12/2023    CO2 27 09/12/2023     09/12/2023    BUN 29 (H) 09/12/2023    CREATININE 1.44 (H) 09/12/2023     I have personally reviewed the blood work as stated above and in my note. I have personally reviewed last renal note.

## 2023-09-21 NOTE — PATIENT INSTRUCTIONS
Chronic Kidney Disease stage 3b- Baseline creatinine 1.1-1.4. 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 Kidney Smart: completed    Proteinuria- Minimal proteinuria. Hypertension- Antihypertensive regimen includes hctz am, metoprolol pm, losartan am, and amlodipine qhs. Avoid salt. Avoid NSAIDs. Stay active and exercise. BP acceptable. Secondary Hyperparathyroidism, renal- PTH acceptable. Continue vitamin D supplementation. Right Renal Cyst- follow up with urology    Follow up with Dr. Kathleen Moreno in 6 months. Please call the office with any questions or concerns.

## 2023-09-28 ENCOUNTER — RA CDI HCC (OUTPATIENT)
Dept: OTHER | Facility: HOSPITAL | Age: 78
End: 2023-09-28

## 2023-10-05 ENCOUNTER — OFFICE VISIT (OUTPATIENT)
Dept: CARDIOLOGY CLINIC | Facility: CLINIC | Age: 78
End: 2023-10-05
Payer: MEDICARE

## 2023-10-05 ENCOUNTER — TELEPHONE (OUTPATIENT)
Dept: NEPHROLOGY | Facility: CLINIC | Age: 78
End: 2023-10-05

## 2023-10-05 ENCOUNTER — TELEPHONE (OUTPATIENT)
Dept: INTERNAL MEDICINE CLINIC | Facility: CLINIC | Age: 78
End: 2023-10-05

## 2023-10-05 ENCOUNTER — OFFICE VISIT (OUTPATIENT)
Dept: INTERNAL MEDICINE CLINIC | Facility: CLINIC | Age: 78
End: 2023-10-05
Payer: MEDICARE

## 2023-10-05 VITALS
WEIGHT: 161 LBS | HEIGHT: 66 IN | OXYGEN SATURATION: 98 % | BODY MASS INDEX: 25.88 KG/M2 | HEART RATE: 60 BPM | DIASTOLIC BLOOD PRESSURE: 80 MMHG | SYSTOLIC BLOOD PRESSURE: 138 MMHG

## 2023-10-05 VITALS
WEIGHT: 160 LBS | HEART RATE: 61 BPM | SYSTOLIC BLOOD PRESSURE: 132 MMHG | BODY MASS INDEX: 25.71 KG/M2 | OXYGEN SATURATION: 98 % | HEIGHT: 66 IN | DIASTOLIC BLOOD PRESSURE: 74 MMHG

## 2023-10-05 DIAGNOSIS — N18.32 STAGE 3B CHRONIC KIDNEY DISEASE (HCC): ICD-10-CM

## 2023-10-05 DIAGNOSIS — G57.21 FEMORAL NEUROPATHY, RIGHT: ICD-10-CM

## 2023-10-05 DIAGNOSIS — I12.9 BENIGN HYPERTENSION WITH CKD (CHRONIC KIDNEY DISEASE) STAGE III (HCC): ICD-10-CM

## 2023-10-05 DIAGNOSIS — Z23 NEED FOR VACCINATION: Primary | ICD-10-CM

## 2023-10-05 DIAGNOSIS — N28.1 KIDNEY CYST, ACQUIRED: ICD-10-CM

## 2023-10-05 DIAGNOSIS — C34.91 ADENOCARCINOMA OF RIGHT LUNG (HCC): ICD-10-CM

## 2023-10-05 DIAGNOSIS — R63.4 WEIGHT LOSS: ICD-10-CM

## 2023-10-05 DIAGNOSIS — M16.11 OSTEOARTHRITIS OF RIGHT HIP, UNSPECIFIED OSTEOARTHRITIS TYPE: ICD-10-CM

## 2023-10-05 DIAGNOSIS — J43.9 PULMONARY EMPHYSEMA, UNSPECIFIED EMPHYSEMA TYPE (HCC): ICD-10-CM

## 2023-10-05 DIAGNOSIS — M15.9 PRIMARY OSTEOARTHRITIS INVOLVING MULTIPLE JOINTS: ICD-10-CM

## 2023-10-05 DIAGNOSIS — E78.2 MIXED HYPERLIPIDEMIA: ICD-10-CM

## 2023-10-05 DIAGNOSIS — I10 ESSENTIAL HYPERTENSION: Primary | ICD-10-CM

## 2023-10-05 DIAGNOSIS — R73.03 PREDIABETES: ICD-10-CM

## 2023-10-05 DIAGNOSIS — N18.30 BENIGN HYPERTENSION WITH CKD (CHRONIC KIDNEY DISEASE) STAGE III (HCC): ICD-10-CM

## 2023-10-05 DIAGNOSIS — E78.5 DYSLIPIDEMIA: ICD-10-CM

## 2023-10-05 DIAGNOSIS — E55.9 VITAMIN D DEFICIENCY: ICD-10-CM

## 2023-10-05 DIAGNOSIS — N28.89 RIGHT RENAL MASS: ICD-10-CM

## 2023-10-05 PROBLEM — F17.200 TOBACCO USE DISORDER: Status: RESOLVED | Noted: 2019-08-25 | Resolved: 2023-10-05

## 2023-10-05 PROBLEM — D72.829 LEUKOCYTOSIS: Status: RESOLVED | Noted: 2023-03-08 | Resolved: 2023-10-05

## 2023-10-05 PROCEDURE — G0008 ADMIN INFLUENZA VIRUS VAC: HCPCS | Performed by: INTERNAL MEDICINE

## 2023-10-05 PROCEDURE — 93000 ELECTROCARDIOGRAM COMPLETE: CPT | Performed by: NURSE PRACTITIONER

## 2023-10-05 PROCEDURE — 90662 IIV NO PRSV INCREASED AG IM: CPT | Performed by: INTERNAL MEDICINE

## 2023-10-05 PROCEDURE — 99213 OFFICE O/P EST LOW 20 MIN: CPT | Performed by: NURSE PRACTITIONER

## 2023-10-05 PROCEDURE — 90471 IMMUNIZATION ADMIN: CPT | Performed by: INTERNAL MEDICINE

## 2023-10-05 PROCEDURE — 99214 OFFICE O/P EST MOD 30 MIN: CPT | Performed by: INTERNAL MEDICINE

## 2023-10-05 NOTE — ASSESSMENT & PLAN NOTE
7/18/2023 patient had a CT scan of chest abdomen pelvis impression:Chest:  No new concerning pulmonary nodules. Nothing to indicate recurrent adenocarcinoma.     Abdomen/pelvis:  Redemonstrated 13 mm exophytic nodule involving the right lower pole of the kidney, for which renal ultrasound was previously recommended.     KIDNEYS/URETERS: Redemonstrated is an exophytic nodule involving the right lower pole of the kidney, measuring approximately 13 mm (series 2, image 152), stable since January 2023. This nodule measured approximately 9 x 7 mm on a remote PET/CT from July 2014    LUNGS: No new pulmonary nodule. Redemonstrated are small pulmonary nodules in the right upper lobe and left lower lobe (series 6, image 54 and 97), stable since 2020, benign.     Redemonstrated postsurgical changes related to segmentectomy in the superior segment of the right lower lobe. Surgical staple line is normal. Associated scarring is unchanged. 8/11/2023: Ultrasound of the kidney and bladder:Right kidney:  10.5 x 5.0 x 5.0 cm. Volume 138.5 mL  Normal echogenicity and contour. Cortical thickness:  Normal.  No suspicious masses detected. 1.5 x 1.1 x 1.2 cm lower pole simple cortical cyst. 0.8 x 0.8 x 0.9 cm upper pole simple cortical cyst. Neither appears to correspond to the mass demonstrated on prior CT. No hydronephrosis. No shadowing calculi. No perinephric fluid collections.     Left kidney:  9.2 x 4.9 x 4.0 cm. Volume 95.8 mL  Normal echogenicity and contour. Cortical thickness:  Normal.  No suspicious masses detected. 8 x 5 x 8 mm upper pole simple cortical cyst.  No hydronephrosis. No shadowing calculi. No perinephric fluid collections    IMPRESSION:     Small simple cortical cysts in both kidneys.     No right lower pole mass identified that corresponds to the exophytic lesion demonstrated on prior unenhanced CT from 7/18/2023. This suggest the possibility of an isoechoic solid renal mass.  Therefore, further evaluation with pre and postcontrast CT or   MRI of the abdomen is recommended in order to accurately characterize this presumed right renal tumor. 8/29/2023: Comment by cardiothoracic NP reviewed  Patient is scheduled for CT scan of the chest 7/29/2024. As well as MRI of abdomen in abdomen 2-  As well as CT scan of abdomen and with and without contrast was also ordered this 2 tests are ordered by urology service or their PA. Patient was seen by urologist 8/23/2023. Urologist PA had ordered CT scan of the abdomen and pelvis with and without contrast on 8/21/2023 however according to urologist note she did notice that patient does have a CKD level 3B and take have ordered MRI of abdomen. However they did not cancel CT scan of the abdomen and pelvis. They also did not notice that patient had bilateral hip replacement. Patient claims that she told the urologist that she had a hip replacement. Also MRI is being ordered with and without contrast.    Patient does have a history of bilateral hip replacement. Also does have a CKD level 3B. .   we will call and find out  From radiology department if patient can have MRI due to her hx of bilateral hip replacement

## 2023-10-05 NOTE — ASSESSMENT & PLAN NOTE
Patient does not have any significant pulmonary symptoms patient does not have any cough chest congestion shortness of breath or hemoptysis or weight loss. 7/18/2023 patient had a CT scan of chest abdomen pelvis impression:Chest:  No new concerning pulmonary nodules. Nothing to indicate recurrent adenocarcinoma.     Abdomen/pelvis:  Redemonstrated 13 mm exophytic nodule involving the right lower pole of the kidney, for which renal ultrasound was previously recommended. CT services ordering repeat CT scan of chest in July 2024 which is patient is already scheduled without contrast and patient will follow with cardiothoracic surgeon at that point.   KIDNEYS/URETERS: Redemonstrated is an exophytic nodule involving the right lower pole of the kidney, measuring approximately 13 mm (series 2, image 152), stable since January 2023. This nodule measured approximately 9 x 7 mm on a remote PET/CT from July 2014    LUNGS: No new pulmonary nodule. Redemonstrated are small pulmonary nodules in the right upper lobe and left lower lobe (series 6, image 54 and 97), stable since 2020, benign.     Redemonstrated postsurgical changes related to segmentectomy in the superior segment of the right lower lobe. Surgical staple line is normal. Associated scarring is unchanged. 8/11/2023: Ultrasound of the kidney and bladder:Right kidney:  10.5 x 5.0 x 5.0 cm. Volume 138.5 mL  Normal echogenicity and contour. Cortical thickness:  Normal.  No suspicious masses detected. 1.5 x 1.1 x 1.2 cm lower pole simple cortical cyst. 0.8 x 0.8 x 0.9 cm upper pole simple cortical cyst. Neither appears to correspond to the mass demonstrated on prior CT. No hydronephrosis. No shadowing calculi. No perinephric fluid collections.     Left kidney:  9.2 x 4.9 x 4.0 cm. Volume 95.8 mL  Normal echogenicity and contour. Cortical thickness:  Normal.  No suspicious masses detected.   8 x 5 x 8 mm upper pole simple cortical cyst.  No hydronephrosis. No shadowing calculi. No perinephric fluid collections    IMPRESSION:     Small simple cortical cysts in both kidneys.     No right lower pole mass identified that corresponds to the exophytic lesion demonstrated on prior unenhanced CT from 7/18/2023. This suggest the possibility of an isoechoic solid renal mass. Therefore, further evaluation with pre and postcontrast CT or   MRI of the abdomen is recommended in order to accurately characterize this presumed right renal tumor.     Lab data done in last 3 months since my visit reviewed    9/12/2023: Vitamin D 51.2, CBC normal, magnesium 2.0, BUN 29 creatinine 1.44 GFR 34 blood sugar 105 rest of the CMP normal, urine for protein/creatinine ratio 0.09, PTH 75.5, urine analysis trace leukocyte 2-4 WBC otherwise unremarkable,  8/9/2023: BUN 25 creatinine 1.46 GFR 33 rest of the BMP normal,  6/9/2023: Hemoglobin 6.2, BUN 36, creatinine 1.56 GFR 31 rest of the CMP normal WBC third 10.32 rest of the CBC normal, TSH 1.37 and free T41.03

## 2023-10-05 NOTE — ASSESSMENT & PLAN NOTE
Lab Results   Component Value Date    EGFR 34 09/12/2023    EGFR 33 08/09/2023    EGFR 31 06/09/2023    CREATININE 1.44 (H) 09/12/2023    CREATININE 1.46 (H) 08/09/2023    CREATININE 1.56 (H) 06/09/2023     Hypertension symptom-free.     Relevant medications includes amlodipine 10 mg daily ( was changed by nephro to 10 mg melara), hydrochlorothiazide 12.5 mg daily, losartan 50 mg daily, metoprolol succinate 100 mg daily,    9/12/2023: Vitamin D 51.2, CBC normal, magnesium 2.0, BUN 29 creatinine 1.44 GFR 34 blood sugar 105 rest of the CMP normal, urine for protein/creatinine ratio 0.09, PTH 75.5, urine analysis trace leukocyte 2-4 WBC otherwise unremarkable,  8/9/2023: BUN 25 creatinine 1.46 GFR 33 rest of the BMP normal,  6/9/2023: Hemoglobin 6.2, BUN 36, creatinine 1.56 GFR 31 rest of the CMP normal WBC third 10.32 rest of the CBC normal, TSH 1.37 and free T41.03    Patient is also being followed by nephrologist their recent notes were reviewed

## 2023-10-05 NOTE — ASSESSMENT & PLAN NOTE
Right renal mass/ renal cyst:  Seen by urologist:7/18/2023 patient had a CT scan of chest abdomen pelvis impression:Chest:  No new concerning pulmonary nodules. Nothing to indicate recurrent adenocarcinoma.     Abdomen/pelvis:  Redemonstrated 13 mm exophytic nodule involving the right lower pole of the kidney, for which renal ultrasound was previously recommended.     KIDNEYS/URETERS: Redemonstrated is an exophytic nodule involving the right lower pole of the kidney, measuring approximately 13 mm (series 2, image 152), stable since January 2023. This nodule measured approximately 9 x 7 mm on a remote PET/CT from July 2014    LUNGS: No new pulmonary nodule. Redemonstrated are small pulmonary nodules in the right upper lobe and left lower lobe (series 6, image 54 and 97), stable since 2020, benign.     Redemonstrated postsurgical changes related to segmentectomy in the superior segment of the right lower lobe. Surgical staple line is normal. Associated scarring is unchanged. 8/11/2023: Ultrasound of the kidney and bladder:Right kidney:  10.5 x 5.0 x 5.0 cm. Volume 138.5 mL  Normal echogenicity and contour. Cortical thickness:  Normal.  No suspicious masses detected. 1.5 x 1.1 x 1.2 cm lower pole simple cortical cyst. 0.8 x 0.8 x 0.9 cm upper pole simple cortical cyst. Neither appears to correspond to the mass demonstrated on prior CT. No hydronephrosis. No shadowing calculi. No perinephric fluid collections.     Left kidney:  9.2 x 4.9 x 4.0 cm. Volume 95.8 mL  Normal echogenicity and contour. Cortical thickness:  Normal.  No suspicious masses detected. 8 x 5 x 8 mm upper pole simple cortical cyst.  No hydronephrosis. No shadowing calculi. No perinephric fluid collections    IMPRESSION:     Small simple cortical cysts in both kidneys.     No right lower pole mass identified that corresponds to the exophytic lesion demonstrated on prior unenhanced CT from 7/18/2023.  This suggest the possibility of an isoechoic solid renal mass. Therefore, further evaluation with pre and postcontrast CT or   MRI of the abdomen is recommended in order to accurately characterize this presumed right renal tumor. 8/29/2023: Comment by cardiothoracic NP reviewed  Patient is scheduled for CT scan of the chest 7/29/2024. As well as MRI of abdomen in abdomen 2-  As well as CT scan of abdomen and with and without contrast was also ordered this 2 tests are ordered by urology service or their PA. Patient was seen by urologist 8/23/2023. Urologist PA had ordered CT scan of the abdomen and pelvis with and without contrast on 8/21/2023 however according to urologist note she did notice that patient does have a CKD level 3B and take have ordered MRI of abdomen. However they did not cancel CT scan of the abdomen and pelvis. They also did not notice that patient had bilateral hip replacement. Patient claims that she told the urologist that she had a hip replacement. Also MRI is being ordered with and without contrast.    Patient does have a history of bilateral hip replacement. Also does have a CKD level 3B. .   we will call and find out  From radiology department if patient can have MRI due to her hx of bilateral hip replacement

## 2023-10-05 NOTE — PROGRESS NOTES
Name: Reena Grijalva      : 1945      MRN: [id number]  Encounter Provider: Alfredo Rodriguez MD  Encounter Date: 10/5/2023   Encounter department: [address]     1. Need for vaccination  -     influenza vaccine, high-dose, PF 0.7 mL (FLUZONE HIGH-DOSE)    2. Adenocarcinoma of right lung [facility])  Assessment & Plan:  Patient does not have any significant pulmonary symptoms patient does not have any cough chest congestion shortness of breath or hemoptysis or weight loss. 2023 patient had a CT scan of chest abdomen pelvis impression:Chest:  No new concerning pulmonary nodules. Nothing to indicate recurrent adenocarcinoma.     Abdomen/pelvis:  Redemonstrated 13 mm exophytic nodule involving the right lower pole of the kidney, for which renal ultrasound was previously recommended. CT services ordering repeat CT scan of chest in 2024 which is patient is already scheduled without contrast and patient will follow with cardiothoracic surgeon at that point.   KIDNEYS/URETERS: Redemonstrated is an exophytic nodule involving the right lower pole of the kidney, measuring approximately 13 mm (series 2, image 152), stable since 2023. This nodule measured approximately 9 x 7 mm on a remote PET/CT from 2014    LUNGS: No new pulmonary nodule. Redemonstrated are small pulmonary nodules in the right upper lobe and left lower lobe (series 6, image 54 and 97), stable since , benign.     Redemonstrated postsurgical changes related to segmentectomy in the superior segment of the right lower lobe. Surgical staple line is normal. Associated scarring is unchanged. 2023: Ultrasound of the kidney and bladder:Right kidney:  10.5 x 5.0 x 5.0 cm. Volume 138.5 mL  Normal echogenicity and contour. Cortical thickness:  Normal.  No suspicious masses detected.   1.5 x 1.1 x 1.2 cm lower pole simple cortical cyst. 0.8 x 0.8 x 0.9 cm upper pole simple cortical cyst. Neither appears to correspond to the mass demonstrated on prior CT. No hydronephrosis. No shadowing calculi. No perinephric fluid collections.     Left kidney:  9.2 x 4.9 x 4.0 cm. Volume 95.8 mL  Normal echogenicity and contour. Cortical thickness:  Normal.  No suspicious masses detected. 8 x 5 x 8 mm upper pole simple cortical cyst.  No hydronephrosis. No shadowing calculi. No perinephric fluid collections    IMPRESSION:     Small simple cortical cysts in both kidneys.     No right lower pole mass identified that corresponds to the exophytic lesion demonstrated on prior unenhanced CT from 7/18/2023. This suggest the possibility of an isoechoic solid renal mass. Therefore, further evaluation with pre and postcontrast CT or   MRI of the abdomen is recommended in order to accurately characterize this presumed right renal tumor. Lab data done in last 3 months since my visit reviewed    9/12/2023: Vitamin D 51.2, CBC normal, magnesium 2.0, BUN 29 creatinine 1.44 GFR 34 blood sugar 105 rest of the CMP normal, urine for protein/creatinine ratio 0.09, PTH 75.5, urine analysis trace leukocyte 2-4 WBC otherwise unremarkable,  8/9/2023: BUN 25 creatinine 1.46 GFR 33 rest of the BMP normal,  6/9/2023: Hemoglobin 6.2, BUN 36, creatinine 1.56 GFR 31 rest of the CMP normal WBC third 10.32 rest of the CBC normal, TSH 1.37 and free T41.03        3. Right renal mass  Assessment & Plan:  7/18/2023 patient had a CT scan of chest abdomen pelvis impression:Chest:  No new concerning pulmonary nodules. Nothing to indicate recurrent adenocarcinoma.     Abdomen/pelvis:  Redemonstrated 13 mm exophytic nodule involving the right lower pole of the kidney, for which renal ultrasound was previously recommended.     KIDNEYS/URETERS: Redemonstrated is an exophytic nodule involving the right lower pole of the kidney, measuring approximately 13 mm (series 2, image 152), stable since January 2023.   This nodule measured approximately 9 x 7 mm on a remote PET/CT from July 2014    LUNGS: No new pulmonary nodule. Redemonstrated are small pulmonary nodules in the right upper lobe and left lower lobe (series 6, image 54 and 97), stable since 2020, benign.     Redemonstrated postsurgical changes related to segmentectomy in the superior segment of the right lower lobe. Surgical staple line is normal. Associated scarring is unchanged. 8/11/2023: Ultrasound of the kidney and bladder:Right kidney:  10.5 x 5.0 x 5.0 cm. Volume 138.5 mL  Normal echogenicity and contour. Cortical thickness:  Normal.  No suspicious masses detected. 1.5 x 1.1 x 1.2 cm lower pole simple cortical cyst. 0.8 x 0.8 x 0.9 cm upper pole simple cortical cyst. Neither appears to correspond to the mass demonstrated on prior CT. No hydronephrosis. No shadowing calculi. No perinephric fluid collections.     Left kidney:  9.2 x 4.9 x 4.0 cm. Volume 95.8 mL  Normal echogenicity and contour. Cortical thickness:  Normal.  No suspicious masses detected. 8 x 5 x 8 mm upper pole simple cortical cyst.  No hydronephrosis. No shadowing calculi. No perinephric fluid collections    IMPRESSION:     Small simple cortical cysts in both kidneys.     No right lower pole mass identified that corresponds to the exophytic lesion demonstrated on prior unenhanced CT from 7/18/2023. This suggest the possibility of an isoechoic solid renal mass. Therefore, further evaluation with pre and postcontrast CT or   MRI of the abdomen is recommended in order to accurately characterize this presumed right renal tumor. 8/29/2023: Comment by cardiothoracic NP reviewed  Patient is scheduled for CT scan of the chest 7/29/2024. As well as MRI of abdomen in abdomen 2-  As well as CT scan of abdomen and with and without contrast was also ordered this 2 tests are ordered by urology service or their PA. Patient was seen by urologist 8/23/2023.   Urologist PA had ordered CT scan of the abdomen and pelvis with and without contrast on 8/21/2023 however according to urologist note she did notice that patient does have a CKD level 3B and take have ordered MRI of abdomen. However they did not cancel CT scan of the abdomen and pelvis. They also did not notice that patient had bilateral hip replacement. Patient claims that she told the urologist that she had a hip replacement. Also MRI is being ordered with and without contrast.    Patient does have a history of bilateral hip replacement. Also does have a CKD level 3B. .   we will call and find out  From radiology department if patient can have MRI due to her hx of bilateral hip replacement        4. Pulmonary emphysema, unspecified emphysema type ([address])  Assessment & Plan:  COPD is fair. Patient is not requiring any inhaler she takes only as needed basis. 5. Benign hypertension with CKD (chronic kidney disease) stage III [facility])  Assessment & Plan:  Lab Results   Component Value Date    EGFR 34 09/12/2023    EGFR 33 08/09/2023    EGFR 31 06/09/2023    CREATININE 1.44 (H) 09/12/2023    CREATININE 1.46 (H) 08/09/2023    CREATININE 1.56 (H) 06/09/2023     Hypertension symptom-free. Relevant medications includes amlodipine 10 mg daily ( was changed by nephro to 10 mg saritha), hydrochlorothiazide 12.5 mg daily, losartan 50 mg daily, metoprolol succinate 100 mg daily,    9/12/2023: Vitamin D 51.2, CBC normal, magnesium 2.0, BUN 29 creatinine 1.44 GFR 34 blood sugar 105 rest of the CMP normal, urine for protein/creatinine ratio 0.09, PTH 75.5, urine analysis trace leukocyte 2-4 WBC otherwise unremarkable,  8/9/2023: BUN 25 creatinine 1.46 GFR 33 rest of the BMP normal,  6/9/2023: Hemoglobin 6.2, BUN 36, creatinine 1.56 GFR 31 rest of the CMP normal WBC third 10.32 rest of the CBC normal, TSH 1.37 and free T41.03    Patient is also being followed by nephrologist their recent notes were reviewed      6.  Femoral neuropathy, right  Assessment & Plan:  Residual  , much better, not a major issue    We will continue gabapentin 300 mg daily. 7. Primary osteoarthritis involving multiple joints  Assessment & Plan:  Arthritis is generally doing  Fair  occasioanl RLS night time better with walking      8. Stage 3b chronic kidney disease [facility])  Assessment & Plan:  Lab Results   Component Value Date    EGFR 34 09/12/2023    EGFR 33 08/09/2023    EGFR 31 06/09/2023    CREATININE 1.44 (H) 09/12/2023    CREATININE 1.46 (H) 08/09/2023    CREATININE 1.56 (H) 06/09/2023 9/21/2023 patient was seen by nephrologist.  They are managing CKD level 3B, hypertension with CKD level 3 and kidney cyst.  Their notes were noted. Patient does have minimal proteinuria. Patient also has a secondary hyperparathyroidism PTH acceptable patient remains on vitamin D. They will follow her back in 6 months. 9/12/2023: Vitamin D 51.2, CBC normal, magnesium 2.0, BUN 29 creatinine 1.44 GFR 34 blood sugar 105 rest of the CMP normal, urine for protein/creatinine ratio 0.09, PTH 75.5, urine analysis trace leukocyte 2-4 WBC otherwise unremarkable,  8/9/2023: BUN 25 creatinine 1.46 GFR 33 rest of the BMP normal,  6/9/2023: Hemoglobin 6.2, BUN 36, creatinine 1.56 GFR 31 rest of the CMP normal WBC third 10.32 rest of the CBC normal, TSH 1.37 and free T41.03      History about right renal mass is being addressed by urologist        9. Kidney cyst, acquired  Assessment & Plan:  Right renal mass/ renal cyst:  Seen by urologist:7/18/2023 patient had a CT scan of chest abdomen pelvis impression:Chest:  No new concerning pulmonary nodules.  Nothing to indicate recurrent adenocarcinoma.     Abdomen/pelvis:  Redemonstrated 13 mm exophytic nodule involving the right lower pole of the kidney, for which renal ultrasound was previously recommended.     KIDNEYS/URETERS: Redemonstrated is an exophytic nodule involving the right lower pole of the kidney, measuring approximately 13 mm (series 2, image 152), stable since January 2023. This nodule measured approximately 9 x 7 mm on a remote PET/CT from July 2014    LUNGS: No new pulmonary nodule. Redemonstrated are small pulmonary nodules in the right upper lobe and left lower lobe (series 6, image 54 and 97), stable since 2020, benign.     Redemonstrated postsurgical changes related to segmentectomy in the superior segment of the right lower lobe. Surgical staple line is normal. Associated scarring is unchanged. 8/11/2023: Ultrasound of the kidney and bladder:Right kidney:  10.5 x 5.0 x 5.0 cm. Volume 138.5 mL  Normal echogenicity and contour. Cortical thickness:  Normal.  No suspicious masses detected. 1.5 x 1.1 x 1.2 cm lower pole simple cortical cyst. 0.8 x 0.8 x 0.9 cm upper pole simple cortical cyst. Neither appears to correspond to the mass demonstrated on prior CT. No hydronephrosis. No shadowing calculi. No perinephric fluid collections.     Left kidney:  9.2 x 4.9 x 4.0 cm. Volume 95.8 mL  Normal echogenicity and contour. Cortical thickness:  Normal.  No suspicious masses detected. 8 x 5 x 8 mm upper pole simple cortical cyst.  No hydronephrosis. No shadowing calculi. No perinephric fluid collections    IMPRESSION:     Small simple cortical cysts in both kidneys.     No right lower pole mass identified that corresponds to the exophytic lesion demonstrated on prior unenhanced CT from 7/18/2023. This suggest the possibility of an isoechoic solid renal mass. Therefore, further evaluation with pre and postcontrast CT or   MRI of the abdomen is recommended in order to accurately characterize this presumed right renal tumor. 8/29/2023: Comment by cardiothoracic NP reviewed  Patient is scheduled for CT scan of the chest 7/29/2024. As well as MRI of abdomen in abdomen 2-  As well as CT scan of abdomen and with and without contrast was also ordered this 2 tests are ordered by urology service or their PA.   Patient was seen by urologist 8/23/2023. Urologist PA had ordered CT scan of the abdomen and pelvis with and without contrast on 8/21/2023 however according to urologist note she did notice that patient does have a CKD level 3B and take have ordered MRI of abdomen. However they did not cancel CT scan of the abdomen and pelvis. They also did not notice that patient had bilateral hip replacement. Patient claims that she told the urologist that she had a hip replacement. Also MRI is being ordered with and without contrast.    Patient does have a history of bilateral hip replacement. Also does have a CKD level 3B. .   we will call and find out  From radiology department if patient can have MRI due to her hx of bilateral hip replacement        10. Mixed hyperlipidemia  Assessment & Plan:  Lab Results   Component Value Date    LDLCALC 36 06/05/2023     Lab Results   Component Value Date    ALT 39 06/09/2023     Lab Results   Component Value Date    CHOLESTEROL 114 06/05/2023    CHOLESTEROL 141 02/16/2022    CHOLESTEROL 139 11/18/2021     Lab Results   Component Value Date    HDL 60 06/05/2023    HDL 54 02/16/2022    HDL 56 11/18/2021     Lab Results   Component Value Date    TRIG 91 06/05/2023    TRIG 113 02/16/2022    TRIG 98 11/18/2021     Lab Results   Component Value Date    NONHDLC 54 06/05/2023    [address] 87 02/16/2022    [address] 83 11/18/2021   Hyperlipidemia to the target. Patient is tolerating atorvastatin 10 mg daily without side effect. We will continue same        11. Vitamin D deficiency  Assessment & Plan:  Recent vitamin D level was normal.      12. Weight loss  Assessment & Plan:  Weight is under 61 pounds and seems to be stable. Patient had a CT scan of chest abdomen and pelvis. Up-to-date with the mammogram.  Up-to-date with colonoscopy. Recommend to continue to watch weight weekly and eat 3 healthy meals      13.  Prediabetes  Assessment & Plan:  Lab Results   Component Value Date    HGBA1C 6.2 (H) 06/09/2023   Recommend hemoglobin A1c with the next blood test               Subjective     Patient is here for chronic disease management. Patient offers no specific complaint. Recent lab data is reviewed. 9/12/2023: Vitamin D 51.2, CBC normal, magnesium 2.0, BUN 29 creatinine 1.44 GFR 34 blood sugar 105 rest of the CMP normal, urine for protein/creatinine ratio 0.09, PTH 75.5, urine analysis trace leukocyte 2-4 WBC otherwise unremarkable,  8/9/2023: BUN 25 creatinine 1.46 GFR 33 rest of the BMP normal,  6/9/2023: Hemoglobin 6.2, BUN 36, creatinine 1.56 GFR 31 rest of the CMP normal WBC third 10.32 rest of the CBC normal, TSH 1.37 and free T41.03      9/21/2023 patient was seen by nephrologist.  They are managing CKD level 3B, hypertension with CKD level 3 and kidney cyst.  Their notes were noted. Patient does have minimal proteinuria. Patient also has a secondary hyperparathyroidism PTH acceptable patient remains on vitamin D. They will follow her back in 6 months. 7/18/2023 patient had a CT scan of chest abdomen pelvis impression:Chest:  No new concerning pulmonary nodules. Nothing to indicate recurrent adenocarcinoma.     Abdomen/pelvis:  Redemonstrated 13 mm exophytic nodule involving the right lower pole of the kidney, for which renal ultrasound was previously recommended.     KIDNEYS/URETERS: Redemonstrated is an exophytic nodule involving the right lower pole of the kidney, measuring approximately 13 mm (series 2, image 152), stable since January 2023. This nodule measured approximately 9 x 7 mm on a remote PET/CT from July 2014    LUNGS: No new pulmonary nodule. Redemonstrated are small pulmonary nodules in the right upper lobe and left lower lobe (series 6, image 54 and 97), stable since 2020, benign.     Redemonstrated postsurgical changes related to segmentectomy in the superior segment of the right lower lobe. Surgical staple line is normal. Associated scarring is unchanged.     8/11/2023: Ultrasound of the kidney and bladder:Right kidney:  10.5 x 5.0 x 5.0 cm. Volume 138.5 mL  Normal echogenicity and contour. Cortical thickness:  Normal.  No suspicious masses detected. 1.5 x 1.1 x 1.2 cm lower pole simple cortical cyst. 0.8 x 0.8 x 0.9 cm upper pole simple cortical cyst. Neither appears to correspond to the mass demonstrated on prior CT. No hydronephrosis. No shadowing calculi. No perinephric fluid collections.     Left kidney:  9.2 x 4.9 x 4.0 cm. Volume 95.8 mL  Normal echogenicity and contour. Cortical thickness:  Normal.  No suspicious masses detected. 8 x 5 x 8 mm upper pole simple cortical cyst.  No hydronephrosis. No shadowing calculi. No perinephric fluid collections    IMPRESSION:     Small simple cortical cysts in both kidneys.     No right lower pole mass identified that corresponds to the exophytic lesion demonstrated on prior unenhanced CT from 7/18/2023. This suggest the possibility of an isoechoic solid renal mass. Therefore, further evaluation with pre and postcontrast CT or   MRI of the abdomen is recommended in order to accurately characterize this presumed right renal tumor. 8/29/2023: Comment by cardiothoracic NP reviewed  Patient is scheduled for CT scan of the chest 7/29/2024. As well as MRI of abdomen in abdomen 2-  As well as CT scan of abdomen and with and without contrast was also ordered this 2 tests are ordered by urology service or their PA. Patient was seen by urologist 8/23/2023. Urologist PA had ordered CT scan of the abdomen and pelvis with and without contrast on 8/21/2023 however according to urologist note she did notice that patient does have a CKD level 3B and take have ordered MRI of abdomen. However they did not cancel CT scan of the abdomen and pelvis. They also did not notice that patient had bilateral hip replacement. Patient claims that she told the urologist that she had a hip replacement.   Also MRI is being ordered with and without contrast.    Patient does have a history of bilateral hip replacement. Also does have a CKD level 3B. .   we will call and find out  From radiology department if patient can have MRI due to her hx of bilateral hip replacement    Review of Systems   Constitutional: Negative for appetite change, fatigue, fever and unexpected weight change. HENT: Negative for congestion, ear pain and sore throat. Eyes: Negative for pain and redness. Respiratory: Negative for cough and shortness of breath. Cardiovascular: Negative for chest pain, palpitations and leg swelling. Gastrointestinal: Negative for abdominal pain, diarrhea, nausea and vomiting. Endocrine: Negative for cold intolerance, polydipsia and polyuria. Genitourinary: Negative for dysuria, frequency and urgency. Musculoskeletal: Negative for arthralgias, gait problem and myalgias. Skin: Negative for rash. Allergic/Immunologic: Negative. Neurological: Negative for dizziness and headaches. Hematological: Negative for adenopathy. Psychiatric/Behavioral: Negative for behavioral problems, confusion, decreased concentration and dysphoric mood.        Past Medical History:   Diagnosis Date   • Cancer ([address])     lung   • Diarrhea     wt loss from 192lb to 178lb (1/23)   • Emphysema lung ([facility])    • Hemorrhoid     possible hemorrhoid with small amount of bleeding   • Hyperlipidemia    • Hypertension    • Lung cancer ([address])    • Numbness and tingling of foot    • Osteoarthritis of right hip    • Primary adenocarcinoma of lower lobe of right lung [facility])      Past Surgical History:   Procedure Laterality Date   • CATARACT EXTRACTION Bilateral    • CHOLECYSTECTOMY     • COLONOSCOPY     • JOINT REPLACEMENT Bilateral     hips-   • TX ARTHRP ACETBLR/PROX FEM PROSTC AGRFT/ALGRFT Right 09/14/2018    Procedure: ANTERIOR TOTAL HIP ARTHROPLASTY;  Surgeon: Jhon Fu MD;  Location: [facility];  Service: Orthopedics   • TOTAL HIP ARTHROPLASTY     • TUMOR REMOVAL Right 2015    lower lobe      Family History   Problem Relation Age of Onset   • Heart disease Mother    • No Known Problems Father      Social History     Socioeconomic History   • Marital status:      Spouse name: None   • Number of children: None   • Years of education: None   • Highest education level: None   Occupational History   • None   Tobacco Use   • Smoking status: Former     Packs/day: 1.00     Years: 50.00     Total pack years: 50.00     Types: Cigarettes     Quit date: 2015     Years since quittin.0   • Smokeless tobacco: Never   Vaping Use   • Vaping Use: Never used   Substance and Sexual Activity   • Alcohol use: Yes     Comment: on occ   • Drug use: Never   • Sexual activity: None   Other Topics Concern   • None   Social History Narrative   • None     Social Determinants of Health     Financial Resource Strain: Low Risk  (2022)    Overall Financial Resource Strain (CARDIA)    • Difficulty of Paying Living Expenses: Not hard at all   Food Insecurity: Not on file   Transportation Needs: No Transportation Needs (2022)    PRAPARE - Transportation    • Lack of Transportation (Medical): No    • Lack of Transportation (Non-Medical):  No   Physical Activity: Not on file   Stress: Not on file   Social Connections: Not on file   Intimate Partner Violence: Not on file   Housing Stability: Not on file     Current Outpatient Medications on File Prior to Visit   Medication Sig   • acetaminophen (TYLENOL) 500 mg tablet Take 500 mg by mouth every 6 (six) hours as needed for mild pain   • albuterol (PROVENTIL HFA,VENTOLIN HFA) 90 mcg/act inhaler Inhale 2 puffs every 6 (six) hours as needed for wheezing or shortness of breath   • amLODIPine (NORVASC) 10 mg tablet Take 1 tablet (10 mg total) by mouth daily   • aspirin (ECOTRIN LOW STRENGTH) 81 mg EC tablet Take 1 tablet (81 mg total) by mouth 2 (two) times a day (Patient taking differently: Take 81 mg by mouth daily with dinner)   • atorvastatin (LIPITOR) 10 mg tablet TAKE 1 TABLET DAILY AT BEDTIME   • Cholecalciferol (Vitamin D3) 50 MCG (2000 UT) TABS Take 2,000 Units by mouth daily   • gabapentin (NEURONTIN) 300 mg capsule Take 1 capsule (300 mg total) by mouth daily   • hydrochlorothiazide (MICROZIDE) 12.5 mg capsule TAKE 1 CAPSULE DAILY   • losartan (COZAAR) 50 mg tablet Take 1 tablet (50 mg total) by mouth daily   • metoprolol succinate (TOPROL-XL) 100 mg 24 hr tablet Take 1 tablet (100 mg total) by mouth daily   • Omega 3 1200 MG CAPS Take by mouth Omega XL daily   • Polyethylene Glycol 400 0.25 % SOLN Apply 1 drop to eye 2 (two) times a day As needed   • [DISCONTINUED] amoxicillin (AMOXIL) 500 mg capsule TAKE FOUR CAPSULES BY MOUTH ONE HOUR BEFORE APPOINTMENT (Patient not taking: Reported on 8/23/2023)   • [DISCONTINUED] Wheat Dextrin (BENEFIBER PO) Take by mouth 2 (two) times a day (Patient not taking: Reported on 8/23/2023)     Allergies   Allergen Reactions   • Other Other (See Comments)     seasonal     Immunization History   Administered Date(s) Administered   • INFLUENZA 12/02/2022   • Influenza, high dose seasonal 0.7 mL 11/23/2020, 11/24/2021, 12/02/2022, 10/05/2023   • Influenza, injectable, quadrivalent, preservative free 0.5 mL 10/24/2019       Objective     /80   Pulse 60   Ht 5' 6" (1.676 m)   Wt 73 kg (161 lb)   SpO2 98%   BMI 25.99 kg/m²     Physical Exam  Constitutional:       General: She is not in acute distress. Appearance: She is well-developed. She is not ill-appearing, toxic-appearing or diaphoretic. HENT:      Head: Normocephalic and atraumatic. Right Ear: External ear normal.      Left Ear: External ear normal.   Eyes:      General: Lids are normal.         Right eye: No discharge. Left eye: No discharge. Conjunctiva/sclera: Conjunctivae normal.   Neck:      Thyroid: No thyroid mass or thyromegaly. Vascular: No carotid bruit or JVD.       Trachea: Trachea normal. Cardiovascular:      Rate and Rhythm: Regular rhythm. Heart sounds: Normal heart sounds. Pulmonary:      Effort: No respiratory distress. Breath sounds: No wheezing, rhonchi or rales. Abdominal:      General: There is no distension. Palpations: There is no mass. Tenderness: There is no abdominal tenderness. Hernia: No hernia is present. Musculoskeletal:      Cervical back: No rigidity or tenderness. Right lower leg: No edema. Left lower leg: No edema. Lymphadenopathy:      Cervical: No cervical adenopathy. Skin:     General: Skin is warm. Coloration: Skin is not jaundiced or pale. Findings: No rash. Neurological:      General: No focal deficit present. Mental Status: She is alert and oriented to person, place, and time. Psychiatric:         Mood and Affect: Mood normal.         Behavior: Behavior normal.         Thought Content:  Thought content normal.         Judgment: Judgment normal.       Dionna Vázquez MD

## 2023-10-05 NOTE — ASSESSMENT & PLAN NOTE
Weight is under 61 pounds and seems to be stable. Patient had a CT scan of chest abdomen and pelvis. Up-to-date with the mammogram.  Up-to-date with colonoscopy.   Recommend to continue to watch weight weekly and eat 3 healthy meals

## 2023-10-05 NOTE — ASSESSMENT & PLAN NOTE
Lab Results   Component Value Date    EGFR 34 09/12/2023    EGFR 33 08/09/2023    EGFR 31 06/09/2023    CREATININE 1.44 (H) 09/12/2023    CREATININE 1.46 (H) 08/09/2023    CREATININE 1.56 (H) 06/09/2023 9/21/2023 patient was seen by nephrologist.  They are managing CKD level 3B, hypertension with CKD level 3 and kidney cyst.  Their notes were noted. Patient does have minimal proteinuria. Patient also has a secondary hyperparathyroidism PTH acceptable patient remains on vitamin D. They will follow her back in 6 months.       9/12/2023: Vitamin D 51.2, CBC normal, magnesium 2.0, BUN 29 creatinine 1.44 GFR 34 blood sugar 105 rest of the CMP normal, urine for protein/creatinine ratio 0.09, PTH 75.5, urine analysis trace leukocyte 2-4 WBC otherwise unremarkable,  8/9/2023: BUN 25 creatinine 1.46 GFR 33 rest of the BMP normal,  6/9/2023: Hemoglobin 6.2, BUN 36, creatinine 1.56 GFR 31 rest of the CMP normal WBC third 10.32 rest of the CBC normal, TSH 1.37 and free T41.03      History about right renal mass is being addressed by urologist

## 2023-10-05 NOTE — ASSESSMENT & PLAN NOTE
Lab Results   Component Value Date    LDLCALC 36 06/05/2023     Lab Results   Component Value Date    ALT 39 06/09/2023     Lab Results   Component Value Date    CHOLESTEROL 114 06/05/2023    CHOLESTEROL 141 02/16/2022    CHOLESTEROL 139 11/18/2021     Lab Results   Component Value Date    HDL 60 06/05/2023    HDL 54 02/16/2022    HDL 56 11/18/2021     Lab Results   Component Value Date    TRIG 91 06/05/2023    TRIG 113 02/16/2022    TRIG 98 11/18/2021     Lab Results   Component Value Date    NONHDLC 54 06/05/2023    [address] 87 02/16/2022    [address] 83 11/18/2021   Hyperlipidemia to the target. Patient is tolerating atorvastatin 10 mg daily without side effect.   We will continue same

## 2023-10-05 NOTE — ASSESSMENT & PLAN NOTE
Lab Results   Component Value Date    HGBA1C 6.2 (H) 06/09/2023   Recommend hemoglobin A1c with the next blood test

## 2023-10-05 NOTE — ASSESSMENT & PLAN NOTE
Patient denies any angina or angina:. Nuclear stress test done on 6/1/2022 reviewed no perfusion defects were noted. Relevant medications includes amlodipine 10 mg daily, regular thiazide 12.5 mg daily, losartan 50 mg daily, metoprolol succinate 100 mg daily, atorvastatin 10 mg daily, as well as baby aspirin daily as well as patient takes omega 1200 mg. Patient has appointment with cardiologist today.

## 2023-10-05 NOTE — TELEPHONE ENCOUNTER
Dr. Osmar Everett office because patient is going to have an MRI w/ & w/o contrast and they wanted to make sure the patient is cleared to have it done. Please advise.

## 2023-10-05 NOTE — ASSESSMENT & PLAN NOTE
Gets headache couple of times a week better with the Tylenol MRI in 2018 was unremarkable headache is not a major issue.

## 2023-10-05 NOTE — PROGRESS NOTES
Progress Note - Cardiology Office  [facility]    Jeff Laguna 77 y.o. female MRN: [id number]  : 1945  Encounter: [id number]      Assessment:     1. Essential hypertension    2. Osteoarthritis of right hip, unspecified osteoarthritis type    3. Stage 3b chronic kidney disease ([address])    4. Dyslipidemia        Discussion Summary and Plan:  1. Hypertension: Blood pressures currently acceptable    -   Continue hydrochlorothiazide 12.5 mg once a day    -   Continue Norvasc 10 mg once a day    -   Continue Cozaar 50 mg daily    -   Continue low-sodium diet    2. Chronic kidney disease stage IIIb: Follows with Dr. Gonzales Gambino,  Creatinine 1.1-1.4    3. Dyslipidemia: Continue Lipitor 10 mg once a day    -   Labs per PCP pending     Patient / Peggy Wadsworth was advised and educated to call our office  immediately if  patient has any new symptoms of chest pain/shortness of breath, near-syncope, syncope, light headedness sustained palpitations  or any other cardiovascular symptoms before their scheduled follow-up appointment. Office number was provided #[phone]. Please call [phone] if any questions. Counseling :  A description of the counseling. Goals and Barriers. Patient's ability to self care: Yes  Medication side effect reviewed with patient in detail and all their questions answered to their satisfaction. HPI :     Jeff Laguna is a 77y.o. year old female who came for follow up. It has been approximately 1 year since she was last seen in our office. She has been busy following with urology and nephrology. She was recently diagnosed with chronic kidney disease stage IIIb which was felt to be due to longstanding NSAID use. She has been watching her sodium and her portion sizes and has lost approximately 40 pounds in the last 8 months. She denies any chest pain, pressure or palpitations.     2022 patient had a [place] nuclear stress test which demonstrated no transient dilatation and no evidence of ischemia or prior infarction. She states that she has been remaining active and has not had any shortness of breath. She has past medical history significant for hypertension, previous tobacco abuse for which she quit after being diagnosed with lung cancer, resection of her lung cancer, emphysema, dyslipidemia, severe osteoarthritis and chronic kidney disease      Review of Systems   Constitutional: Negative. Negative for activity change and fatigue. HENT: Negative. Negative for congestion, facial swelling and sinus pressure. Eyes: Negative. Negative for photophobia and visual disturbance. Respiratory: Negative. Negative for chest tightness and shortness of breath. Cardiovascular: Negative. Negative for chest pain, palpitations and leg swelling. Gastrointestinal: Negative. Negative for abdominal distention, constipation, diarrhea, nausea and vomiting. Endocrine: Negative for polydipsia, polyphagia and polyuria. Genitourinary: Negative. Negative for difficulty urinating. Musculoskeletal: Negative. Neurological: Negative. Negative for dizziness, syncope and light-headedness. Hematological: Negative.         Historical Information   Past Medical History:   Diagnosis Date   • Cancer ([address])     lung   • Diarrhea     wt loss from 192lb to 178lb (1/23)   • Emphysema lung (HCC)    • Hemorrhoid     possible hemorrhoid with small amount of bleeding   • Hyperlipidemia    • Hypertension    • Lung cancer ([address])    • Numbness and tingling of foot    • Osteoarthritis of right hip    • Primary adenocarcinoma of lower lobe of right lung [facility])      Past Surgical History:   Procedure Laterality Date   • CATARACT EXTRACTION Bilateral    • CHOLECYSTECTOMY     • COLONOSCOPY     • JOINT REPLACEMENT Bilateral     hips-   • UT ARTHRP ACETBLR/PROX FEM PROSTC AGRFT/ALGRFT Right 09/14/2018    Procedure: ANTERIOR TOTAL HIP ARTHROPLASTY;  Surgeon: Tasneem Luis MD; Location: [facility];  Service: Orthopedics   • TOTAL HIP ARTHROPLASTY     • TUMOR REMOVAL Right 2015    lower lobe      Social History     Substance and Sexual Activity   Alcohol Use Yes    Comment: on occ     Social History     Substance and Sexual Activity   Drug Use Never     Social History     Tobacco Use   Smoking Status Former   • Packs/day: 1.00   • Years: 50.00   • Total pack years: 50.00   • Types: Cigarettes   • Quit date: 2015   • Years since quittin.0   Smokeless Tobacco Never     Family History:   Family History   Problem Relation Age of Onset   • Heart disease Mother    • No Known Problems Father        Meds/Allergies     Allergies   Allergen Reactions   • Other Other (See Comments)     seasonal       Current Outpatient Medications:   •  acetaminophen (TYLENOL) 500 mg tablet, Take 500 mg by mouth every 6 (six) hours as needed for mild pain, Disp: , Rfl:   •  albuterol (PROVENTIL HFA,VENTOLIN HFA) 90 mcg/act inhaler, Inhale 2 puffs every 6 (six) hours as needed for wheezing or shortness of breath, Disp: 18 g, Rfl: 1  •  amLODIPine (NORVASC) 10 mg tablet, Take 1 tablet (10 mg total) by mouth daily, Disp: 90 tablet, Rfl: 1  •  aspirin (ECOTRIN LOW STRENGTH) 81 mg EC tablet, Take 1 tablet (81 mg total) by mouth 2 (two) times a day (Patient taking differently: Take 81 mg by mouth daily with dinner), Disp: 60 tablet, Rfl: 0  •  atorvastatin (LIPITOR) 10 mg tablet, TAKE 1 TABLET DAILY AT BEDTIME, Disp: 90 tablet, Rfl: 3  •  Cholecalciferol (Vitamin D3) 50 MCG (2000 UT) TABS, Take 2,000 Units by mouth daily, Disp: , Rfl:   •  gabapentin (NEURONTIN) 300 mg capsule, Take 1 capsule (300 mg total) by mouth daily, Disp: 90 capsule, Rfl: 1  •  hydrochlorothiazide (MICROZIDE) 12.5 mg capsule, TAKE 1 CAPSULE DAILY, Disp: 90 capsule, Rfl: 3  •  losartan (COZAAR) 50 mg tablet, Take 1 tablet (50 mg total) by mouth daily, Disp: 90 tablet, Rfl: 1  •  metoprolol succinate (TOPROL-XL) 100 mg 24 hr tablet, Take 1 tablet (100 mg total) by mouth daily, Disp: 90 tablet, Rfl: 1  •  Omega 3 1200 MG CAPS, Take by mouth Omega XL daily, Disp: , Rfl:   •  Polyethylene Glycol 400 0.25 % SOLN, Apply 1 drop to eye 2 (two) times a day As needed, Disp: , Rfl:     Vitals: Blood pressure 132/74, pulse 61, height 5' 6" (1.676 m), weight 72.6 kg (160 lb), SpO2 98 %. Body mass index is 25.82 kg/m². Wt Readings from Last 3 Encounters:   10/05/23 72.6 kg (160 lb)   10/05/23 73 kg (161 lb)   09/21/23 72.9 kg (160 lb 12.8 oz)     Vitals:    10/05/23 1420   Weight: 72.6 kg (160 lb)     BP Readings from Last 3 Encounters:   10/05/23 132/74   10/05/23 138/80   09/21/23 130/64       Physical Exam:  Physical Exam  Vitals and nursing note reviewed. Constitutional:       General: She is not in acute distress. Appearance: Normal appearance. She is normal weight. HENT:      Right Ear: External ear normal.      Left Ear: External ear normal.   Eyes:      General: No scleral icterus. Right eye: No discharge. Left eye: No discharge. Cardiovascular:      Rate and Rhythm: Normal rate and regular rhythm. Pulses: Normal pulses. Heart sounds: Normal heart sounds. Pulmonary:      Effort: Pulmonary effort is normal. No respiratory distress. Breath sounds: Normal breath sounds. No wheezing, rhonchi or rales. Abdominal:      General: Bowel sounds are normal. There is no distension. Palpations: Abdomen is soft. Musculoskeletal:      Right lower leg: No edema. Left lower leg: No edema. Skin:     General: Skin is warm and dry. Capillary Refill: Capillary refill takes less than 2 seconds. Neurological:      General: No focal deficit present. Mental Status: She is alert and oriented to person, place, and time. Mental status is at baseline.    Psychiatric:         Mood and Affect: Mood normal.           Diagnostic Studies Review Cardio:      EKG:  Sinus rhythm with right bundle branch block          [address] [facility]  Cardiology        "This note was completed in part utilizing [employer] direct voice recognition software. Grammatical errors, random word insertion, spelling mistakes, and incomplete sentences may be an occasional consequence of the system secondary to software limitations, ambient noise and hardware issues. Please read the chart carefully and recognize, using context, where substitutions have occurred.   If you have any questions or concerns about the context, text or information contained within the body of this dictation, please contact myself, the provider, for further clarification."

## 2023-10-05 NOTE — PATIENT INSTRUCTIONS
You are scheduled to have CT scan of the chest in 7/29/2024 by your cardiothoracic surgeon make sure you get it done and follow-up with your cardiothoracic surgeon after that      You are ordered to have MRI of abdomen regarding your kidney mass by urologist with and without dye. My office will call and find out you can have MRI of abdomen due to your bilateral hip replacement. Please contact our office for this clarification. You should also discuss with your urologist to order MRI about both bilateral hip replacement as well as your sluggish kidney that is CKD level 3B. Follow with Consultants as per their and our suggestion    Follow up in8-10 week(s) or as needed earlier    Follow all instructions as advised and discussed. Take your medications as prescribed. Call the office immediately if you experience any side effects. Ask questions if you do not understand. Keep your scheduled appointment as advised or come sooner if necessary or in doubt. Best time to call for non-urgent matter or questions on weekdays is between 9am and 12 noon. See physician for any new symptoms or worsening of current symptoms. Urgent or emergent situations call 911 and report to nearest emergency room. I spent  time taking care of this patient including clinical care, conseling, collaboration, chart, lab and consultant's follow up note,images report, documentation, pre visit  review as appropriate.     Patient is to get labs 1 week(s) prior to next visit if advised    Please go for mammogram in next month as planned

## 2023-10-09 ENCOUNTER — TELEPHONE (OUTPATIENT)
Dept: INTERNAL MEDICINE CLINIC | Facility: CLINIC | Age: 78
End: 2023-10-09

## 2023-10-09 ENCOUNTER — TELEPHONE (OUTPATIENT)
Age: 78
End: 2023-10-09

## 2023-10-09 NOTE — TELEPHONE ENCOUNTER
Patient called to clarify on the amoxicillin , she does take it prior to dental work because of hip replacement

## 2023-10-09 NOTE — TELEPHONE ENCOUNTER
Patient calling inquiring about a missed phone call from the office. She was informed and acknowledged  Dr Britni Erickson said it was ok to have MRI with contrast.    Please f/u with patient if there is anything else.     Thank You

## 2023-10-16 DIAGNOSIS — N18.30 BENIGN HYPERTENSION WITH CKD (CHRONIC KIDNEY DISEASE) STAGE III (HCC): ICD-10-CM

## 2023-10-16 DIAGNOSIS — I12.9 BENIGN HYPERTENSION WITH CKD (CHRONIC KIDNEY DISEASE) STAGE III (HCC): ICD-10-CM

## 2023-10-16 RX ORDER — METOPROLOL SUCCINATE 100 MG/1
100 TABLET, EXTENDED RELEASE ORAL DAILY
Qty: 90 TABLET | Refills: 1 | Status: SHIPPED | OUTPATIENT
Start: 2023-10-16

## 2023-10-16 RX ORDER — AMLODIPINE BESYLATE 10 MG/1
10 TABLET ORAL DAILY
Qty: 90 TABLET | Refills: 3 | Status: SHIPPED | OUTPATIENT
Start: 2023-10-16

## 2023-11-01 ENCOUNTER — TELEPHONE (OUTPATIENT)
Dept: NEPHROLOGY | Facility: CLINIC | Age: 78
End: 2023-11-01

## 2023-11-01 NOTE — TELEPHONE ENCOUNTER
"Dual Chamber AICD Evaluation Report  IN OFFICE    March 14, 2018    Primary Cardiologist: David  : Guidant Model: Teligen  Implant date: 06/07/2010    Reason for evaluation: routine  Indication for AICD: dilated cardiomyopathy    Measurements  Atrial sensing - P wave: 7.4 mV  Atrial threshold: 0.1 V @ 0.4 ms  Atrial lead impedance: 487 ohms  Ventricular sensing - R wave: 9.6 mV  Ventricular threshold: 0.6 V @ 0.4 ms  Ventricular lead impedance:  809 ohms  Shock impedance:   69 ohms      Diagnostic Data  Atrial paced: 1 %  Ventricular paced: <1 %    Numerous episodes recorded since 02/06/2017:  10 episodes recorded as NS-ventricular. Maximum ventricular rate 205 bpm, longest duration 8 seconds.  Appear to be SVT.  Episode recorded as VF appears to be NOISE/ARTIFACT.  Patient denies having any medical procedure that day.  1 ATR episode:  Max ventricular rate 165 bpm, duration 7 seconds.  Patient reports that she has not taken cardiac medications for at least three months because she \"ran out\".  RN educated patient on importance of medication regimen and on importance of device follow up.  Patient verbalized understanding.  Discussed with ANDREA Josue, prior to clinic visit with patient.    Battery status: satisfactory  Est 4.5 years remaining      Final Parameters  Mode: DDD  Lower rate: 60 bpm Upper rate: 130 bpm  AV Delay: Paced- 220-260 ms    Sensed- 220-260 ms     Atrial - Amplitude: 2.2 V Pulse width: 0.4 ms Sensitivity: 0.25 mV   Ventricular - Amplitude: 2 V Pulse width: 0.4 ms Sensitivity: 0.6 mV   Changes made: Normal jimmy atrial sensitivity changed to 0.3mV.  Conclusions: normal AICD function, no therapy delivered, stable pacing and sensing thresholds and adequate battery reserve    Follow up: 1 month AICD recheck and NP visit     " Patient called stating she is having a wisdom tooth removed 12/4 and would like to know if it's safe for her kidneys to go under for this procedure.

## 2023-11-09 ENCOUNTER — HOSPITAL ENCOUNTER (OUTPATIENT)
Dept: RADIOLOGY | Facility: HOSPITAL | Age: 78
Discharge: HOME | End: 2023-11-09
Attending: INTERNAL MEDICINE
Payer: MEDICARE

## 2023-11-09 VITALS — BODY MASS INDEX: 25.82 KG/M2 | HEIGHT: 66 IN

## 2023-11-09 DIAGNOSIS — Z12.31 SCREENING MAMMOGRAM, ENCOUNTER FOR: ICD-10-CM

## 2023-11-09 PROCEDURE — 77067 SCR MAMMO BI INCL CAD: CPT

## 2023-11-09 PROCEDURE — 77063 BREAST TOMOSYNTHESIS BI: CPT

## 2023-11-10 DIAGNOSIS — N18.30 BENIGN HYPERTENSION WITH CKD (CHRONIC KIDNEY DISEASE) STAGE III (HCC): ICD-10-CM

## 2023-11-10 DIAGNOSIS — I12.9 BENIGN HYPERTENSION WITH CKD (CHRONIC KIDNEY DISEASE) STAGE III (HCC): ICD-10-CM

## 2023-11-10 RX ORDER — LOSARTAN POTASSIUM 50 MG/1
50 TABLET ORAL DAILY
Qty: 90 TABLET | Refills: 1 | Status: SHIPPED | OUTPATIENT
Start: 2023-11-10

## 2023-12-14 ENCOUNTER — OFFICE VISIT (OUTPATIENT)
Dept: INTERNAL MEDICINE CLINIC | Facility: CLINIC | Age: 78
End: 2023-12-14
Payer: MEDICARE

## 2023-12-14 VITALS
HEART RATE: 74 BPM | BODY MASS INDEX: 26.03 KG/M2 | HEIGHT: 66 IN | OXYGEN SATURATION: 99 % | SYSTOLIC BLOOD PRESSURE: 150 MMHG | WEIGHT: 162 LBS | DIASTOLIC BLOOD PRESSURE: 80 MMHG

## 2023-12-14 DIAGNOSIS — H81.10 BENIGN PAROXYSMAL POSITIONAL VERTIGO, UNSPECIFIED LATERALITY: ICD-10-CM

## 2023-12-14 DIAGNOSIS — E78.5 DYSLIPIDEMIA: ICD-10-CM

## 2023-12-14 DIAGNOSIS — E83.9 CHRONIC KIDNEY DISEASE-MINERAL AND BONE DISORDER: ICD-10-CM

## 2023-12-14 DIAGNOSIS — N18.32 STAGE 3B CHRONIC KIDNEY DISEASE (HCC): ICD-10-CM

## 2023-12-14 DIAGNOSIS — I25.84 CORONARY ARTERY CALCIFICATION: ICD-10-CM

## 2023-12-14 DIAGNOSIS — M89.9 CHRONIC KIDNEY DISEASE-MINERAL AND BONE DISORDER: ICD-10-CM

## 2023-12-14 DIAGNOSIS — R60.0 EDEMA OF BOTH LEGS: Primary | ICD-10-CM

## 2023-12-14 DIAGNOSIS — F41.8 MIXED ANXIETY AND DEPRESSIVE DISORDER: ICD-10-CM

## 2023-12-14 DIAGNOSIS — N18.9 CHRONIC KIDNEY DISEASE-MINERAL AND BONE DISORDER: ICD-10-CM

## 2023-12-14 DIAGNOSIS — N18.30 BENIGN HYPERTENSION WITH CKD (CHRONIC KIDNEY DISEASE) STAGE III (HCC): ICD-10-CM

## 2023-12-14 DIAGNOSIS — R74.01 ELEVATED AST (SGOT): ICD-10-CM

## 2023-12-14 DIAGNOSIS — I12.9 BENIGN HYPERTENSION WITH CKD (CHRONIC KIDNEY DISEASE) STAGE III (HCC): ICD-10-CM

## 2023-12-14 DIAGNOSIS — I25.10 CORONARY ARTERY CALCIFICATION: ICD-10-CM

## 2023-12-14 DIAGNOSIS — C34.91 ADENOCARCINOMA OF RIGHT LUNG (HCC): ICD-10-CM

## 2023-12-14 DIAGNOSIS — J43.9 PULMONARY EMPHYSEMA, UNSPECIFIED EMPHYSEMA TYPE (HCC): ICD-10-CM

## 2023-12-14 DIAGNOSIS — R73.03 PREDIABETES: ICD-10-CM

## 2023-12-14 DIAGNOSIS — G25.81 RESTLESS LEG SYNDROME: ICD-10-CM

## 2023-12-14 DIAGNOSIS — Z00.00 MEDICARE ANNUAL WELLNESS VISIT, SUBSEQUENT: ICD-10-CM

## 2023-12-14 PROCEDURE — G0439 PPPS, SUBSEQ VISIT: HCPCS | Performed by: INTERNAL MEDICINE

## 2023-12-14 PROCEDURE — 99214 OFFICE O/P EST MOD 30 MIN: CPT | Performed by: INTERNAL MEDICINE

## 2023-12-14 RX ORDER — HYDROCHLOROTHIAZIDE 25 MG/1
25 TABLET ORAL DAILY
Qty: 90 TABLET | Refills: 1 | Status: SHIPPED | OUTPATIENT
Start: 2023-12-14

## 2023-12-14 NOTE — ASSESSMENT & PLAN NOTE
Differential diagnosis discussed. Patient had remote history of acute DVT in the remote past.  Also has a varicose vein. Known case of C of the lung. At risk. In part secondary to edema is to some degree chronic however due to additional salt intake during holidays probably contributed to that. Also underlying CKD level 3 as well as amlodipine could be contributing. We talked about increased recently switching over to Lasix. Patient does not want to do that. Patient is also being followed by urologist for urinary frequency. We will increase hydrochlorothiazide to 25 mg daily. Will check follow-up lab data. Will do venous Doppler. Will check back in 2 weeks. Patient is acceptable to suggested plan. May have bump in the renal functions this is acceptable. Patient is being followed by also nephrologist.  May have to do echo and chest x-ray if problem persists or worsens.   She does not have any symptoms of LV failure or hypothyroidism or renal failure at this time

## 2023-12-14 NOTE — ASSESSMENT & PLAN NOTE
Lab Results   Component Value Date    EGFR 34 09/12/2023    EGFR 33 08/09/2023    EGFR 31 06/09/2023    CREATININE 1.44 (H) 09/12/2023    CREATININE 1.46 (H) 08/09/2023    CREATININE 1.56 (H) 06/09/2023   Or remains at risk of decompensation particularly since we are increasing hydrochlorothiazide. Refuses Lasix. I have some degree of prerenal state may have to be acceptable to find a balance between hypotension and edema and CKD. No symptoms of LV failure.   Will also monitor renal function, at risk of getting worse, will accept some degree of bump  Also being monitored by nephrologist

## 2023-12-14 NOTE — ASSESSMENT & PLAN NOTE
Lab Results   Component Value Date    EGFR 34 09/12/2023    EGFR 33 08/09/2023    EGFR 31 06/09/2023    CREATININE 1.44 (H) 09/12/2023    CREATININE 1.46 (H) 08/09/2023    CREATININE 1.56 (H) 06/09/2023   Or remains at risk of decompensation particularly since we are increasing hydrochlorothiazide. Refuses Lasix. I have some degree of prerenal state may have to be acceptable to find a balance between hypotension and edema and CKD. No symptoms of LV failure. Will also monitor 1.26.   Weekly

## 2023-12-14 NOTE — ASSESSMENT & PLAN NOTE
Lab Results   Component Value Date    LDLCALC 36 06/05/2023     Lab Results   Component Value Date    ALT 39 06/09/2023     Lab Results   Component Value Date    CHOLESTEROL 114 06/05/2023    CHOLESTEROL 141 02/16/2022    CHOLESTEROL 139 11/18/2021     Lab Results   Component Value Date    HDL 60 06/05/2023    HDL 54 02/16/2022    HDL 56 11/18/2021     Lab Results   Component Value Date    TRIG 91 06/05/2023    TRIG 113 02/16/2022    TRIG 98 11/18/2021     Lab Results   Component Value Date    NONHDLC 54 06/05/2023    [address] 87 02/16/2022    [address] 83 11/18/2021     Continue atorvastatin 10 mg daily

## 2023-12-14 NOTE — ASSESSMENT & PLAN NOTE
C of the lung is stable. No symptoms of LV failure. No pulmonary symptoms. Being followed by cardiothoracic surgeon. Going for CT scan of the chest middle of the next year on yearly basis. Remains disease-free.

## 2023-12-14 NOTE — ASSESSMENT & PLAN NOTE
Lab Results   Component Value Date    EGFR 34 09/12/2023    EGFR 33 08/09/2023    EGFR 31 06/09/2023    CREATININE 1.44 (H) 09/12/2023    CREATININE 1.46 (H) 08/09/2023    CREATININE 1.56 (H) 06/09/2023   CKD level 3B. Patient has modest edema. We talked about going on Lasix. Patient is refusing to go on Lasix. We will increase hydrochlorothiazide to Dyazide. Will monitor lab data in a week. Will do venous Doppler. Hypertension is suboptimal control. Remains on amlodipine losartan metoprolol and hydrochlorothiazide 12.5 mg will increase to 25 mg continue losartan 50 mg daily as well as amlodipine 10 mg daily as well as metoprolol succinate 100 mg daily.

## 2023-12-14 NOTE — ASSESSMENT & PLAN NOTE
COPD is fair. Denies any significant cough chest congestion shortness of breath. Not using any inhaler.

## 2023-12-14 NOTE — PROGRESS NOTES
Assessment and Plan:     Problem List Items Addressed This Visit        Respiratory    Adenocarcinoma of right lung ([address])     C of the lung is stable. No symptoms of LV failure. No pulmonary symptoms. Being followed by cardiothoracic surgeon. Going for CT scan of the chest middle of the next year on yearly basis. Remains disease-free. Relevant Orders    Comprehensive metabolic panel    CBC    TSH, 3rd generation    Emphysema lung ([address])     COPD is fair. Denies any significant cough chest congestion shortness of breath. Not using any inhaler. Relevant Orders    Comprehensive metabolic panel       Cardiovascular and Mediastinum    Benign hypertension with CKD (chronic kidney disease) stage III [facility])     Lab Results   Component Value Date    EGFR 34 09/12/2023    EGFR 33 08/09/2023    EGFR 31 06/09/2023    CREATININE 1.44 (H) 09/12/2023    CREATININE 1.46 (H) 08/09/2023    CREATININE 1.56 (H) 06/09/2023   CKD level 3B. Patient has modest edema. We talked about going on Lasix. Patient is refusing to go on Lasix. We will increase hydrochlorothiazide to Dyazide. Will monitor lab data in a week. Will do venous Doppler. Hypertension is suboptimal control. Remains on amlodipine losartan metoprolol and hydrochlorothiazide 12.5 mg will increase to 25 mg continue losartan 50 mg daily as well as amlodipine 10 mg daily as well as metoprolol succinate 100 mg daily. Relevant Medications    hydrochlorothiazide (HYDRODIURIL) 25 mg tablet    Coronary artery calcification       Nervous and Auditory    BPPV (benign paroxysmal positional vertigo)     . Positional vertigo is stable. No symptoms of dizziness.             Genitourinary    Stage 3b chronic kidney disease [facility])     Lab Results   Component Value Date    EGFR 34 09/12/2023    EGFR 33 08/09/2023    EGFR 31 06/09/2023    CREATININE 1.44 (H) 09/12/2023    CREATININE 1.46 (H) 08/09/2023    CREATININE 1.56 (H) 06/09/2023   Or remains at risk of decompensation particularly since we are increasing hydrochlorothiazide. Refuses Lasix. I have some degree of prerenal state may have to be acceptable to find a balance between hypotension and edema and CKD. No symptoms of LV failure. Will also monitor renal function, at risk of getting worse, will accept some degree of bump  Also being monitored by nephrologist         Relevant Medications    hydrochlorothiazide (HYDRODIURIL) 25 mg tablet    Other Relevant Orders    Comprehensive metabolic panel    CBC    TSH, 3rd generation    Chronic kidney disease-mineral and bone disorder     Lab Results   Component Value Date    EGFR 34 09/12/2023    EGFR 33 08/09/2023    EGFR 31 06/09/2023    CREATININE 1.44 (H) 09/12/2023    CREATININE 1.46 (H) 08/09/2023    CREATININE 1.56 (H) 06/09/2023   Or remains at risk of decompensation particularly since we are increasing hydrochlorothiazide. Refuses Lasix. I have some degree of prerenal state may have to be acceptable to find a balance between hypotension and edema and CKD. No symptoms of LV failure. Will also monitor 1.26.   Weekly         Relevant Medications    hydrochlorothiazide (HYDRODIURIL) 25 mg tablet       Other    Dyslipidemia     Lab Results   Component Value Date    LDLCALC 36 06/05/2023     Lab Results   Component Value Date    ALT 39 06/09/2023     Lab Results   Component Value Date    CHOLESTEROL 114 06/05/2023    CHOLESTEROL 141 02/16/2022    CHOLESTEROL 139 11/18/2021     Lab Results   Component Value Date    HDL 60 06/05/2023    HDL 54 02/16/2022    HDL 56 11/18/2021     Lab Results   Component Value Date    TRIG 91 06/05/2023    TRIG 113 02/16/2022    TRIG 98 11/18/2021     Lab Results   Component Value Date    NONHDLC 54 06/05/2023    [address] 02/16/2022    [address] 11/18/2021     Continue atorvastatin 10 mg daily         Mixed anxiety and depressive disorder    Restless leg syndrome     No further cramps in the leg         Edema of both legs - Primary     Differential diagnosis discussed. Patient had remote history of acute DVT in the remote past.  Also has a varicose vein. Known case of C of the lung. At risk. In part secondary to edema is to some degree chronic however due to additional salt intake during holidays probably contributed to that. Also underlying CKD level 3 as well as amlodipine could be contributing. We talked about increased recently switching over to Lasix. Patient does not want to do that. Patient is also being followed by urologist for urinary frequency. We will increase hydrochlorothiazide to 25 mg daily. Will check follow-up lab data. Will do venous Doppler. Will check back in 2 weeks. Patient is acceptable to suggested plan. May have bump in the renal functions this is acceptable. Patient is being followed by also nephrologist.  May have to do echo and chest x-ray if problem persists or worsens. She does not have any symptoms of LV failure or hypothyroidism or renal failure at this time         Relevant Medications    hydrochlorothiazide (HYDRODIURIL) 25 mg tablet    Other Relevant Orders    Comprehensive metabolic panel    CBC    TSH, 3rd generation    VAS lower limb venous duplex study, complete bilateral    Elevated AST (SGOT)    Relevant Orders    Comprehensive metabolic panel    Prediabetes     Lab Results   Component Value Date    HGBA1C 6.2 (H) 06/09/2023            Relevant Orders    Comprehensive metabolic panel    CBC    TSH, 3rd generation   Other Visit Diagnoses     Medicare annual wellness visit, subsequent                 Preventive health issues were discussed with patient, and age appropriate screening tests were ordered as noted in patient's After Visit Summary. Personalized health advice and appropriate referrals for health education or preventive services given if needed, as noted in patient's After Visit Summary.      History of Present Illness:     Patient presents for a [employer] Visit    Here for chronic disease management. Patient has a new complaint of bilateral swelling of the both lower extremities since Thanksgiving she claims that she probably ate high salt food. Patient denies any chest pain palpitation. Denies any renal failure symptoms. Denies any symptoms of LV failure. Patient did not have any previous DVT. She is a known case of C of the lung. COPD fair. C of the lung has been stable going for repeat CT scan for surveillance. [place] and has remained disease-free. Patient did have a history of deep vein thrombosis in the remote past.  Does have a varicose vein. Denies any symptoms related to angina. Benign positional vertigo fair. She has a CKD level 3E. Multiple joint DJD is reasonable. Hyperlipidemia will continue to monitor. Restless leg fair. Generalized anxiety and depression has been reasonable. Recently also seen by dentist had a tooth taken out on amoxicillin no fever no chills able to eat normal.       Patient Care Team:  Bekah Segal MD as PCP - MD Brittany Barger MD Lockie Mounts, MD (Nephrology)     Review of Systems:     Review of Systems   Constitutional:  Negative for appetite change, fatigue, fever and unexpected weight change. HENT:  Negative for congestion, ear pain and sore throat. Eyes:  Negative for pain and redness. Respiratory:  Negative for cough and shortness of breath. Cardiovascular:  Positive for leg swelling. Negative for chest pain and palpitations. Gastrointestinal:  Negative for abdominal pain, diarrhea, nausea and vomiting. Endocrine: Negative for cold intolerance, polydipsia and polyuria. Genitourinary:  Negative for dysuria, frequency and urgency. Musculoskeletal:  Negative for arthralgias, gait problem and myalgias. Skin:  Negative for rash. Allergic/Immunologic: Negative.     Neurological:  Negative for dizziness, tremors, syncope, speech difficulty, weakness and headaches. Hematological:  Negative for adenopathy. Psychiatric/Behavioral:  Negative for behavioral problems, confusion, decreased concentration and dysphoric mood.          Problem List:     Patient Active Problem List   Diagnosis   • Adenocarcinoma of right lung ([address])   • Benign hypertension with CKD (chronic kidney disease) stage III (HCC)   • Dyslipidemia   • Primary osteoarthritis involving multiple joints   • Femoral neuropathy, right   • Emphysema lung (HCC)   • Osteoarthritis of right hip   • Hyperlipidemia   • Numbness and tingling of foot   • Anxiety   • Colon polyp   • Thyroid nodule   • Mixed anxiety and depressive disorder   • Stage 3b chronic kidney disease (HCC)   • Diverticulosis   • Skin mole   • Restless leg syndrome   • BPPV (benign paroxysmal positional vertigo)   • Class 1 obesity due to excess calories without serious comorbidity with body mass index (BMI) of 30.0 to 30.9 in adult   • Chorea   • Varicose veins of legs   • Vitamin B12 deficiency   • Edema of both legs   • Vitamin D deficiency   • Kidney cyst, acquired   • Carpal tunnel syndrome   • Breast cancer screening by mammogram   • Onychomycosis   • Acute deep vein thrombosis (DVT) of distal vein of left lower extremity (HCC)   • Left foot pain   • Coronary artery calcification   • Coronary artery calcification seen on CAT scan   • Diarrhea   • Chronic kidney disease-mineral and bone disorder   • Elevated AST (SGOT)   • Weight loss   • Prediabetes   • Right renal mass   • Chronic nonintractable headache      Past Medical and Surgical History:     Past Medical History:   Diagnosis Date   • Cancer ([address])     lung   • Diarrhea     wt loss from 192lb to 178lb (1/23)   • Emphysema lung ([address])    • Hemorrhoid     possible hemorrhoid with small amount of bleeding   • Hyperlipidemia    • Hypertension    • Lung cancer ([address])    • Numbness and tingling of foot    • Osteoarthritis of right hip    • Primary adenocarcinoma of lower lobe of right lung [facility])      Past Surgical History:   Procedure Laterality Date   • CATARACT EXTRACTION Bilateral    • CHOLECYSTECTOMY     • COLONOSCOPY     • JOINT REPLACEMENT Bilateral     hips-   • MN ARTHRP ACETBLR/PROX FEM PROSTC AGRFT/ALGRFT Right 2018    Procedure: ANTERIOR TOTAL HIP ARTHROPLASTY;  Surgeon: Lolita Lan MD;  Location: [facility];  Service: Orthopedics   • TOTAL HIP ARTHROPLASTY     • TUMOR REMOVAL Right 2015    lower lobe       Family History:     Family History   Problem Relation Age of Onset   • Heart disease Mother    • No Known Problems Father       Social History:     Social History     Socioeconomic History   • Marital status:      Spouse name: None   • Number of children: None   • Years of education: None   • Highest education level: None   Occupational History   • None   Tobacco Use   • Smoking status: Former     Current packs/day: 0.00     Average packs/day: 1 pack/day for 50.0 years (50.0 ttl pk-yrs)     Types: Cigarettes     Start date: 1965     Quit date: 2015     Years since quittin.2   • Smokeless tobacco: Never   Vaping Use   • Vaping status: Never Used   Substance and Sexual Activity   • Alcohol use: Yes     Comment: on occ   • Drug use: Never   • Sexual activity: None   Other Topics Concern   • None   Social History Narrative   • None     Social Determinants of Health     Financial Resource Strain: Low Risk  (2023)    Overall Financial Resource Strain (CARDIA)    • Difficulty of Paying Living Expenses: Not hard at all   Food Insecurity: Not on file   Transportation Needs: No Transportation Needs (2023)    PRAPARE - Transportation    • Lack of Transportation (Medical): No    • Lack of Transportation (Non-Medical):  No   Physical Activity: Not on file   Stress: Not on file   Social Connections: Not on file   Intimate Partner Violence: Not on file   Housing Stability: Not on file      Medications and Allergies:     Current Outpatient Medications   Medication Sig Dispense Refill   • acetaminophen (TYLENOL) 500 mg tablet Take 500 mg by mouth every 6 (six) hours as needed for mild pain     • albuterol (PROVENTIL HFA,VENTOLIN HFA) 90 mcg/act inhaler Inhale 2 puffs every 6 (six) hours as needed for wheezing or shortness of breath 18 g 1   • amLODIPine (NORVASC) 10 mg tablet TAKE 1 TABLET DAILY 90 tablet 3   • aspirin (ECOTRIN LOW STRENGTH) 81 mg EC tablet Take 1 tablet (81 mg total) by mouth 2 (two) times a day (Patient taking differently: Take 81 mg by mouth daily with dinner) 60 tablet 0   • atorvastatin (LIPITOR) 10 mg tablet TAKE 1 TABLET DAILY AT BEDTIME 90 tablet 3   • Cholecalciferol (Vitamin D3) 50 MCG (2000 UT) TABS Take 2,000 Units by mouth daily     • gabapentin (NEURONTIN) 300 mg capsule Take 1 capsule (300 mg total) by mouth daily 90 capsule 1   • hydrochlorothiazide (HYDRODIURIL) 25 mg tablet Take 1 tablet (25 mg total) by mouth daily 90 tablet 1   • losartan (COZAAR) 50 mg tablet TAKE 1 TABLET DAILY 90 tablet 1   • metoprolol succinate (TOPROL-XL) 100 mg 24 hr tablet TAKE 1 TABLET DAILY 90 tablet 1   • Omega 3 1200 MG CAPS Take by mouth Omega XL daily     • Polyethylene Glycol 400 0.25 % SOLN Apply 1 drop to eye 2 (two) times a day As needed       No current facility-administered medications for this visit.      Allergies   Allergen Reactions   • Other Other (See Comments)     seasonal      Immunizations:     Immunization History   Administered Date(s) Administered   • INFLUENZA 12/02/2022   • Influenza, high dose seasonal 0.7 mL 11/23/2020, 11/24/2021, 12/02/2022, 10/05/2023   • Influenza, injectable, quadrivalent, preservative free 0.5 mL 10/24/2019      Health Maintenance:         Topic Date Due   • Colorectal Cancer Screening  03/19/2026   • Hepatitis C Screening  Completed         Topic Date Due   • COVID-19 Vaccine (1) Never done   • Pneumococcal Vaccine: 65+ Years (1 - PCV) Never done      Medicare Screening Tests and Risk Assessments:     Ede Pollard is here for her Subsequent Wellness visit. Last Medicare Wellness visit information reviewed, patient interviewed and updates made to the record today. Health Risk Assessment:   Patient rates overall health as fair. Patient feels that their physical health rating is slightly worse. Patient is satisfied with their life. Eyesight was rated as same. Hearing was rated as same. Patient feels that their emotional and mental health rating is slightly better. Patients states they are never, rarely angry. Patient states they are sometimes unusually tired/fatigued. Pain experienced in the last 7 days has been none. Patient states that she has experienced no weight loss or gain in last 6 months. She has lost some weight. Fall Risk Screening: In the past year, patient has experienced: no history of falling in past year      Urinary Incontinence Screening:   Patient has not leaked urine accidently in the last six months. No issues. Home Safety:  Patient does not have trouble with stairs inside or outside of their home. Patient has working smoke alarms and has working carbon monoxide detector. Home safety hazards include: none. Goes slow on stairs. Uses banisters. No home hazards. Pt feels safe in home. Lives alone. Daughter looks in on her. Nutrition:   Current diet is Regular. Follows a balanced diet. Veggies and proteins. Watches her lactose. Medications:   Patient is currently taking over-the-counter supplements. OTC medications include: see medication list. Patient is able to manage medications. No issues with meds. Activities of Daily Living (ADLs)/Instrumental Activities of Daily Living (IADLs):   Walk and transfer into and out of bed and chair?: Yes  Dress and groom yourself?: Yes    Bathe or shower yourself?: Yes    Feed yourself?  Yes  Do your laundry/housekeeping?: Yes  Manage your money, pay your bills and track your expenses?: Yes  Make your own meals?: Yes    Do your own shopping?: Yes    ADL comments: Pt is independent. She still drives. Previous Hospitalizations:   Any hospitalizations or ED visits within the last 12 months?: No      Hospitalization Comments: Chronic Conditions have been stable. Advance Care Planning:   Living will: Yes    Durable POA for healthcare: Yes    Advanced directive: Yes    Advanced directive counseling given: Yes    Five wishes given: No    Patient declined ACP directive: No    End of Life Decisions reviewed with patient: Yes    Provider agrees with end of life decisions: Yes      Comments: Encouraged to discuss with family. ACP uploaded. Cognitive Screening:   Provider or family/friend/caregiver concerned regarding cognition?: No    PREVENTIVE SCREENINGS      Cardiovascular Screening:    General: Screening Not Indicated and History Lipid Disorder      Diabetes Screening:     General: Screening Current      Colorectal Cancer Screening:     General: Screening Current      Breast Cancer Screening:     General: Screening Current      Cervical Cancer Screening:    General: Screening Not Indicated      Osteoporosis Screening:    General: Risks and Benefits Discussed and Patient Declines      Abdominal Aortic Aneurysm (AAA) Screening:        General: Risks and Benefits Discussed and Screening Not Indicated      Lung Cancer Screening:     General: Screening Not Indicated and History Lung Cancer      Hepatitis C Screening:    General: Screening Current      Preventive Screening Comments: Is getting a flu shot today. Goes to eye exam refgularly. Screening, Brief Intervention, and Referral to Treatment (SBIRT)    Screening  Typical number of drinks in a day: 0  Typical number of drinks in a week: 0  Interpretation: Low risk drinking behavior.     AUDIT-C Screenin) How often did you have a drink containing alcohol in the past year? never  2) How many drinks did you have on a typical day when you were drinking in the past year? 0  3) How often did you have 6 or more drinks on one occasion in the past year? never    AUDIT-C Score: 0  Interpretation: Score 0-2 (female): Negative screen for alcohol misuse    Single Item Drug Screening:  How often have you used an illegal drug (including marijuana) or a prescription medication for non-medical reasons in the past year? never    Single Item Drug Screen Score: 0  Interpretation: Negative screen for possible drug use disorder    Brief Intervention  Alcohol & drug use screenings were reviewed. No concerns regarding substance use disorder identified. Other Counseling Topics:   Skin self-exam and regular weightbearing exercise. No results found. Physical Exam:     /80   Pulse 74   Ht 5' 6" (1.676 m)   Wt 73.5 kg (162 lb)   SpO2 99%   BMI 26.15 kg/m²     Physical Exam  Constitutional:       General: She is not in acute distress. Appearance: She is well-developed. She is not ill-appearing or toxic-appearing. HENT:      Head: Normocephalic and atraumatic. Eyes:      Conjunctiva/sclera: Conjunctivae normal.   Neck:      Thyroid: No thyromegaly. Vascular: No JVD. Cardiovascular:      Rate and Rhythm: Regular rhythm. Heart sounds: Normal heart sounds. Comments: No clinical DVT  Pulmonary:      Effort: No respiratory distress. Breath sounds: Normal breath sounds. No wheezing or rales. Abdominal:      General: Bowel sounds are normal. There is no distension. Palpations: There is no mass. Tenderness: There is no abdominal tenderness. There is no rebound. Musculoskeletal:      Right lower leg: 3+ Edema present. Left lower leg: 3+ Edema present. Lymphadenopathy:      Cervical: No cervical adenopathy. Skin:     General: Skin is warm. Findings: No rash.    Neurological:      Coordination: Coordination normal.   Psychiatric:         Behavior: Behavior normal.         Judgment: Judgment normal.          Kerri Crawford MD

## 2023-12-14 NOTE — PATIENT INSTRUCTIONS
Medicare Preventive Visit Patient Instructions  Thank you for completing your Welcome to Medicare Visit or Medicare Annual Wellness Visit today. Your next wellness visit will be due in one year (12/14/2024). The screening/preventive services that you may require over the next 5-10 years are detailed below. Some tests may not apply to you based off risk factors and/or age. Screening tests ordered at today's visit but not completed yet may show as past due. Also, please note that scanned in results may not display below. Preventive Screenings:  Service Recommendations Previous Testing/Comments   Colorectal Cancer Screening  * Colonoscopy    * Fecal Occult Blood Test (FOBT)/Fecal Immunochemical Test (FIT)  * Fecal DNA/Cologuard Test  * Flexible Sigmoidoscopy Age: 43-73 years old   Colonoscopy: every 10 years (may be performed more frequently if at higher risk)  OR  FOBT/FIT: every 1 year  OR  Cologuard: every 3 years  OR  Sigmoidoscopy: every 5 years  Screening may be recommended earlier than age 39 if at higher risk for colorectal cancer. Also, an individualized decision between you and your healthcare provider will decide whether screening between the ages of 77-80 would be appropriate. Colonoscopy: 03/20/2023  FOBT/FIT: 01/18/2023  Cologuard: Not on file  Sigmoidoscopy: Not on file          Breast Cancer Screening Age: 36 years old  Frequency: every 1-2 years  Not required if history of left and right mastectomy Mammogram: 11/09/2023        Cervical Cancer Screening Between the ages of 21-29, pap smear recommended once every 3 years. Between the ages of 32-69, can perform pap smear with HPV co-testing every 5 years.    Recommendations may differ for women with a history of total hysterectomy, cervical cancer, or abnormal pap smears in past. Pap Smear: Not on file        Hepatitis C Screening Once for adults born between [address] [place] [address]  More frequently in patients at high risk for Hepatitis C Hep C Antibody: 03/03/2017        Diabetes Screening 1-2 times per year if you're at risk for diabetes or have pre-diabetes Fasting glucose: 105 mg/dL (9/12/2023)  A1C: 6.2 % (6/9/2023)      Cholesterol Screening Once every 5 years if you don't have a lipid disorder. May order more often based on risk factors. Lipid panel: 06/05/2023          Other Preventive Screenings Covered by Medicare:  Abdominal Aortic Aneurysm (AAA) Screening: covered once if your at risk. You're considered to be at risk if you have a family history of AAA. Lung Cancer Screening: covers low dose CT scan once per year if you meet all of the following conditions: (1) Age 48-67; (2) No signs or symptoms of lung cancer; (3) Current smoker or have quit smoking within the last 15 years; (4) You have a tobacco smoking history of at least 20 pack years (packs per day multiplied by number of years you smoked); (5) You get a written order from a healthcare provider. Glaucoma Screening: covered annually if you're considered high risk: (1) You have diabetes OR (2) Family history of glaucoma OR (3)  aged 48 and older OR (3)  American aged 72 and older  Osteoporosis Screening: covered every 2 years if you meet one of the following conditions: (1) You're estrogen deficient and at risk for osteoporosis based off medical history and other findings; (2) Have a vertebral abnormality; (3) On glucocorticoid therapy for more than 3 months; (4) Have primary hyperparathyroidism; (5) On osteoporosis medications and need to assess response to drug therapy. Last bone density test (DXA Scan): Not on file. HIV Screening: covered annually if you're between the age of 14-79. Also covered annually if you are younger than 13 and older than 72 with risk factors for HIV infection. For pregnant patients, it is covered up to 3 times per pregnancy.     Immunizations:  Immunization Recommendations   Influenza Vaccine Annual influenza vaccination during flu season is recommended for all persons aged >= 6 months who do not have contraindications   Pneumococcal Vaccine   * Pneumococcal conjugate vaccine = PCV13 (Prevnar 13), PCV15 (Vaxneuvance), PCV20 (Prevnar 20)  * Pneumococcal polysaccharide vaccine = PPSV23 (Pneumovax) Adults 77-72 yo with certain risk factors or if 69+ yo  If never received any pneumonia vaccine: recommend Prevnar 20 (PCV20)  Give PCV20 if previously received 1 dose of PCV13 or PPSV23   Hepatitis B Vaccine 3 dose series if at intermediate or high risk (ex: diabetes, end stage renal disease, liver disease)   Respiratory syncytial virus (RSV) Vaccine - COVERED BY MEDICARE PART D  * RSVPreF3 (Arexvy) CDC recommends that adults 61years of age and older may receive a single dose of RSV vaccine using shared clinical decision-making (SCDM)   Tetanus (Td) Vaccine - COST NOT COVERED BY MEDICARE PART B Following completion of primary series, a booster dose should be given every 10 years to maintain immunity against tetanus. Td may also be given as tetanus wound prophylaxis. Tdap Vaccine - COST NOT COVERED BY MEDICARE PART B Recommended at least once for all adults. For pregnant patients, recommended with each pregnancy. Shingles Vaccine (Shingrix) - COST NOT COVERED BY MEDICARE PART B  2 shot series recommended in those 23 years and older who have or will have weakened immune systems or those 50 years and older     Health Maintenance Due:      Topic Date Due    Colorectal Cancer Screening  03/19/2026    Hepatitis C Screening  Completed     Immunizations Due:      Topic Date Due    COVID-19 Vaccine (1) Never done    Pneumococcal Vaccine: 65+ Years (1 - PCV) Never done     Advance Directives   What are advance directives? Advance directives are legal documents that state your wishes and plans for medical care. These plans are made ahead of time in case you lose your ability to make decisions for yourself.  Advance directives can apply to any medical decision, such as the treatments you want, and if you want to donate organs. What are the types of advance directives? There are many types of advance directives, and each state has rules about how to use them. You may choose a combination of any of the following:  Living will: This is a written record of the treatment you want. You can also choose which treatments you do not want, which to limit, and which to stop at a certain time. This includes surgery, medicine, IV fluid, and tube feedings. Durable power of  for [facility]): This is a written record that states who you want to make healthcare choices for you when you are unable to make them for yourself. This person, called a proxy, is usually a family member or a friend. You may choose more than 1 proxy. Do not resuscitate (DNR) order:  A DNR order is used in case your heart stops beating or you stop breathing. It is a request not to have certain forms of treatment, such as CPR. A DNR order may be included in other types of advance directives. Medical directive: This covers the care that you want if you are in a coma, near death, or unable to make decisions for yourself. You can list the treatments you want for each condition. Treatment may include pain medicine, surgery, blood transfusions, dialysis, IV or tube feedings, and a ventilator (breathing machine). Values history: This document has questions about your views, beliefs, and how you feel and think about life. This information can help others choose the care that you would choose. Why are advance directives important? An advance directive helps you control your care. Although spoken wishes may be used, it is better to have your wishes written down. Spoken wishes can be misunderstood, or not followed. Treatments may be given even if you do not want them. An advance directive may make it easier for your family to make difficult choices about your care.    Urinary Incontinence   Urinary incontinence (UI)  is when you lose control of your bladder. UI develops because your bladder cannot store or empty urine properly. The 3 most common types of UI are stress incontinence, urge incontinence, or both. Medicines:   May be given to help strengthen your bladder control. Report any side effects of medication to your healthcare provider. Do pelvic muscle exercises often:  Your pelvic muscles help you stop urinating. Squeeze these muscles tight for 5 seconds, then relax for 5 seconds. Gradually work up to squeezing for 10 seconds. Do 3 sets of 15 repetitions a day, or as directed. This will help strengthen your pelvic muscles and improve bladder control. Train your bladder:  Go to the bathroom at set times, such as every 2 hours, even if you do not feel the urge to go. You can also try to hold your urine when you feel the urge to go. For example, hold your urine for 5 minutes when you feel the urge to go. As that becomes easier, hold your urine for 10 minutes. Self-care:   Keep a UI record. Write down how often you leak urine and how much you leak. Make a note of what you were doing when you leaked urine. Drink liquids as directed. You may need to limit the amount of liquid you drink to help control your urine leakage. Do not drink any liquid right before you go to bed. Limit or do not have drinks that contain caffeine or alcohol. Prevent constipation. Eat a variety of high-fiber foods. Good examples are high-fiber cereals, beans, vegetables, and whole-grain breads. Walking is the best way to trigger your intestines to have a bowel movement. Exercise regularly and maintain a healthy weight. Weight loss and exercise will decrease pressure on your bladder and help you control your leakage. Use a catheter as directed  to help empty your bladder. A catheter is a tiny, plastic tube that is put into your bladder to drain your urine. Go to behavior therapy as directed.   Behavior therapy may be used to help you learn to control your urge to urinate. Weight Management   Why it is important to manage your weight:  Being overweight increases your risk of health conditions such as heart disease, high blood pressure, type 2 diabetes, and certain types of cancer. It can also increase your risk for osteoarthritis, sleep apnea, and other respiratory problems. Aim for a slow, steady weight loss. Even a small amount of weight loss can lower your risk of health problems. How to lose weight safely:  A safe and healthy way to lose weight is to eat fewer calories and get regular exercise. You can lose up about 1 pound a week by decreasing the number of calories you eat by 500 calories each day. Healthy meal plan for weight management:  A healthy meal plan includes a variety of foods, contains fewer calories, and helps you stay healthy. A healthy meal plan includes the following:  Eat whole-grain foods more often. A healthy meal plan should contain fiber. Fiber is the part of grains, fruits, and vegetables that is not broken down by your body. Whole-grain foods are healthy and provide extra fiber in your diet. Some examples of whole-grain foods are whole-wheat breads and pastas, oatmeal, brown rice, and bulgur. Eat a variety of vegetables every day. Include dark, leafy greens such as spinach, kale, tati greens, and mustard greens. Eat yellow and orange vegetables such as carrots, sweet potatoes, and winter squash. Eat a variety of fruits every day. Choose fresh or canned fruit (canned in its own juice or light syrup) instead of juice. Fruit juice has very little or no fiber. Eat low-fat dairy foods. Drink fat-free (skim) milk or 1% milk. Eat fat-free yogurt and low-fat cottage cheese. Try low-fat cheeses such as mozzarella and other reduced-fat cheeses. Choose meat and other protein foods that are low in fat. Choose beans or other legumes such as split peas or lentils.  Choose fish, skinless poultry (chicken or turkey), or lean cuts of red meat (beef or pork). Before you cook meat or poultry, cut off any visible fat. Use less fat and oil. Try baking foods instead of frying them. Add less fat, such as margarine, sour cream, regular salad dressing and mayonnaise to foods. Eat fewer high-fat foods. Some examples of high-fat foods include french fries, doughnuts, ice cream, and cakes. Eat fewer sweets. Limit foods and drinks that are high in sugar. This includes candy, cookies, regular soda, and sweetened drinks. Exercise:  Exercise at least 30 minutes per day on most days of the week. Some examples of exercise include walking, biking, dancing, and swimming. You can also fit in more physical activity by taking the stairs instead of the elevator or parking farther away from stores. Ask your healthcare provider about the best exercise plan for you. © Copyright [employer] 2018 Information is for End User's use only and may not be sold, redistributed or otherwise used for commercial purposes. All illustrations and images included in CareNotes® are the copyrighted property of [employer]. or [address] [place] [address]      Venous Doppler of both lower extremity to evaluate your swelling of the leg. Do not eat any salt. Weight yourself every day. We will increase your hydrochlorothiazide 25 mg 1 tablet daily instead of 12.5 mg daily. You may take 2 of 12.5 mg till you run out. I am sending prescription of 25 mg to your pharmacy    You will need a blood test to check your kidney blood tests the next 1 week. We like to see you back in 1 to 2 weeks. Follow with Consultants as per their and our suggestion    Follow up in approximately one week or as needed earlier    Follow all instructions as advised and discussed. Take your medications as prescribed. Call the office immediately if you experience any side effects. Ask questions if you do not understand.     Keep your scheduled appointment as advised or come sooner if necessary or in doubt. Best time to call for non-urgent matter or questions on weekdays is between 9am and 12 noon. See physician for any new symptoms or worsening of current symptoms. Urgent or emergent situations call 911 and report to nearest emergency room. I spent  time taking care of this patient including clinical care, conseling, collaboration, chart, lab and consultant's follow up note,images report, documentation, pre visit  review as appropriate.     Patient is to get labs 1 week(s) prior to next visit if advised

## 2023-12-15 ENCOUNTER — HOSPITAL ENCOUNTER (OUTPATIENT)
Dept: RADIOLOGY | Facility: HOSPITAL | Age: 78
Discharge: HOME | End: 2023-12-15
Attending: INTERNAL MEDICINE
Payer: MEDICARE

## 2023-12-15 DIAGNOSIS — R60.0 EDEMA OF BOTH LEGS: ICD-10-CM

## 2023-12-15 PROCEDURE — 93970 EXTREMITY STUDY: CPT | Performed by: SURGERY

## 2023-12-15 PROCEDURE — 93970 EXTREMITY STUDY: CPT

## 2023-12-21 ENCOUNTER — RA CDI HCC (OUTPATIENT)
Dept: OTHER | Facility: HOSPITAL | Age: 78
End: 2023-12-21

## 2023-12-21 ENCOUNTER — APPOINTMENT (OUTPATIENT)
Dept: LAB | Facility: CLINIC | Age: 78
End: 2023-12-21
Payer: MEDICARE

## 2023-12-21 DIAGNOSIS — J43.9 PULMONARY EMPHYSEMA, UNSPECIFIED EMPHYSEMA TYPE (HCC): ICD-10-CM

## 2023-12-21 DIAGNOSIS — R73.03 PREDIABETES: ICD-10-CM

## 2023-12-21 DIAGNOSIS — R74.01 ELEVATED AST (SGOT): ICD-10-CM

## 2023-12-21 DIAGNOSIS — N18.32 STAGE 3B CHRONIC KIDNEY DISEASE (HCC): ICD-10-CM

## 2023-12-21 DIAGNOSIS — C34.91 ADENOCARCINOMA OF RIGHT LUNG (HCC): ICD-10-CM

## 2023-12-21 DIAGNOSIS — R60.0 EDEMA OF BOTH LEGS: ICD-10-CM

## 2023-12-21 LAB
ALBUMIN SERPL BCP-MCNC: 4.4 G/DL (ref 3.5–5)
ALP SERPL-CCNC: 63 U/L (ref 34–104)
ALT SERPL W P-5'-P-CCNC: 22 U/L (ref 7–52)
ANION GAP SERPL CALCULATED.3IONS-SCNC: 7 MMOL/L
AST SERPL W P-5'-P-CCNC: 23 U/L (ref 13–39)
BILIRUB SERPL-MCNC: 0.62 MG/DL (ref 0.2–1)
BUN SERPL-MCNC: 32 MG/DL (ref 5–25)
CALCIUM SERPL-MCNC: 9.4 MG/DL (ref 8.4–10.2)
CHLORIDE SERPL-SCNC: 102 MMOL/L (ref 96–108)
CO2 SERPL-SCNC: 29 MMOL/L (ref 21–32)
CREAT SERPL-MCNC: 1.33 MG/DL (ref 0.6–1.3)
ERYTHROCYTE [DISTWIDTH] IN BLOOD BY AUTOMATED COUNT: 13.2 % (ref 11.6–15.1)
GFR SERPL CREATININE-BSD FRML MDRD: 38 ML/MIN/1.73SQ M
GLUCOSE P FAST SERPL-MCNC: 98 MG/DL (ref 65–99)
HCT VFR BLD AUTO: 43 % (ref 34.8–46.1)
HGB BLD-MCNC: 14.1 G/DL (ref 11.5–15.4)
MCH RBC QN AUTO: 30.6 PG (ref 26.8–34.3)
MCHC RBC AUTO-ENTMCNC: 32.8 G/DL (ref 31.4–37.4)
MCV RBC AUTO: 93 FL (ref 82–98)
PLATELET # BLD AUTO: 275 THOUSANDS/UL (ref 149–390)
PMV BLD AUTO: 9.3 FL (ref 8.9–12.7)
POTASSIUM SERPL-SCNC: 4 MMOL/L (ref 3.5–5.3)
PROT SERPL-MCNC: 7.6 G/DL (ref 6.4–8.4)
RBC # BLD AUTO: 4.61 MILLION/UL (ref 3.81–5.12)
SODIUM SERPL-SCNC: 138 MMOL/L (ref 135–147)
TSH SERPL DL<=0.05 MIU/L-ACNC: 1.82 UIU/ML (ref 0.45–4.5)
WBC # BLD AUTO: 8.83 THOUSAND/UL (ref 4.31–10.16)

## 2023-12-21 PROCEDURE — 85027 COMPLETE CBC AUTOMATED: CPT

## 2023-12-21 PROCEDURE — 80053 COMPREHEN METABOLIC PANEL: CPT

## 2023-12-21 PROCEDURE — 36415 COLL VENOUS BLD VENIPUNCTURE: CPT

## 2023-12-21 PROCEDURE — 84443 ASSAY THYROID STIM HORMONE: CPT

## 2023-12-28 ENCOUNTER — OFFICE VISIT (OUTPATIENT)
Dept: INTERNAL MEDICINE CLINIC | Facility: CLINIC | Age: 78
End: 2023-12-28
Payer: MEDICARE

## 2023-12-28 VITALS
WEIGHT: 162 LBS | BODY MASS INDEX: 26.03 KG/M2 | DIASTOLIC BLOOD PRESSURE: 66 MMHG | HEIGHT: 66 IN | OXYGEN SATURATION: 97 % | HEART RATE: 61 BPM | SYSTOLIC BLOOD PRESSURE: 144 MMHG

## 2023-12-28 DIAGNOSIS — C34.91 ADENOCARCINOMA OF RIGHT LUNG (HCC): ICD-10-CM

## 2023-12-28 DIAGNOSIS — I83.93 VARICOSE VEINS OF BOTH LOWER EXTREMITIES, UNSPECIFIED WHETHER COMPLICATED: ICD-10-CM

## 2023-12-28 DIAGNOSIS — N18.30 BENIGN HYPERTENSION WITH CKD (CHRONIC KIDNEY DISEASE) STAGE III (HCC): ICD-10-CM

## 2023-12-28 DIAGNOSIS — R73.03 PREDIABETES: ICD-10-CM

## 2023-12-28 DIAGNOSIS — R60.0 EDEMA OF BOTH LEGS: Primary | ICD-10-CM

## 2023-12-28 DIAGNOSIS — I82.4Z2 ACUTE DEEP VEIN THROMBOSIS (DVT) OF DISTAL VEIN OF LEFT LOWER EXTREMITY (HCC): ICD-10-CM

## 2023-12-28 DIAGNOSIS — I12.9 BENIGN HYPERTENSION WITH CKD (CHRONIC KIDNEY DISEASE) STAGE III (HCC): ICD-10-CM

## 2023-12-28 DIAGNOSIS — N18.32 STAGE 3B CHRONIC KIDNEY DISEASE (HCC): ICD-10-CM

## 2023-12-28 DIAGNOSIS — J43.9 PULMONARY EMPHYSEMA, UNSPECIFIED EMPHYSEMA TYPE (HCC): ICD-10-CM

## 2023-12-28 PROBLEM — R19.7 DIARRHEA: Status: RESOLVED | Noted: 2023-01-17 | Resolved: 2023-12-28

## 2023-12-28 PROCEDURE — 99214 OFFICE O/P EST MOD 30 MIN: CPT | Performed by: INTERNAL MEDICINE

## 2023-12-28 RX ORDER — FUROSEMIDE 20 MG/1
20 TABLET ORAL 2 TIMES DAILY
Qty: 30 TABLET | Refills: 1 | Status: SHIPPED | OUTPATIENT
Start: 2023-12-28 | End: 2023-12-28

## 2023-12-28 RX ORDER — POTASSIUM CHLORIDE 20 MEQ/1
20 TABLET, EXTENDED RELEASE ORAL DAILY
Qty: 30 TABLET | Refills: 1 | Status: SHIPPED | OUTPATIENT
Start: 2023-12-28 | End: 2023-12-28

## 2023-12-28 RX ORDER — FUROSEMIDE 20 MG/1
20 TABLET ORAL 2 TIMES DAILY
Qty: 30 TABLET | Refills: 1 | Status: SHIPPED | OUTPATIENT
Start: 2023-12-28

## 2023-12-28 RX ORDER — POTASSIUM CHLORIDE 20 MEQ/1
20 TABLET, EXTENDED RELEASE ORAL DAILY
Qty: 30 TABLET | Refills: 1 | Status: SHIPPED | OUTPATIENT
Start: 2023-12-28

## 2023-12-28 NOTE — ASSESSMENT & PLAN NOTE
Pertinent history is positive for adenocarcinoma of the lung, COPD, CKD level 3, history of varicose vein, coronary artery calcification, history of deep vein thrombosis, hypertension, on amlodipine,    Edema persist.  Blood pressure remains slightly high.  Weight remains unchanged.  No signs symptoms of LV failure.  Some symptoms of unusual thyroid problem or cardiopulmonary problem.    Differential diagnosis and effort discussed with the patient's.  In part secondary to hypertension, history of DVT, CKD level 3, amlodipine COPD etc.    Plan since hydrochlorothiazide is not working we will discontinue that.    Will switch it over to Lasix/furosemide 20 mg daily and KCl to 20 mEq daily.  Follow-up BMP PE in 10 days.    Will do chest x-ray as well as echocardiogram.    Recommend to follow with cardiologist.    Recommend to get a chest x-ray.    Follow back in 2 weeks.  Recommend to avoid salt and daily weight.

## 2023-12-28 NOTE — PATIENT INSTRUCTIONS
Discontinue hydrochlorothiazide.    Start taking furosemide 20 mg 1 tablet daily.  Start taking potassium chloride 20 mEq 1 daily.    Repeat BMP in 10 days.    Avoid salt.    Get chest x-ray at your convenience.    Schedule echocardiogram of the heart at your convenience.    Come back in 2 weeks.      Follow with Consultants as per their and our suggestion    Follow up in approximately one week or as needed earlier    Follow all instructions as advised and discussed.    Take your medications as prescribed.    Call the office immediately if you experience any side effects.    Ask questions if you do not understand.    Keep your scheduled appointment as advised or come sooner if necessary or in doubt.    Best time to call for non-urgent matter or questions on weekdays is between 9am and 12 noon.    See physician for any new symptoms or worsening of current symptoms.    Urgent or emergent situations call 911 and report to nearest emergency room.    I spent  time taking care of this patient including clinical care, conseling, collaboration, chart, lab and consultant's follow up note,images report, documentation, pre visit  review as appropriate.    Patient is to get labs 1 week(s) prior to next visit if advised

## 2023-12-28 NOTE — PROGRESS NOTES
Name: Cande Smith      : 1945      MRN: [id number]  Encounter Provider: Edwin Lin MD  Encounter Date: 2023   Encounter department: [facility] INTERNAL MEDICINE    Assessment & Plan     1. Edema of both legs  Assessment & Plan:  Pertinent history is positive for adenocarcinoma of the lung, COPD, CKD level 3, history of varicose vein, coronary artery calcification, history of deep vein thrombosis, hypertension, on amlodipine,    Edema persist.  Blood pressure remains slightly high.  Weight remains unchanged.  No signs symptoms of LV failure.  Some symptoms of unusual thyroid problem or cardiopulmonary problem.    Differential diagnosis and effort discussed with the patient's.  In part secondary to hypertension, history of DVT, CKD level 3, amlodipine COPD etc.    Plan since hydrochlorothiazide is not working we will discontinue that.    Will switch it over to Lasix/furosemide 20 mg daily and KCl to 20 mEq daily.  Follow-up BMP PE in 10 days.    Will do chest x-ray as well as echocardiogram.    Recommend to follow with cardiologist.    Recommend to get a chest x-ray.    Follow back in 2 weeks.  Recommend to avoid salt and daily weight.        Orders:  -     furosemide (LASIX) 20 mg tablet; Take 1 tablet (20 mg total) by mouth 2 (two) times a day  -     potassium chloride (K-DUR,KLOR-CON) 20 mEq tablet; Take 1 tablet (20 mEq total) by mouth daily  -     Basic metabolic panel; Future; Expected date: 2024  -     XR chest pa & lateral; Future; Expected date: 2024  -     B-Type Natriuretic Peptide(BNP); Future; Expected date: 2024  -     Echo complete w/ contrast if indicated; Future; Expected date: 2024    2. Pulmonary emphysema, unspecified emphysema type (HCC)  Assessment & Plan:  Baseline,      3. Acute deep vein thrombosis (DVT) of distal vein of left lower extremity (HCC)  Assessment & Plan:  No deep vein thrombosis venous Dopplers negative,      4.  Varicose veins of both lower extremities, unspecified whether complicated  Assessment & Plan:  Varicose veins stable, no different thrombosis or venous Doppler,      5. Stage 3b chronic kidney disease (HCC)  -     furosemide (LASIX) 20 mg tablet; Take 1 tablet (20 mg total) by mouth 2 (two) times a day  -     potassium chloride (K-DUR,KLOR-CON) 20 mEq tablet; Take 1 tablet (20 mEq total) by mouth daily  -     Basic metabolic panel; Future; Expected date: 01/04/2024    6. Prediabetes  Assessment & Plan:  Baseline      7. Benign hypertension with CKD (chronic kidney disease) stage III (HCC)  Assessment & Plan:  Lab Results   Component Value Date    EGFR 38 12/21/2023    EGFR 34 09/12/2023    EGFR 33 08/09/2023    CREATININE 1.33 (H) 12/21/2023    CREATININE 1.44 (H) 09/12/2023    CREATININE 1.46 (H) 08/09/2023   CKD level 3 baseline, hydrochlorothiazide did not affect renal functions.  We are switching to Lasix.  Will recheck BMP back in 1 week.  Risk of worsening of the renal function and reviewed with her.  However we need to find a balance between edema versus renal function I will discuss    Orders:  -     furosemide (LASIX) 20 mg tablet; Take 1 tablet (20 mg total) by mouth 2 (two) times a day  -     potassium chloride (K-DUR,KLOR-CON) 20 mEq tablet; Take 1 tablet (20 mEq total) by mouth daily  -     Basic metabolic panel; Future; Expected date: 01/04/2024  -     XR chest pa & lateral; Future; Expected date: 01/04/2024  -     B-Type Natriuretic Peptide(BNP); Future; Expected date: 01/04/2024  -     Echo complete w/ contrast if indicated; Future; Expected date: 01/11/2024    8. Adenocarcinoma of right lung (HCC)           Subjective     There is a follow-up of her edema both lower extremity.    Edema persist.  Weight remains unchanged.  Patient is now on hydrochlorothiazide 25 mg instead of 12.5 mg.  Renal function acceptable.  Denies any chest pain palpitation PND orthopnea.  Remains anxious.    Lab data is  reviewed.  Venous Doppler negative for deep vein thrombosis.    12/15/2023: Venous Doppler negative for deep vein thrombosis  12 12/21/2023: BUN 32 creatinine 1.33 GFR 38 which is baseline her baseline GFR is in the range of 31-41, TSH 1.82    Talked about differential diagnosis.  In part secondary to CKD level 3, hypertension, amlodipine, COPD, may have some varicose vein.    Treatment options reviewed with the patient's.  Patient needs to go on stronger water pill will switch it over to Lasix/furosemide 20 mg daily and add also potassium pill 1 tablet daily.  Will recheck BMP in 10 days.  Will bring her back in 2 weeks.  We did explain that water pill may make her renal function worse however it may may may remain stable will monitor the trend.    Also we talked about need for doing chest x-ray as well as echocardiogram she is willing to go for it appropriate paper is being given.    Recommend to also follow with cardiologist.    Pertinent history is positive for adenocarcinoma of the lung, COPD, CKD level 3, history of varicose vein, coronary artery calcification, history of deep vein thrombosis, hypertension, on amlodipine,      Review of Systems   Constitutional:  Negative for appetite change, fatigue, fever and unexpected weight change.   HENT:  Negative for congestion, ear pain and sore throat.    Eyes:  Negative for pain and redness.   Respiratory:  Negative for cough and shortness of breath.    Cardiovascular:  Positive for leg swelling. Negative for chest pain and palpitations.   Gastrointestinal:  Negative for abdominal pain, diarrhea, nausea and vomiting.   Endocrine: Negative for cold intolerance, polydipsia and polyuria.   Genitourinary:  Negative for dysuria, frequency and urgency.   Musculoskeletal:  Negative for arthralgias, gait problem and myalgias.   Skin:  Negative for color change and rash.   Allergic/Immunologic: Negative.    Neurological:  Negative for dizziness, tremors, seizures, syncope,  light-headedness, numbness and headaches.   Hematological:  Negative for adenopathy.   Psychiatric/Behavioral:  Negative for behavioral problems. The patient is nervous/anxious.        Past Medical History:   Diagnosis Date   • Cancer (HCC)     lung   • Diarrhea     wt loss from 192lb to 178lb ()   • Emphysema lung (HCC)    • Hemorrhoid     possible hemorrhoid with small amount of bleeding   • Hyperlipidemia    • Hypertension    • Lung cancer (HCC)    • Numbness and tingling of foot    • Osteoarthritis of right hip    • Primary adenocarcinoma of lower lobe of right lung (HCC)      Past Surgical History:   Procedure Laterality Date   • CATARACT EXTRACTION Bilateral    • CHOLECYSTECTOMY     • COLONOSCOPY     • JOINT REPLACEMENT Bilateral     hips-   • IN ARTHRP ACETBLR/PROX FEM PROSTC AGRFT/ALGRFT Right 2018    Procedure: ANTERIOR TOTAL HIP ARTHROPLASTY;  Surgeon: Kunal Chester MD;  Location: [facility];  Service: Orthopedics   • TOTAL HIP ARTHROPLASTY     • TUMOR REMOVAL Right     lower lobe      Family History   Problem Relation Age of Onset   • Heart disease Mother    • No Known Problems Father      Social History     Socioeconomic History   • Marital status:      Spouse name: None   • Number of children: None   • Years of education: None   • Highest education level: None   Occupational History   • None   Tobacco Use   • Smoking status: Former     Current packs/day: 0.00     Average packs/day: 1 pack/day for 50.0 years (50.0 ttl pk-yrs)     Types: Cigarettes     Start date: 1965     Quit date: 2015     Years since quittin.3   • Smokeless tobacco: Never   Vaping Use   • Vaping status: Never Used   Substance and Sexual Activity   • Alcohol use: Yes     Comment: on occ   • Drug use: Never   • Sexual activity: None   Other Topics Concern   • None   Social History Narrative   • None     Social Determinants of Health     Financial Resource Strain: Low Risk  (2023)    Overall  Financial Resource Strain (CARDIA)    • Difficulty of Paying Living Expenses: Not hard at all   Food Insecurity: Not on file   Transportation Needs: No Transportation Needs (12/14/2023)    PRAPARE - Transportation    • Lack of Transportation (Medical): No    • Lack of Transportation (Non-Medical): No   Physical Activity: Not on file   Stress: Not on file   Social Connections: Not on file   Intimate Partner Violence: Not on file   Housing Stability: Not on file     Current Outpatient Medications on File Prior to Visit   Medication Sig   • acetaminophen (TYLENOL) 500 mg tablet Take 500 mg by mouth every 6 (six) hours as needed for mild pain   • albuterol (PROVENTIL HFA,VENTOLIN HFA) 90 mcg/act inhaler Inhale 2 puffs every 6 (six) hours as needed for wheezing or shortness of breath   • amLODIPine (NORVASC) 10 mg tablet TAKE 1 TABLET DAILY   • aspirin (ECOTRIN LOW STRENGTH) 81 mg EC tablet Take 1 tablet (81 mg total) by mouth 2 (two) times a day (Patient taking differently: Take 81 mg by mouth daily with dinner)   • atorvastatin (LIPITOR) 10 mg tablet TAKE 1 TABLET DAILY AT BEDTIME   • Cholecalciferol (Vitamin D3) 50 MCG (2000 UT) TABS Take 2,000 Units by mouth daily   • gabapentin (NEURONTIN) 300 mg capsule Take 1 capsule (300 mg total) by mouth daily   • hydrochlorothiazide (HYDRODIURIL) 25 mg tablet Take 1 tablet (25 mg total) by mouth daily   • losartan (COZAAR) 50 mg tablet TAKE 1 TABLET DAILY   • metoprolol succinate (TOPROL-XL) 100 mg 24 hr tablet TAKE 1 TABLET DAILY   • Omega 3 1200 MG CAPS Take by mouth Omega XL daily   • Polyethylene Glycol 400 0.25 % SOLN Apply 1 drop to eye 2 (two) times a day As needed     Allergies   Allergen Reactions   • Other Other (See Comments)     seasonal     Immunization History   Administered Date(s) Administered   • INFLUENZA 12/02/2022   • Influenza, high dose seasonal 0.7 mL 11/23/2020, 11/24/2021, 12/02/2022, 10/05/2023   • Influenza, injectable, quadrivalent, preservative  "free 0.5 mL 10/24/2019       Objective     /66   Pulse 61   Ht 5' 6\" (1.676 m)   Wt 73.5 kg (162 lb)   SpO2 97%   BMI 26.15 kg/m²     Physical Exam  Constitutional:       General: She is not in acute distress.     Appearance: She is well-developed. She is not ill-appearing or diaphoretic.   HENT:      Head: Normocephalic and atraumatic.      Right Ear: External ear normal.      Left Ear: External ear normal.   Eyes:      General: Lids are normal.         Right eye: No discharge.         Left eye: No discharge.      Conjunctiva/sclera: Conjunctivae normal.   Neck:      Thyroid: No thyroid mass or thyromegaly.      Vascular: No carotid bruit or JVD.      Trachea: Trachea normal.   Cardiovascular:      Rate and Rhythm: Regular rhythm.      Heart sounds: Normal heart sounds. No murmur heard.     No gallop.   Pulmonary:      Effort: No respiratory distress.      Breath sounds: No wheezing, rhonchi or rales.   Musculoskeletal:      Right lower leg: 3+ Edema present.      Left lower leg: 3+ Edema present.   Lymphadenopathy:      Cervical: No cervical adenopathy.   Skin:     General: Skin is warm.      Coloration: Skin is not jaundiced or pale.      Findings: No rash.   Neurological:      Mental Status: She is alert and oriented to person, place, and time.       Edwin Lin MD    "

## 2023-12-28 NOTE — ASSESSMENT & PLAN NOTE
Lab Results   Component Value Date    EGFR 38 12/21/2023    EGFR 34 09/12/2023    EGFR 33 08/09/2023    CREATININE 1.33 (H) 12/21/2023    CREATININE 1.44 (H) 09/12/2023    CREATININE 1.46 (H) 08/09/2023   CKD level 3 baseline, hydrochlorothiazide did not affect renal functions.  We are switching to Lasix.  Will recheck BMP back in 1 week.  Risk of worsening of the renal function and reviewed with her.  However we need to find a balance between edema versus renal function I will discuss

## 2023-12-29 ENCOUNTER — TELEPHONE (OUTPATIENT)
Age: 78
End: 2023-12-29

## 2023-12-29 NOTE — TELEPHONE ENCOUNTER
Patient  rx for furosemide and the bottle says to take twice a day.  She thought provider told her to take it once daily.  She is calling to confirm dose.

## 2024-01-04 ENCOUNTER — LAB (OUTPATIENT)
Dept: LAB | Facility: CLINIC | Age: 79
End: 2024-01-04
Payer: MEDICARE

## 2024-01-04 DIAGNOSIS — N18.32 STAGE 3B CHRONIC KIDNEY DISEASE (HCC): ICD-10-CM

## 2024-01-04 DIAGNOSIS — N18.30 BENIGN HYPERTENSION WITH CKD (CHRONIC KIDNEY DISEASE) STAGE III (HCC): ICD-10-CM

## 2024-01-04 DIAGNOSIS — R60.0 EDEMA OF BOTH LEGS: ICD-10-CM

## 2024-01-04 DIAGNOSIS — I12.9 BENIGN HYPERTENSION WITH CKD (CHRONIC KIDNEY DISEASE) STAGE III (HCC): ICD-10-CM

## 2024-01-04 LAB
ANION GAP SERPL CALCULATED.3IONS-SCNC: 9 MMOL/L
BNP SERPL-MCNC: 196 PG/ML (ref 0–100)
BUN SERPL-MCNC: 28 MG/DL (ref 5–25)
CALCIUM SERPL-MCNC: 9.8 MG/DL (ref 8.4–10.2)
CHLORIDE SERPL-SCNC: 103 MMOL/L (ref 96–108)
CO2 SERPL-SCNC: 27 MMOL/L (ref 21–32)
CREAT SERPL-MCNC: 1.36 MG/DL (ref 0.6–1.3)
GFR SERPL CREATININE-BSD FRML MDRD: 37 ML/MIN/1.73SQ M
GLUCOSE P FAST SERPL-MCNC: 103 MG/DL (ref 65–99)
POTASSIUM SERPL-SCNC: 4.8 MMOL/L (ref 3.5–5.3)
SODIUM SERPL-SCNC: 139 MMOL/L (ref 135–147)

## 2024-01-04 PROCEDURE — 80048 BASIC METABOLIC PNL TOTAL CA: CPT

## 2024-01-04 PROCEDURE — 83880 ASSAY OF NATRIURETIC PEPTIDE: CPT

## 2024-01-04 PROCEDURE — 36415 COLL VENOUS BLD VENIPUNCTURE: CPT

## 2024-01-11 ENCOUNTER — OFFICE VISIT (OUTPATIENT)
Dept: INTERNAL MEDICINE CLINIC | Facility: CLINIC | Age: 79
End: 2024-01-11
Payer: MEDICARE

## 2024-01-11 ENCOUNTER — HOSPITAL ENCOUNTER (OUTPATIENT)
Dept: NON INVASIVE DIAGNOSTICS | Facility: HOSPITAL | Age: 79
Discharge: HOME | End: 2024-01-11
Attending: INTERNAL MEDICINE
Payer: MEDICARE

## 2024-01-11 ENCOUNTER — HOSPITAL ENCOUNTER (OUTPATIENT)
Dept: RADIOLOGY | Facility: HOSPITAL | Age: 79
Discharge: HOME | End: 2024-01-11
Payer: MEDICARE

## 2024-01-11 VITALS
WEIGHT: 160 LBS | DIASTOLIC BLOOD PRESSURE: 70 MMHG | SYSTOLIC BLOOD PRESSURE: 130 MMHG | HEIGHT: 66 IN | BODY MASS INDEX: 25.71 KG/M2 | HEART RATE: 64 BPM

## 2024-01-11 VITALS
DIASTOLIC BLOOD PRESSURE: 70 MMHG | BODY MASS INDEX: 25.71 KG/M2 | TEMPERATURE: 98 F | SYSTOLIC BLOOD PRESSURE: 130 MMHG | WEIGHT: 160 LBS | HEART RATE: 64 BPM | OXYGEN SATURATION: 100 % | HEIGHT: 66 IN

## 2024-01-11 DIAGNOSIS — I12.9 BENIGN HYPERTENSION WITH CKD (CHRONIC KIDNEY DISEASE) STAGE III (HCC): Primary | ICD-10-CM

## 2024-01-11 DIAGNOSIS — N18.30 BENIGN HYPERTENSION WITH CKD (CHRONIC KIDNEY DISEASE) STAGE III (HCC): ICD-10-CM

## 2024-01-11 DIAGNOSIS — N18.32 STAGE 3B CHRONIC KIDNEY DISEASE (HCC): ICD-10-CM

## 2024-01-11 DIAGNOSIS — I25.10 CORONARY ARTERY CALCIFICATION SEEN ON CAT SCAN: ICD-10-CM

## 2024-01-11 DIAGNOSIS — R60.0 EDEMA OF BOTH LEGS: ICD-10-CM

## 2024-01-11 DIAGNOSIS — C34.91 ADENOCARCINOMA OF RIGHT LUNG (HCC): ICD-10-CM

## 2024-01-11 DIAGNOSIS — J43.9 PULMONARY EMPHYSEMA, UNSPECIFIED EMPHYSEMA TYPE (HCC): ICD-10-CM

## 2024-01-11 DIAGNOSIS — I82.4Z2 ACUTE DEEP VEIN THROMBOSIS (DVT) OF DISTAL VEIN OF LEFT LOWER EXTREMITY (HCC): ICD-10-CM

## 2024-01-11 DIAGNOSIS — I12.9 BENIGN HYPERTENSION WITH CKD (CHRONIC KIDNEY DISEASE) STAGE III (HCC): ICD-10-CM

## 2024-01-11 DIAGNOSIS — N18.30 BENIGN HYPERTENSION WITH CKD (CHRONIC KIDNEY DISEASE) STAGE III (HCC): Primary | ICD-10-CM

## 2024-01-11 LAB
AORTIC ROOT: 2.8 CM
APICAL FOUR CHAMBER EJECTION FRACTION: 55 %
AV LVOT PEAK GRADIENT: 3 MMHG
AV PEAK GRADIENT: 7 MMHG
BSA FOR ECHO PROCEDURE: 1.82 M2
DOP CALC LVOT AREA: 3.14 CM2
DOP CALC LVOT DIAMETER: 2 CM
E WAVE DECELERATION TIME: 279 MS
E/A RATIO: 0.96
FRACTIONAL SHORTENING: 30 (ref 28–44)
INTERVENTRICULAR SEPTUM IN DIASTOLE (PARASTERNAL SHORT AXIS VIEW): 1 CM
INTERVENTRICULAR SEPTUM: 1 CM (ref 0.6–1.1)
LAAS-AP2: 21.8 CM2
LAAS-AP4: 19.9 CM2
LEFT ATRIUM SIZE: 3.7 CM
LEFT ATRIUM VOLUME (MOD BIPLANE): 61 ML
LEFT ATRIUM VOLUME INDEX (MOD BIPLANE): 33.5 ML/M2
LEFT INTERNAL DIMENSION IN SYSTOLE: 3.3 CM (ref 2.1–4)
LEFT VENTRICULAR INTERNAL DIMENSION IN DIASTOLE: 4.7 CM (ref 3.5–6)
LEFT VENTRICULAR POSTERIOR WALL IN END DIASTOLE: 0.9 CM
LEFT VENTRICULAR STROKE VOLUME: 60 ML
LVSV (TEICH): 60 ML
MV E'TISSUE VEL-LAT: 11 CM/S
MV E'TISSUE VEL-SEP: 9 CM/S
MV PEAK A VEL: 0.77 M/S
MV PEAK E VEL: 74 CM/S
MV STENOSIS PRESSURE HALF TIME: 81 MS
MV VALVE AREA P 1/2 METHOD: 2.72
RIGHT ATRIUM AREA SYSTOLE A4C: 20.7 CM2
RIGHT VENTRICLE ID DIMENSION: 3.6 CM
SL CV LEFT ATRIUM LENGTH A2C: 5.5 CM
SL CV PED ECHO LEFT VENTRICLE DIASTOLIC VOLUME (MOD BIPLANE) 2D: 105 ML
SL CV PED ECHO LEFT VENTRICLE SYSTOLIC VOLUME (MOD BIPLANE) 2D: 44 ML
TR MAX PG: 38 MMHG
TR PEAK VELOCITY: 3.1 M/S
TRICUSPID ANNULAR PLANE SYSTOLIC EXCURSION: 2.8 CM
TRICUSPID VALVE PEAK REGURGITATION VELOCITY: 3.09 M/S

## 2024-01-11 PROCEDURE — 71046 X-RAY EXAM CHEST 2 VIEWS: CPT

## 2024-01-11 PROCEDURE — 93306 TTE W/DOPPLER COMPLETE: CPT

## 2024-01-11 PROCEDURE — 99214 OFFICE O/P EST MOD 30 MIN: CPT | Performed by: INTERNAL MEDICINE

## 2024-01-11 PROCEDURE — 93306 TTE W/DOPPLER COMPLETE: CPT | Performed by: INTERNAL MEDICINE

## 2024-01-11 RX ORDER — FUROSEMIDE 20 MG/1
20 TABLET ORAL DAILY
Qty: 90 TABLET | Refills: 1 | Status: SHIPPED | OUTPATIENT
Start: 2024-01-11

## 2024-01-11 RX ORDER — POTASSIUM CHLORIDE 20 MEQ/1
20 TABLET, EXTENDED RELEASE ORAL DAILY
Qty: 30 TABLET | Refills: 1 | Status: SHIPPED | OUTPATIENT
Start: 2024-01-11

## 2024-01-11 NOTE — ASSESSMENT & PLAN NOTE
Lab Results   Component Value Date    EGFR 37 01/04/2024    EGFR 38 12/21/2023    EGFR 34 09/12/2023    CREATININE 1.36 (H) 01/04/2024    CREATININE 1.33 (H) 12/21/2023    CREATININE 1.44 (H) 09/12/2023   Stable at baseline GFR 37 creatinine 1.36 stable avoid nephrotoxic drug chronic kidney disease management same is under stage IIIb chronic kidney disease      Agree and continue current management with Lasix 20 mg daily and follow-up

## 2024-01-11 NOTE — PROGRESS NOTES
Dr. Peters's Office Visit Note  24     Cande CARLOS Pfund 78 y.o. female MRN: [id number]  : 1945    Assessment:     1. Benign hypertension with CKD (chronic kidney disease) stage III (HCC)  Assessment & Plan:  Lab Results   Component Value Date    EGFR 37 2024    EGFR 38 2023    EGFR 34 2023    CREATININE 1.36 (H) 2024    CREATININE 1.33 (H) 2023    CREATININE 1.44 (H) 2023   Stable at baseline GFR 37 creatinine 1.36 stable avoid nephrotoxic drug chronic kidney disease management same is under stage IIIb chronic kidney disease      Agree and continue current management with Lasix 20 mg daily and follow-up    Orders:  -     furosemide (LASIX) 20 mg tablet; Take 1 tablet (20 mg total) by mouth daily  -     potassium chloride (K-DUR,KLOR-CON) 20 mEq tablet; Take 1 tablet (20 mEq total) by mouth daily    2. Adenocarcinoma of right lung (HCC)  Assessment & Plan:  In remission asymptomatic follow-up with the cardiothoracic surgeon repeat CT lungs 2024      3. Pulmonary emphysema, unspecified emphysema type (HCC)  Assessment & Plan:  Stable no wheezing no difficulty breathing    Continue Proventil as needed follow-up with the pulmonary      4. Acute deep vein thrombosis (DVT) of distal vein of left lower extremity (HCC)  Assessment & Plan:  No DVT the lower extremity Doppler negative no further intervention needed      5. Stage 3b chronic kidney disease (HCC)  Assessment & Plan:  Lab Results   Component Value Date    EGFR 37 2024    EGFR 38 2023    EGFR 34 2023    CREATININE 1.36 (H) 2024    CREATININE 1.33 (H) 2023    CREATININE 1.44 (H) 2023   As above  GFR 37 and creatinine 1.36 stable at baseline avoid nephrotoxic stable monitor BMP closely  GFR is baseline  Creat is baseline.   Recommend periodic blood test for monitoring of BUN and Creat  Avoid Non Steroidal Antiinflammatory such as ibuprofen, aleve etc.  Potassium, HCO3 and calcium  are wnl and will require periodic blood test.  Bone and Mineral disease monitoring as appropriate.  All patient with GFR less than 30( CKD 4 or 5 or 6) advised to follow with nephrologist.     Orders:  -     furosemide (LASIX) 20 mg tablet; Take 1 tablet (20 mg total) by mouth daily  -     potassium chloride (K-DUR,KLOR-CON) 20 mEq tablet; Take 1 tablet (20 mEq total) by mouth daily    6. Edema of both legs  Assessment & Plan:  Edema both legs patient was on Dyazide using compression stocking minimal improvement echocardiogram done normal ejection fraction 60% no evidence of any CHF patient looks CHF compensated lower extremity venous duplex negative for DVT    Agree and continue management medication as follows    Lasix 20 mg daily    Low-salt diet fluid restriction    Could be possibly due to amlodipine if no improvement will discontinue amlodipine start lisinopril or hydralazine     Orders:  -     furosemide (LASIX) 20 mg tablet; Take 1 tablet (20 mg total) by mouth daily  -     potassium chloride (K-DUR,KLOR-CON) 20 mEq tablet; Take 1 tablet (20 mEq total) by mouth daily    7. Coronary artery calcification seen on CAT scan  Assessment & Plan:  Patient denies any angina or angina:.     Nuclear stress test done on 6/1/2022 reviewed no perfusion defects were noted.     Relevant medications includes amlodipine 10 mg daily, regular thiazide 12.5 mg daily, losartan 50 mg daily, metoprolol succinate 100 mg daily, atorvastatin 10 mg daily, as well as baby aspirin daily as well as patient takes omega 1200 mg.    Patient stable no chest pain or difficulty breathing agree and continue medical medication management as above            Discussion Summary and Plan:  Today's care plan and medications were reviewed with patient in detail and all their questions answered to their satisfaction.    Chief Complaint   Patient presents with   • Follow-up     Had lab work done. Was given lasix twice a day but Dr. Lin said take it  once a day.      Subjective:  Came in follow-up chronic medical condition listed under visit diagnosis leg edema improved lower extremity venous duplex negative awaiting echocardiogram no chest pain no difficulty breathing for detail see assessment plan under visit diagnosis        The following portions of the patient's history were reviewed and updated as appropriate: allergies, current medications, past family history, past medical history, past social history, past surgical history and problem list.    Review of Systems   Constitutional:  Positive for activity change. Negative for appetite change, chills, diaphoresis, fatigue, fever and unexpected weight change.   HENT:  Negative for congestion, dental problem, drooling, ear discharge, ear pain, facial swelling, hearing loss, mouth sores, nosebleeds, postnasal drip, rhinorrhea, sinus pressure, sneezing, sore throat, tinnitus, trouble swallowing and voice change.    Eyes:  Negative for photophobia, pain, discharge, redness, itching and visual disturbance.   Respiratory:  Negative for apnea, cough, choking, chest tightness, shortness of breath, wheezing and stridor.    Cardiovascular:  Positive for leg swelling. Negative for chest pain and palpitations.   Gastrointestinal:  Negative for abdominal distention, abdominal pain, anal bleeding, blood in stool, constipation, diarrhea, nausea, rectal pain and vomiting.   Endocrine: Negative for cold intolerance, heat intolerance, polydipsia, polyphagia and polyuria.   Genitourinary:  Negative for decreased urine volume, difficulty urinating, dysuria, enuresis, flank pain, frequency, genital sores, hematuria and urgency.   Musculoskeletal:  Negative for arthralgias, back pain, gait problem, joint swelling, myalgias, neck pain and neck stiffness.   Skin:  Negative for color change, pallor, rash and wound.   Allergic/Immunologic: Negative.  Negative for environmental allergies, food allergies and immunocompromised state.    Neurological:  Negative for dizziness, tremors, seizures, syncope, facial asymmetry, speech difficulty, weakness, light-headedness, numbness and headaches.   Psychiatric/Behavioral:  Negative for agitation, behavioral problems, confusion, decreased concentration, dysphoric mood, hallucinations, self-injury, sleep disturbance and suicidal ideas. The patient is nervous/anxious. The patient is not hyperactive.          Historical Information   Patient Active Problem List   Diagnosis   • Adenocarcinoma of right lung (HCC)   • Benign hypertension with CKD (chronic kidney disease) stage III (HCC)   • Dyslipidemia   • Primary osteoarthritis involving multiple joints   • Femoral neuropathy, right   • Emphysema lung (HCC)   • Osteoarthritis of right hip   • Hyperlipidemia   • Numbness and tingling of foot   • Anxiety   • Colon polyp   • Thyroid nodule   • Mixed anxiety and depressive disorder   • Stage 3b chronic kidney disease (HCC)   • Diverticulosis   • Skin mole   • Restless leg syndrome   • BPPV (benign paroxysmal positional vertigo)   • Class 1 obesity due to excess calories without serious comorbidity with body mass index (BMI) of 30.0 to 30.9 in adult   • Chorea   • Varicose veins of legs   • Vitamin B12 deficiency   • Edema of both legs   • Vitamin D deficiency   • Kidney cyst, acquired   • Carpal tunnel syndrome   • Breast cancer screening by mammogram   • Onychomycosis   • Acute deep vein thrombosis (DVT) of distal vein of left lower extremity (HCC)   • Left foot pain   • Coronary artery calcification   • Coronary artery calcification seen on CAT scan   • Chronic kidney disease-mineral and bone disorder   • Elevated AST (SGOT)   • Weight loss   • Prediabetes   • Right renal mass   • Chronic nonintractable headache     Past Medical History:   Diagnosis Date   • Cancer (HCC)     lung   • Diarrhea     wt loss from 192lb to 178lb (1/23)   • Emphysema lung (HCC)    • Hemorrhoid     possible hemorrhoid with small  amount of bleeding   • Hyperlipidemia    • Hypertension    • Lung cancer (HCC)    • Numbness and tingling of foot    • Osteoarthritis of right hip    • Primary adenocarcinoma of lower lobe of right lung (HCC)      Past Surgical History:   Procedure Laterality Date   • CATARACT EXTRACTION Bilateral    • CHOLECYSTECTOMY     • COLONOSCOPY     • JOINT REPLACEMENT Bilateral     hips-   • ME ARTHRP ACETBLR/PROX FEM PROSTC AGRFT/ALGRFT Right 2018    Procedure: ANTERIOR TOTAL HIP ARTHROPLASTY;  Surgeon: Kunal Chester MD;  Location: [facility];  Service: Orthopedics   • TOTAL HIP ARTHROPLASTY     • TUMOR REMOVAL Right 2015    lower lobe      Social History     Substance and Sexual Activity   Alcohol Use Yes    Comment: on occ     Social History     Substance and Sexual Activity   Drug Use Never     Social History     Tobacco Use   Smoking Status Former   • Current packs/day: 0.00   • Average packs/day: 1 pack/day for 50.0 years (50.0 ttl pk-yrs)   • Types: Cigarettes   • Start date: 1965   • Quit date: 2015   • Years since quittin.3   Smokeless Tobacco Never     Family History   Problem Relation Age of Onset   • Heart disease Mother    • No Known Problems Father      Health Maintenance Due   Topic   • COVID-19 Vaccine (1)   • Pneumococcal Vaccine: 65+ Years (1 - PCV)   • Osteoporosis Screening    • Zoster Vaccine (1 of 2)   • Depression Screening       Meds/Allergies       Current Outpatient Medications:   •  acetaminophen (TYLENOL) 500 mg tablet, Take 500 mg by mouth every 6 (six) hours as needed for mild pain, Disp: , Rfl:   •  albuterol (PROVENTIL HFA,VENTOLIN HFA) 90 mcg/act inhaler, Inhale 2 puffs every 6 (six) hours as needed for wheezing or shortness of breath, Disp: 18 g, Rfl: 1  •  amLODIPine (NORVASC) 10 mg tablet, TAKE 1 TABLET DAILY, Disp: 90 tablet, Rfl: 3  •  aspirin (ECOTRIN LOW STRENGTH) 81 mg EC tablet, Take 1 tablet (81 mg total) by mouth 2 (two) times a day (Patient taking  "differently: Take 81 mg by mouth daily with dinner), Disp: 60 tablet, Rfl: 0  •  atorvastatin (LIPITOR) 10 mg tablet, TAKE 1 TABLET DAILY AT BEDTIME, Disp: 90 tablet, Rfl: 3  •  Cholecalciferol (Vitamin D3) 50 MCG (2000 UT) TABS, Take 2,000 Units by mouth daily, Disp: , Rfl:   •  furosemide (LASIX) 20 mg tablet, Take 1 tablet (20 mg total) by mouth daily, Disp: 90 tablet, Rfl: 1  •  gabapentin (NEURONTIN) 300 mg capsule, Take 1 capsule (300 mg total) by mouth daily, Disp: 90 capsule, Rfl: 1  •  losartan (COZAAR) 50 mg tablet, TAKE 1 TABLET DAILY, Disp: 90 tablet, Rfl: 1  •  metoprolol succinate (TOPROL-XL) 100 mg 24 hr tablet, TAKE 1 TABLET DAILY, Disp: 90 tablet, Rfl: 1  •  Omega 3 1200 MG CAPS, Take by mouth Omega XL daily, Disp: , Rfl:   •  Polyethylene Glycol 400 0.25 % SOLN, Apply 1 drop to eye 2 (two) times a day As needed, Disp: , Rfl:   •  potassium chloride (K-DUR,KLOR-CON) 20 mEq tablet, Take 1 tablet (20 mEq total) by mouth daily, Disp: 30 tablet, Rfl: 1      Objective:    Vitals:   /70   Pulse 64   Temp 98 °F (36.7 °C)   Ht 5' 6\" (1.676 m)   Wt 72.6 kg (160 lb)   SpO2 100%   BMI 25.82 kg/m²   Body mass index is 25.82 kg/m².  Vitals:    01/11/24 1133   Weight: 72.6 kg (160 lb)       Physical Exam  Vitals and nursing note reviewed.   Constitutional:       General: She is not in acute distress.     Appearance: She is well-developed. She is obese. She is not ill-appearing, toxic-appearing or diaphoretic.   HENT:      Head: Normocephalic and atraumatic.      Right Ear: External ear normal.      Left Ear: External ear normal.      Nose: Nose normal.      Mouth/Throat:      Pharynx: No oropharyngeal exudate.   Eyes:      General: Lids are normal. Lids are everted, no foreign bodies appreciated. No scleral icterus.        Right eye: No discharge.         Left eye: No discharge.      Conjunctiva/sclera: Conjunctivae normal.      Pupils: Pupils are equal, round, and reactive to light.   Neck:      " Thyroid: No thyromegaly.      Vascular: Normal carotid pulses. No carotid bruit, hepatojugular reflux or JVD.      Trachea: No tracheal tenderness or tracheal deviation.   Cardiovascular:      Rate and Rhythm: Normal rate and regular rhythm.      Pulses: Normal pulses.      Heart sounds: Murmur heard.      No friction rub. No gallop.   Pulmonary:      Effort: Pulmonary effort is normal. No respiratory distress.      Breath sounds: Normal breath sounds. No stridor. No wheezing or rales.   Chest:      Chest wall: No tenderness.   Abdominal:      General: Bowel sounds are normal. There is no distension.      Palpations: Abdomen is soft. There is no mass.      Tenderness: There is no abdominal tenderness. There is no guarding or rebound.   Musculoskeletal:         General: No tenderness or deformity. Normal range of motion.      Cervical back: Normal range of motion and neck supple. No edema, erythema or rigidity. No spinous process tenderness or muscular tenderness. Normal range of motion.      Right lower leg: Edema present.      Left lower leg: Edema present.   Lymphadenopathy:      Head:      Right side of head: No submental, submandibular, tonsillar, preauricular or posterior auricular adenopathy.      Left side of head: No submental, submandibular, tonsillar, preauricular, posterior auricular or occipital adenopathy.      Cervical: No cervical adenopathy.      Right cervical: No superficial, deep or posterior cervical adenopathy.     Left cervical: No superficial, deep or posterior cervical adenopathy.      Upper Body:      Right upper body: No pectoral adenopathy.      Left upper body: No pectoral adenopathy.   Skin:     General: Skin is warm and dry.      Coloration: Skin is not pale.      Findings: No erythema or rash.   Neurological:      General: No focal deficit present.      Mental Status: She is alert and oriented to person, place, and time.      Cranial Nerves: No cranial nerve deficit.      Sensory: No  sensory deficit.      Motor: No tremor, abnormal muscle tone or seizure activity.      Coordination: Coordination normal.      Gait: Gait normal.      Deep Tendon Reflexes: Reflexes are normal and symmetric. Reflexes normal.   Psychiatric:         Behavior: Behavior normal.         Thought Content: Thought content normal.         Judgment: Judgment normal.         Lab Review   Hospital Outpatient Visit on 01/11/2024   Component Date Value Ref Range Status   • BSA 01/11/2024 1.82  m2 Final   • A4C EF 01/11/2024 55  % Final   • LVIDd 01/11/2024 4.70  cm Final   • LVIDS 01/11/2024 3.30  cm Final   • IVSd 01/11/2024 1.00  cm Final   • LVPWd 01/11/2024 0.90  cm Final   • LVOT diameter 01/11/2024 2.0  cm Final   • FS 01/11/2024 30  28 - 44 Final   • MV E' Tissue Velocity Septal 01/11/2024 9  cm/s Final   • MV E' Tissue Velocity Lateral 01/11/2024 11  cm/s Final   • LA Volume Index (BP) 01/11/2024 33.5  mL/m2 Final   • E/A ratio 01/11/2024 0.96   Final   • E wave deceleration time 01/11/2024 279  ms Final   • MV Peak E Bernard[place] 01/11/2024 74  cm/s Final   • MV Peak A [place] 01/11/2024 0.77  m/s Final   • AV LVOT peak gradient 01/11/2024 3  mmHg Final   • RVID d 01/11/2024 3.6  cm Final   • Tricuspid annular plane systolic e* 01/11/2024 2.80  cm Final   • LA size 01/11/2024 3.7  cm Final   • LA length (A2C) 01/11/2024 5.50  cm Final   • LA volume (BP) 01/11/2024 61  mL Final   • RAA A4C 01/11/2024 20.7  cm2 Final   • AV peak gradient 01/11/2024 7  mmHg Final   • MV stenosis pressure 1/2 time 01/11/2024 81  ms Final   • MV valve area p 1/2 method 01/11/2024 2.72   Final   • TR Peak [place] 01/11/2024 3.1  m/s Final   • Triscuspid Valve Regurgitation Pea* 01/11/2024 38.0  mmHg Final   • Ao root 01/11/2024 2.80  cm Final   • Tricuspid valve peak regurgitation* 01/11/2024 3.09  m/s Final   • Left ventricular stroke volume (2D) 01/11/2024 60.00  mL Final   • IVS 01/11/2024 1  cm Final   • LEFT VENTRICLE SYSTOLIC VOLUME (MO* 01/11/2024 44   mL Final   • LV DIASTOLIC VOLUME (MOD BIPLANE) * 01/11/2024 105  mL Final   • Left Atrium Area-systolic Four Grazyna* 01/11/2024 19.9  cm2 Final   • Left Atrium Area-systolic Apical T* 01/11/2024 21.8  cm2 Final   • LVSV, 2D 01/11/2024 60  mL Final   • LVOT area 01/11/2024 3.14  cm2 Final   Lab on 01/04/2024   Component Date Value Ref Range Status   • Sodium 01/04/2024 139  135 - 147 mmol/L Final   • Potassium 01/04/2024 4.8  3.5 - 5.3 mmol/L Final   • Chloride 01/04/2024 103  96 - 108 mmol/L Final   • CO2 01/04/2024 27  21 - 32 mmol/L Final   • ANION GAP 01/04/2024 9  mmol/L Final   • BUN 01/04/2024 28 (H)  5 - 25 mg/dL Final   • Creatinine 01/04/2024 1.36 (H)  0.60 - 1.30 mg/dL Final    Standardized to IDMS reference method   • Glucose, Fasting 01/04/2024 103 (H)  65 - 99 mg/dL Final   • Calcium 01/04/2024 9.8  8.4 - 10.2 mg/dL Final   • eGFR 01/04/2024 37  ml/min/1.73sq m Final   • BNP 01/04/2024 196 (H)  0 - 100 pg/mL Final   Appointment on 12/21/2023   Component Date Value Ref Range Status   • Sodium 12/21/2023 138  135 - 147 mmol/L Final   • Potassium 12/21/2023 4.0  3.5 - 5.3 mmol/L Final   • Chloride 12/21/2023 102  96 - 108 mmol/L Final   • CO2 12/21/2023 29  21 - 32 mmol/L Final   • ANION GAP 12/21/2023 7  mmol/L Final   • BUN 12/21/2023 32 (H)  5 - 25 mg/dL Final   • Creatinine 12/21/2023 1.33 (H)  0.60 - 1.30 mg/dL Final    Standardized to IDMS reference method   • Glucose, Fasting 12/21/2023 98  65 - 99 mg/dL Final   • Calcium 12/21/2023 9.4  8.4 - 10.2 mg/dL Final   • AST 12/21/2023 23  13 - 39 U/L Final   • ALT 12/21/2023 22  7 - 52 U/L Final    Specimen collection should occur prior to Sulfasalazine administration due to the potential for falsely depressed results.    • Alkaline Phosphatase 12/21/2023 63  34 - 104 U/L Final   • Total Protein 12/21/2023 7.6  6.4 - 8.4 g/dL Final   • Albumin 12/21/2023 4.4  3.5 - 5.0 g/dL Final   • Total Bilirubin 12/21/2023 0.62  0.20 - 1.00 mg/dL Final    Use of this  assay is not recommended for patients undergoing treatment with eltrombopag due to the potential for falsely elevated results.  N-acetyl-p-benzoquinone imine (metabolite of Acetaminophen) will generate erroneously low results in samples for patients that have taken an overdose of Acetaminophen.   • eGFR 12/21/2023 38  ml/min/1.73sq m Final   • WBC 12/21/2023 8.83  4.31 - 10.16 Thousand/uL Final   • RBC 12/21/2023 4.61  3.81 - 5.12 Million/uL Final   • Hemoglobin 12/21/2023 14.1  11.5 - 15.4 g/dL Final   • Hematocrit 12/21/2023 43.0  34.8 - 46.1 % Final   • MCV 12/21/2023 93  82 - 98 fL Final   • MCH 12/21/2023 30.6  26.8 - 34.3 pg Final   • MCHC 12/21/2023 32.8  31.4 - 37.4 g/dL Final   • RDW 12/21/2023 13.2  11.6 - 15.1 % Final   • Platelets 12/21/2023 275  149 - 390 Thousands/uL Final   • MPV 12/21/2023 9.3  8.9 - 12.7 fL Final   • TSH 3RD GENERATON 12/21/2023 1.822  0.450 - 4.500 uIU/mL Final    The recommended reference ranges for TSH during pregnancy are as follows:   First trimester 0.100 to 2.500 uIU/mL   Second trimester  0.200 to 3.000 uIU/mL   Third trimester 0.300 to 3.000 uIU/m    Note: Normal ranges may not apply to patients who are transgender, non-binary, or whose legal sex, sex at birth, and gender identity differ.  Adult TSH (3rd generation) reference range follows the recommended guidelines of the [employer], January, 2020.         Patient Instructions   Hypertension and Diabetes   WHAT YOU NEED TO KNOW:   Hypertension is high blood pressure (BP). Hypertension is common in persons with diabetes. A normal BP is 119/79 or lower. You can control hypertension and diabetes with a healthy lifestyle, or a combination of lifestyle and medicine. Controlled BP and blood sugar levels help prevent certain complications from diabetes. Examples include retinopathy (eye damage) and kidney damage.       DISCHARGE INSTRUCTIONS:   Call or have someone call your local emergency number (911 in the  [place]) for any of the following:  You have any of the following signs of a heart attack:   Squeezing, pressure, or pain in your chest    You may  also have any of the following:     Discomfort or pain in your back, neck, jaw, stomach, or arm    Shortness of breath    Nausea or vomiting    Lightheadedness or a sudden cold sweat  You have any of the following signs of a stroke:   Numbness or drooping on one side of your face     Weakness in an arm or leg    Confusion or difficulty speaking    Dizziness, a severe headache, or vision loss    Seek immediate care if:   You feel faint, dizzy, confused, or drowsy.    You have a severe headache or vision loss.    Call your doctor or diabetes care team provider if:   You have been taking your BP medicine and your BP is still higher than your healthcare provider says it should be.    You have questions or concerns about your condition or care.    Medicines:  You may  need any of the following:  Medicine  may be used to help lower your BP. You may need more than one type of medicine. Take the medicine exactly as directed.    Diuretics  help decrease extra fluid that collects in your body. This will help lower your BP. You may urinate more often while you take this medicine.    Cholesterol medicine  helps lower your cholesterol level. A low cholesterol level helps prevent heart disease and makes it easier to control your BP.    Take your medicine as directed.  Contact your healthcare provider if you think your medicine is not helping or if you have side effects. Tell your provider if you are allergic to any medicine. Keep a list of the medicines, vitamins, and herbs you take. Include the amounts, and when and why you take them. Bring the list or the pill bottles to follow-up visits. Carry your medicine list with you in case of an emergency.    Manage hypertension and diabetes:  Talk with your healthcare provider about these and other ways to manage hypertension and diabetes:  Check  your BP at home.  Do not smoke, drink caffeine, or exercise at least 30 minutes before checking your BP. Sit and rest for 5 minutes before you take your blood pressure. Extend your arm and support it on a flat surface. Your arm should be at the same level as your heart. Follow the directions that came with your BP monitor. Check your BP 2 times, 1 minute apart, before you take your medicine in the morning. Also check your BP before your evening meal. Keep a record of your readings and bring it to your follow-up visits. Ask your provider what your BP should be.         Check your blood sugar level at home.  Follow your provider's instructions and check your blood sugar level as directed. You may need to check a drop of blood in a glucose test machine. Your care team provider may recommend a continuous glucose monitor (CGM). A CGM is a device that is worn at all times. The CGM checks your blood sugar every 5 minutes. It sends results to an electronic device such as a smart phone. Keep a record of your blood sugar level readings and bring it to your follow-up visits. Ask your provider what your blood sugar levels should be.            Manage any other health conditions you have.  Health conditions such as kidney disease, thyroid disease, or adrenal gland disorder can increase your BP and blood sugar levels. Follow your provider's instructions and take all your medicines as directed.    Lifestyle changes you can make:  Talk with your healthcare provider about these and other lifestyle changes for hypertension and diabetes:  Limit sodium (salt) as directed.  Too much sodium can affect your fluid balance. Check labels to find low-sodium or no-salt-added foods. Some low-sodium foods use potassium salts for flavor. Too much potassium can also cause health problems. Your provider will tell you how much sodium and potassium are safe for you to have in a day. He or she may recommend that you limit sodium to 2,300 mg a day.          Follow the meal plan recommended by your provider.  A dietitian or your provider can help you create healthy meal plans. The plans will help you control sodium, carbohydrates, and fats in your meals. This can help you control both your blood sugar and BP levels. The plans usually include eating more fruits, vegetables, and low-fat dairy products. Your provider may talk to you about a Mediterranean style and Dietary Approaches to Stop Hypertension (DASH) eating plans. These eating plans can help you with weight loss and lowering your cholesterol.         Get regular physical activity.  Physical activity can help decrease your blood sugar level. It can also help to decrease your risk for heart disease and help you maintain a healthy weight. Adults should have moderate intensity physical activity for at least 150 minutes every week. Spread the amount of activity over at least 3 days a week. Do not skip more than 2 days in a row. Children should get at least 60 minutes of moderate physical activity on most days of the week. Examples of moderate physical activity include brisk walking, running, and swimming. Do not sit for longer than 30 minutes. Work with your provider to create a plan for physical activity.         Decrease stress.  This may help lower your BP. Learn ways to relax, such as deep breathing or listening to music. Yoga and meditation may also help. Talk to your provider about ways to decrease stress.    Limit alcohol as directed.  Alcohol can cause your blood sugar levels to be low if you use insulin. Alcohol can cause high blood sugar and BP levels, and weight gain. Women 21 years or older and men 65 years or older should limit alcohol to 1 drink a day. Men aged 21 to 64 years should limit alcohol to 2 drinks a day. A drink of alcohol is 12 ounces of beer, 5 ounces of wine, or 1½ ounces of liquor.    Do not smoke.  Nicotine and other chemicals in cigarettes and cigars can increase your BP and make  "your blood sugar levels harder to control. Ask your provider for information if you currently smoke and need help to quit. E-cigarettes or smokeless tobacco still contain nicotine. Talk to your provider before you use these products.       Follow up with your doctor or diabetes care team provider as directed:  You will need to return to have your BP checked. You will also need other lab tests done, including an A1C to monitor your overall blood sugar control. Write down your questions so you remember to ask them during your visits.  © Copyright [employer] 2023 Information is for End User's use only and may not be sold, redistributed or otherwise used for commercial purposes.  The above information is an  only. It is not intended as medical advice for individual conditions or treatments. Talk to your doctor, nurse or pharmacist before following any medical regimen to see if it is safe and effective for you.       Mirella Peters MD        \"This note has been constructed using a voice recognition system.Therefore there may be syntax, spelling, and/or grammatical errors. Please call if you have any questions. \"  "

## 2024-01-11 NOTE — ASSESSMENT & PLAN NOTE
Stable no wheezing no difficulty breathing    Continue Proventil as needed follow-up with the pulmonary

## 2024-01-11 NOTE — ASSESSMENT & PLAN NOTE
Lab Results   Component Value Date    EGFR 37 01/04/2024    EGFR 38 12/21/2023    EGFR 34 09/12/2023    CREATININE 1.36 (H) 01/04/2024    CREATININE 1.33 (H) 12/21/2023    CREATININE 1.44 (H) 09/12/2023   As above  GFR 37 and creatinine 1.36 stable at baseline avoid nephrotoxic stable monitor BMP closely  GFR is baseline  Creat is baseline.   Recommend periodic blood test for monitoring of BUN and Creat  Avoid Non Steroidal Antiinflammatory such as ibuprofen, aleve etc.  Potassium, HCO3 and calcium are wnl and will require periodic blood test.  Bone and Mineral disease monitoring as appropriate.  All patient with GFR less than 30( CKD 4 or 5 or 6) advised to follow with nephrologist.

## 2024-01-14 NOTE — ASSESSMENT & PLAN NOTE
Edema both legs patient was on Dyazide using compression stocking minimal improvement echocardiogram done normal ejection fraction 60% no evidence of any CHF patient looks CHF compensated lower extremity venous duplex negative for DVT    Agree and continue management medication as follows    Lasix 20 mg daily    Low-salt diet fluid restriction    Could be possibly due to amlodipine if no improvement will discontinue amlodipine start lisinopril or hydralazine

## 2024-01-29 ENCOUNTER — HOSPITAL ENCOUNTER (OUTPATIENT)
Dept: RADIOLOGY | Facility: HOSPITAL | Age: 79
Discharge: HOME | End: 2024-01-29
Attending: STUDENT IN AN ORGANIZED HEALTH CARE EDUCATION/TRAINING PROGRAM
Payer: MEDICARE

## 2024-01-29 DIAGNOSIS — N28.89 RIGHT RENAL MASS: ICD-10-CM

## 2024-01-29 PROCEDURE — 74183 MRI ABD W/O CNTR FLWD CNTR: CPT

## 2024-01-29 PROCEDURE — A9585 GADOBUTROL INJECTION: HCPCS | Performed by: STUDENT IN AN ORGANIZED HEALTH CARE EDUCATION/TRAINING PROGRAM

## 2024-01-29 PROCEDURE — G1004 CDSM NDSC: HCPCS

## 2024-01-29 RX ORDER — GADOBUTROL 604.72 MG/ML
7 INJECTION INTRAVENOUS
Status: COMPLETED | OUTPATIENT
Start: 2024-01-29 | End: 2024-01-29

## 2024-01-29 RX ADMIN — GADOBUTROL 7 ML: 604.72 INJECTION INTRAVENOUS at 13:12

## 2024-02-19 ENCOUNTER — OFFICE VISIT (OUTPATIENT)
Dept: UROLOGY | Facility: CLINIC | Age: 79
End: 2024-02-19
Payer: MEDICARE

## 2024-02-19 VITALS
BODY MASS INDEX: 27 KG/M2 | WEIGHT: 168 LBS | OXYGEN SATURATION: 96 % | SYSTOLIC BLOOD PRESSURE: 140 MMHG | DIASTOLIC BLOOD PRESSURE: 70 MMHG | HEART RATE: 60 BPM | HEIGHT: 66 IN

## 2024-02-19 DIAGNOSIS — N39.0 URINARY TRACT INFECTION WITHOUT HEMATURIA, SITE UNSPECIFIED: Primary | ICD-10-CM

## 2024-02-19 PROCEDURE — 81001 URINALYSIS AUTO W/SCOPE: CPT

## 2024-02-19 PROCEDURE — 99213 OFFICE O/P EST LOW 20 MIN: CPT

## 2024-02-19 NOTE — PROGRESS NOTES
Office Visit- Urology  Cande CARLOS [employer] 1945 MRN: [id number]      Assessment/Discussion/Plan    78 y.o. female managed by Dr. Berto Aaronk 2 cyst  -1.3 centimeter right renal mass first identified on CT scan in January 2023.  Repeat CT scan in July 2023 demonstrated stable mass unchanged in size.  Ultrasound not reliably noted  -Patient was recommended to have 6-month MRI   -MRI demonstrated a exophytic Bosniak two 1.6 cm right renal proteinaceous cyst correlating with indeterminate CT lesion  -as lesion reported on MRI as Bosniak 2 I do not think any further follow-up is warranted at this point in time  -patient can follow-up on an as-needed basis    2.  Report of microscopic hematuria   -Last 2 urine microscopy's with no evidence of clinically significant microscopic hematuria  -Will send repeat testing today.  If negative patient can follow-up with PCP with annual UA/microscopy      Chief Complaint:   Cande is a 78 y.o. female presenting to the office for a follow up visit regarding right renal lesion        Subjective    Patient is a 78-year-old female who presents for follow-up in regards to right renal mass/microhematuria.  She was last in the office in August 2023 by Dr. Thomson.  She was initially found to have a 1 point centimeter right renal mass on CT scan on 1/2023.  Repeat CT scan in July 2023 demonstrated stability with no change in size.  Renal ultrasound was subsequently obtained but not demonstrate any lower pole lesion that corresponds to the exophytic lesion mentioned.  Patient was advised to have an MRI of the abdomen and presents for 6-month follow-up.  She presents for that follow-up .  Her MRI of the abdomen with and without contrast demonstrated an exophytic Bosniak two 1.6 cm right renal proteinaceous cyst corresponding to the indeterminant CT lesion.  She denies any bothersome urinary tract symptoms.  She denies any gross hematuria, dysuria, flank pain      ROS:   Review of Systems    Constitutional: Negative.  Negative for chills, fatigue and fever.   HENT: Negative.     Respiratory:  Negative for shortness of breath.    Cardiovascular:  Negative for chest pain.   Gastrointestinal: Negative.  Negative for abdominal pain.   Endocrine: Negative.    Musculoskeletal: Negative.    Skin: Negative.    Neurological: Negative.  Negative for dizziness and light-headedness.   Hematological: Negative.    Psychiatric/Behavioral: Negative.           Past Medical History  Past Medical History:   Diagnosis Date    Cancer (HCC)     lung    Diarrhea     wt loss from 192lb to 178lb (1/23)    Emphysema lung (HCC)     Hemorrhoid     possible hemorrhoid with small amount of bleeding    Hyperlipidemia     Hypertension     Lung cancer (HCC)     Numbness and tingling of foot     Osteoarthritis of right hip     Primary adenocarcinoma of lower lobe of right lung (HCC)        Past Surgical History  Past Surgical History:   Procedure Laterality Date    CATARACT EXTRACTION Bilateral     CHOLECYSTECTOMY      COLONOSCOPY      JOINT REPLACEMENT Bilateral     hips-    MT ARTHRP ACETBLR/PROX FEM PROSTC AGRFT/ALGRFT Right 09/14/2018    Procedure: ANTERIOR TOTAL HIP ARTHROPLASTY;  Surgeon: Kunal Chester MD;  Location: [facility];  Service: Orthopedics    TOTAL HIP ARTHROPLASTY      TUMOR REMOVAL Right 2015    lower lobe        Past Family History  Family History   Problem Relation Age of Onset    Heart disease Mother     No Known Problems Father        Past Social history  Social History     Socioeconomic History    Marital status:      Spouse name: Not on file    Number of children: Not on file    Years of education: Not on file    Highest education level: Not on file   Occupational History    Not on file   Tobacco Use    Smoking status: Former     Current packs/day: 0.00     Average packs/day: 1 pack/day for 50.0 years (50.0 ttl pk-yrs)     Types: Cigarettes     Start date: 9/7/1965     Quit date: 9/7/2015      Years since quittin.4    Smokeless tobacco: Never   Vaping Use    Vaping status: Never Used   Substance and Sexual Activity    Alcohol use: Yes     Comment: on occ    Drug use: Never    Sexual activity: Not on file   Other Topics Concern    Not on file   Social History Narrative    Not on file     Social Determinants of Health     Financial Resource Strain: Low Risk  (2023)    Overall Financial Resource Strain (CARDIA)     Difficulty of Paying Living Expenses: Not hard at all   Food Insecurity: Not on file   Transportation Needs: No Transportation Needs (2023)    PRAPARE - Transportation     Lack of Transportation (Medical): No     Lack of Transportation (Non-Medical): No   Physical Activity: Not on file   Stress: Not on file   Social Connections: Not on file   Intimate Partner Violence: Not on file   Housing Stability: Not on file       Current Medications  Current Outpatient Medications   Medication Sig Dispense Refill    acetaminophen (TYLENOL) 500 mg tablet Take 500 mg by mouth every 6 (six) hours as needed for mild pain      albuterol (PROVENTIL HFA,VENTOLIN HFA) 90 mcg/act inhaler Inhale 2 puffs every 6 (six) hours as needed for wheezing or shortness of breath 18 g 1    amLODIPine (NORVASC) 10 mg tablet TAKE 1 TABLET DAILY 90 tablet 3    aspirin (ECOTRIN LOW STRENGTH) 81 mg EC tablet Take 1 tablet (81 mg total) by mouth 2 (two) times a day (Patient taking differently: Take 81 mg by mouth daily with dinner) 60 tablet 0    atorvastatin (LIPITOR) 10 mg tablet TAKE 1 TABLET DAILY AT BEDTIME 90 tablet 3    Cholecalciferol (Vitamin D3) 50 MCG (2000 UT) TABS Take 2,000 Units by mouth daily      furosemide (LASIX) 20 mg tablet Take 1 tablet (20 mg total) by mouth daily 90 tablet 1    gabapentin (NEURONTIN) 300 mg capsule Take 1 capsule (300 mg total) by mouth daily 90 capsule 1    losartan (COZAAR) 50 mg tablet TAKE 1 TABLET DAILY 90 tablet 1    metoprolol succinate (TOPROL-XL) 100 mg 24 hr tablet TAKE  "1 TABLET DAILY 90 tablet 1    Omega 3 1200 MG CAPS Take by mouth Omega XL daily      Polyethylene Glycol 400 0.25 % SOLN Apply 1 drop to eye 2 (two) times a day As needed      potassium chloride (K-DUR,KLOR-CON) 20 mEq tablet Take 1 tablet (20 mEq total) by mouth daily 30 tablet 1     No current facility-administered medications for this visit.       Allergies  Allergies   Allergen Reactions    Other Other (See Comments)     seasonal       OBJECTIVE    Vitals   Vitals:    02/19/24 1325   BP: 140/70   BP Location: Left arm   Patient Position: Sitting   Cuff Size: Adult   Pulse: 60   SpO2: 96%   Weight: 76.2 kg (168 lb)   Height: 5' 6\" (1.676 m)       PVR:    Physical Exam  Constitutional:       General: She is not in acute distress.     Appearance: Normal appearance. She is normal weight. She is not ill-appearing or toxic-appearing.   HENT:      Head: Normocephalic and atraumatic.   Eyes:      Conjunctiva/sclera: Conjunctivae normal.   Cardiovascular:      Rate and Rhythm: Normal rate.   Pulmonary:      Effort: Pulmonary effort is normal. No respiratory distress.   Skin:     General: Skin is warm and dry.   Neurological:      General: No focal deficit present.      Mental Status: She is alert and oriented to person, place, and time.      Cranial Nerves: No cranial nerve deficit.   Psychiatric:         Mood and Affect: Mood normal.         Behavior: Behavior normal.         Thought Content: Thought content normal.          Labs:     Lab Results   Component Value Date    CREATININE 1.36 (H) 01/04/2024      Lab Results   Component Value Date    HGBA1C 6.2 (H) 06/09/2023     Lab Results   Component Value Date    GLUCOSE 109 09/20/2015    CALCIUM 9.8 01/04/2024     09/20/2015    K 4.8 01/04/2024    CO2 27 01/04/2024     01/04/2024    BUN 28 (H) 01/04/2024    CREATININE 1.36 (H) 01/04/2024       I have personally reviewed all pertinent lab results and reviewed with patient    Imaging   MRI - ABDOMEN - WITH AND " "WITHOUT CONTRAST     INDICATION: 78 years / Female. N28.89: Other specified disorders of kidney and ureter.     Excerpt from Urology progress note dated 8/23/2023:  \"Right renal mass which is unchanged in size over a 6-month period.  This mass is not reliably noted on recent renal ultrasound.  Regardless, I discussed the management of a solid renal mass which   would typically be managed with surveillance up to 3 cm at which point the risk of microscopic metastatic disease increases.  Since obtaining immediate imaging would not  I recommend return to clinic in 6 months with imaging.  She has   been advised to avoid iodinated contrast agents presumably due to renal function.  We will obtain an MRI rather than a CT for this reason\"     COMPARISON: CT abdomen pelvis 1/17/2023. CT chest abdomen pelvis 7/18/2023.     TECHNIQUE: Multiplanar/multisequence MRI of the abdomen was performed before and after administration of contrast.     IV Contrast: 7 mL of Gadobutrol injection (SINGLE-DOSE)     DIAGNOSTIC QUALITY: Motion limited     FINDINGS:     LOWER CHEST: Unremarkable.     LIVER:  Normal in size and configuration.  No suspicious mass.  Patent hepatic and portal veins.     BILE DUCTS: Mild intra and extrapelvic biliary dilation of a normal degree status post cholecystectomy.     GALLBLADDER: Surgically absent.     PANCREAS: Unremarkable.     ADRENAL GLANDS: Unremarkable.     SPLEEN: Normal.     KIDNEYS/PROXIMAL URETERS: No hydroureteronephrosis.     Exophytic 16 mm T2 hyperintense right renal lesion located at the junction of the mid and lower thirds along the lateral contour correlates to the indeterminate CT lesion. Lesion is isointense on T1 imaging and shows no enhancement.     Bilateral simple cortical cysts.     BOWEL: No dilated loops of bowel.     PERITONEUM/RETROPERITONEUM: No ascites.     LYMPH NODES: No abdominal lymphadenopathy.     VESSELS: Unchanged 27 mm infrarenal abdominal aortic " ectasia #12/80.     ABDOMINAL WALL: Unremarkable     BONES: No suspicious osseous lesion.     IMPRESSION:     Motion limited examination.     Exophytic Bosniak 2 16 mm right renal proteinaceous cyst correlates to the indeterminate CT lesion.        Workstation performed: [id number]    Cristiano Lowry PA-C  Date: 2/19/2024 Time: 2:08 PM  [facility]    This note was written using fluency dictation software. Please excuse any resulting minor grammatical errors.

## 2024-02-20 DIAGNOSIS — G62.9 NEUROPATHY: ICD-10-CM

## 2024-02-20 LAB

## 2024-02-21 ENCOUNTER — TELEPHONE (OUTPATIENT)
Dept: UROLOGY | Facility: CLINIC | Age: 79
End: 2024-02-21

## 2024-02-21 RX ORDER — GABAPENTIN 300 MG/1
300 CAPSULE ORAL DAILY
Qty: 90 CAPSULE | Refills: 1 | Status: SHIPPED | OUTPATIENT
Start: 2024-02-21

## 2024-02-21 NOTE — TELEPHONE ENCOUNTER
----- Message from Cristiano Lowry PA-C sent at 2/20/2024  8:26 AM EST -----  No microscopic hematuria.  Patient can follow-up as needed as discussed in office

## 2024-02-27 ENCOUNTER — APPOINTMENT (OUTPATIENT)
Dept: LAB | Facility: CLINIC | Age: 79
End: 2024-02-27
Payer: MEDICARE

## 2024-02-27 DIAGNOSIS — N18.32 STAGE 3B CHRONIC KIDNEY DISEASE (HCC): ICD-10-CM

## 2024-02-27 LAB
ANION GAP SERPL CALCULATED.3IONS-SCNC: 7 MMOL/L
BUN SERPL-MCNC: 27 MG/DL (ref 5–25)
CALCIUM SERPL-MCNC: 9.6 MG/DL (ref 8.4–10.2)
CHLORIDE SERPL-SCNC: 101 MMOL/L (ref 96–108)
CO2 SERPL-SCNC: 28 MMOL/L (ref 21–32)
CREAT SERPL-MCNC: 1.3 MG/DL (ref 0.6–1.3)
CREAT UR-MCNC: 33.1 MG/DL
ERYTHROCYTE [DISTWIDTH] IN BLOOD BY AUTOMATED COUNT: 13.5 % (ref 11.6–15.1)
GFR SERPL CREATININE-BSD FRML MDRD: 39 ML/MIN/1.73SQ M
GLUCOSE P FAST SERPL-MCNC: 105 MG/DL (ref 65–99)
HCT VFR BLD AUTO: 43.6 % (ref 34.8–46.1)
HGB BLD-MCNC: 13.5 G/DL (ref 11.5–15.4)
MAGNESIUM SERPL-MCNC: 2.2 MG/DL (ref 1.9–2.7)
MCH RBC QN AUTO: 29 PG (ref 26.8–34.3)
MCHC RBC AUTO-ENTMCNC: 31 G/DL (ref 31.4–37.4)
MCV RBC AUTO: 94 FL (ref 82–98)
PHOSPHATE SERPL-MCNC: 3.8 MG/DL (ref 2.3–4.1)
PLATELET # BLD AUTO: 277 THOUSANDS/UL (ref 149–390)
PMV BLD AUTO: 9.8 FL (ref 8.9–12.7)
POTASSIUM SERPL-SCNC: 4.7 MMOL/L (ref 3.5–5.3)
PROT UR-MCNC: 7 MG/DL
PROT/CREAT UR: 0.21 MG/G{CREAT} (ref 0–0.1)
PTH-INTACT SERPL-MCNC: 66.8 PG/ML (ref 12–88)
RBC # BLD AUTO: 4.65 MILLION/UL (ref 3.81–5.12)
SODIUM SERPL-SCNC: 136 MMOL/L (ref 135–147)
WBC # BLD AUTO: 9.48 THOUSAND/UL (ref 4.31–10.16)

## 2024-02-27 PROCEDURE — 83735 ASSAY OF MAGNESIUM: CPT

## 2024-02-27 PROCEDURE — 82306 VITAMIN D 25 HYDROXY: CPT

## 2024-02-27 PROCEDURE — 84156 ASSAY OF PROTEIN URINE: CPT

## 2024-02-27 PROCEDURE — 36415 COLL VENOUS BLD VENIPUNCTURE: CPT

## 2024-02-27 PROCEDURE — 80048 BASIC METABOLIC PNL TOTAL CA: CPT

## 2024-02-27 PROCEDURE — 83970 ASSAY OF PARATHORMONE: CPT

## 2024-02-27 PROCEDURE — 84100 ASSAY OF PHOSPHORUS: CPT

## 2024-02-27 PROCEDURE — 85027 COMPLETE CBC AUTOMATED: CPT

## 2024-02-27 PROCEDURE — 82570 ASSAY OF URINE CREATININE: CPT

## 2024-03-03 LAB
25(OH)D2 SERPL-MCNC: <1 NG/ML
25(OH)D3 SERPL-MCNC: 40 NG/ML
25(OH)D3+25(OH)D2 SERPL-MCNC: 41 NG/ML

## 2024-03-05 ENCOUNTER — OFFICE VISIT (OUTPATIENT)
Dept: NEPHROLOGY | Facility: CLINIC | Age: 79
End: 2024-03-05
Payer: MEDICARE

## 2024-03-05 VITALS
DIASTOLIC BLOOD PRESSURE: 76 MMHG | BODY MASS INDEX: 26.65 KG/M2 | OXYGEN SATURATION: 97 % | HEIGHT: 66 IN | WEIGHT: 165.8 LBS | HEART RATE: 68 BPM | SYSTOLIC BLOOD PRESSURE: 162 MMHG

## 2024-03-05 DIAGNOSIS — N18.30 BENIGN HYPERTENSION WITH CKD (CHRONIC KIDNEY DISEASE) STAGE III (HCC): ICD-10-CM

## 2024-03-05 DIAGNOSIS — E87.6 HYPOKALEMIA: ICD-10-CM

## 2024-03-05 DIAGNOSIS — N28.1 KIDNEY CYST, ACQUIRED: ICD-10-CM

## 2024-03-05 DIAGNOSIS — N18.9 CHRONIC KIDNEY DISEASE-MINERAL AND BONE DISORDER: ICD-10-CM

## 2024-03-05 DIAGNOSIS — E83.9 CHRONIC KIDNEY DISEASE-MINERAL AND BONE DISORDER: ICD-10-CM

## 2024-03-05 DIAGNOSIS — N18.32 STAGE 3B CHRONIC KIDNEY DISEASE (HCC): Primary | ICD-10-CM

## 2024-03-05 DIAGNOSIS — R60.0 EDEMA OF BOTH LEGS: ICD-10-CM

## 2024-03-05 DIAGNOSIS — M89.9 CHRONIC KIDNEY DISEASE-MINERAL AND BONE DISORDER: ICD-10-CM

## 2024-03-05 DIAGNOSIS — E55.9 VITAMIN D DEFICIENCY: ICD-10-CM

## 2024-03-05 DIAGNOSIS — I12.9 BENIGN HYPERTENSION WITH CKD (CHRONIC KIDNEY DISEASE) STAGE III (HCC): ICD-10-CM

## 2024-03-05 PROCEDURE — 99214 OFFICE O/P EST MOD 30 MIN: CPT | Performed by: INTERNAL MEDICINE

## 2024-03-05 RX ORDER — AMLODIPINE BESYLATE 5 MG/1
5 TABLET ORAL DAILY
Start: 2024-03-05

## 2024-03-05 RX ORDER — TORSEMIDE 20 MG/1
20 TABLET ORAL DAILY
Qty: 30 TABLET | Refills: 2 | Status: SHIPPED | OUTPATIENT
Start: 2024-03-05

## 2024-03-05 RX ORDER — POTASSIUM CHLORIDE 20 MEQ/1
20 TABLET, EXTENDED RELEASE ORAL DAILY
Start: 2024-03-05

## 2024-03-05 NOTE — PATIENT INSTRUCTIONS
You have chronic kidney disease (CKD) and your kidney function is stable.   Please decrease Amlodipine back to 5 mg daily because of the swelling.   Please stop the Furosemide and start Torsemide 20 mg daily.   Continue with potassium chloride.   Follow up in 6 months - please obtain blood work 1-2 weeks prior to the appointment.     Please check your BP twice daily for 1 week and bring a log with your next visit.   Please avoid taking NSAIDs (Ibuprofen, Motrin, Aleve, Advil, Naproxen, Celebrex, etc.)  Make sure you are eating healthy and have adequate exercise.

## 2024-03-05 NOTE — PROGRESS NOTES
NEPHROLOGY OFFICE PROGRESS NOTE   Cande Smith 78 y.o. female MRN: [id number]  DATE: 03/05/24  Reason for visit: Continued evaluation and management of CKD    ASSESSMENT & PLAN:  Chronic kidney disease, stage IIIB  Baseline creatinine is around 1.2 - 1.4.  Etiology of CKD is suspected to be hypertensive nephrosclerosis complicated by previous NSAID use.  Stable renal function.  Creatinine 1.30.  Treatment: BP control.     Hypertension:  Home BP is close to goal (<130/80)  Current Rx: Metoprolol succinate 100 mg daily, losartan 50 mg daily, Furosemide 20 mg OD, Amlodipine 10 mg daily.  She has edema since increase in Amlodipine.   Will decrease Amlodipine back to 5 mg OD.   Change Furosemide to Torsemide 20 mg daily and check BMP in 2 weeks.   If BP remains high, will plan to increase Losartan to 50 mg BID but will need to stop K supplements at that time.   Check home BP x 1 week.     Bilateral lower extremity edema:  Suspect this is related to amlodipine use.  Decrease amlodipine to 5 mg daily.  Change furosemide to torsemide as above.      Mineral bone disease:  PTH is at goal in February 2024.  Calcium and phosphorus are at goal.  Vitamin D level is at goal in February 2024.  Continue vitamin D 2000 units daily.      Hypokalemia  Continue Kdur 20 meq daily.     R renal cyst  Recent MRI showed Bosniak 2 cyst.  No need for follow-up.      Patient Instructions   You have chronic kidney disease (CKD) and your kidney function is stable.   Please decrease Amlodipine back to 5 mg daily because of the swelling.   Please stop the Furosemide and start Torsemide 20 mg daily.   Continue with potassium chloride.   Follow up in 6 months - please obtain blood work 1-2 weeks prior to the appointment.     Please check your BP twice daily for 1 week and bring a log with your next visit.   Please avoid taking NSAIDs (Ibuprofen, Motrin, Aleve, Advil, Naproxen, Celebrex, etc.)  Make sure you are eating healthy and have adequate  "exercise.     SUBJECTIVE / INTERVAL HISTORY:  Cande was last seen in the office in Sep 2023.   Since her last visit 6 months ago, there have been no unplanned hospitalizations or ER visits.  During a recent visit with her PCP, she was changed from hydrochlorothiazide to furosemide because of leg swelling.  Despite the change to furosemide, she reports that her leg swelling remains the same.  Home BP was in the 130s/50s - she checks sparingly and did not bring the log.   No other acute complaints.   No CP, SOB, orthopnea, PND.     PMH/PSH: HTN, preDM, HLP, CKD, R sided lung adenocarcinoma stage 1A s/p superior segmentectomy in Sep 2015, COPD, DJD s/p bilateral hip replacement, obesity, skin cancer s/p excision, colonic polyps, thyroid nodules, cholecystectomy, cataract surgery.    Previous work up:   8/11/23 Renal US: R 10.5 cm, L 9.2 cm, no hydronephrosis, small simple cortical cyst in both kidneys.    1/29/24 MRI: Exophytic Bosniak 2 right renal proteinaceous cyst.    ALLERGIES:   Allergies   Allergen Reactions    Other Other (See Comments)     seasonal     REVIEW OF SYSTEMS:  Review of Systems   Constitutional:  Negative for appetite change, chills, fatigue and fever.   Respiratory:  Negative for cough and shortness of breath.    Cardiovascular:  Positive for leg swelling. Negative for chest pain.   Gastrointestinal:  Negative for abdominal pain, diarrhea, nausea and vomiting.   Genitourinary:  Negative for dysuria and hematuria.   Musculoskeletal:  Negative for arthralgias and back pain.   Allergic/Immunologic: Negative for immunocompromised state.   Neurological:  Negative for dizziness and light-headedness.     OBJECTIVE:  /76 (BP Location: Left arm, Patient Position: Sitting, Cuff Size: Standard)   Pulse 68   Ht 5' 6\" (1.676 m)   Wt 75.2 kg (165 lb 12.8 oz)   SpO2 97%   BMI 26.76 kg/m²   Current Weight: Weight - Scale: 75.2 kg (165 lb 12.8 oz) Body mass index is 26.76 kg/m².  Physical " Exam  Constitutional:       General: She is not in acute distress.     Appearance: Normal appearance. She is well-developed. She is not ill-appearing, toxic-appearing or diaphoretic.   HENT:      Head: Normocephalic and atraumatic.   Eyes:      General: No scleral icterus.     Conjunctiva/sclera: Conjunctivae normal.   Neck:      Vascular: No JVD.   Cardiovascular:      Rate and Rhythm: Normal rate and regular rhythm.      Heart sounds: Normal heart sounds.   Pulmonary:      Effort: Pulmonary effort is normal.      Breath sounds: Normal breath sounds.   Abdominal:      General: Bowel sounds are normal.      Palpations: Abdomen is soft.   Musculoskeletal:      Comments: (+) bilateral LE edema - pitting.    Skin:     General: Skin is warm and dry.   Neurological:      Mental Status: She is alert and oriented to person, place, and time.   Psychiatric:         Behavior: Behavior normal. Behavior is cooperative.       Medications:  Current Outpatient Medications:     acetaminophen (TYLENOL) 500 mg tablet, Take 500 mg by mouth every 6 (six) hours as needed for mild pain, Disp: , Rfl:     albuterol (PROVENTIL HFA,VENTOLIN HFA) 90 mcg/act inhaler, Inhale 2 puffs every 6 (six) hours as needed for wheezing or shortness of breath, Disp: 18 g, Rfl: 1    amLODIPine (NORVASC) 10 mg tablet, TAKE 1 TABLET DAILY, Disp: 90 tablet, Rfl: 3    aspirin (ECOTRIN LOW STRENGTH) 81 mg EC tablet, Take 1 tablet (81 mg total) by mouth 2 (two) times a day (Patient taking differently: Take 81 mg by mouth daily with dinner), Disp: 60 tablet, Rfl: 0    atorvastatin (LIPITOR) 10 mg tablet, TAKE 1 TABLET DAILY AT BEDTIME, Disp: 90 tablet, Rfl: 3    Cholecalciferol (Vitamin D3) 50 MCG (2000 UT) TABS, Take 2,000 Units by mouth daily, Disp: , Rfl:     furosemide (LASIX) 20 mg tablet, Take 1 tablet (20 mg total) by mouth daily, Disp: 90 tablet, Rfl: 1    gabapentin (NEURONTIN) 300 mg capsule, TAKE 1 CAPSULE DAILY, Disp: 90 capsule, Rfl: 1    losartan  (COZAAR) 50 mg tablet, TAKE 1 TABLET DAILY, Disp: 90 tablet, Rfl: 1    metoprolol succinate (TOPROL-XL) 100 mg 24 hr tablet, TAKE 1 TABLET DAILY, Disp: 90 tablet, Rfl: 1    Omega 3 1200 MG CAPS, Take by mouth Omega XL daily, Disp: , Rfl:     Polyethylene Glycol 400 0.25 % SOLN, Apply 1 drop to eye 2 (two) times a day As needed, Disp: , Rfl:     potassium chloride (K-DUR,KLOR-CON) 20 mEq tablet, Take 1 tablet (20 mEq total) by mouth daily, Disp: 30 tablet, Rfl: 1    Laboratory Results:  Results for orders placed or performed in visit on 02/27/24   Basic metabolic panel   Result Value Ref Range    Sodium 136 135 - 147 mmol/L    Potassium 4.7 3.5 - 5.3 mmol/L    Chloride 101 96 - 108 mmol/L    CO2 28 21 - 32 mmol/L    ANION GAP 7 mmol/L    BUN 27 (H) 5 - 25 mg/dL    Creatinine 1.30 0.60 - 1.30 mg/dL    Glucose, Fasting 105 (H) 65 - 99 mg/dL    Calcium 9.6 8.4 - 10.2 mg/dL    eGFR 39 ml/min/1.73sq m   Protein / creatinine ratio, urine   Result Value Ref Range    Creatinine, Ur 33.1 Reference range not established. mg/dL    Protein Urine Random 7 Reference range not established. mg/dL    Prot/Creat Ratio, Ur 0.21 (H) 0.00 - 0.10   CBC   Result Value Ref Range    WBC 9.48 4.31 - 10.16 Thousand/uL    RBC 4.65 3.81 - 5.12 Million/uL    Hemoglobin 13.5 11.5 - 15.4 g/dL    Hematocrit 43.6 34.8 - 46.1 %    MCV 94 82 - 98 fL    MCH 29.0 26.8 - 34.3 pg    MCHC 31.0 (L) 31.4 - 37.4 g/dL    RDW 13.5 11.6 - 15.1 %    Platelets 277 149 - 390 Thousands/uL    MPV 9.8 8.9 - 12.7 fL   PTH, intact   Result Value Ref Range    PTH 66.8 12.0 - 88.0 pg/mL   Vitamin D Panel   Result Value Ref Range    25-HYDROXY VIT D 41 ng/mL    25-Hydroxy D2 <1.0 ng/mL    25-HYDROXY VIT D3 40 ng/mL   Phosphorus   Result Value Ref Range    Phosphorus 3.8 2.3 - 4.1 mg/dL   Magnesium   Result Value Ref Range    Magnesium 2.2 1.9 - 2.7 mg/dL

## 2024-03-19 ENCOUNTER — APPOINTMENT (OUTPATIENT)
Dept: LAB | Facility: CLINIC | Age: 79
End: 2024-03-19
Payer: MEDICARE

## 2024-03-19 DIAGNOSIS — I12.9 BENIGN HYPERTENSION WITH CKD (CHRONIC KIDNEY DISEASE) STAGE III (HCC): ICD-10-CM

## 2024-03-19 DIAGNOSIS — N18.30 BENIGN HYPERTENSION WITH CKD (CHRONIC KIDNEY DISEASE) STAGE III (HCC): ICD-10-CM

## 2024-03-19 LAB
ANION GAP SERPL CALCULATED.3IONS-SCNC: 8 MMOL/L (ref 4–13)
BUN SERPL-MCNC: 38 MG/DL (ref 5–25)
CALCIUM SERPL-MCNC: 9.5 MG/DL (ref 8.4–10.2)
CHLORIDE SERPL-SCNC: 102 MMOL/L (ref 96–108)
CO2 SERPL-SCNC: 27 MMOL/L (ref 21–32)
CREAT SERPL-MCNC: 1.35 MG/DL (ref 0.6–1.3)
GFR SERPL CREATININE-BSD FRML MDRD: 37 ML/MIN/1.73SQ M
GLUCOSE P FAST SERPL-MCNC: 111 MG/DL (ref 65–99)
POTASSIUM SERPL-SCNC: 4.8 MMOL/L (ref 3.5–5.3)
SODIUM SERPL-SCNC: 137 MMOL/L (ref 135–147)

## 2024-03-19 PROCEDURE — 36415 COLL VENOUS BLD VENIPUNCTURE: CPT

## 2024-03-19 PROCEDURE — 80048 BASIC METABOLIC PNL TOTAL CA: CPT

## 2024-03-21 ENCOUNTER — TELEPHONE (OUTPATIENT)
Dept: NEPHROLOGY | Facility: CLINIC | Age: 79
End: 2024-03-21

## 2024-03-21 NOTE — TELEPHONE ENCOUNTER
Pt advised that labs of 3/19/24 show stable kidney function.  Pt states her calf edema is much improved with Torsemide.  She still has the puffiness and feeling of tightness around her ankles and top of her feet.  (She does note that the Torsemide 20 mg. Has her urinating quite a bit to the point she needs to look for a bathroom if she goes out)    ----- Message from Jim Villegas MD sent at 3/20/2024  6:42 PM EDT -----  Please inform patient that most recent labs (3/19/24) show that kidney function is stable. Please ask her if her edema is better.

## 2024-03-26 DIAGNOSIS — E87.6 HYPOKALEMIA: ICD-10-CM

## 2024-03-27 RX ORDER — POTASSIUM CHLORIDE 20 MEQ/1
20 TABLET, EXTENDED RELEASE ORAL DAILY
Qty: 30 TABLET | Refills: 5 | Status: SHIPPED | OUTPATIENT
Start: 2024-03-27

## 2024-04-12 DIAGNOSIS — I12.9 BENIGN HYPERTENSION WITH CKD (CHRONIC KIDNEY DISEASE) STAGE III (HCC): ICD-10-CM

## 2024-04-12 DIAGNOSIS — N18.30 BENIGN HYPERTENSION WITH CKD (CHRONIC KIDNEY DISEASE) STAGE III (HCC): ICD-10-CM

## 2024-04-12 RX ORDER — METOPROLOL SUCCINATE 100 MG/1
100 TABLET, EXTENDED RELEASE ORAL DAILY
Qty: 90 TABLET | Refills: 1 | Status: SHIPPED | OUTPATIENT
Start: 2024-04-12

## 2024-04-15 ENCOUNTER — OFFICE VISIT (OUTPATIENT)
Dept: INTERNAL MEDICINE CLINIC | Facility: CLINIC | Age: 79
End: 2024-04-15
Payer: MEDICARE

## 2024-04-15 VITALS
HEART RATE: 65 BPM | TEMPERATURE: 97.5 F | WEIGHT: 164 LBS | RESPIRATION RATE: 15 BRPM | BODY MASS INDEX: 26.36 KG/M2 | SYSTOLIC BLOOD PRESSURE: 148 MMHG | DIASTOLIC BLOOD PRESSURE: 74 MMHG | OXYGEN SATURATION: 98 % | HEIGHT: 66 IN

## 2024-04-15 DIAGNOSIS — N18.32 STAGE 3B CHRONIC KIDNEY DISEASE (HCC): ICD-10-CM

## 2024-04-15 DIAGNOSIS — N18.30 BENIGN HYPERTENSION WITH CKD (CHRONIC KIDNEY DISEASE) STAGE III (HCC): Primary | ICD-10-CM

## 2024-04-15 DIAGNOSIS — M16.11 OSTEOARTHRITIS OF RIGHT HIP, UNSPECIFIED OSTEOARTHRITIS TYPE: ICD-10-CM

## 2024-04-15 DIAGNOSIS — I12.9 BENIGN HYPERTENSION WITH CKD (CHRONIC KIDNEY DISEASE) STAGE III (HCC): Primary | ICD-10-CM

## 2024-04-15 DIAGNOSIS — M79.89 LEFT LEG SWELLING: ICD-10-CM

## 2024-04-15 DIAGNOSIS — I10 PRIMARY HYPERTENSION: ICD-10-CM

## 2024-04-15 PROCEDURE — G2211 COMPLEX E/M VISIT ADD ON: HCPCS | Performed by: INTERNAL MEDICINE

## 2024-04-15 PROCEDURE — 99213 OFFICE O/P EST LOW 20 MIN: CPT | Performed by: INTERNAL MEDICINE

## 2024-04-15 RX ORDER — TORSEMIDE 20 MG/1
20 TABLET ORAL DAILY
Qty: 90 TABLET | Refills: 1 | Status: SHIPPED | OUTPATIENT
Start: 2024-04-15

## 2024-04-15 NOTE — ASSESSMENT & PLAN NOTE
Lab Results   Component Value Date    EGFR 37 03/19/2024    EGFR 39 02/27/2024    EGFR 37 01/04/2024    CREATININE 1.35 (H) 03/19/2024    CREATININE 1.30 02/27/2024    CREATININE 1.36 (H) 01/04/2024   GFR stable at 37 creatinine stable at 1.35 seen by cardiology on Demadex seen by nephrology started on Demadex continue current regimen stable

## 2024-04-15 NOTE — PROGRESS NOTES
Depression Screening and Follow-up Plan: Patient was screened for depression during today's encounter. They screened negative with a PHQ-9 score of 2.    Dr. Peters's Office Visit Note  04/15/24     Cande Smith 78 y.o. female MRN: [id number]  : 1945    Assessment:     1. Benign hypertension with CKD (chronic kidney disease) stage III (HCC)  Assessment & Plan:  Lab Results   Component Value Date    EGFR 37 2024    EGFR 39 2024    EGFR 37 2024    CREATININE 1.35 (H) 2024    CREATININE 1.30 2024    CREATININE 1.36 (H) 2024   Seen by nephrology GFR creatinine stable at baseline 37 1.3 avoid nephrotoxic follow-up with the nephrology    Orders:  -     torsemide (DEMADEX) 20 mg tablet; Take 1 tablet (20 mg total) by mouth daily    2. Stage 3b chronic kidney disease (HCC)  Assessment & Plan:  Lab Results   Component Value Date    EGFR 37 2024    EGFR 39 2024    EGFR 37 2024    CREATININE 1.35 (H) 2024    CREATININE 1.30 2024    CREATININE 1.36 (H) 2024   GFR stable at 37 creatinine stable at 1.35 seen by cardiology on Demadex seen by nephrology started on Demadex continue current regimen stable      3. Primary hypertension  Assessment & Plan:  Asymptomatic blood pressure much improved seen by nephrology due to the leg edema amlodipine cut down to 5 mg low-salt diet continue losartan 50 mg with Toprol- mg and continue monitoring blood pressure            Discussion Summary and Plan:  Today's care plan and medications were reviewed with patient in detail and all their questions answered to their satisfaction.    Chief Complaint   Patient presents with   • Follow-up     Right middle finger is locking up and fingers are going numb       Subjective:  Came in follow-up chronic medical condition complaining of trigger finger left middle finger but patient refused to be seen by hand surgeon labs reviewed seen by nephrology denies chest pain  difficulty breathing overall unchanged no chest pain difficulty breathing no nausea vomiting leg edema improved since the amlodipine doses was cut down        The following portions of the patient's history were reviewed and updated as appropriate: allergies, current medications, past family history, past medical history, past social history, past surgical history and problem list.    Review of Systems   Constitutional:  Negative for activity change, appetite change, chills, diaphoresis, fatigue, fever and unexpected weight change.   HENT:  Negative for congestion, dental problem, drooling, ear discharge, ear pain, facial swelling, hearing loss, mouth sores, nosebleeds, postnasal drip, rhinorrhea, sinus pressure, sneezing, sore throat, tinnitus, trouble swallowing and voice change.    Eyes:  Negative for photophobia, pain, discharge, redness, itching and visual disturbance.   Respiratory:  Negative for apnea, cough, choking, chest tightness, shortness of breath, wheezing and stridor.    Cardiovascular:  Negative for chest pain, palpitations and leg swelling.   Gastrointestinal:  Negative for abdominal distention, abdominal pain, anal bleeding, blood in stool, constipation, diarrhea, nausea, rectal pain and vomiting.   Endocrine: Negative for cold intolerance, heat intolerance, polydipsia, polyphagia and polyuria.   Genitourinary:  Negative for decreased urine volume, difficulty urinating, dysuria, enuresis, flank pain, frequency, genital sores, hematuria and urgency.   Musculoskeletal:  Positive for arthralgias. Negative for back pain, gait problem, joint swelling, myalgias, neck pain and neck stiffness.   Skin:  Negative for color change, pallor, rash and wound.   Allergic/Immunologic: Negative.  Negative for environmental allergies, food allergies and immunocompromised state.   Neurological:  Negative for dizziness, tremors, seizures, syncope, facial asymmetry, speech difficulty, weakness, light-headedness,  numbness and headaches.   Psychiatric/Behavioral:  Negative for agitation, behavioral problems, confusion, decreased concentration, dysphoric mood, hallucinations, self-injury, sleep disturbance and suicidal ideas. The patient is not nervous/anxious and is not hyperactive.          Historical Information   Patient Active Problem List   Diagnosis   • Adenocarcinoma of right lung (HCC)   • Benign hypertension with CKD (chronic kidney disease) stage III (HCC)   • Dyslipidemia   • Primary osteoarthritis involving multiple joints   • Femoral neuropathy, right   • Emphysema lung (HCC)   • Osteoarthritis of right hip   • Hyperlipidemia   • Numbness and tingling of foot   • Anxiety   • Colon polyp   • Thyroid nodule   • Mixed anxiety and depressive disorder   • Stage 3b chronic kidney disease (HCC)   • Diverticulosis   • Skin mole   • Restless leg syndrome   • BPPV (benign paroxysmal positional vertigo)   • Class 1 obesity due to excess calories without serious comorbidity with body mass index (BMI) of 30.0 to 30.9 in adult   • Chorea   • Varicose veins of legs   • Vitamin B12 deficiency   • Edema of both legs   • Vitamin D deficiency   • Kidney cyst, acquired   • Carpal tunnel syndrome   • Breast cancer screening by mammogram   • Onychomycosis   • Acute deep vein thrombosis (DVT) of distal vein of left lower extremity (HCC)   • Left foot pain   • Coronary artery calcification   • Coronary artery calcification seen on CAT scan   • Chronic kidney disease-mineral and bone disorder   • Elevated AST (SGOT)   • Weight loss   • Prediabetes   • Right renal mass   • Chronic nonintractable headache   • Primary hypertension     Past Medical History:   Diagnosis Date   • Cancer (HCC)     lung   • Diarrhea     wt loss from 192lb to 178lb (1/23)   • Emphysema lung (HCC)    • Hemorrhoid     possible hemorrhoid with small amount of bleeding   • Hyperlipidemia    • Hypertension    • Lung cancer (HCC)    • Numbness and tingling of foot    •  Osteoarthritis of right hip    • Primary adenocarcinoma of lower lobe of right lung (HCC)      Past Surgical History:   Procedure Laterality Date   • CATARACT EXTRACTION Bilateral    • CHOLECYSTECTOMY     • COLONOSCOPY     • JOINT REPLACEMENT Bilateral     hips-   • ME ARTHRP ACETBLR/PROX FEM PROSTC AGRFT/ALGRFT Right 2018    Procedure: ANTERIOR TOTAL HIP ARTHROPLASTY;  Surgeon: Kunal Chester MD;  Location: [facility];  Service: Orthopedics   • TOTAL HIP ARTHROPLASTY     • TUMOR REMOVAL Right 2015    lower lobe      Social History     Substance and Sexual Activity   Alcohol Use Yes    Comment: on occ     Social History     Substance and Sexual Activity   Drug Use Never     Social History     Tobacco Use   Smoking Status Former   • Current packs/day: 0.00   • Average packs/day: 1 pack/day for 50.0 years (50.0 ttl pk-yrs)   • Types: Cigarettes   • Start date: 1965   • Quit date: 2015   • Years since quittin.6   Smokeless Tobacco Never     Family History   Problem Relation Age of Onset   • Heart disease Mother    • No Known Problems Father      Health Maintenance Due   Topic   • Pneumococcal Vaccine: 65+ Years (1 of 2 - PCV)   • Osteoporosis Screening    • Zoster Vaccine (1 of 2)   • COVID-19 Vaccine ( season)   • Depression Screening       Meds/Allergies       Current Outpatient Medications:   •  acetaminophen (TYLENOL) 500 mg tablet, Take 500 mg by mouth every 6 (six) hours as needed for mild pain, Disp: , Rfl:   •  albuterol (PROVENTIL HFA,VENTOLIN HFA) 90 mcg/act inhaler, Inhale 2 puffs every 6 (six) hours as needed for wheezing or shortness of breath, Disp: 18 g, Rfl: 1  •  amLODIPine (NORVASC) 5 mg tablet, Take 1 tablet (5 mg total) by mouth daily, Disp: , Rfl:   •  atorvastatin (LIPITOR) 10 mg tablet, TAKE 1 TABLET DAILY AT BEDTIME, Disp: 90 tablet, Rfl: 3  •  Cholecalciferol (Vitamin D3) 50 MCG (2000 UT) TABS, Take 2,000 Units by mouth daily, Disp: , Rfl:   •  gabapentin  "(NEURONTIN) 300 mg capsule, TAKE 1 CAPSULE DAILY, Disp: 90 capsule, Rfl: 1  •  losartan (COZAAR) 50 mg tablet, TAKE 1 TABLET DAILY, Disp: 90 tablet, Rfl: 1  •  metoprolol succinate (TOPROL-XL) 100 mg 24 hr tablet, TAKE 1 TABLET DAILY, Disp: 90 tablet, Rfl: 1  •  Omega 3 1200 MG CAPS, Take by mouth Omega XL daily, Disp: , Rfl:   •  Polyethylene Glycol 400 0.25 % SOLN, Apply 1 drop to eye 2 (two) times a day As needed, Disp: , Rfl:   •  potassium chloride (Klor-Con M20) 20 mEq tablet, TAKE 1 TABLET BY MOUTH DAILY, Disp: 30 tablet, Rfl: 5  •  torsemide (DEMADEX) 20 mg tablet, Take 1 tablet (20 mg total) by mouth daily, Disp: 90 tablet, Rfl: 1      Objective:    Vitals:   /74   Pulse 65   Temp 97.5 °F (36.4 °C)   Resp 15   Ht 5' 6\" (1.676 m)   Wt 74.4 kg (164 lb)   SpO2 98%   BMI 26.47 kg/m²   Body mass index is 26.47 kg/m².  Vitals:    04/15/24 1128   Weight: 74.4 kg (164 lb)       Physical Exam  Vitals and nursing note reviewed.   Constitutional:       General: She is not in acute distress.     Appearance: She is well-developed. She is not ill-appearing, toxic-appearing or diaphoretic.   HENT:      Head: Normocephalic and atraumatic.      Right Ear: External ear normal.      Left Ear: External ear normal.      Nose: Nose normal.      Mouth/Throat:      Pharynx: No oropharyngeal exudate.   Eyes:      General: Lids are normal. Lids are everted, no foreign bodies appreciated. No scleral icterus.        Right eye: No discharge.         Left eye: No discharge.      Conjunctiva/sclera: Conjunctivae normal.      Pupils: Pupils are equal, round, and reactive to light.   Neck:      Thyroid: No thyromegaly.      Vascular: Normal carotid pulses. No carotid bruit, hepatojugular reflux or JVD.      Trachea: No tracheal tenderness or tracheal deviation.   Cardiovascular:      Rate and Rhythm: Normal rate and regular rhythm.      Pulses: Normal pulses.      Heart sounds:      No friction rub. No gallop.   Pulmonary:     "  Effort: Pulmonary effort is normal. No respiratory distress.      Breath sounds: Normal breath sounds. No stridor. No wheezing or rales.   Chest:      Chest wall: No tenderness.   Abdominal:      General: Bowel sounds are normal. There is no distension.      Palpations: Abdomen is soft. There is no mass.      Tenderness: There is no abdominal tenderness. There is no guarding or rebound.   Musculoskeletal:         General: No tenderness or deformity. Normal range of motion.      Cervical back: Normal range of motion and neck supple. No edema, erythema or rigidity. No spinous process tenderness or muscular tenderness. Normal range of motion.      Right lower leg: Edema present.      Left lower leg: Edema present.   Lymphadenopathy:      Head:      Right side of head: No submental, submandibular, tonsillar, preauricular or posterior auricular adenopathy.      Left side of head: No submental, submandibular, tonsillar, preauricular, posterior auricular or occipital adenopathy.      Cervical: No cervical adenopathy.      Right cervical: No superficial, deep or posterior cervical adenopathy.     Left cervical: No superficial, deep or posterior cervical adenopathy.      Upper Body:      Right upper body: No pectoral adenopathy.      Left upper body: No pectoral adenopathy.   Skin:     General: Skin is warm and dry.      Coloration: Skin is not pale.      Findings: No erythema or rash.   Neurological:      General: No focal deficit present.      Mental Status: She is alert and oriented to person, place, and time.      Cranial Nerves: No cranial nerve deficit.      Sensory: No sensory deficit.      Motor: No tremor, abnormal muscle tone or seizure activity.      Coordination: Coordination normal.      Gait: Gait normal.      Deep Tendon Reflexes: Reflexes are normal and symmetric. Reflexes normal.   Psychiatric:         Behavior: Behavior normal.         Thought Content: Thought content normal.         Judgment: Judgment  normal.         Lab Review   Appointment on 03/19/2024   Component Date Value Ref Range Status   • Sodium 03/19/2024 137  135 - 147 mmol/L Final   • Potassium 03/19/2024 4.8  3.5 - 5.3 mmol/L Final   • Chloride 03/19/2024 102  96 - 108 mmol/L Final   • CO2 03/19/2024 27  21 - 32 mmol/L Final   • ANION GAP 03/19/2024 8  4 - 13 mmol/L Final   • BUN 03/19/2024 38 (H)  5 - 25 mg/dL Final   • Creatinine 03/19/2024 1.35 (H)  0.60 - 1.30 mg/dL Final    Standardized to IDMS reference method   • Glucose, Fasting 03/19/2024 111 (H)  65 - 99 mg/dL Final   • Calcium 03/19/2024 9.5  8.4 - 10.2 mg/dL Final   • eGFR 03/19/2024 37  ml/min/1.73sq m Final   Appointment on 02/27/2024   Component Date Value Ref Range Status   • Sodium 02/27/2024 136  135 - 147 mmol/L Final   • Potassium 02/27/2024 4.7  3.5 - 5.3 mmol/L Final   • Chloride 02/27/2024 101  96 - 108 mmol/L Final   • CO2 02/27/2024 28  21 - 32 mmol/L Final   • ANION GAP 02/27/2024 7  mmol/L Final   • BUN 02/27/2024 27 (H)  5 - 25 mg/dL Final   • Creatinine 02/27/2024 1.30  0.60 - 1.30 mg/dL Final    Standardized to IDMS reference method   • Glucose, Fasting 02/27/2024 105 (H)  65 - 99 mg/dL Final   • Calcium 02/27/2024 9.6  8.4 - 10.2 mg/dL Final   • eGFR 02/27/2024 39  ml/min/1.73sq m Final   • Creatinine, Ur 02/27/2024 33.1  Reference range not established. mg/dL Final   • Protein Urine Random 02/27/2024 7  Reference range not established. mg/dL Final   • Prot/Creat Ratio, Ur 02/27/2024 0.21 (H)  0.00 - 0.10 Final   • WBC 02/27/2024 9.48  4.31 - 10.16 Thousand/uL Final   • RBC 02/27/2024 4.65  3.81 - 5.12 Million/uL Final   • Hemoglobin 02/27/2024 13.5  11.5 - 15.4 g/dL Final   • Hematocrit 02/27/2024 43.6  34.8 - 46.1 % Final   • MCV 02/27/2024 94  82 - 98 fL Final   • MCH 02/27/2024 29.0  26.8 - 34.3 pg Final   • MCHC 02/27/2024 31.0 (L)  31.4 - 37.4 g/dL Final   • RDW 02/27/2024 13.5  11.6 - 15.1 % Final   • Platelets 02/27/2024 277  149 - 390 Thousands/uL Final   •  MPV 02/27/2024 9.8  8.9 - 12.7 fL Final   • PTH 02/27/2024 66.8  12.0 - 88.0 pg/mL Final   • 25-HYDROXY VIT D 02/27/2024 41  ng/mL Final    Reference Range:  All Ages: Target levels 30 - 100   • 25-Hydroxy D2 02/27/2024 <1.0  ng/mL Final    This test was developed and its performance characteristics  determined by [facility]. It has not been cleared or approved  by the Food and Drug Administration.   • 25-HYDROXY VIT D3 02/27/2024 40  ng/mL Final    This test was developed and its performance characteristics  determined by [facility]. It has not been cleared or approved  by the Food and Drug Administration.   • Phosphorus 02/27/2024 3.8  2.3 - 4.1 mg/dL Final   • Magnesium 02/27/2024 2.2  1.9 - 2.7 mg/dL Final   Office Visit on 02/19/2024   Component Date Value Ref Range Status   • Color, UA 02/19/2024 Colorless   Final   • Clarity, UA 02/19/2024 Clear   Final   • Specific Gravity, UA 02/19/2024 1.007  1.003 - 1.030 Final   • pH, UA 02/19/2024 6.0  4.5, 5.0, 5.5, 6.0, 6.5, 7.0, 7.5, 8.0 Final   • Leukocytes, UA 02/19/2024 Negative  Negative Final   • Nitrite, UA 02/19/2024 Negative  Negative Final   • Protein, UA 02/19/2024 Negative  Negative mg/dl Final   • Glucose, UA 02/19/2024 Negative  Negative mg/dl Final   • Ketones, UA 02/19/2024 Negative  Negative mg/dl Final   • Urobilinogen, UA 02/19/2024 <2.0  <2.0 mg/dl mg/dl Final   • Bilirubin, UA 02/19/2024 Negative  Negative Final   • Occult Blood, UA 02/19/2024 Negative  Negative Final   • RBC, UA 02/19/2024 None Seen  None Seen, 1-2 /hpf Final   • WBC, UA 02/19/2024 None Seen  None Seen, 1-2 /hpf Final   • Epithelial Cells 02/19/2024 Occasional  None Seen, Occasional /hpf Final   • Bacteria, UA 02/19/2024 None Seen  None Seen, Occasional /hpf Final         Patient Instructions   Pt is symptom free for this problem.  This diagnosis or problem is stable/well controlled  Patient is advised to continue same meds as outlined in medicine list          Mirella Peters,  "MD        \"This note has been constructed using a voice recognition system.Therefore there may be syntax, spelling, and/or grammatical errors. Please call if you have any questions. \"  "

## 2024-04-15 NOTE — ASSESSMENT & PLAN NOTE
Lab Results   Component Value Date    EGFR 37 03/19/2024    EGFR 39 02/27/2024    EGFR 37 01/04/2024    CREATININE 1.35 (H) 03/19/2024    CREATININE 1.30 02/27/2024    CREATININE 1.36 (H) 01/04/2024   Seen by nephrology GFR creatinine stable at baseline 37 1.3 avoid nephrotoxic follow-up with the nephrology

## 2024-04-15 NOTE — ASSESSMENT & PLAN NOTE
Asymptomatic blood pressure much improved seen by nephrology due to the leg edema amlodipine cut down to 5 mg low-salt diet continue losartan 50 mg with Toprol- mg and continue monitoring blood pressure

## 2024-04-16 ENCOUNTER — TELEPHONE (OUTPATIENT)
Age: 79
End: 2024-04-16

## 2024-04-16 NOTE — TELEPHONE ENCOUNTER
Patient called in to ask PCP why she is no longer supposed to take ASA 81 mg daily. She saw it on her after visit summary from yesterday. RN did reviewed current med list. Please advise patient.

## 2024-04-16 NOTE — TELEPHONE ENCOUNTER
Confirmed medication dosage with Dr. Peters.     Pt notified to continue taking 81 mg aspirin 1 tab daily.    No further questions at this time.

## 2024-05-06 DIAGNOSIS — E87.6 HYPOKALEMIA: ICD-10-CM

## 2024-05-07 RX ORDER — POTASSIUM CHLORIDE 1500 MG/1
20 TABLET, EXTENDED RELEASE ORAL DAILY
Qty: 30 TABLET | Refills: 5 | Status: SHIPPED | OUTPATIENT
Start: 2024-05-07

## 2024-05-08 DIAGNOSIS — I12.9 BENIGN HYPERTENSION WITH CKD (CHRONIC KIDNEY DISEASE) STAGE III (HCC): ICD-10-CM

## 2024-05-08 DIAGNOSIS — N18.30 BENIGN HYPERTENSION WITH CKD (CHRONIC KIDNEY DISEASE) STAGE III (HCC): ICD-10-CM

## 2024-05-08 RX ORDER — LOSARTAN POTASSIUM 50 MG/1
50 TABLET ORAL DAILY
Qty: 90 TABLET | Refills: 1 | Status: SHIPPED | OUTPATIENT
Start: 2024-05-08

## 2024-07-08 ENCOUNTER — RA CDI HCC (OUTPATIENT)
Dept: OTHER | Facility: HOSPITAL | Age: 79
End: 2024-07-08

## 2024-07-15 ENCOUNTER — OFFICE VISIT (OUTPATIENT)
Dept: INTERNAL MEDICINE CLINIC | Facility: CLINIC | Age: 79
End: 2024-07-15
Payer: MEDICARE

## 2024-07-15 VITALS
DIASTOLIC BLOOD PRESSURE: 78 MMHG | SYSTOLIC BLOOD PRESSURE: 142 MMHG | HEIGHT: 66 IN | BODY MASS INDEX: 27.19 KG/M2 | WEIGHT: 169.2 LBS | OXYGEN SATURATION: 95 % | HEART RATE: 61 BPM

## 2024-07-15 DIAGNOSIS — G62.9 NEUROPATHY: ICD-10-CM

## 2024-07-15 DIAGNOSIS — I10 PRIMARY HYPERTENSION: ICD-10-CM

## 2024-07-15 DIAGNOSIS — I12.9 BENIGN HYPERTENSION WITH CKD (CHRONIC KIDNEY DISEASE) STAGE III (HCC): Primary | ICD-10-CM

## 2024-07-15 DIAGNOSIS — N18.30 BENIGN HYPERTENSION WITH CKD (CHRONIC KIDNEY DISEASE) STAGE III (HCC): Primary | ICD-10-CM

## 2024-07-15 PROCEDURE — 99213 OFFICE O/P EST LOW 20 MIN: CPT | Performed by: INTERNAL MEDICINE

## 2024-07-15 PROCEDURE — G2211 COMPLEX E/M VISIT ADD ON: HCPCS | Performed by: INTERNAL MEDICINE

## 2024-07-15 RX ORDER — GABAPENTIN 300 MG/1
300 CAPSULE ORAL DAILY
Qty: 90 CAPSULE | Refills: 2 | Status: SHIPPED | OUTPATIENT
Start: 2024-07-15

## 2024-07-15 RX ORDER — AMLODIPINE BESYLATE 5 MG/1
5 TABLET ORAL DAILY
Qty: 90 TABLET | Refills: 2 | Status: SHIPPED | OUTPATIENT
Start: 2024-07-15

## 2024-07-15 NOTE — ASSESSMENT & PLAN NOTE
Asymptomatic    Pressure controlled    Continue manage medication as follows    Amlodipine 5 mg daily    Losartan 50 mg daily    Metoprolol  mg daily with Demadex 20 mg daily with low-salt diet monitor blood pressure at home

## 2024-07-15 NOTE — PROGRESS NOTES
Dr. Peters's Office Visit Note  07/15/24     Cande CARLOS Pfund 78 y.o. female MRN: [id number]  : 1945    Assessment:     1. Benign hypertension with CKD (chronic kidney disease) stage III (HCC)  Assessment & Plan:  Lab Results   Component Value Date    EGFR 37 2024    EGFR 39 2024    EGFR 37 2024    CREATININE 1.35 (H) 2024    CREATININE 1.30 2024    CREATININE 1.36 (H) 2024   For stable 37 creatinine 1.35 awaiting to be seen by nephrology awaiting repeat BMP continue Demadex 20 mg daily  Orders:  -     amLODIPine (NORVASC) 5 mg tablet; Take 1 tablet (5 mg total) by mouth daily  2. Neuropathy  Assessment & Plan:  Symptoms controlled with gabapentin    Gabapentin 300 mg daily was refilled  Orders:  -     gabapentin (NEURONTIN) 300 mg capsule; Take 1 capsule (300 mg total) by mouth daily  3. Primary hypertension  Assessment & Plan:  Asymptomatic    Pressure controlled    Continue manage medication as follows    Amlodipine 5 mg daily    Losartan 50 mg daily    Metoprolol  mg daily with Demadex 20 mg daily with low-salt diet monitor blood pressure at home        Discussion Summary and Plan:  Today's care plan and medications were reviewed with patient in detail and all their questions answered to their satisfaction.    Chief Complaint   Patient presents with   • Follow-up      Subjective:  Given follow-up chronic medical condition listed visit diagnosis denies any chest pain difficulty breathing awaiting to be seen by nephrology and awaiting to have a blood work done meds refilled        The following portions of the patient's history were reviewed and updated as appropriate: allergies, current medications, past family history, past medical history, past social history, past surgical history and problem list.    Review of Systems   Constitutional:  Positive for activity change. Negative for appetite change, chills, diaphoresis, fatigue, fever and unexpected weight change.    HENT:  Negative for congestion, dental problem, drooling, ear discharge, ear pain, facial swelling, hearing loss, mouth sores, nosebleeds, postnasal drip, rhinorrhea, sinus pressure, sneezing, sore throat, tinnitus, trouble swallowing and voice change.    Eyes:  Negative for photophobia, pain, discharge, redness, itching and visual disturbance.   Respiratory:  Negative for apnea, cough, choking, chest tightness, shortness of breath, wheezing and stridor.    Cardiovascular:  Negative for chest pain, palpitations and leg swelling.   Gastrointestinal:  Negative for abdominal distention, abdominal pain, anal bleeding, blood in stool, constipation, diarrhea, nausea, rectal pain and vomiting.   Endocrine: Negative for cold intolerance, heat intolerance, polydipsia, polyphagia and polyuria.   Genitourinary:  Negative for decreased urine volume, difficulty urinating, dysuria, enuresis, flank pain, frequency, genital sores, hematuria and urgency.   Musculoskeletal:  Positive for arthralgias. Negative for back pain, gait problem, joint swelling, myalgias, neck pain and neck stiffness.   Skin:  Negative for color change, pallor, rash and wound.   Allergic/Immunologic: Negative.  Negative for environmental allergies, food allergies and immunocompromised state.   Neurological:  Negative for dizziness, tremors, seizures, syncope, facial asymmetry, speech difficulty, weakness, light-headedness, numbness and headaches.   Psychiatric/Behavioral:  Negative for agitation, behavioral problems, confusion, decreased concentration, dysphoric mood, hallucinations, self-injury, sleep disturbance and suicidal ideas. The patient is not nervous/anxious and is not hyperactive.          Historical Information   Patient Active Problem List   Diagnosis   • Adenocarcinoma of right lung (HCC)   • Benign hypertension with CKD (chronic kidney disease) stage III (HCC)   • Dyslipidemia   • Primary osteoarthritis involving multiple joints   • Femoral  neuropathy, right   • Emphysema lung (HCC)   • Osteoarthritis of right hip   • Hyperlipidemia   • Numbness and tingling of foot   • Anxiety   • Colon polyp   • Thyroid nodule   • Mixed anxiety and depressive disorder   • Stage 3b chronic kidney disease (HCC)   • Diverticulosis   • Skin mole   • Restless leg syndrome   • BPPV (benign paroxysmal positional vertigo)   • Class 1 obesity due to excess calories without serious comorbidity with body mass index (BMI) of 30.0 to 30.9 in adult   • Chorea   • Varicose veins of legs   • Vitamin B12 deficiency   • Edema of both legs   • Vitamin D deficiency   • Kidney cyst, acquired   • Carpal tunnel syndrome   • Breast cancer screening by mammogram   • Onychomycosis   • Acute deep vein thrombosis (DVT) of distal vein of left lower extremity (HCC)   • Left foot pain   • Coronary artery calcification   • Coronary artery calcification seen on CAT scan   • Chronic kidney disease-mineral and bone disorder   • Elevated AST (SGOT)   • Weight loss   • Prediabetes   • Right renal mass   • Chronic nonintractable headache   • Primary hypertension   • Neuropathy     Past Medical History:   Diagnosis Date   • Cancer (HCC)     lung   • Diarrhea     wt loss from 192lb to 178lb (1/23)   • Emphysema lung (HCC)    • Hemorrhoid     possible hemorrhoid with small amount of bleeding   • Hyperlipidemia    • Hypertension    • Lung cancer (HCC)    • Numbness and tingling of foot    • Osteoarthritis of right hip    • Primary adenocarcinoma of lower lobe of right lung (HCC)      Past Surgical History:   Procedure Laterality Date   • CATARACT EXTRACTION Bilateral    • CHOLECYSTECTOMY     • COLONOSCOPY     • JOINT REPLACEMENT Bilateral     hips-   • ID ARTHRP ACETBLR/PROX FEM PROSTC AGRFT/ALGRFT Right 09/14/2018    Procedure: ANTERIOR TOTAL HIP ARTHROPLASTY;  Surgeon: Kunal Chester MD;  Location: [facility];  Service: Orthopedics   • TOTAL HIP ARTHROPLASTY     • TUMOR REMOVAL Right 2015    lower  lobe      Social History     Substance and Sexual Activity   Alcohol Use Yes    Comment: on occ     Social History     Substance and Sexual Activity   Drug Use Never     Social History     Tobacco Use   Smoking Status Former   • Current packs/day: 0.00   • Average packs/day: 1 pack/day for 50.0 years (50.0 ttl pk-yrs)   • Types: Cigarettes   • Start date: 1965   • Quit date: 2015   • Years since quittin.8   Smokeless Tobacco Never     Family History   Problem Relation Age of Onset   • Heart disease Mother    • No Known Problems Father      Health Maintenance Due   Topic   • Pneumococcal Vaccine: 65+ Years (1 of 2 - PCV)   • Osteoporosis Screening    • Zoster Vaccine (1 of 2)   • RSV Vaccine Age 60+ Years (1 - 1-dose 60+ series)   • COVID-19 Vaccine ( season)   • Influenza Vaccine (1)      Meds/Allergies       Current Outpatient Medications:   •  acetaminophen (TYLENOL) 500 mg tablet, Take 500 mg by mouth every 6 (six) hours as needed for mild pain, Disp: , Rfl:   •  albuterol (PROVENTIL HFA,VENTOLIN HFA) 90 mcg/act inhaler, Inhale 2 puffs every 6 (six) hours as needed for wheezing or shortness of breath, Disp: 18 g, Rfl: 1  •  amLODIPine (NORVASC) 5 mg tablet, Take 1 tablet (5 mg total) by mouth daily, Disp: 90 tablet, Rfl: 2  •  atorvastatin (LIPITOR) 10 mg tablet, TAKE 1 TABLET DAILY AT BEDTIME, Disp: 90 tablet, Rfl: 3  •  Cholecalciferol (Vitamin D3) 50 MCG (2000 UT) TABS, Take 2,000 Units by mouth daily, Disp: , Rfl:   •  gabapentin (NEURONTIN) 300 mg capsule, Take 1 capsule (300 mg total) by mouth daily, Disp: 90 capsule, Rfl: 2  •  losartan (COZAAR) 50 mg tablet, TAKE 1 TABLET DAILY, Disp: 90 tablet, Rfl: 1  •  metoprolol succinate (TOPROL-XL) 100 mg 24 hr tablet, TAKE 1 TABLET DAILY, Disp: 90 tablet, Rfl: 1  •  Omega 3 1200 MG CAPS, Take by mouth Omega XL daily, Disp: , Rfl:   •  Polyethylene Glycol 400 0.25 % SOLN, Apply 1 drop to eye 2 (two) times a day As needed, Disp: , Rfl:   •   "potassium chloride (Klor-Con M20) 20 mEq tablet, TAKE 1 TABLET DAILY, Disp: 30 tablet, Rfl: 5  •  torsemide (DEMADEX) 20 mg tablet, Take 1 tablet (20 mg total) by mouth daily, Disp: 90 tablet, Rfl: 1      Objective:    Vitals:   /78   Pulse 61   Ht 5' 6\" (1.676 m)   Wt 76.7 kg (169 lb 3.2 oz)   SpO2 95%   BMI 27.31 kg/m²   Body mass index is 27.31 kg/m².  Vitals:    07/15/24 1330   Weight: 76.7 kg (169 lb 3.2 oz)       Physical Exam  Vitals and nursing note reviewed.   Constitutional:       General: She is not in acute distress.     Appearance: She is well-developed. She is not ill-appearing, toxic-appearing or diaphoretic.   HENT:      Head: Normocephalic and atraumatic.      Right Ear: External ear normal.      Left Ear: External ear normal.      Nose: Nose normal.      Mouth/Throat:      Pharynx: No oropharyngeal exudate.   Eyes:      General: Lids are normal. Lids are everted, no foreign bodies appreciated. No scleral icterus.        Right eye: No discharge.         Left eye: No discharge.      Conjunctiva/sclera: Conjunctivae normal.      Pupils: Pupils are equal, round, and reactive to light.   Neck:      Thyroid: No thyromegaly.      Vascular: Normal carotid pulses. No carotid bruit, hepatojugular reflux or JVD.      Trachea: No tracheal tenderness or tracheal deviation.   Cardiovascular:      Rate and Rhythm: Normal rate and regular rhythm.      Pulses: Normal pulses.      Heart sounds:      No friction rub. No gallop.   Pulmonary:      Effort: Pulmonary effort is normal. No respiratory distress.      Breath sounds: Normal breath sounds. No stridor. No wheezing or rales.   Chest:      Chest wall: No tenderness.   Abdominal:      General: Bowel sounds are normal. There is no distension.      Palpations: Abdomen is soft. There is no mass.      Tenderness: There is no abdominal tenderness. There is no guarding or rebound.   Musculoskeletal:         General: No tenderness or deformity. Normal range of " motion.      Cervical back: Normal range of motion and neck supple. No edema, erythema or rigidity. No spinous process tenderness or muscular tenderness. Normal range of motion.   Lymphadenopathy:      Head:      Right side of head: No submental, submandibular, tonsillar, preauricular or posterior auricular adenopathy.      Left side of head: No submental, submandibular, tonsillar, preauricular, posterior auricular or occipital adenopathy.      Cervical: No cervical adenopathy.      Right cervical: No superficial, deep or posterior cervical adenopathy.     Left cervical: No superficial, deep or posterior cervical adenopathy.      Upper Body:      Right upper body: No pectoral adenopathy.      Left upper body: No pectoral adenopathy.   Skin:     General: Skin is warm and dry.      Coloration: Skin is not pale.      Findings: No erythema or rash.   Neurological:      General: No focal deficit present.      Mental Status: She is alert and oriented to person, place, and time.      Cranial Nerves: No cranial nerve deficit.      Sensory: No sensory deficit.      Motor: No tremor, abnormal muscle tone or seizure activity.      Coordination: Coordination normal.      Gait: Gait normal.      Deep Tendon Reflexes: Reflexes are normal and symmetric. Reflexes normal.   Psychiatric:         Behavior: Behavior normal.         Thought Content: Thought content normal.         Judgment: Judgment normal.         Lab Review   No visits with results within 2 Month(s) from this visit.   Latest known visit with results is:   Appointment on 03/19/2024   Component Date Value Ref Range Status   • Sodium 03/19/2024 137  135 - 147 mmol/L Final   • Potassium 03/19/2024 4.8  3.5 - 5.3 mmol/L Final   • Chloride 03/19/2024 102  96 - 108 mmol/L Final   • CO2 03/19/2024 27  21 - 32 mmol/L Final   • ANION GAP 03/19/2024 8  4 - 13 mmol/L Final   • BUN 03/19/2024 38 (H)  5 - 25 mg/dL Final   • Creatinine 03/19/2024 1.35 (H)  0.60 - 1.30 mg/dL Final     "Standardized to IDMS reference method   • Glucose, Fasting 03/19/2024 111 (H)  65 - 99 mg/dL Final   • Calcium 03/19/2024 9.5  8.4 - 10.2 mg/dL Final   • eGFR 03/19/2024 37  ml/min/1.73sq m Final         Patient Instructions   Pt is symptom free for this problem.  This diagnosis or problem is stable/well controlled  Patient is advised to continue same meds as outlined in medicine list  Pt is advised to continue to follow with specialist if they are following for this problme  Pt should get blood test or other testing as per specialist ( or myself) prior to your next visit.        Mirella Peters MD        \"This note has been constructed using a voice recognition system.Therefore there may be syntax, spelling, and/or grammatical errors. Please call if you have any questions. \"  "

## 2024-07-15 NOTE — ASSESSMENT & PLAN NOTE
[facility] received a SNF choice form. Per the choice form the referral has been sent to [facility].    Lab Results   Component Value Date    EGFR 37 03/19/2024    EGFR 39 02/27/2024    EGFR 37 01/04/2024    CREATININE 1.35 (H) 03/19/2024    CREATININE 1.30 02/27/2024    CREATININE 1.36 (H) 01/04/2024   For stable 37 creatinine 1.35 awaiting to be seen by nephrology awaiting repeat BMP continue Demadex 20 mg daily

## 2024-07-29 ENCOUNTER — HOSPITAL ENCOUNTER (OUTPATIENT)
Dept: RADIOLOGY | Facility: HOSPITAL | Age: 79
Discharge: HOME | End: 2024-07-29
Payer: MEDICARE

## 2024-07-29 DIAGNOSIS — Z85.118 HISTORY OF LUNG CANCER: ICD-10-CM

## 2024-07-29 PROCEDURE — G1004 CDSM NDSC: HCPCS

## 2024-07-29 PROCEDURE — 71250 CT THORAX DX C-: CPT

## 2024-08-06 ENCOUNTER — OFFICE VISIT (OUTPATIENT)
Dept: CARDIAC SURGERY | Facility: CLINIC | Age: 79
End: 2024-08-06
Payer: MEDICARE

## 2024-08-06 VITALS
HEART RATE: 61 BPM | TEMPERATURE: 98.1 F | WEIGHT: 169.53 LBS | DIASTOLIC BLOOD PRESSURE: 82 MMHG | BODY MASS INDEX: 27.25 KG/M2 | HEIGHT: 66 IN | OXYGEN SATURATION: 96 % | RESPIRATION RATE: 14 BRPM | SYSTOLIC BLOOD PRESSURE: 158 MMHG

## 2024-08-06 DIAGNOSIS — C34.91 ADENOCARCINOMA OF RIGHT LUNG (HCC): Primary | ICD-10-CM

## 2024-08-06 PROCEDURE — 99213 OFFICE O/P EST LOW 20 MIN: CPT | Performed by: THORACIC SURGERY (CARDIOTHORACIC VASCULAR SURGERY)

## 2024-08-06 NOTE — PROGRESS NOTES
Assessment/Plan:    Adenocarcinoma of right lung (HCC)  Ms. Smith has no clinical or radiographic appearance of recurrent lung cancer.  As per NCCN guidelines we will continue to monitor her with a noncontrasted CT scan of the chest on a yearly basis.  Will see her back in 12 months with a repeat scan.  She knows that she can call the office at anytime with questions or concerns.       Diagnoses and all orders for this visit:    Adenocarcinoma of right lung (HCC)  -     CT chest wo contrast; Future          Thoracic History   Cancer Staging   Adenocarcinoma of right lung (HCC)  Staging form: Lung, AJCC 7th Edition  - Clinical stage from 9/24/2015: Stage IA (T1a, N0, M0) - Signed by Tessie Waddell PA-C on 10/18/2022  Histopathologic type: Adenocarcinoma, NOS  Stage prefix: Initial diagnosis  Biopsy of metastatic site performed: No  Histologic grade (G): G2  Lymph-vascular invasion (LVI): LVI present/identified, NOS  Pleural/elastic layer invasion: PL0    Oncology History   Adenocarcinoma of right lung (HCC)   9/17/2015 Surgery    fiberoptic bronchoscopy, right thoracoscopic superior segmentectomy and mediastinal lymph node dissection      9/24/2015 -  Cancer Staged    Staging form: Lung, AJCC 7th Edition  - Clinical stage from 9/24/2015: Stage IA (T1a, N0, M0) - Signed by Tessie Waddell PA-C on 10/18/2022  Histopathologic type: Adenocarcinoma, NOS  Stage prefix: Initial diagnosis  Biopsy of metastatic site performed: No  Histologic grade (G): G2  Lymph-vascular invasion (LVI): LVI present/identified, NOS  Pleural/elastic layer invasion: PL0       10/5/2015 Initial Diagnosis    Adenocarcinoma of right lung (HCC)              Subjective:    Patient ID: Cande Smith is a 79 y.o. female.    Ms. Smith turns to the office today for routine lung cancer surveillance.  She has a remote history of a stage IA adenocarcinoma and is status post a right thoracoscopic superior segmentectomy.  Overall, she feels pretty good.   She has had some decrement in her kidney function and is following with nephrology.  She had lost some weight over the last year but this has stabilized.  She denies new headaches or blurry vision or new bone or joint pains.  She only has an occasional cough and denies purulent sputum production or hemoptysis.      A CT scan of the chest on July 31, 2024 and this film was personally reviewed.  This demonstrates stable postsurgical changes in the right hemithorax.  There are no new or growing pulmonary nodules nor are there any worrisome mediastinal or hilar lymph nodes.  There is no sign of recurrent lung cancer.        The following portions of the patient's history were reviewed and updated as appropriate: allergies, current medications, past family history, past medical history, past social history, past surgical history and problem list.    Review of Systems   Constitutional:  Negative for activity change, appetite change, chills, fever and unexpected weight change.   HENT:  Negative for voice change.    Respiratory:  Negative for cough, shortness of breath and wheezing.    Cardiovascular:  Negative for chest pain, palpitations and leg swelling.   Gastrointestinal:  Negative for abdominal pain, constipation, diarrhea, nausea and vomiting.   Musculoskeletal:  Negative for arthralgias and myalgias.   Skin:  Negative for rash.   Neurological:  Negative for dizziness, seizures, weakness, numbness and headaches.   Hematological:  Negative for adenopathy.   Psychiatric/Behavioral:  Negative for confusion.          Objective:   Physical Exam  Constitutional:       Appearance: She is well-developed.   HENT:      Head: Normocephalic and atraumatic.      Mouth/Throat:      Pharynx: No oropharyngeal exudate.   Eyes:      General: No scleral icterus.     Extraocular Movements: EOM normal.      Conjunctiva/sclera: Conjunctivae normal.      Pupils: Pupils are equal, round, and reactive to light.   Neck:      Thyroid: No  "thyromegaly.      Trachea: No tracheal deviation.   Cardiovascular:      Rate and Rhythm: Normal rate and regular rhythm.      Heart sounds: Normal heart sounds.   Pulmonary:      Effort: Pulmonary effort is normal. No respiratory distress.      Breath sounds: Normal breath sounds. No wheezing.   Abdominal:      General: There is no distension.      Palpations: Abdomen is soft.      Tenderness: There is no abdominal tenderness.   Musculoskeletal:         General: Normal range of motion.      Cervical back: Normal range of motion and neck supple.   Lymphadenopathy:      Cervical: No cervical adenopathy.   Skin:     General: Skin is warm and dry.   Neurological:      Mental Status: She is alert and oriented to person, place, and time.   Psychiatric:         Mood and Affect: Mood and affect normal.         Behavior: Behavior normal.         Thought Content: Thought content normal.         Judgment: Judgment normal.     /82 (BP Location: Left arm, Patient Position: Sitting, Cuff Size: Standard)   Pulse 61   Temp 98.1 °F (36.7 °C) (Temporal)   Resp 14   Ht 5' 6\" (1.676 m)   Wt 76.9 kg (169 lb 8.5 oz)   SpO2 96%   BMI 27.36 kg/m²          CT chest wo contrast    Result Date: 7/31/2024  Narrative CT CHEST WITHOUT IV CONTRAST INDICATION:   Z85.118: Personal history of other malignant neoplasm of bronchus and lung. Per my review of the medical record, right lower lobe superior segmentectomy 9/17/2015 for adenocarcinoma. COMPARISON: CXR 1/11/2024, chest CT 7/18/2023, 10/15/2021, 10/08/2019. TECHNIQUE: Chest CT without intravenous contrast.  Axial, sagittal, coronal 2D reformats and coronal MIPS from source data. Radiation dose length product (DLP):  241.03 mGy-cm . Radiation dose exposure minimized using iterative reconstruction and automated exposure control. FINDINGS: LUNGS: No suspicious nodules. 2 mm peripheral right upper lobe nodule (3/96), 2 mm juxtapleural lateral right middle lobe nodule (3/125), 2 mm " juxtapleural lateral basal left lower lobe nodule (3/206), stable since at least 2019 and benign. A previously described 2 mm peripheral right upper lobe nodule is a benign calcified granuloma (3/109). Right lower lobe superior segmentectomy. Mild benign bilateral scar. Mild centrilobular emphysema. AIRWAYS: No significant filling defects. PLEURA: Benign mild calcified biapical pleural-parenchymal scar. No pleural effusion. HEART/GREAT VESSELS: Normal heart size. Mild coronary artery calcification indicating atherosclerotic heart disease. Small chronic loculated pericardial effusion abutting the right atrium. MEDIASTINUM AND KARIE:  Unremarkable. CHEST WALL AND LOWER NECK: Benign lipoma left lateral chest wall (2/50). UPPER ABDOMEN: Cholecystectomy. Benign fat-containing Bochdalek hernias. OSSEOUS STRUCTURES: Mild degenerative disease in the spine with mild curvature. Calcified loose bodies about the right glenoid.     Impression Nothing to indicate recurrent tumor.

## 2024-08-06 NOTE — ASSESSMENT & PLAN NOTE
Ms. Smith has no clinical or radiographic appearance of recurrent lung cancer.  As per NCCN guidelines we will continue to monitor her with a noncontrasted CT scan of the chest on a yearly basis.  Will see her back in 12 months with a repeat scan.  She knows that she can call the office at anytime with questions or concerns.

## 2024-09-20 DIAGNOSIS — I12.9 BENIGN HYPERTENSION WITH CKD (CHRONIC KIDNEY DISEASE) STAGE III (HCC): ICD-10-CM

## 2024-09-20 DIAGNOSIS — N18.30 BENIGN HYPERTENSION WITH CKD (CHRONIC KIDNEY DISEASE) STAGE III (HCC): ICD-10-CM

## 2024-09-20 RX ORDER — TORSEMIDE 20 MG/1
20 TABLET ORAL DAILY
Qty: 90 TABLET | Refills: 1 | Status: SHIPPED | OUTPATIENT
Start: 2024-09-20

## 2024-10-09 ENCOUNTER — APPOINTMENT (OUTPATIENT)
Dept: LAB | Facility: CLINIC | Age: 79
End: 2024-10-09
Payer: MEDICARE

## 2024-10-09 DIAGNOSIS — N18.32 STAGE 3B CHRONIC KIDNEY DISEASE (HCC): ICD-10-CM

## 2024-10-09 LAB
ALBUMIN SERPL BCG-MCNC: 4.3 G/DL (ref 3.5–5)
ANION GAP SERPL CALCULATED.3IONS-SCNC: 8 MMOL/L (ref 4–13)
BUN SERPL-MCNC: 28 MG/DL (ref 5–25)
CALCIUM SERPL-MCNC: 9.4 MG/DL (ref 8.4–10.2)
CHLORIDE SERPL-SCNC: 103 MMOL/L (ref 96–108)
CO2 SERPL-SCNC: 27 MMOL/L (ref 21–32)
CREAT SERPL-MCNC: 1.28 MG/DL (ref 0.6–1.3)
CREAT UR-MCNC: 39.5 MG/DL
GFR SERPL CREATININE-BSD FRML MDRD: 39 ML/MIN/1.73SQ M
GLUCOSE P FAST SERPL-MCNC: 106 MG/DL (ref 65–99)
MAGNESIUM SERPL-MCNC: 2.2 MG/DL (ref 1.9–2.7)
MICROALBUMIN UR-MCNC: 39.1 MG/L
MICROALBUMIN/CREAT 24H UR: 99 MG/G CREATININE (ref 0–30)
PHOSPHATE SERPL-MCNC: 3.3 MG/DL (ref 2.3–4.1)
POTASSIUM SERPL-SCNC: 4.6 MMOL/L (ref 3.5–5.3)
SODIUM SERPL-SCNC: 138 MMOL/L (ref 135–147)

## 2024-10-09 PROCEDURE — 36415 COLL VENOUS BLD VENIPUNCTURE: CPT

## 2024-10-09 PROCEDURE — 80069 RENAL FUNCTION PANEL: CPT

## 2024-10-09 PROCEDURE — 82570 ASSAY OF URINE CREATININE: CPT

## 2024-10-09 PROCEDURE — 83735 ASSAY OF MAGNESIUM: CPT

## 2024-10-09 PROCEDURE — 82043 UR ALBUMIN QUANTITATIVE: CPT

## 2024-10-15 ENCOUNTER — OFFICE VISIT (OUTPATIENT)
Dept: INTERNAL MEDICINE CLINIC | Facility: CLINIC | Age: 79
End: 2024-10-15
Payer: MEDICARE

## 2024-10-15 VITALS
HEART RATE: 60 BPM | WEIGHT: 172 LBS | TEMPERATURE: 97.6 F | OXYGEN SATURATION: 93 % | RESPIRATION RATE: 16 BRPM | SYSTOLIC BLOOD PRESSURE: 148 MMHG | HEIGHT: 66 IN | DIASTOLIC BLOOD PRESSURE: 68 MMHG | BODY MASS INDEX: 27.64 KG/M2

## 2024-10-15 DIAGNOSIS — I12.9 BENIGN HYPERTENSION WITH CKD (CHRONIC KIDNEY DISEASE) STAGE III (HCC): Primary | ICD-10-CM

## 2024-10-15 DIAGNOSIS — Z23 ENCOUNTER FOR IMMUNIZATION: ICD-10-CM

## 2024-10-15 DIAGNOSIS — J43.9 PULMONARY EMPHYSEMA, UNSPECIFIED EMPHYSEMA TYPE (HCC): ICD-10-CM

## 2024-10-15 DIAGNOSIS — J41.0 SIMPLE CHRONIC BRONCHITIS (HCC): ICD-10-CM

## 2024-10-15 DIAGNOSIS — N18.30 BENIGN HYPERTENSION WITH CKD (CHRONIC KIDNEY DISEASE) STAGE III (HCC): Primary | ICD-10-CM

## 2024-10-15 DIAGNOSIS — E78.5 DYSLIPIDEMIA: ICD-10-CM

## 2024-10-15 PROCEDURE — 99213 OFFICE O/P EST LOW 20 MIN: CPT | Performed by: INTERNAL MEDICINE

## 2024-10-15 PROCEDURE — G0008 ADMIN INFLUENZA VIRUS VAC: HCPCS

## 2024-10-15 PROCEDURE — 90662 IIV NO PRSV INCREASED AG IM: CPT

## 2024-10-15 RX ORDER — ALBUTEROL SULFATE 90 UG/1
2 INHALANT RESPIRATORY (INHALATION) EVERY 6 HOURS PRN
Qty: 18 G | Refills: 1 | Status: SHIPPED | OUTPATIENT
Start: 2024-10-15

## 2024-10-15 NOTE — PROGRESS NOTES
Dr. Peters's Office Visit Note  10/15/24     Cande CARLOS Pfund 79 y.o. female MRN: [id number]  : 1945    Assessment:     1. Encounter for immunization  -     influenza vaccine, high-dose, PF 0.5 mL (Fluzone High Dose)  2. Benign hypertension with CKD (chronic kidney disease) stage III (HCC)  Assessment & Plan:  Lab Results   Component Value Date    EGFR 39 10/09/2024    EGFR 37 2024    EGFR 39 2024    CREATININE 1.28 10/09/2024    CREATININE 1.35 (H) 2024    CREATININE 1.30 2024   GFR stable at 39 creatinine 1.2H followed by nephrology stable at baseline avoid nephrotoxic controlled blood pressure magnesium normal    Again continue manage medication for the hypertension as follow    Amlodipine 5 mg daily    Losartan 50 mg daily Toprol- mg daily low-salt diet Demadex 20 mg daily  3. Dyslipidemia  Assessment & Plan:  Lab Results   Component Value Date    LDLCALC 36 2023   Previous LDL reviewed agree and continue manage medications follow  Lipitor 10 mg daily with low-fat low-cholesterol diet will monitor closely  4. Pulmonary emphysema, unspecified emphysema type (HCC)  Assessment & Plan:  No wheezing no difficulty breathing hardly uses Proventil inhaler as needed    Agree and continue manage medication as follow Proventil 2 puffs 4 times a day no further intervention needed stable  5. Simple chronic bronchitis (HCC)  -     albuterol (PROVENTIL HFA,VENTOLIN HFA) 90 mcg/act inhaler; Inhale 2 puffs every 6 (six) hours as needed for wheezing or shortness of breath        Discussion Summary and Plan:  Today's care plan and medications were reviewed with patient in detail and all their questions answered to their satisfaction.    Chief Complaint   Patient presents with    Follow-up     Discuss lab results that were ordered by kidney doctor      Subjective:  Came in follow-up chronic medical condition list of his diagnosis in by nephrology all the labs reviewed kidney function GFR  stable at baseline no chest pain difficulty breathing no nausea vomiting        The following portions of the patient's history were reviewed and updated as appropriate: allergies, current medications, past family history, past medical history, past social history, past surgical history and problem list.    Review of Systems   Constitutional:  Positive for activity change. Negative for appetite change, chills, diaphoresis, fatigue, fever and unexpected weight change.   HENT:  Negative for congestion, dental problem, drooling, ear discharge, ear pain, facial swelling, hearing loss, mouth sores, nosebleeds, postnasal drip, rhinorrhea, sinus pressure, sneezing, sore throat, tinnitus, trouble swallowing and voice change.    Eyes:  Negative for photophobia, pain, discharge, redness, itching and visual disturbance.   Respiratory:  Negative for apnea, cough, choking, chest tightness, shortness of breath, wheezing and stridor.    Cardiovascular:  Negative for chest pain, palpitations and leg swelling.   Gastrointestinal:  Negative for abdominal distention, abdominal pain, anal bleeding, blood in stool, constipation, diarrhea, nausea, rectal pain and vomiting.   Endocrine: Negative for cold intolerance, heat intolerance, polydipsia, polyphagia and polyuria.   Genitourinary:  Negative for decreased urine volume, difficulty urinating, dysuria, enuresis, flank pain, frequency, genital sores, hematuria and urgency.   Musculoskeletal:  Positive for arthralgias. Negative for back pain, gait problem, joint swelling, myalgias, neck pain and neck stiffness.   Skin:  Negative for color change, pallor, rash and wound.   Allergic/Immunologic: Negative.  Negative for environmental allergies, food allergies and immunocompromised state.   Neurological:  Negative for dizziness, tremors, seizures, syncope, facial asymmetry, speech difficulty, weakness, light-headedness, numbness and headaches.   Psychiatric/Behavioral:  Negative for  agitation, behavioral problems, confusion, decreased concentration, dysphoric mood, hallucinations, self-injury, sleep disturbance and suicidal ideas. The patient is not nervous/anxious and is not hyperactive.          Historical Information   Patient Active Problem List   Diagnosis    Adenocarcinoma of right lung (HCC)    Benign hypertension with CKD (chronic kidney disease) stage III (HCC)    Dyslipidemia    Primary osteoarthritis involving multiple joints    Femoral neuropathy, right    Emphysema lung (HCC)    Osteoarthritis of right hip    Hyperlipidemia    Numbness and tingling of foot    Anxiety    Colon polyp    Thyroid nodule    Mixed anxiety and depressive disorder    Stage 3b chronic kidney disease (HCC)    Diverticulosis    Skin mole    Restless leg syndrome    BPPV (benign paroxysmal positional vertigo)    Class 1 obesity due to excess calories without serious comorbidity with body mass index (BMI) of 30.0 to 30.9 in adult    Chorea    Varicose veins of legs    Vitamin B12 deficiency    Edema of both legs    Vitamin D deficiency    Kidney cyst, acquired    Carpal tunnel syndrome    Breast cancer screening by mammogram    Onychomycosis    Acute deep vein thrombosis (DVT) of distal vein of left lower extremity (HCC)    Left foot pain    Coronary artery calcification    Coronary artery calcification seen on CAT scan    Chronic kidney disease-mineral and bone disorder    Elevated AST (SGOT)    Weight loss    Prediabetes    Right renal mass    Chronic nonintractable headache    Primary hypertension    Neuropathy     Past Medical History:   Diagnosis Date    Cancer (HCC)     lung    Diarrhea     wt loss from 192lb to 178lb (1/23)    Emphysema lung (HCC)     Hemorrhoid     possible hemorrhoid with small amount of bleeding    Hyperlipidemia     Hypertension     Lung cancer (HCC)     Numbness and tingling of foot     Osteoarthritis of right hip     Primary adenocarcinoma of lower lobe of right lung (HCC)       Past Surgical History:   Procedure Laterality Date    CATARACT EXTRACTION Bilateral     CHOLECYSTECTOMY      COLONOSCOPY      JOINT REPLACEMENT Bilateral     hips-    CA ARTHRP ACETBLR/PROX FEM PROSTC AGRFT/ALGRFT Right 2018    Procedure: ANTERIOR TOTAL HIP ARTHROPLASTY;  Surgeon: Kunal Chester MD;  Location: [facility];  Service: Orthopedics    TOTAL HIP ARTHROPLASTY      TUMOR REMOVAL Right 2015    lower lobe      Social History     Substance and Sexual Activity   Alcohol Use Yes    Comment: on occ     Social History     Substance and Sexual Activity   Drug Use Never     Social History     Tobacco Use   Smoking Status Former    Current packs/day: 0.00    Average packs/day: 1 pack/day for 50.0 years (50.0 ttl pk-yrs)    Types: Cigarettes    Start date: 1965    Quit date: 2015    Years since quittin.1   Smokeless Tobacco Never     Family History   Problem Relation Age of Onset    Heart disease Mother     No Known Problems Father      Health Maintenance Due   Topic    Pneumococcal Vaccine: 65+ Years (1 of 2 - PCV)    Osteoporosis Screening     Zoster Vaccine (1 of 2)    RSV Vaccine Age 60+ Years (1 - 1-dose 60+ series)    COVID-19 Vaccine ( season)    Fall Risk     Medicare Annual Wellness Visit (AWV)     Depression Screening       Meds/Allergies       Current Outpatient Medications:     acetaminophen (TYLENOL) 500 mg tablet, Take 500 mg by mouth every 6 (six) hours as needed for mild pain, Disp: , Rfl:     albuterol (PROVENTIL HFA,VENTOLIN HFA) 90 mcg/act inhaler, Inhale 2 puffs every 6 (six) hours as needed for wheezing or shortness of breath, Disp: 18 g, Rfl: 1    amLODIPine (NORVASC) 5 mg tablet, Take 1 tablet (5 mg total) by mouth daily, Disp: 90 tablet, Rfl: 2    atorvastatin (LIPITOR) 10 mg tablet, TAKE 1 TABLET DAILY AT BEDTIME, Disp: 90 tablet, Rfl: 3    Cholecalciferol (Vitamin D3) 50 MCG (2000 UT) TABS, Take 2,000 Units by mouth daily, Disp: , Rfl:     gabapentin  "(NEURONTIN) 300 mg capsule, Take 1 capsule (300 mg total) by mouth daily, Disp: 90 capsule, Rfl: 2    losartan (COZAAR) 50 mg tablet, TAKE 1 TABLET DAILY, Disp: 90 tablet, Rfl: 1    metoprolol succinate (TOPROL-XL) 100 mg 24 hr tablet, TAKE 1 TABLET DAILY, Disp: 90 tablet, Rfl: 1    Omega 3 1200 MG CAPS, Take by mouth Omega XL daily, Disp: , Rfl:     Polyethylene Glycol 400 0.25 % SOLN, Apply 1 drop to eye 2 (two) times a day As needed, Disp: , Rfl:     potassium chloride (Klor-Con M20) 20 mEq tablet, TAKE 1 TABLET DAILY, Disp: 30 tablet, Rfl: 5    torsemide (DEMADEX) 20 mg tablet, TAKE 1 TABLET DAILY, Disp: 90 tablet, Rfl: 1      Objective:    Vitals:   /68   Pulse 60   Temp 97.6 °F (36.4 °C)   Resp 16   Ht 5' 6\" (1.676 m)   Wt 78 kg (172 lb)   SpO2 93%   BMI 27.76 kg/m²   Body mass index is 27.76 kg/m².  Vitals:    10/15/24 1226   Weight: 78 kg (172 lb)       Physical Exam  Vitals and nursing note reviewed.   Constitutional:       General: She is not in acute distress.     Appearance: She is well-developed. She is not ill-appearing, toxic-appearing or diaphoretic.   HENT:      Head: Normocephalic and atraumatic.      Right Ear: External ear normal.      Left Ear: External ear normal.      Nose: Nose normal.      Mouth/Throat:      Pharynx: No oropharyngeal exudate.   Eyes:      General: Lids are normal. Lids are everted, no foreign bodies appreciated. No scleral icterus.        Right eye: No discharge.         Left eye: No discharge.      Conjunctiva/sclera: Conjunctivae normal.      Pupils: Pupils are equal, round, and reactive to light.   Neck:      Thyroid: No thyromegaly.      Vascular: Normal carotid pulses. No carotid bruit, hepatojugular reflux or JVD.      Trachea: No tracheal tenderness or tracheal deviation.   Cardiovascular:      Rate and Rhythm: Normal rate and regular rhythm.      Pulses: Normal pulses.      Heart sounds:      No friction rub. No gallop.   Pulmonary:      Effort: " Pulmonary effort is normal. No respiratory distress.      Breath sounds: Normal breath sounds. No stridor. No wheezing or rales.   Chest:      Chest wall: No tenderness.   Abdominal:      General: Bowel sounds are normal. There is no distension.      Palpations: Abdomen is soft. There is no mass.      Tenderness: There is no abdominal tenderness. There is no guarding or rebound.   Musculoskeletal:         General: No tenderness or deformity. Normal range of motion.      Cervical back: Normal range of motion and neck supple. No edema, erythema or rigidity. No spinous process tenderness or muscular tenderness. Normal range of motion.   Lymphadenopathy:      Head:      Right side of head: No submental, submandibular, tonsillar, preauricular or posterior auricular adenopathy.      Left side of head: No submental, submandibular, tonsillar, preauricular, posterior auricular or occipital adenopathy.      Cervical: No cervical adenopathy.      Right cervical: No superficial, deep or posterior cervical adenopathy.     Left cervical: No superficial, deep or posterior cervical adenopathy.      Upper Body:      Right upper body: No pectoral adenopathy.      Left upper body: No pectoral adenopathy.   Skin:     General: Skin is warm and dry.      Coloration: Skin is not pale.      Findings: No erythema or rash.   Neurological:      General: No focal deficit present.      Mental Status: She is alert and oriented to person, place, and time.      Cranial Nerves: No cranial nerve deficit.      Sensory: No sensory deficit.      Motor: No tremor, abnormal muscle tone or seizure activity.      Coordination: Coordination normal.      Gait: Gait normal.      Deep Tendon Reflexes: Reflexes are normal and symmetric. Reflexes normal.   Psychiatric:         Behavior: Behavior normal.         Thought Content: Thought content normal.         Judgment: Judgment normal.         Lab Review   Appointment on 10/09/2024   Component Date Value Ref  "Range Status    Magnesium 10/09/2024 2.2  1.9 - 2.7 mg/dL Final    Albumin 10/09/2024 4.3  3.5 - 5.0 g/dL Final    Calcium 10/09/2024 9.4  8.4 - 10.2 mg/dL Final    Phosphorus 10/09/2024 3.3  2.3 - 4.1 mg/dL Final    BUN 10/09/2024 28 (H)  5 - 25 mg/dL Final    Creatinine 10/09/2024 1.28  0.60 - 1.30 mg/dL Final    Standardized to IDMS reference method    Sodium 10/09/2024 138  135 - 147 mmol/L Final    Potassium 10/09/2024 4.6  3.5 - 5.3 mmol/L Final    Chloride 10/09/2024 103  96 - 108 mmol/L Final    CO2 10/09/2024 27  21 - 32 mmol/L Final    ANION GAP 10/09/2024 8  4 - 13 mmol/L Final    eGFR 10/09/2024 39  ml/min/1.73sq m Final    Glucose, Fasting 10/09/2024 106 (H)  65 - 99 mg/dL Final    Creatinine, Ur 10/09/2024 39.5  Reference range not established. mg/dL Final    Albumin,U,Random 10/09/2024 39.1 (H)  <20.0 mg/L Final    Albumin Creat Ratio 10/09/2024 99 (H)  0 - 30 mg/g creatinine Final         There are no Patient Instructions on file for this visit.     Mirella Peters MD        \"This note has been constructed using a voice recognition system.Therefore there may be syntax, spelling, and/or grammatical errors. Please call if you have any questions. \"  "

## 2024-10-15 NOTE — ASSESSMENT & PLAN NOTE
Lab Results   Component Value Date    LDLCALC 36 06/05/2023   Previous LDL reviewed agree and continue manage medications follow  Lipitor 10 mg daily with low-fat low-cholesterol diet will monitor closely

## 2024-10-15 NOTE — ASSESSMENT & PLAN NOTE
No wheezing no difficulty breathing hardly uses Proventil inhaler as needed    Agree and continue manage medication as follow Proventil 2 puffs 4 times a day no further intervention needed stable

## 2024-10-15 NOTE — ASSESSMENT & PLAN NOTE
Lab Results   Component Value Date    EGFR 39 10/09/2024    EGFR 37 03/19/2024    EGFR 39 02/27/2024    CREATININE 1.28 10/09/2024    CREATININE 1.35 (H) 03/19/2024    CREATININE 1.30 02/27/2024   GFR stable at 39 creatinine 1.2H followed by nephrology stable at baseline avoid nephrotoxic controlled blood pressure magnesium normal    Again continue manage medication for the hypertension as follow    Amlodipine 5 mg daily    Losartan 50 mg daily Toprol- mg daily low-salt diet Demadex 20 mg daily

## 2024-11-04 DIAGNOSIS — I12.9 BENIGN HYPERTENSION WITH CKD (CHRONIC KIDNEY DISEASE) STAGE III (HCC): ICD-10-CM

## 2024-11-04 DIAGNOSIS — N18.30 BENIGN HYPERTENSION WITH CKD (CHRONIC KIDNEY DISEASE) STAGE III (HCC): ICD-10-CM

## 2024-11-06 RX ORDER — LOSARTAN POTASSIUM 50 MG/1
50 TABLET ORAL DAILY
Qty: 90 TABLET | Refills: 1 | Status: SHIPPED | OUTPATIENT
Start: 2024-11-06

## 2024-11-12 ENCOUNTER — OFFICE VISIT (OUTPATIENT)
Dept: NEPHROLOGY | Facility: CLINIC | Age: 79
End: 2024-11-12
Payer: MEDICARE

## 2024-11-12 VITALS
HEART RATE: 80 BPM | DIASTOLIC BLOOD PRESSURE: 82 MMHG | OXYGEN SATURATION: 100 % | WEIGHT: 173 LBS | BODY MASS INDEX: 27.8 KG/M2 | HEIGHT: 66 IN | SYSTOLIC BLOOD PRESSURE: 174 MMHG

## 2024-11-12 DIAGNOSIS — E83.9 CHRONIC KIDNEY DISEASE-MINERAL AND BONE DISORDER: ICD-10-CM

## 2024-11-12 DIAGNOSIS — M89.9 CHRONIC KIDNEY DISEASE-MINERAL AND BONE DISORDER: ICD-10-CM

## 2024-11-12 DIAGNOSIS — I12.9 BENIGN HYPERTENSION WITH CKD (CHRONIC KIDNEY DISEASE) STAGE III (HCC): ICD-10-CM

## 2024-11-12 DIAGNOSIS — N18.9 CHRONIC KIDNEY DISEASE-MINERAL AND BONE DISORDER: ICD-10-CM

## 2024-11-12 DIAGNOSIS — N18.32 STAGE 3B CHRONIC KIDNEY DISEASE (HCC): Primary | ICD-10-CM

## 2024-11-12 DIAGNOSIS — N18.30 BENIGN HYPERTENSION WITH CKD (CHRONIC KIDNEY DISEASE) STAGE III (HCC): ICD-10-CM

## 2024-11-12 DIAGNOSIS — R60.0 EDEMA OF BOTH LEGS: ICD-10-CM

## 2024-11-12 PROCEDURE — 99214 OFFICE O/P EST MOD 30 MIN: CPT | Performed by: INTERNAL MEDICINE

## 2024-11-12 RX ORDER — METOPROLOL SUCCINATE 100 MG/1
100 TABLET, EXTENDED RELEASE ORAL DAILY
Qty: 90 TABLET | Refills: 2 | Status: SHIPPED | OUTPATIENT
Start: 2024-11-12

## 2024-11-12 NOTE — PROGRESS NOTES
NEPHROLOGY OFFICE PROGRESS NOTE   Cande CARLOS Pfund 79 y.o. female MRN: [id number]  DATE: 11/12/24  Reason for visit: Continued evaluation and management of CKD    ASSESSMENT & PLAN:  Chronic kidney disease, stage IIIB  Baseline creatinine is around 1.2 - 1.4.  Etiology of CKD is suspected to be hypertensive nephrosclerosis complicated by previous NSAID use.  Renal function is stable.  Creatinine 1.28.  Treatment: BP control.     Hypertension:  Home BP is close to goal (<130/80)  Current Rx: Metoprolol succinate 100 mg daily, losartan 50 mg daily, Torsemide 20 mg OD, Amlodipine 5 mg daily.  Intolerant to Amlodipine 10 mg OD - edema.   No changes today except advised to lose weight.     Bilateral lower extremity edema:  Suspect this is related to amlodipine use.  Resolved with lower dose of Amlodipine.     Mineral bone disease:  PTH is at goal in February 2024.  Calcium and phosphorus are at goal.  Vitamin D level is at goal in February 2024.  Continue vitamin D 2000 units daily.    Recheck PTH and Vitamin D in 2025.     Hypokalemia  K is at goal and she only takes K dur occasionally.   Stop Kdur.     R renal cyst  Recent MRI showed Bosniak 2 cyst.  No need for follow-up.      Patient Instructions   You have chronic kidney disease (CKD) and your kidney function is stable.   Please take the Amlodipine in the morning and the Losartan in the evening.   Please stop the potassium supplement.   Check blood work in 4-5 months  Follow up in 9 months - please obtain blood work 1-2 weeks prior to the appointment.     Please check your BP twice daily for 1 week and bring a log with your next visit.   Please avoid taking NSAIDs (Ibuprofen, Motrin, Aleve, Advil, Naproxen, Celebrex, etc.)  Make sure you are eating healthy and have adequate exercise.     SUBJECTIVE / INTERVAL HISTORY:  Cande was last seen in the office in March 2024.   Over the course of the past 6 months, there have been no recent hospitalizations or ER visits.  She  "denies any acute complaints at this time.  During the last visit, we changed furosemide to torsemide and decreased the amlodipine. With this change, her edema has resolved.  No new acute complaints or acute issues.    Home BP log reviewed. Done 11/4/24 to 11/12/24  Average readings: 134.4/62.9 mm Hg x 9 readings    PMH/PSH: HTN, preDM, HLP, CKD, R sided lung adenocarcinoma stage 1A s/p superior segmentectomy in Sep 2015, COPD, DJD s/p bilateral hip replacement, obesity, skin cancer s/p excision, colonic polyps, thyroid nodules, cholecystectomy, cataract surgery.    Previous work up:   8/11/23 Renal US: R 10.5 cm, L 9.2 cm, no hydronephrosis, small simple cortical cyst in both kidneys.    1/29/24 MRI: Exophytic Bosniak 2 right renal proteinaceous cyst.    ALLERGIES:   Allergies   Allergen Reactions    Other Other (See Comments)     seasonal     REVIEW OF SYSTEMS:  Review of Systems   Constitutional:  Negative for appetite change, chills, fatigue and fever.   Respiratory:  Negative for cough and shortness of breath.    Cardiovascular:  Negative for chest pain and leg swelling.   Gastrointestinal:  Negative for abdominal pain, diarrhea, nausea and vomiting.   Genitourinary:  Negative for hematuria.   Musculoskeletal:  Negative for arthralgias and back pain.   Neurological:  Negative for dizziness and light-headedness.     OBJECTIVE:  BP (!) 174/82 (BP Location: Left arm, Patient Position: Sitting, Cuff Size: Large)   Pulse 80   Ht 5' 6\" (1.676 m)   Wt 78.5 kg (173 lb)   SpO2 100%   BMI 27.92 kg/m²   Current Weight:   Body mass index is 27.92 kg/m².  Physical Exam  Constitutional:       General: She is not in acute distress.     Appearance: Normal appearance. She is well-developed. She is not ill-appearing or toxic-appearing.   HENT:      Head: Normocephalic and atraumatic.   Eyes:      General: No scleral icterus.     Conjunctiva/sclera: Conjunctivae normal.   Neck:      Vascular: No JVD.   Cardiovascular:      " Rate and Rhythm: Normal rate and regular rhythm.      Heart sounds: Normal heart sounds.   Pulmonary:      Effort: Pulmonary effort is normal.      Breath sounds: Normal breath sounds.   Abdominal:      General: Bowel sounds are normal.      Palpations: Abdomen is soft.   Musculoskeletal:      Right lower leg: No edema.      Left lower leg: No edema.   Skin:     General: Skin is warm and dry.   Neurological:      Mental Status: She is alert and oriented to person, place, and time.   Psychiatric:         Mood and Affect: Mood normal.         Behavior: Behavior normal. Behavior is cooperative.         Judgment: Judgment normal.       Medications:  Current Outpatient Medications:     acetaminophen (TYLENOL) 500 mg tablet, Take 500 mg by mouth every 6 (six) hours as needed for mild pain, Disp: , Rfl:     albuterol (PROVENTIL HFA,VENTOLIN HFA) 90 mcg/act inhaler, Inhale 2 puffs every 6 (six) hours as needed for wheezing or shortness of breath, Disp: 18 g, Rfl: 1    amLODIPine (NORVASC) 5 mg tablet, Take 1 tablet (5 mg total) by mouth daily, Disp: 90 tablet, Rfl: 2    atorvastatin (LIPITOR) 10 mg tablet, TAKE 1 TABLET DAILY AT BEDTIME, Disp: 90 tablet, Rfl: 3    Cholecalciferol (Vitamin D3) 50 MCG (2000 UT) TABS, Take 2,000 Units by mouth daily, Disp: , Rfl:     gabapentin (NEURONTIN) 300 mg capsule, Take 1 capsule (300 mg total) by mouth daily, Disp: 90 capsule, Rfl: 2    losartan (COZAAR) 50 mg tablet, Take 1 tablet (50 mg total) by mouth daily, Disp: 90 tablet, Rfl: 1    metoprolol succinate (TOPROL-XL) 100 mg 24 hr tablet, Take 1 tablet (100 mg total) by mouth daily, Disp: 90 tablet, Rfl: 2    Omega 3 1200 MG CAPS, Take by mouth Omega XL daily, Disp: , Rfl:     Polyethylene Glycol 400 0.25 % SOLN, Apply 1 drop to eye 2 (two) times a day As needed, Disp: , Rfl:     potassium chloride (Klor-Con M20) 20 mEq tablet, TAKE 1 TABLET DAILY, Disp: 30 tablet, Rfl: 5    torsemide (DEMADEX) 20 mg tablet, TAKE 1 TABLET DAILY,  Disp: 90 tablet, Rfl: 1    Laboratory Results:  Results for orders placed or performed in visit on 10/09/24   Magnesium    Collection Time: 10/09/24 11:20 AM   Result Value Ref Range    Magnesium 2.2 1.9 - 2.7 mg/dL   Renal function panel    Collection Time: 10/09/24 11:20 AM   Result Value Ref Range    Albumin 4.3 3.5 - 5.0 g/dL    Calcium 9.4 8.4 - 10.2 mg/dL    Phosphorus 3.3 2.3 - 4.1 mg/dL    BUN 28 (H) 5 - 25 mg/dL    Creatinine 1.28 0.60 - 1.30 mg/dL    Sodium 138 135 - 147 mmol/L    Potassium 4.6 3.5 - 5.3 mmol/L    Chloride 103 96 - 108 mmol/L    CO2 27 21 - 32 mmol/L    ANION GAP 8 4 - 13 mmol/L    eGFR 39 ml/min/1.73sq m    Glucose, Fasting 106 (H) 65 - 99 mg/dL   Albumin / creatinine urine ratio    Collection Time: 10/09/24 11:20 AM   Result Value Ref Range    Creatinine, Ur 39.5 Reference range not established. mg/dL    Albumin,U,Random 39.1 (H) <20.0 mg/L    Albumin Creat Ratio 99 (H) 0 - 30 mg/g creatinine

## 2024-11-12 NOTE — PATIENT INSTRUCTIONS
You have chronic kidney disease (CKD) and your kidney function is stable.   Please take the Amlodipine in the morning and the Losartan in the evening.   Please stop the potassium supplement.   Check blood work in 4-5 months  Follow up in 9 months - please obtain blood work 1-2 weeks prior to the appointment.     Please check your BP twice daily for 1 week and bring a log with your next visit.   Please avoid taking NSAIDs (Ibuprofen, Motrin, Aleve, Advil, Naproxen, Celebrex, etc.)  Make sure you are eating healthy and have adequate exercise.

## 2024-12-01 NOTE — ASSESSMENT & PLAN NOTE
Patient denies any angina or angina:.     Nuclear stress test done on 6/1/2022 reviewed no perfusion defects were noted.     Relevant medications includes amlodipine 10 mg daily, regular thiazide 12.5 mg daily, losartan 50 mg daily, metoprolol succinate 100 mg daily, atorvastatin 10 mg daily, as well as baby aspirin daily as well as patient takes omega 1200 mg.    Patient stable no chest pain or difficulty breathing agree and continue medical medication management as above   2 seconds or less

## 2025-03-19 DIAGNOSIS — I12.9 BENIGN HYPERTENSION WITH CKD (CHRONIC KIDNEY DISEASE) STAGE III (HCC): ICD-10-CM

## 2025-03-19 DIAGNOSIS — N18.30 BENIGN HYPERTENSION WITH CKD (CHRONIC KIDNEY DISEASE) STAGE III (HCC): ICD-10-CM

## 2025-03-19 RX ORDER — TORSEMIDE 20 MG/1
20 TABLET ORAL DAILY
Qty: 90 TABLET | Refills: 1 | Status: SHIPPED | OUTPATIENT
Start: 2025-03-19

## 2025-03-20 ENCOUNTER — APPOINTMENT (OUTPATIENT)
Dept: LAB | Facility: CLINIC | Age: 80
End: 2025-03-20
Payer: MEDICARE

## 2025-03-20 DIAGNOSIS — N18.32 STAGE 3B CHRONIC KIDNEY DISEASE (HCC): ICD-10-CM

## 2025-03-20 LAB
25(OH)D3 SERPL-MCNC: 37 NG/ML (ref 30–100)
ALBUMIN SERPL BCG-MCNC: 4.6 G/DL (ref 3.5–5)
ALP SERPL-CCNC: 73 U/L (ref 34–104)
ALT SERPL W P-5'-P-CCNC: 20 U/L (ref 7–52)
ANION GAP SERPL CALCULATED.3IONS-SCNC: 8 MMOL/L (ref 4–13)
AST SERPL W P-5'-P-CCNC: 27 U/L (ref 13–39)
BILIRUB SERPL-MCNC: 0.73 MG/DL (ref 0.2–1)
BUN SERPL-MCNC: 27 MG/DL (ref 5–25)
CALCIUM SERPL-MCNC: 9.7 MG/DL (ref 8.4–10.2)
CHLORIDE SERPL-SCNC: 101 MMOL/L (ref 96–108)
CO2 SERPL-SCNC: 28 MMOL/L (ref 21–32)
CREAT SERPL-MCNC: 1.34 MG/DL (ref 0.6–1.3)
ERYTHROCYTE [DISTWIDTH] IN BLOOD BY AUTOMATED COUNT: 13.3 % (ref 11.6–15.1)
GFR SERPL CREATININE-BSD FRML MDRD: 37 ML/MIN/1.73SQ M
GLUCOSE P FAST SERPL-MCNC: 115 MG/DL (ref 65–99)
HCT VFR BLD AUTO: 45.3 % (ref 34.8–46.1)
HGB BLD-MCNC: 14.4 G/DL (ref 11.5–15.4)
MAGNESIUM SERPL-MCNC: 2.2 MG/DL (ref 1.9–2.7)
MCH RBC QN AUTO: 29.4 PG (ref 26.8–34.3)
MCHC RBC AUTO-ENTMCNC: 31.8 G/DL (ref 31.4–37.4)
MCV RBC AUTO: 92 FL (ref 82–98)
PLATELET # BLD AUTO: 261 THOUSANDS/UL (ref 149–390)
PMV BLD AUTO: 9.9 FL (ref 8.9–12.7)
POTASSIUM SERPL-SCNC: 4.7 MMOL/L (ref 3.5–5.3)
PROT SERPL-MCNC: 7.4 G/DL (ref 6.4–8.4)
PTH-INTACT SERPL-MCNC: 114.8 PG/ML (ref 12–88)
RBC # BLD AUTO: 4.9 MILLION/UL (ref 3.81–5.12)
SODIUM SERPL-SCNC: 137 MMOL/L (ref 135–147)
WBC # BLD AUTO: 9.62 THOUSAND/UL (ref 4.31–10.16)

## 2025-03-20 PROCEDURE — 82306 VITAMIN D 25 HYDROXY: CPT

## 2025-03-20 PROCEDURE — 80053 COMPREHEN METABOLIC PANEL: CPT

## 2025-03-20 PROCEDURE — 85027 COMPLETE CBC AUTOMATED: CPT

## 2025-03-20 PROCEDURE — 36415 COLL VENOUS BLD VENIPUNCTURE: CPT

## 2025-03-20 PROCEDURE — 83735 ASSAY OF MAGNESIUM: CPT

## 2025-03-20 PROCEDURE — 83970 ASSAY OF PARATHORMONE: CPT

## 2025-03-24 ENCOUNTER — TELEPHONE (OUTPATIENT)
Dept: NEPHROLOGY | Facility: CLINIC | Age: 80
End: 2025-03-24

## 2025-03-24 NOTE — TELEPHONE ENCOUNTER
Pt advised that labs of 3/20/25 show kidney function is stable per Dr. Villegas.    ----- Message from Jim Villegas MD sent at 3/21/2025  4:58 PM EDT -----  Please inform patient that most recent labs (3/20/25) show that kidney function is stable.

## 2025-03-24 NOTE — TELEPHONE ENCOUNTER
----- Message from Jim Villegas MD sent at 3/21/2025  4:58 PM EDT -----  Please inform patient that most recent labs (3/20/25) show that kidney function is stable.

## 2025-03-28 DIAGNOSIS — I12.9 BENIGN HYPERTENSION WITH CKD (CHRONIC KIDNEY DISEASE) STAGE III (HCC): ICD-10-CM

## 2025-03-28 DIAGNOSIS — N18.30 BENIGN HYPERTENSION WITH CKD (CHRONIC KIDNEY DISEASE) STAGE III (HCC): ICD-10-CM

## 2025-03-28 RX ORDER — AMLODIPINE BESYLATE 5 MG/1
5 TABLET ORAL DAILY
Qty: 90 TABLET | Refills: 1 | Status: SHIPPED | OUTPATIENT
Start: 2025-03-28

## 2025-04-01 ENCOUNTER — OFFICE VISIT (OUTPATIENT)
Dept: INTERNAL MEDICINE CLINIC | Facility: CLINIC | Age: 80
End: 2025-04-01
Payer: MEDICARE

## 2025-04-01 VITALS
OXYGEN SATURATION: 99 % | WEIGHT: 172 LBS | HEART RATE: 61 BPM | SYSTOLIC BLOOD PRESSURE: 132 MMHG | RESPIRATION RATE: 15 BRPM | BODY MASS INDEX: 27.64 KG/M2 | TEMPERATURE: 98.7 F | DIASTOLIC BLOOD PRESSURE: 76 MMHG | HEIGHT: 66 IN

## 2025-04-01 DIAGNOSIS — I12.9 BENIGN HYPERTENSION WITH CKD (CHRONIC KIDNEY DISEASE) STAGE III (HCC): ICD-10-CM

## 2025-04-01 DIAGNOSIS — N18.30 BENIGN HYPERTENSION WITH CKD (CHRONIC KIDNEY DISEASE) STAGE III (HCC): ICD-10-CM

## 2025-04-01 DIAGNOSIS — E78.2 MIXED HYPERLIPIDEMIA: ICD-10-CM

## 2025-04-01 DIAGNOSIS — Z13.0 SCREENING FOR DEFICIENCY ANEMIA: ICD-10-CM

## 2025-04-01 DIAGNOSIS — R73.03 PREDIABETES: ICD-10-CM

## 2025-04-01 DIAGNOSIS — R74.01 ELEVATED AST (SGOT): ICD-10-CM

## 2025-04-01 DIAGNOSIS — C34.91 ADENOCARCINOMA OF RIGHT LUNG (HCC): ICD-10-CM

## 2025-04-01 DIAGNOSIS — N18.32 STAGE 3B CHRONIC KIDNEY DISEASE (HCC): Primary | ICD-10-CM

## 2025-04-01 DIAGNOSIS — Z00.00 ENCOUNTER FOR SUBSEQUENT ANNUAL WELLNESS VISIT (AWV) IN MEDICARE PATIENT: ICD-10-CM

## 2025-04-01 PROBLEM — J43.9 EMPHYSEMA LUNG (HCC): Status: RESOLVED | Noted: 2019-08-25 | Resolved: 2025-04-01

## 2025-04-01 PROCEDURE — G2211 COMPLEX E/M VISIT ADD ON: HCPCS | Performed by: INTERNAL MEDICINE

## 2025-04-01 PROCEDURE — G0439 PPPS, SUBSEQ VISIT: HCPCS | Performed by: INTERNAL MEDICINE

## 2025-04-01 PROCEDURE — 99214 OFFICE O/P EST MOD 30 MIN: CPT | Performed by: INTERNAL MEDICINE

## 2025-04-01 NOTE — PATIENT INSTRUCTIONS
Medicare Preventive Visit Patient Instructions  Thank you for completing your Welcome to Medicare Visit or Medicare Annual Wellness Visit today. Your next wellness visit will be due in one year (4/2/2026).  The screening/preventive services that you may require over the next 5-10 years are detailed below. Some tests may not apply to you based off risk factors and/or age. Screening tests ordered at today's visit but not completed yet may show as past due. Also, please note that scanned in results may not display below.  Preventive Screenings:  Service Recommendations Previous Testing/Comments   Colorectal Cancer Screening  * Colonoscopy    * Fecal Occult Blood Test (FOBT)/Fecal Immunochemical Test (FIT)  * Fecal DNA/Cologuard Test  * Flexible Sigmoidoscopy Age: 45-75 years old   Colonoscopy: every 10 years (may be performed more frequently if at higher risk)  OR  FOBT/FIT: every 1 year  OR  Cologuard: every 3 years  OR  Sigmoidoscopy: every 5 years  Screening may be recommended earlier than age 45 if at higher risk for colorectal cancer. Also, an individualized decision between you and your healthcare provider will decide whether screening between the ages of 76-85 would be appropriate. Colonoscopy: 03/20/2023  FOBT/FIT: 01/18/2023  Cologuard: Not on file  Sigmoidoscopy: Not on file          Breast Cancer Screening Age: 40+ years old  Frequency: every 1-2 years  Not required if history of left and right mastectomy Mammogram: 11/09/2023        Cervical Cancer Screening Between the ages of 21-29, pap smear recommended once every 3 years.   Between the ages of 30-65, can perform pap smear with HPV co-testing every 5 years.   Recommendations may differ for women with a history of total hysterectomy, cervical cancer, or abnormal pap smears in past. Pap Smear: Not on file        Hepatitis C Screening Once for adults born between 1945 and 1965  More frequently in patients at high risk for Hepatitis C Hep C Antibody:  03/03/2017        Diabetes Screening 1-2 times per year if you're at risk for diabetes or have pre-diabetes Fasting glucose: 115 mg/dL (3/20/2025)  A1C: 6.2 % (6/9/2023)      Cholesterol Screening Once every 5 years if you don't have a lipid disorder. May order more often based on risk factors. Lipid panel: 06/05/2023          Other Preventive Screenings Covered by Medicare:  Abdominal Aortic Aneurysm (AAA) Screening: covered once if your at risk. You're considered to be at risk if you have a family history of AAA.  Lung Cancer Screening: covers low dose CT scan once per year if you meet all of the following conditions: (1) Age 55-77; (2) No signs or symptoms of lung cancer; (3) Current smoker or have quit smoking within the last 15 years; (4) You have a tobacco smoking history of at least 20 pack years (packs per day multiplied by number of years you smoked); (5) You get a written order from a healthcare provider.  Glaucoma Screening: covered annually if you're considered high risk: (1) You have diabetes OR (2) Family history of glaucoma OR (3)  aged 50 and older OR (4)  American aged 65 and older  Osteoporosis Screening: covered every 2 years if you meet one of the following conditions: (1) You're estrogen deficient and at risk for osteoporosis based off medical history and other findings; (2) Have a vertebral abnormality; (3) On glucocorticoid therapy for more than 3 months; (4) Have primary hyperparathyroidism; (5) On osteoporosis medications and need to assess response to drug therapy.   Last bone density test (DXA Scan): Not on file.  HIV Screening: covered annually if you're between the age of 15-65. Also covered annually if you are younger than 15 and older than 65 with risk factors for HIV infection. For pregnant patients, it is covered up to 3 times per pregnancy.    Immunizations:  Immunization Recommendations   Influenza Vaccine Annual influenza vaccination during flu season is  recommended for all persons aged >= 6 months who do not have contraindications   Pneumococcal Vaccine   * Pneumococcal conjugate vaccine = PCV13 (Prevnar 13), PCV15 (Vaxneuvance), PCV20 (Prevnar 20)  * Pneumococcal polysaccharide vaccine = PPSV23 (Pneumovax) Adults 19-65 yo with certain risk factors or if 65+ yo  If never received any pneumonia vaccine: recommend Prevnar 20 (PCV20)  Give PCV20 if previously received 1 dose of PCV13 or PPSV23   Hepatitis B Vaccine 3 dose series if at intermediate or high risk (ex: diabetes, end stage renal disease, liver disease)   Respiratory syncytial virus (RSV) Vaccine - COVERED BY MEDICARE PART D  * RSVPreF3 (Arexvy) CDC recommends that adults 60 years of age and older may receive a single dose of RSV vaccine using shared clinical decision-making (SCDM)   Tetanus (Td) Vaccine - COST NOT COVERED BY MEDICARE PART B Following completion of primary series, a booster dose should be given every 10 years to maintain immunity against tetanus. Td may also be given as tetanus wound prophylaxis.   Tdap Vaccine - COST NOT COVERED BY MEDICARE PART B Recommended at least once for all adults. For pregnant patients, recommended with each pregnancy.   Shingles Vaccine (Shingrix) - COST NOT COVERED BY MEDICARE PART B  2 shot series recommended in those 19 years and older who have or will have weakened immune systems or those 50 years and older     Health Maintenance Due:      Topic Date Due   • Colorectal Cancer Screening  03/19/2026   • Hepatitis C Screening  Completed     Immunizations Due:      Topic Date Due   • Pneumococcal Vaccine: 65+ Years (1 of 2 - PCV) Never done   • COVID-19 Vaccine (1 - 2024-25 season) Never done     Advance Directives   What are advance directives?  Advance directives are legal documents that state your wishes and plans for medical care. These plans are made ahead of time in case you lose your ability to make decisions for yourself. Advance directives can apply to  any medical decision, such as the treatments you want, and if you want to donate organs.   What are the types of advance directives?  There are many types of advance directives, and each state has rules about how to use them. You may choose a combination of any of the following:  Living will:  This is a written record of the treatment you want. You can also choose which treatments you do not want, which to limit, and which to stop at a certain time. This includes surgery, medicine, IV fluid, and tube feedings.   Durable power of  for healthcare (DPAHC):  This is a written record that states who you want to make healthcare choices for you when you are unable to make them for yourself. This person, called a proxy, is usually a family member or a friend. You may choose more than 1 proxy.  Do not resuscitate (DNR) order:  A DNR order is used in case your heart stops beating or you stop breathing. It is a request not to have certain forms of treatment, such as CPR. A DNR order may be included in other types of advance directives.  Medical directive:  This covers the care that you want if you are in a coma, near death, or unable to make decisions for yourself. You can list the treatments you want for each condition. Treatment may include pain medicine, surgery, blood transfusions, dialysis, IV or tube feedings, and a ventilator (breathing machine).  Values history:  This document has questions about your views, beliefs, and how you feel and think about life. This information can help others choose the care that you would choose.  Why are advance directives important?  An advance directive helps you control your care. Although spoken wishes may be used, it is better to have your wishes written down. Spoken wishes can be misunderstood, or not followed. Treatments may be given even if you do not want them. An advance directive may make it easier for your family to make difficult choices about your care.   Urinary  Incontinence   Urinary incontinence (UI)  is when you lose control of your bladder. UI develops because your bladder cannot store or empty urine properly. The 3 most common types of UI are stress incontinence, urge incontinence, or both.  Medicines:   May be given to help strengthen your bladder control. Report any side effects of medication to your healthcare provider.  Do pelvic muscle exercises often:  Your pelvic muscles help you stop urinating. Squeeze these muscles tight for 5 seconds, then relax for 5 seconds. Gradually work up to squeezing for 10 seconds. Do 3 sets of 15 repetitions a day, or as directed. This will help strengthen your pelvic muscles and improve bladder control.  Train your bladder:  Go to the bathroom at set times, such as every 2 hours, even if you do not feel the urge to go. You can also try to hold your urine when you feel the urge to go. For example, hold your urine for 5 minutes when you feel the urge to go. As that becomes easier, hold your urine for 10 minutes.   Self-care:   Keep a UI record.  Write down how often you leak urine and how much you leak. Make a note of what you were doing when you leaked urine.  Drink liquids as directed. You may need to limit the amount of liquid you drink to help control your urine leakage. Do not drink any liquid right before you go to bed. Limit or do not have drinks that contain caffeine or alcohol.   Prevent constipation.  Eat a variety of high-fiber foods. Good examples are high-fiber cereals, beans, vegetables, and whole-grain breads. Walking is the best way to trigger your intestines to have a bowel movement.  Exercise regularly and maintain a healthy weight.  Weight loss and exercise will decrease pressure on your bladder and help you control your leakage.   Use a catheter as directed  to help empty your bladder. A catheter is a tiny, plastic tube that is put into your bladder to drain your urine.   Go to behavior therapy as directed.   Behavior therapy may be used to help you learn to control your urge to urinate.    Weight Management   Why it is important to manage your weight:  Being overweight increases your risk of health conditions such as heart disease, high blood pressure, type 2 diabetes, and certain types of cancer. It can also increase your risk for osteoarthritis, sleep apnea, and other respiratory problems. Aim for a slow, steady weight loss. Even a small amount of weight loss can lower your risk of health problems.  How to lose weight safely:  A safe and healthy way to lose weight is to eat fewer calories and get regular exercise. You can lose up about 1 pound a week by decreasing the number of calories you eat by 500 calories each day.   Healthy meal plan for weight management:  A healthy meal plan includes a variety of foods, contains fewer calories, and helps you stay healthy. A healthy meal plan includes the following:  Eat whole-grain foods more often.  A healthy meal plan should contain fiber. Fiber is the part of grains, fruits, and vegetables that is not broken down by your body. Whole-grain foods are healthy and provide extra fiber in your diet. Some examples of whole-grain foods are whole-wheat breads and pastas, oatmeal, brown rice, and bulgur.  Eat a variety of vegetables every day.  Include dark, leafy greens such as spinach, kale, tati greens, and mustard greens. Eat yellow and orange vegetables such as carrots, sweet potatoes, and winter squash.   Eat a variety of fruits every day.  Choose fresh or canned fruit (canned in its own juice or light syrup) instead of juice. Fruit juice has very little or no fiber.  Eat low-fat dairy foods.  Drink fat-free (skim) milk or 1% milk. Eat fat-free yogurt and low-fat cottage cheese. Try low-fat cheeses such as mozzarella and other reduced-fat cheeses.  Choose meat and other protein foods that are low in fat.  Choose beans or other legumes such as split peas or lentils. Choose  fish, skinless poultry (chicken or turkey), or lean cuts of red meat (beef or pork). Before you cook meat or poultry, cut off any visible fat.   Use less fat and oil.  Try baking foods instead of frying them. Add less fat, such as margarine, sour cream, regular salad dressing and mayonnaise to foods. Eat fewer high-fat foods. Some examples of high-fat foods include french fries, doughnuts, ice cream, and cakes.  Eat fewer sweets.  Limit foods and drinks that are high in sugar. This includes candy, cookies, regular soda, and sweetened drinks.  Exercise:  Exercise at least 30 minutes per day on most days of the week. Some examples of exercise include walking, biking, dancing, and swimming. You can also fit in more physical activity by taking the stairs instead of the elevator or parking farther away from stores. Ask your healthcare provider about the best exercise plan for you.      © Copyright [employer] 2018 Information is for End User's use only and may not be sold, redistributed or otherwise used for commercial purposes. All illustrations and images included in CareNotes® are the copyrighted property of [employer] or [employer]

## 2025-04-01 NOTE — ASSESSMENT & PLAN NOTE
Lab Results   Component Value Date    HGBA1C 6.2 (H) 06/09/2023      Orders:  •  Hemoglobin A1C; Future  Previous A1c reviewed borderline    Patient advised diabetic diet    Recheck A1c before next visit

## 2025-04-01 NOTE — ASSESSMENT & PLAN NOTE
Previous LDL reviewed LFT reviewed normal no side effect    Continue management medication as follow    Lipitor 10 mg daily low-fat low-cholesterol diet check lipid profile LFT before next visit  Orders:  •  Lipid Panel with Direct LDL reflex; Future

## 2025-04-01 NOTE — ASSESSMENT & PLAN NOTE
The previous CMP reviewed shows elevated AST asymptomatic    Will check CMP before next visit  Orders:  •  Comprehensive metabolic panel; Future

## 2025-04-01 NOTE — ASSESSMENT & PLAN NOTE
Lab Results   Component Value Date    EGFR 37 03/20/2025    EGFR 39 10/09/2024    EGFR 37 03/19/2024    CREATININE 1.34 (H) 03/20/2025    CREATININE 1.28 10/09/2024    CREATININE 1.35 (H) 03/19/2024     Lab Results   Component Value Date    .8 (H) 03/20/2025    CALCIUM 9.7 03/20/2025    PHOS 3.3 10/09/2024   Above reviewed  Orders:  •  Urinalysis with microscopic; Future

## 2025-04-01 NOTE — ASSESSMENT & PLAN NOTE
Lab Results   Component Value Date    EGFR 37 03/20/2025    EGFR 39 10/09/2024    EGFR 37 03/19/2024    CREATININE 1.34 (H) 03/20/2025    CREATININE 1.28 10/09/2024    CREATININE 1.35 (H) 03/19/2024   Above reviewed GFR 37 creatinine 1.35    PTH level reviewed    Avoid nephrotoxic    Blood pressure control    Check BMP urine analysis before next visit  Patient followed by nephrology follow-up with nephrology

## 2025-04-01 NOTE — ASSESSMENT & PLAN NOTE
Ms. Smith has no clinical or radiographic appearance of recurrent lung cancer.  As per NCCN guidelines we will continue to monitor her with a noncontrasted CT scan of the chest on a yearly basis.  Will see her back in 12 months with a repeat scan.  She knows that she can call the office at anytime with questions or concerns.    Surveillance once a year with a CT lung    Asymptomatic no signs symptom of recurrence

## 2025-04-01 NOTE — PROGRESS NOTES
Name: Cande Smith      : 1945      MRN: [id number]  Encounter Provider: Mirella Peters MD  Encounter Date: 2025   Encounter department: [facility]    Assessment & Plan  Screening for deficiency anemia    Orders:  •  CBC and differential; Future    Benign hypertension with CKD (chronic kidney disease) stage III (HCC)  Lab Results   Component Value Date    EGFR 37 2025    EGFR 39 10/09/2024    EGFR 37 2024    CREATININE 1.34 (H) 2025    CREATININE 1.28 10/09/2024    CREATININE 1.35 (H) 2024     Lab Results   Component Value Date    .8 (H) 2025    CALCIUM 9.7 2025    PHOS 3.3 10/09/2024   Above reviewed  Orders:  •  Urinalysis with microscopic; Future    Mixed hyperlipidemia  Previous LDL reviewed LFT reviewed normal no side effect    Continue management medication as follow    Lipitor 10 mg daily low-fat low-cholesterol diet check lipid profile LFT before next visit  Orders:  •  Lipid Panel with Direct LDL reflex; Future    Elevated AST (SGOT)  The previous CMP reviewed shows elevated AST asymptomatic    Will check CMP before next visit  Orders:  •  Comprehensive metabolic panel; Future    Prediabetes  Lab Results   Component Value Date    HGBA1C 6.2 (H) 2023      Orders:  •  Hemoglobin A1C; Future  Previous A1c reviewed borderline    Patient advised diabetic diet    Recheck A1c before next visit  Adenocarcinoma of right lung (HCC)  Ms. Smith has no clinical or radiographic appearance of recurrent lung cancer.  As per NCCN guidelines we will continue to monitor her with a noncontrasted CT scan of the chest on a yearly basis.  Will see her back in 12 months with a repeat scan.  She knows that she can call the office at anytime with questions or concerns.    Surveillance once a year with a CT lung    Asymptomatic no signs symptom of recurrence       Stage 3b chronic kidney disease (HCC)  Lab Results   Component Value Date     EGFR 37 03/20/2025    EGFR 39 10/09/2024    EGFR 37 03/19/2024    CREATININE 1.34 (H) 03/20/2025    CREATININE 1.28 10/09/2024    CREATININE 1.35 (H) 03/19/2024   Above reviewed GFR 37 creatinine 1.35    PTH level reviewed    Avoid nephrotoxic    Blood pressure control    Check BMP urine analysis before next visit  Patient followed by nephrology follow-up with nephrology       Encounter for subsequent annual wellness visit (AWV) in Medicare patient           Depression Screening and Follow-up Plan: Patient's depression screening was positive with a PHQ-9 score of 6.   Patient advised to follow-up with PCP for further management.     Preventive health issues were discussed with patient, and age appropriate screening tests were ordered as noted in patient's After Visit Summary. Personalized health advice and appropriate referrals for health education or preventive services given if needed, as noted in patient's After Visit Summary.    History of Present Illness     Came in follow-up chronic medical condition list visit diagnosis denies any chest pain difficulty breathing no new symptoms seen by nephrology all the labs discussed at length advised and requested new labs to be done before next visit    Complete annual wellness exam done for counseling screening follow-up recommendations see attached encounter     Patient Care Team:  Mirella Peters MD as PCP - General (Internal Medicine)  MD Pee Dias MD Jose Albert Avila, MD (Nephrology)    Review of Systems   Constitutional:  Negative for activity change, appetite change, chills, diaphoresis, fatigue, fever and unexpected weight change.   HENT:  Negative for congestion, dental problem, drooling, ear discharge, ear pain, facial swelling, hearing loss, mouth sores, nosebleeds, postnasal drip, rhinorrhea, sinus pressure, sneezing, sore throat, tinnitus, trouble swallowing and voice change.    Eyes:  Negative for photophobia,  pain, discharge, redness, itching and visual disturbance.   Respiratory:  Negative for apnea, cough, choking, chest tightness, shortness of breath, wheezing and stridor.    Cardiovascular:  Negative for chest pain, palpitations and leg swelling.   Gastrointestinal:  Negative for abdominal distention, abdominal pain, anal bleeding, blood in stool, constipation, diarrhea, nausea, rectal pain and vomiting.   Endocrine: Negative for cold intolerance, heat intolerance, polydipsia, polyphagia and polyuria.   Genitourinary:  Negative for decreased urine volume, difficulty urinating, dysuria, enuresis, flank pain, frequency, genital sores, hematuria and urgency.   Musculoskeletal:  Positive for arthralgias. Negative for back pain, gait problem, joint swelling, myalgias, neck pain and neck stiffness.   Skin:  Negative for color change, pallor, rash and wound.   Allergic/Immunologic: Negative.  Negative for environmental allergies, food allergies and immunocompromised state.   Neurological:  Negative for dizziness, tremors, seizures, syncope, facial asymmetry, speech difficulty, weakness, light-headedness, numbness and headaches.   Psychiatric/Behavioral:  Negative for agitation, behavioral problems, confusion, decreased concentration, dysphoric mood, hallucinations, self-injury, sleep disturbance and suicidal ideas. The patient is not nervous/anxious and is not hyperactive.      Medical History Reviewed by provider this encounter:  Tobacco  Allergies  Meds  Problems  Med Hx  Surg Hx  Fam Hx       Annual Wellness Visit Questionnaire   Cande is here for her Subsequent Wellness visit. Last Medicare Wellness visit information reviewed, patient interviewed and updates made to the record today.      Health Risk Assessment:   Patient rates overall health as fair. Patient feels that their physical health rating is same. Patient is satisfied with their life. Eyesight was rated as same. Hearing was rated as same. Patient feels  that their emotional and mental health rating is same. Patients states they are never, rarely angry. Patient states they are sometimes unusually tired/fatigued. Pain experienced in the last 7 days has been none. Patient states that she has experienced no weight loss or gain in last 6 months. Weight is stable.    Depression Screening:   PHQ-9 Score: 6      Fall Risk Screening:   In the past year, patient has experienced: no history of falling in past year      Urinary Incontinence Screening:   Patient has leaked urine accidently in the last six months. Stress incontinence     Home Safety:  Patient does not have trouble with stairs inside or outside of their home. Patient has working smoke alarms and has working carbon monoxide detector. Home safety hazards include: none. Goes slow on stairs. Uses banisters. No home hazards. Pt feels safe in home. Lives alone. Daughter looks in on her.    Nutrition:   Current diet is Regular. Follows a balanced diet. Veggies and proteins. Watches her lactose.    Medications:   Patient is currently taking over-the-counter supplements. OTC medications include: see medication list. Patient is able to manage medications. No issues with meds.    Activities of Daily Living (ADLs)/Instrumental Activities of Daily Living (IADLs):   Walk and transfer into and out of bed and chair?: Yes  Dress and groom yourself?: Yes    Bathe or shower yourself?: Yes    Feed yourself? Yes  Do your laundry/housekeeping?: Yes  Manage your money, pay your bills and track your expenses?: Yes  Make your own meals?: Yes    Do your own shopping?: Yes    ADL comments: Pt is independent. She still drives.    Previous Hospitalizations:   Any hospitalizations or ED visits within the last 12 months?: No      Hospitalization Comments: Chronic Conditions have been stable.    Advance Care Planning:   Living will: Yes    Durable POA for healthcare: Yes    Advanced directive: Yes    Advanced directive counseling given: Yes     Five wishes given: No    Patient declined ACP directive: No    End of Life Decisions reviewed with patient: Yes    Provider agrees with end of life decisions: Yes      Comments: Encouraged to discuss with family. ACP uploaded.    Cognitive Screening:   Provider or family/friend/caregiver concerned regarding cognition?: No    PREVENTIVE SCREENINGS      Cardiovascular Screening:    General: Screening Not Indicated and History Lipid Disorder      Diabetes Screening:     General: Screening Current      Colorectal Cancer Screening:     General: Screening Current      Breast Cancer Screening:     General: Screening Current      Cervical Cancer Screening:    General: Screening Not Indicated      Osteoporosis Screening:    General: Risks and Benefits Discussed and Patient Declines      Abdominal Aortic Aneurysm (AAA) Screening:        General: Risks and Benefits Discussed and Screening Not Indicated      Lung Cancer Screening:     General: Screening Not Indicated and History Lung Cancer      Hepatitis C Screening:    General: Screening Current      Preventive Screening Comments: Is getting a flu shot today. Goes to eye exam refgularly.    Screening, Brief Intervention, and Referral to Treatment (SBIRT)     Screening  Typical number of drinks in a day: 0  Typical number of drinks in a week: 0  Interpretation: Low risk drinking behavior.    Single Item Drug Screening:  How often have you used an illegal drug (including marijuana) or a prescription medication for non-medical reasons in the past year? never    Single Item Drug Screen Score: 0  Interpretation: Negative screen for possible drug use disorder    Brief Intervention  Alcohol & drug use screenings were reviewed. No concerns regarding substance use disorder identified.     Other Counseling Topics:   Skin self-exam and regular weightbearing exercise.     Social Drivers of Health     Financial Resource Strain: Low Risk  (12/14/2023)    Overall Financial Resource Strain  "(CARDIA)    • Difficulty of Paying Living Expenses: Not hard at all   Food Insecurity: No Food Insecurity (4/1/2025)    Hunger Vital Sign    • Worried About Running Out of Food in the Last Year: Never true    • Ran Out of Food in the Last Year: Never true   Transportation Needs: No Transportation Needs (4/1/2025)    PRAPARE - Transportation    • Lack of Transportation (Medical): No    • Lack of Transportation (Non-Medical): No   Housing Stability: Low Risk  (4/1/2025)    Housing Stability Vital Sign    • Unable to Pay for Housing in the Last Year: No    • Number of Times Moved in the Last Year: 0    • Homeless in the Last Year: No   Utilities: Not At Risk (4/1/2025)    [facility] Utilities    • Threatened with loss of utilities: No     No results found.    Objective   /76   Pulse 61   Temp 98.7 °F (37.1 °C)   Resp 15   Ht 5' 6\" (1.676 m)   Wt 78 kg (172 lb)   SpO2 99%   BMI 27.76 kg/m²     Physical Exam  Vitals and nursing note reviewed.   Constitutional:       General: She is not in acute distress.     Appearance: She is well-developed. She is not ill-appearing, toxic-appearing or diaphoretic.   HENT:      Head: Normocephalic and atraumatic.      Right Ear: External ear normal.      Left Ear: External ear normal.      Nose: Nose normal.      Mouth/Throat:      Pharynx: No oropharyngeal exudate.   Eyes:      General: Lids are normal. Lids are everted, no foreign bodies appreciated. No scleral icterus.        Right eye: No discharge.         Left eye: No discharge.      Conjunctiva/sclera: Conjunctivae normal.      Pupils: Pupils are equal, round, and reactive to light.   Neck:      Thyroid: No thyromegaly.      Vascular: Normal carotid pulses. No carotid bruit, hepatojugular reflux or JVD.      Trachea: No tracheal tenderness or tracheal deviation.   Cardiovascular:      Rate and Rhythm: Normal rate and regular rhythm.      Pulses: Normal pulses.      Heart sounds: Normal heart sounds. No murmur heard.     No " friction rub. No gallop.   Pulmonary:      Effort: Pulmonary effort is normal. No respiratory distress.      Breath sounds: Normal breath sounds. No stridor. No wheezing or rales.   Chest:      Chest wall: No tenderness.   Abdominal:      General: Bowel sounds are normal. There is no distension.      Palpations: Abdomen is soft. There is no mass.      Tenderness: There is no abdominal tenderness. There is no guarding or rebound.   Musculoskeletal:         General: No tenderness or deformity. Normal range of motion.      Cervical back: Normal range of motion and neck supple. No edema, erythema or rigidity. No spinous process tenderness or muscular tenderness. Normal range of motion.   Lymphadenopathy:      Head:      Right side of head: No submental, submandibular, tonsillar, preauricular or posterior auricular adenopathy.      Left side of head: No submental, submandibular, tonsillar, preauricular, posterior auricular or occipital adenopathy.      Cervical: No cervical adenopathy.      Right cervical: No superficial, deep or posterior cervical adenopathy.     Left cervical: No superficial, deep or posterior cervical adenopathy.      Upper Body:      Right upper body: No pectoral adenopathy.      Left upper body: No pectoral adenopathy.   Skin:     General: Skin is warm and dry.      Coloration: Skin is not pale.      Findings: No erythema or rash.   Neurological:      Mental Status: She is alert and oriented to person, place, and time.      Cranial Nerves: No cranial nerve deficit.      Sensory: No sensory deficit.      Motor: No tremor, abnormal muscle tone or seizure activity.      Coordination: Coordination normal.      Gait: Gait normal.      Deep Tendon Reflexes: Reflexes are normal and symmetric. Reflexes normal.   Psychiatric:         Behavior: Behavior normal.         Thought Content: Thought content normal.         Judgment: Judgment normal.

## 2025-05-01 ENCOUNTER — OFFICE VISIT (OUTPATIENT)
Age: 80
End: 2025-05-01
Payer: MEDICARE

## 2025-05-01 VITALS
HEIGHT: 66 IN | WEIGHT: 174 LBS | DIASTOLIC BLOOD PRESSURE: 84 MMHG | HEART RATE: 84 BPM | SYSTOLIC BLOOD PRESSURE: 144 MMHG | BODY MASS INDEX: 27.97 KG/M2

## 2025-05-01 DIAGNOSIS — R19.7 DIARRHEA, UNSPECIFIED TYPE: Primary | ICD-10-CM

## 2025-05-01 DIAGNOSIS — D12.6 ADENOMATOUS POLYP OF COLON, UNSPECIFIED PART OF COLON: ICD-10-CM

## 2025-05-01 PROBLEM — Z00.00 ENCOUNTER FOR SUBSEQUENT ANNUAL WELLNESS VISIT (AWV) IN MEDICARE PATIENT: Status: RESOLVED | Noted: 2021-03-23 | Resolved: 2025-05-01

## 2025-05-01 PROCEDURE — 99213 OFFICE O/P EST LOW 20 MIN: CPT | Performed by: INTERNAL MEDICINE

## 2025-05-01 NOTE — ASSESSMENT & PLAN NOTE
History of adenomatous polyps in 2010 but only hyperplastic polyps on most recent colonoscopy 2023.  3-year surveillance colonoscopy would seem a bit premature.  We discussed options and will reevaluate after 5 years, in 2028, to determine if continued surveillance makes sense for her, especially given her history of lung cancer

## 2025-05-01 NOTE — PROGRESS NOTES
Name: Cande Smith      : 1945      MRN: [id number]  Encounter Provider: Liam Wadsworth MD  Encounter Date: 2025   Encounter department: [facility]  :  Assessment & Plan  Diarrhea, unspecified type  This has completely resolved       Adenomatous polyp of colon, unspecified part of colon  History of adenomatous polyps in  but only hyperplastic polyps on most recent colonoscopy .  3-year surveillance colonoscopy would seem a bit premature.  We discussed options and will reevaluate after 5 years, in , to determine if continued surveillance makes sense for her, especially given her history of lung cancer           History of Present Illness   Cande Smith is a 79 y.o. female who presents follow-up with diarrhea.  Reports her symptoms resolved shortly after undergoing colonoscopy in  and have not returned.  No abdominal pain, nausea or vomiting, fever or chills, jaundice or rash, blood in her stool or unexplained weight loss.  Was noted to have 9 hyperplastic polyps on colonoscopy.  Recommendation at that time was to repeat in 3 years.  HPI    Review of Systems A complete review of systems is negative other than that noted above in the HPI.      Current Outpatient Medications   Medication Sig Dispense Refill    acetaminophen (TYLENOL) 500 mg tablet Take 500 mg by mouth every 6 (six) hours as needed for mild pain      albuterol (PROVENTIL HFA,VENTOLIN HFA) 90 mcg/act inhaler Inhale 2 puffs every 6 (six) hours as needed for wheezing or shortness of breath 18 g 1    amLODIPine (NORVASC) 5 mg tablet TAKE 1 TABLET DAILY 90 tablet 1    atorvastatin (LIPITOR) 10 mg tablet TAKE 1 TABLET DAILY AT BEDTIME 90 tablet 3    Cholecalciferol (Vitamin D3) 50 MCG ( UT) TABS Take 2,000 Units by mouth daily      gabapentin (NEURONTIN) 300 mg capsule Take 1 capsule (300 mg total) by mouth daily 90 capsule 2    losartan (COZAAR) 50 mg tablet Take 1 tablet (50 mg total)  "by mouth daily 90 tablet 1    metoprolol succinate (TOPROL-XL) 100 mg 24 hr tablet Take 1 tablet (100 mg total) by mouth daily 90 tablet 2    Omega 3 1200 MG CAPS Take by mouth Omega XL daily      Polyethylene Glycol 400 0.25 % SOLN Apply 1 drop to eye 2 (two) times a day As needed      potassium chloride (Klor-Con M20) 20 mEq tablet TAKE 1 TABLET DAILY 30 tablet 5    torsemide (DEMADEX) 20 mg tablet TAKE 1 TABLET DAILY 90 tablet 1     No current facility-administered medications for this visit.     Objective   /84 (BP Location: Right arm, Patient Position: Sitting, Cuff Size: Standard)   Pulse 84   Ht 5' 6\" (1.676 m)   Wt 78.9 kg (174 lb)   BMI 28.08 kg/m²     Physical Exam       Lab Results: I personally reviewed relevant lab results.       Results for orders placed during the hospital encounter of 03/20/23    Colonoscopy    Impression  9 colon polyps removed, some acute angulation in the left side of the colon, diverticulosis and mixed hemorrhoids    RECOMMENDATION:  Repeat colonoscopy in 3 years  Personal history of colon polyps    High-fiber diet recommended            Woo Nelson MD        "

## 2025-05-05 DIAGNOSIS — N18.30 BENIGN HYPERTENSION WITH CKD (CHRONIC KIDNEY DISEASE) STAGE III (HCC): ICD-10-CM

## 2025-05-05 DIAGNOSIS — I12.9 BENIGN HYPERTENSION WITH CKD (CHRONIC KIDNEY DISEASE) STAGE III (HCC): ICD-10-CM

## 2025-05-06 RX ORDER — LOSARTAN POTASSIUM 50 MG/1
50 TABLET ORAL DAILY
Qty: 90 TABLET | Refills: 1 | Status: SHIPPED | OUTPATIENT
Start: 2025-05-06

## 2025-05-22 DIAGNOSIS — G62.9 NEUROPATHY: ICD-10-CM

## 2025-05-23 RX ORDER — GABAPENTIN 300 MG/1
300 CAPSULE ORAL DAILY
Qty: 90 CAPSULE | Refills: 1 | Status: SHIPPED | OUTPATIENT
Start: 2025-05-23

## 2025-07-15 ENCOUNTER — TELEPHONE (OUTPATIENT)
Age: 80
End: 2025-07-15

## 2025-07-22 ENCOUNTER — HOSPITAL ENCOUNTER (OUTPATIENT)
Dept: RADIOLOGY | Facility: HOSPITAL | Age: 80
Discharge: HOME | End: 2025-07-22
Attending: THORACIC SURGERY (CARDIOTHORACIC VASCULAR SURGERY)
Payer: MEDICARE

## 2025-07-22 DIAGNOSIS — C34.91 ADENOCARCINOMA OF RIGHT LUNG (HCC): ICD-10-CM

## 2025-07-22 PROCEDURE — 71250 CT THORAX DX C-: CPT

## 2025-07-24 ENCOUNTER — TRANSCRIBE ORDERS (OUTPATIENT)
Dept: LAB | Facility: CLINIC | Age: 80
End: 2025-07-24

## 2025-07-24 ENCOUNTER — APPOINTMENT (OUTPATIENT)
Dept: LAB | Facility: CLINIC | Age: 80
End: 2025-07-24
Payer: MEDICARE

## 2025-07-24 DIAGNOSIS — N18.30 BENIGN HYPERTENSION WITH CKD (CHRONIC KIDNEY DISEASE) STAGE III (HCC): ICD-10-CM

## 2025-07-24 DIAGNOSIS — R73.03 PREDIABETES: ICD-10-CM

## 2025-07-24 DIAGNOSIS — I12.9 BENIGN HYPERTENSION WITH CKD (CHRONIC KIDNEY DISEASE) STAGE III (HCC): ICD-10-CM

## 2025-07-24 DIAGNOSIS — R74.01 ELEVATED AST (SGOT): ICD-10-CM

## 2025-07-24 DIAGNOSIS — N18.32 STAGE 3B CHRONIC KIDNEY DISEASE (HCC): Primary | ICD-10-CM

## 2025-07-24 DIAGNOSIS — Z13.0 SCREENING FOR DEFICIENCY ANEMIA: ICD-10-CM

## 2025-07-24 DIAGNOSIS — E78.2 MIXED HYPERLIPIDEMIA: ICD-10-CM

## 2025-07-24 LAB
BACTERIA UR QL AUTO: ABNORMAL /HPF
BASOPHILS # BLD AUTO: 0.07 THOUSANDS/ÂΜL (ref 0–0.1)
BASOPHILS NFR BLD AUTO: 1 % (ref 0–1)
BILIRUB UR QL STRIP: NEGATIVE
CLARITY UR: CLEAR
COLOR UR: ABNORMAL
CREAT UR-MCNC: 80 MG/DL
EOSINOPHIL # BLD AUTO: 0.25 THOUSAND/ÂΜL (ref 0–0.61)
EOSINOPHIL NFR BLD AUTO: 3 % (ref 0–6)
ERYTHROCYTE [DISTWIDTH] IN BLOOD BY AUTOMATED COUNT: 13.4 % (ref 11.6–15.1)
EST. AVERAGE GLUCOSE BLD GHB EST-MCNC: 128 MG/DL
GLUCOSE UR STRIP-MCNC: NEGATIVE MG/DL
HBA1C MFR BLD: 6.1 %
HCT VFR BLD AUTO: 43.5 % (ref 34.8–46.1)
HGB BLD-MCNC: 13.9 G/DL (ref 11.5–15.4)
HGB UR QL STRIP.AUTO: NEGATIVE
IMM GRANULOCYTES # BLD AUTO: 0.03 THOUSAND/UL (ref 0–0.2)
IMM GRANULOCYTES NFR BLD AUTO: 0 % (ref 0–2)
KETONES UR STRIP-MCNC: NEGATIVE MG/DL
LEUKOCYTE ESTERASE UR QL STRIP: NEGATIVE
LYMPHOCYTES # BLD AUTO: 2.77 THOUSANDS/ÂΜL (ref 0.6–4.47)
LYMPHOCYTES NFR BLD AUTO: 34 % (ref 14–44)
MCH RBC QN AUTO: 29.7 PG (ref 26.8–34.3)
MCHC RBC AUTO-ENTMCNC: 32 G/DL (ref 31.4–37.4)
MCV RBC AUTO: 93 FL (ref 82–98)
MICROALBUMIN UR-MCNC: 131.3 MG/L
MICROALBUMIN/CREAT 24H UR: 164 MG/G CREATININE (ref 0–30)
MONOCYTES # BLD AUTO: 0.46 THOUSAND/ÂΜL (ref 0.17–1.22)
MONOCYTES NFR BLD AUTO: 6 % (ref 4–12)
NEUTROPHILS # BLD AUTO: 4.69 THOUSANDS/ÂΜL (ref 1.85–7.62)
NEUTS SEG NFR BLD AUTO: 56 % (ref 43–75)
NITRITE UR QL STRIP: NEGATIVE
NON-SQ EPI CELLS URNS QL MICRO: ABNORMAL /HPF
NRBC BLD AUTO-RTO: 0 /100 WBCS
PH UR STRIP.AUTO: 6 [PH]
PLATELET # BLD AUTO: 243 THOUSANDS/UL (ref 149–390)
PMV BLD AUTO: 9.4 FL (ref 8.9–12.7)
PROT UR STRIP-MCNC: ABNORMAL MG/DL
RBC # BLD AUTO: 4.68 MILLION/UL (ref 3.81–5.12)
RBC #/AREA URNS AUTO: ABNORMAL /HPF
SP GR UR STRIP.AUTO: 1.01 (ref 1–1.03)
UROBILINOGEN UR STRIP-ACNC: <2 MG/DL
WBC # BLD AUTO: 8.27 THOUSAND/UL (ref 4.31–10.16)
WBC #/AREA URNS AUTO: ABNORMAL /HPF

## 2025-07-24 PROCEDURE — 36415 COLL VENOUS BLD VENIPUNCTURE: CPT

## 2025-07-24 PROCEDURE — 80061 LIPID PANEL: CPT

## 2025-07-24 PROCEDURE — 83036 HEMOGLOBIN GLYCOSYLATED A1C: CPT

## 2025-07-24 PROCEDURE — 85025 COMPLETE CBC W/AUTO DIFF WBC: CPT

## 2025-07-24 PROCEDURE — 80053 COMPREHEN METABOLIC PANEL: CPT

## 2025-07-24 PROCEDURE — 81001 URINALYSIS AUTO W/SCOPE: CPT

## 2025-07-25 LAB
ALBUMIN SERPL BCG-MCNC: 4.2 G/DL (ref 3.5–5)
ALP SERPL-CCNC: 70 U/L (ref 34–104)
ALT SERPL W P-5'-P-CCNC: 20 U/L (ref 7–52)
ANION GAP SERPL CALCULATED.3IONS-SCNC: 9 MMOL/L (ref 4–13)
AST SERPL W P-5'-P-CCNC: 24 U/L (ref 13–39)
BILIRUB SERPL-MCNC: 0.66 MG/DL (ref 0.2–1)
BUN SERPL-MCNC: 28 MG/DL (ref 5–25)
CALCIUM SERPL-MCNC: 9.4 MG/DL (ref 8.4–10.2)
CHLORIDE SERPL-SCNC: 104 MMOL/L (ref 96–108)
CHOLEST SERPL-MCNC: 154 MG/DL (ref ?–200)
CO2 SERPL-SCNC: 27 MMOL/L (ref 21–32)
CREAT SERPL-MCNC: 1.25 MG/DL (ref 0.6–1.3)
GFR SERPL CREATININE-BSD FRML MDRD: 40 ML/MIN/1.73SQ M
GLUCOSE P FAST SERPL-MCNC: 104 MG/DL (ref 65–99)
HDLC SERPL-MCNC: 59 MG/DL
LDLC SERPL CALC-MCNC: 76 MG/DL (ref 0–100)
MAGNESIUM SERPL-MCNC: 2.4 MG/DL (ref 1.9–2.7)
PHOSPHATE SERPL-MCNC: 3.3 MG/DL (ref 2.3–4.1)
POTASSIUM SERPL-SCNC: 4.7 MMOL/L (ref 3.5–5.3)
PROT SERPL-MCNC: 7.2 G/DL (ref 6.4–8.4)
SODIUM SERPL-SCNC: 140 MMOL/L (ref 135–147)
TRIGL SERPL-MCNC: 96 MG/DL (ref ?–150)

## 2025-07-30 ENCOUNTER — TELEMEDICINE (OUTPATIENT)
Dept: CARDIAC SURGERY | Facility: CLINIC | Age: 80
End: 2025-07-30

## 2025-07-30 DIAGNOSIS — C34.91 ADENOCARCINOMA OF RIGHT LUNG (HCC): Primary | ICD-10-CM

## 2025-08-01 ENCOUNTER — OFFICE VISIT (OUTPATIENT)
Dept: NEPHROLOGY | Facility: CLINIC | Age: 80
End: 2025-08-01
Payer: MEDICARE

## 2025-08-01 VITALS
SYSTOLIC BLOOD PRESSURE: 178 MMHG | HEIGHT: 66 IN | WEIGHT: 173 LBS | DIASTOLIC BLOOD PRESSURE: 76 MMHG | BODY MASS INDEX: 27.8 KG/M2 | OXYGEN SATURATION: 99 % | HEART RATE: 76 BPM

## 2025-08-01 DIAGNOSIS — N18.32 STAGE 3B CHRONIC KIDNEY DISEASE (HCC): ICD-10-CM

## 2025-08-01 DIAGNOSIS — I12.9 BENIGN HYPERTENSION WITH CKD (CHRONIC KIDNEY DISEASE) STAGE III (HCC): Primary | ICD-10-CM

## 2025-08-01 DIAGNOSIS — N18.9 CHRONIC KIDNEY DISEASE-MINERAL AND BONE DISORDER: ICD-10-CM

## 2025-08-01 DIAGNOSIS — I1A.0 RESISTANT HYPERTENSION: ICD-10-CM

## 2025-08-01 DIAGNOSIS — M89.9 CHRONIC KIDNEY DISEASE-MINERAL AND BONE DISORDER: ICD-10-CM

## 2025-08-01 DIAGNOSIS — N18.30 BENIGN HYPERTENSION WITH CKD (CHRONIC KIDNEY DISEASE) STAGE III (HCC): Primary | ICD-10-CM

## 2025-08-01 DIAGNOSIS — E83.9 CHRONIC KIDNEY DISEASE-MINERAL AND BONE DISORDER: ICD-10-CM

## 2025-08-01 DIAGNOSIS — N28.1 KIDNEY CYST, ACQUIRED: ICD-10-CM

## 2025-08-01 PROCEDURE — 99214 OFFICE O/P EST MOD 30 MIN: CPT | Performed by: INTERNAL MEDICINE

## 2025-08-01 RX ORDER — DOXAZOSIN 4 MG/1
4 TABLET ORAL
Qty: 90 TABLET | Refills: 1 | Status: SHIPPED | OUTPATIENT
Start: 2025-08-01

## 2025-08-04 DIAGNOSIS — N18.30 BENIGN HYPERTENSION WITH CKD (CHRONIC KIDNEY DISEASE) STAGE III (HCC): ICD-10-CM

## 2025-08-04 DIAGNOSIS — I12.9 BENIGN HYPERTENSION WITH CKD (CHRONIC KIDNEY DISEASE) STAGE III (HCC): ICD-10-CM

## 2025-08-05 RX ORDER — METOPROLOL SUCCINATE 100 MG/1
100 TABLET, EXTENDED RELEASE ORAL DAILY
Qty: 90 TABLET | Refills: 3 | Status: SHIPPED | OUTPATIENT
Start: 2025-08-05

## 2025-08-19 ENCOUNTER — OFFICE VISIT (OUTPATIENT)
Age: 80
End: 2025-08-19
Payer: MEDICARE

## 2025-08-19 VITALS
WEIGHT: 173 LBS | DIASTOLIC BLOOD PRESSURE: 78 MMHG | HEART RATE: 65 BPM | OXYGEN SATURATION: 100 % | BODY MASS INDEX: 27.8 KG/M2 | HEIGHT: 66 IN | SYSTOLIC BLOOD PRESSURE: 172 MMHG

## 2025-08-19 DIAGNOSIS — R73.03 PREDIABETES: ICD-10-CM

## 2025-08-19 DIAGNOSIS — E78.2 MIXED HYPERLIPIDEMIA: ICD-10-CM

## 2025-08-19 DIAGNOSIS — I12.9 BENIGN HYPERTENSION WITH CKD (CHRONIC KIDNEY DISEASE) STAGE III (HCC): Primary | ICD-10-CM

## 2025-08-19 DIAGNOSIS — N18.32 STAGE 3B CHRONIC KIDNEY DISEASE (HCC): ICD-10-CM

## 2025-08-19 DIAGNOSIS — I10 PRIMARY HYPERTENSION: ICD-10-CM

## 2025-08-19 DIAGNOSIS — N18.30 BENIGN HYPERTENSION WITH CKD (CHRONIC KIDNEY DISEASE) STAGE III (HCC): Primary | ICD-10-CM

## 2025-08-19 PROCEDURE — G2211 COMPLEX E/M VISIT ADD ON: HCPCS | Performed by: INTERNAL MEDICINE

## 2025-08-19 PROCEDURE — 99214 OFFICE O/P EST MOD 30 MIN: CPT | Performed by: INTERNAL MEDICINE

## (undated) DEVICE — INTENDED FOR TISSUE SEPARATION, AND OTHER PROCEDURES THAT REQUIRE A SHARP SURGICAL BLADE TO PUNCTURE OR CUT.: Brand: BARD-PARKER SAFETY BLADES SIZE 10, STERILE

## (undated) DEVICE — INSTRUMENT POUCH: Brand: CONVERTORS

## (undated) DEVICE — GLOVE SRG BIOGEL 7.5

## (undated) DEVICE — SUT VICRYL 4-0 PS-2 18 IN J496G

## (undated) DEVICE — ASTOUND STANDARD SURGICAL GOWN, XL: Brand: CONVERTORS

## (undated) DEVICE — DUAL CUT SAGITTAL BLADE

## (undated) DEVICE — SUT VICRYL 2-0 CT-1 27 IN J259H

## (undated) DEVICE — 3M™ COBAN™ NL STERILE NON-LATEX SELF-ADHERENT WRAP, 2084S, 4 IN X 5 YD (10 CM X 4,5 M), 18 ROLLS/CASE: Brand: 3M™ COBAN™

## (undated) DEVICE — BASIC DOUBLE BASIN 2-LF: Brand: MEDLINE INDUSTRIES, INC.

## (undated) DEVICE — INTENDED FOR TISSUE SEPARATION, AND OTHER PROCEDURES THAT REQUIRE A SHARP SURGICAL BLADE TO PUNCTURE OR CUT.: Brand: BARD-PARKER SAFETY BLADES SIZE 15, STERILE

## (undated) DEVICE — POSITIONER HANA TABLE PACK

## (undated) DEVICE — SUT ETHIBOND 2 V-37 30 IN MX69G

## (undated) DEVICE — SUT VICRYL PLUS 0 CTX 27 IN VCP365H

## (undated) DEVICE — 3M™ IOBAN™ 2 ANTIMICROBIAL INCISE DRAPE 6651EZ: Brand: IOBAN™ 2

## (undated) DEVICE — CAPIT HIP MOP -METAL ON POLY

## (undated) DEVICE — ORTHOPEDIC PACK: Brand: CARDINAL HEALTH

## (undated) DEVICE — HOOD: Brand: FLYTE, SURGICOOL

## (undated) DEVICE — 3M™ STERI-DRAPE™ U-DRAPE 1015: Brand: STERI-DRAPE™

## (undated) DEVICE — CHLORAPREP HI-LITE 26ML ORANGE

## (undated) DEVICE — DRESSING MEPILEX AG BORDER 4 X 12 IN

## (undated) DEVICE — PAD CAST 4 IN COTTON NON STERILE

## (undated) DEVICE — ARTHROSCOPY FLOOR MAT

## (undated) DEVICE — DRAPE C-ARM X-RAY

## (undated) DEVICE — GLOVE INDICATOR PI UNDERGLOVE SZ 8 BLUE

## (undated) DEVICE — THE SIMPULSE SOLO SYSTEM WITH ULTREX RETRACTABLE SPLASH SHIELD TIP: Brand: SIMPULSE SOLO